# Patient Record
Sex: MALE | Race: WHITE | HISPANIC OR LATINO | URBAN - METROPOLITAN AREA
[De-identification: names, ages, dates, MRNs, and addresses within clinical notes are randomized per-mention and may not be internally consistent; named-entity substitution may affect disease eponyms.]

---

## 2021-06-27 ENCOUNTER — EMERGENCY (EMERGENCY)
Facility: HOSPITAL | Age: 71
LOS: 1 days | Discharge: HOME | End: 2021-06-27
Attending: EMERGENCY MEDICINE
Payer: MEDICARE

## 2021-06-27 VITALS
OXYGEN SATURATION: 96 % | TEMPERATURE: 99 F | WEIGHT: 169.54 LBS | DIASTOLIC BLOOD PRESSURE: 78 MMHG | SYSTOLIC BLOOD PRESSURE: 133 MMHG | RESPIRATION RATE: 16 BRPM | HEART RATE: 78 BPM | HEIGHT: 64 IN

## 2021-06-27 VITALS
HEART RATE: 72 BPM | TEMPERATURE: 99 F | DIASTOLIC BLOOD PRESSURE: 86 MMHG | OXYGEN SATURATION: 96 % | SYSTOLIC BLOOD PRESSURE: 149 MMHG | RESPIRATION RATE: 16 BRPM

## 2021-06-27 LAB
ALBUMIN SERPL ELPH-MCNC: 4 G/DL — SIGNIFICANT CHANGE UP (ref 3.3–5.2)
ALP SERPL-CCNC: 79 U/L — SIGNIFICANT CHANGE UP (ref 40–120)
ALT FLD-CCNC: 21 U/L — SIGNIFICANT CHANGE UP
ANION GAP SERPL CALC-SCNC: 9 MMOL/L — SIGNIFICANT CHANGE UP (ref 5–17)
APTT BLD: 30.7 SEC — SIGNIFICANT CHANGE UP (ref 27.5–35.5)
AST SERPL-CCNC: 23 U/L — SIGNIFICANT CHANGE UP
BASE EXCESS BLDV CALC-SCNC: 4.2 MMOL/L — HIGH (ref -2–3)
BASOPHILS # BLD AUTO: 0.05 K/UL — SIGNIFICANT CHANGE UP (ref 0–0.2)
BASOPHILS NFR BLD AUTO: 0.6 % — SIGNIFICANT CHANGE UP (ref 0–2)
BILIRUB SERPL-MCNC: 0.5 MG/DL — SIGNIFICANT CHANGE UP (ref 0.4–2)
BLD GP AB SCN SERPL QL: SIGNIFICANT CHANGE UP
BUN SERPL-MCNC: 10.3 MG/DL — SIGNIFICANT CHANGE UP (ref 8–20)
CALCIUM SERPL-MCNC: 9.1 MG/DL — SIGNIFICANT CHANGE UP (ref 8.6–10.2)
CHLORIDE SERPL-SCNC: 103 MMOL/L — SIGNIFICANT CHANGE UP (ref 98–107)
CO2 SERPL-SCNC: 28 MMOL/L — SIGNIFICANT CHANGE UP (ref 22–29)
CREAT SERPL-MCNC: 0.82 MG/DL — SIGNIFICANT CHANGE UP (ref 0.5–1.3)
EOSINOPHIL # BLD AUTO: 0.23 K/UL — SIGNIFICANT CHANGE UP (ref 0–0.5)
EOSINOPHIL NFR BLD AUTO: 2.9 % — SIGNIFICANT CHANGE UP (ref 0–6)
GLUCOSE SERPL-MCNC: 158 MG/DL — HIGH (ref 70–99)
HCO3 BLDV-SCNC: 30 MMOL/L — HIGH (ref 22–29)
HCT VFR BLD CALC: 43.8 % — SIGNIFICANT CHANGE UP (ref 39–50)
HGB BLD-MCNC: 14.5 G/DL — SIGNIFICANT CHANGE UP (ref 13–17)
IMM GRANULOCYTES NFR BLD AUTO: 0.4 % — SIGNIFICANT CHANGE UP (ref 0–1.5)
INR BLD: 1.24 RATIO — HIGH (ref 0.88–1.16)
LYMPHOCYTES # BLD AUTO: 2.25 K/UL — SIGNIFICANT CHANGE UP (ref 1–3.3)
LYMPHOCYTES # BLD AUTO: 28.8 % — SIGNIFICANT CHANGE UP (ref 13–44)
MCHC RBC-ENTMCNC: 29.7 PG — SIGNIFICANT CHANGE UP (ref 27–34)
MCHC RBC-ENTMCNC: 33.1 GM/DL — SIGNIFICANT CHANGE UP (ref 32–36)
MCV RBC AUTO: 89.8 FL — SIGNIFICANT CHANGE UP (ref 80–100)
MONOCYTES # BLD AUTO: 0.72 K/UL — SIGNIFICANT CHANGE UP (ref 0–0.9)
MONOCYTES NFR BLD AUTO: 9.2 % — SIGNIFICANT CHANGE UP (ref 2–14)
NEUTROPHILS # BLD AUTO: 4.53 K/UL — SIGNIFICANT CHANGE UP (ref 1.8–7.4)
NEUTROPHILS NFR BLD AUTO: 58.1 % — SIGNIFICANT CHANGE UP (ref 43–77)
PCO2 BLDV: 54 MMHG — SIGNIFICANT CHANGE UP (ref 42–55)
PH BLDV: 7.35 — SIGNIFICANT CHANGE UP (ref 7.32–7.43)
PLATELET # BLD AUTO: 307 K/UL — SIGNIFICANT CHANGE UP (ref 150–400)
PO2 BLDV: 54 MMHG — HIGH (ref 25–45)
POTASSIUM SERPL-MCNC: 3.8 MMOL/L — SIGNIFICANT CHANGE UP (ref 3.5–5.3)
POTASSIUM SERPL-SCNC: 3.8 MMOL/L — SIGNIFICANT CHANGE UP (ref 3.5–5.3)
PROT SERPL-MCNC: 7.2 G/DL — SIGNIFICANT CHANGE UP (ref 6.6–8.7)
PROTHROM AB SERPL-ACNC: 14.2 SEC — HIGH (ref 10.6–13.6)
RBC # BLD: 4.88 M/UL — SIGNIFICANT CHANGE UP (ref 4.2–5.8)
RBC # FLD: 12.5 % — SIGNIFICANT CHANGE UP (ref 10.3–14.5)
SAO2 % BLDV: 86.3 % — SIGNIFICANT CHANGE UP
SODIUM SERPL-SCNC: 140 MMOL/L — SIGNIFICANT CHANGE UP (ref 135–145)
TROPONIN T SERPL-MCNC: <0.01 NG/ML — SIGNIFICANT CHANGE UP (ref 0–0.06)
WBC # BLD: 7.81 K/UL — SIGNIFICANT CHANGE UP (ref 3.8–10.5)
WBC # FLD AUTO: 7.81 K/UL — SIGNIFICANT CHANGE UP (ref 3.8–10.5)

## 2021-06-27 PROCEDURE — 99220: CPT

## 2021-06-27 PROCEDURE — 93005 ELECTROCARDIOGRAM TRACING: CPT

## 2021-06-27 PROCEDURE — 86900 BLOOD TYPING SEROLOGIC ABO: CPT

## 2021-06-27 PROCEDURE — 82803 BLOOD GASES ANY COMBINATION: CPT

## 2021-06-27 PROCEDURE — 70551 MRI BRAIN STEM W/O DYE: CPT

## 2021-06-27 PROCEDURE — 0042T: CPT

## 2021-06-27 PROCEDURE — 70496 CT ANGIOGRAPHY HEAD: CPT

## 2021-06-27 PROCEDURE — 85610 PROTHROMBIN TIME: CPT

## 2021-06-27 PROCEDURE — 99291 CRITICAL CARE FIRST HOUR: CPT | Mod: 25

## 2021-06-27 PROCEDURE — 93010 ELECTROCARDIOGRAM REPORT: CPT

## 2021-06-27 PROCEDURE — 82962 GLUCOSE BLOOD TEST: CPT

## 2021-06-27 PROCEDURE — 85025 COMPLETE CBC W/AUTO DIFF WBC: CPT

## 2021-06-27 PROCEDURE — 85730 THROMBOPLASTIN TIME PARTIAL: CPT

## 2021-06-27 PROCEDURE — 70498 CT ANGIOGRAPHY NECK: CPT | Mod: 26,MA

## 2021-06-27 PROCEDURE — 70450 CT HEAD/BRAIN W/O DYE: CPT | Mod: 26,59,MA

## 2021-06-27 PROCEDURE — 86901 BLOOD TYPING SEROLOGIC RH(D): CPT

## 2021-06-27 PROCEDURE — 70450 CT HEAD/BRAIN W/O DYE: CPT

## 2021-06-27 PROCEDURE — 70551 MRI BRAIN STEM W/O DYE: CPT | Mod: 26,MA

## 2021-06-27 PROCEDURE — 86850 RBC ANTIBODY SCREEN: CPT

## 2021-06-27 PROCEDURE — 36415 COLL VENOUS BLD VENIPUNCTURE: CPT

## 2021-06-27 PROCEDURE — 71045 X-RAY EXAM CHEST 1 VIEW: CPT | Mod: 26

## 2021-06-27 PROCEDURE — G0378: CPT

## 2021-06-27 PROCEDURE — 71045 X-RAY EXAM CHEST 1 VIEW: CPT

## 2021-06-27 PROCEDURE — 70496 CT ANGIOGRAPHY HEAD: CPT | Mod: 26,MA

## 2021-06-27 PROCEDURE — 80053 COMPREHEN METABOLIC PANEL: CPT

## 2021-06-27 PROCEDURE — 84484 ASSAY OF TROPONIN QUANT: CPT

## 2021-06-27 PROCEDURE — 70498 CT ANGIOGRAPHY NECK: CPT

## 2021-06-27 RX ORDER — ATORVASTATIN CALCIUM 80 MG/1
20 TABLET, FILM COATED ORAL AT BEDTIME
Refills: 0 | Status: DISCONTINUED | OUTPATIENT
Start: 2021-06-27 | End: 2021-07-02

## 2021-06-27 NOTE — ED ADULT NURSE NOTE - OBJECTIVE STATEMENT
pt A&OX4, states that around 10am pt was outside gardening and a neighbors was talking to him and he was unable to respond pt then went inside and had difficulty talking with son episode lasted a couple minutes and then resolved, resp even and unlabored no distress noted, abd soft NTND, pt able to ambulate and move all extems without difficulty

## 2021-06-27 NOTE — ED CDU PROVIDER DISPOSITION NOTE - CARE PROVIDER_API CALL
Aldair Kelly; PhD)  Neurology; Vascular Neurology  [address] [place] [address]  Phone: [phone]  Fax: [phone]  Follow Up Time:

## 2021-06-27 NOTE — ED ADULT NURSE REASSESSMENT NOTE - GLASGOW COMA SCALE: EYE OPENING
Use steroid cream twice a day for 1 week and call if no improvement   PAST MEDICAL HISTORY:  CAD (coronary artery disease) s/p stents 2017 at [facility]?    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

## 2021-06-27 NOTE — ED PROVIDER NOTE - ATTENDING CONTRIBUTION TO CARE
71yoM; with pmh signif for DM, HLD; now p/w expressive aphasia.  patient LKW 10am. states he was out in the garden and someone said hello on the street, but patient was unable to respond although he knew the words he wanted to state. patient states he went into the home and again experienced expressive aphasia when attempting to speak to his son. states he had 3 similar episodes.  states he felt heaviness of the tongue at that time. denies headache. denies numbness/tingling. denies any other focal weakness.  Gen: Alert, NAD  Head: NC, AT, PERRL, EOMI, normal lids/conjunctiva  Neck: +supple, no tenderness/meningismus/JVD, +Trachea midline  Pulm: Bilateral BS, normal resp effort, no wheeze/stridor/retractions  CV: RRR, no M/R/G, 2+dist pulses  Abd: soft, NT/ND, +BS, no hepatosplenomegaly  Mskel: ROM intact x4 extremities.  no edema/erythema/cyanosis  Skin: no rash, warm, dry  Neuro: AAOx3, no sensory/motor deficits, CN 2-12 intact, NIHSS = 0  A/P:  71yoM p/w expressive aphasia, LKW = 10am  -code stroke activated, ct head and angio negative for acute findings, will place in obs for mri

## 2021-06-27 NOTE — ED ADULT TRIAGE NOTE - CHIEF COMPLAINT QUOTE
daughter reports pt is having intermittent trouble finding his words with the inability to speak at all and reporting that he feels like he is going to pass out when it happens. first episode happened at 10am then resolved. last known well 1:30pm before starting again.

## 2021-06-27 NOTE — ED CDU PROVIDER INITIAL DAY NOTE - MEDICAL DECISION MAKING DETAILS
72 y/o M with expressive aphasia that resolved PTA and has not recurred, workup negative so far, MRI pending

## 2021-06-27 NOTE — ED CDU PROVIDER INITIAL DAY NOTE - NS ED ROS FT
General: no fever or chills  Eyes: no redness, discharge,  no visual disturbances  ENT: no nasal congestion, sore throat  Cardiac: No chest pain, palpitations, peripheral edema  Respiratory: No cough, CHRISTIAN, SOB or wheeze  GI: No abdominal pain, N/V/D  : no dysuria, hematuria  Musculoskeletal: no joint pain, no back or neck pain  Neuro: No headache or dizziness  Skin: no itch or rash

## 2021-06-27 NOTE — ED CDU PROVIDER DISPOSITION NOTE - CLINICAL COURSE
pt is a 72 y/o male with a pmhx of DM, HLD presenting to the ed for expressive aphasia that resolved PTA, and has not recurred when in the ed, pt placed in observation for MR head - negative, pt to follow up with neurology, aspirin daily pt explained dc instructions

## 2021-06-27 NOTE — ED ADULT NURSE REASSESSMENT NOTE - NS ED NURSE REASSESS COMMENT FT1
Assumed care of patient from previous RN.  Patient back from MRI, cardiac monitoring continued.  Patients daughter present at bedside.  Patient offers no complaints at this time.  Patient awaiting for MRI results at this time.  VSS.  Safety maintained.
Report given to IRENE Cantor. Pt POC explained and verbalized understanding. Pt placed on obs awaiting MRI.
Assumed care of patient @ 1600.  Pt A&Ox4. Denies any c/o pain or discomfort at this time. Cardiac monitor in place, normal sinus rhythm. Patient in understanding of plan of care. Patient with no further questions for the RN. Resting in comfort. Call bell within reach and encouraged to use when assistance needed. Will continue to monitor.

## 2021-06-27 NOTE — ED CDU PROVIDER INITIAL DAY NOTE - OBJECTIVE STATEMENT
Pt is a 70 y/o M w/PMHx DM, HLD presents c/o expressive aphasia.  Pt states that he was outside gardening and a passer by said hello to him and he was unable to respond.  He went inside and was also having difficulty talking to his son.  The episode started around 10 am and lasted several minutes before resolving.  He states he felt his tongue was heavy, and was unable to speak at all.  Pt states he has not had these symptoms before, and feels that everything has returned to normal for him.  Pt denies headache, fever, chest pain, cob, abdominal pain.  NO PSH  Smoking: never  CT scan, CTA's and perfusion negative for acute pathology

## 2021-06-27 NOTE — ED CDU PROVIDER DISPOSITION NOTE - PATIENT PORTAL LINK FT
You can access the FollowMyHealth Patient Portal offered by [facility] by registering at the following website: [email or web address]. By joining [employer]’s FollowMyHealth portal, you will also be able to view your health information using other applications (apps) compatible with our system.

## 2021-06-27 NOTE — ED CDU PROVIDER INITIAL DAY NOTE - PHYSICAL EXAMINATION
Constitutional: Awake and alert, in NAD  Eyes: clear bilaterally  Cardiac: S1S2, RR, no murmur  Resp: CTA/BL, no use of accessory muscles  GI: +BS x 4, soft, NTTP  MSK:  no bony deformity, no limited ROM  Neuro: A&Ox3, CN II-XI GI, THOMAS x4, 5/5 motors throughout, = sensation, no pronator drift  Skin: warm and dry

## 2021-06-27 NOTE — ED PROVIDER NOTE - OBJECTIVE STATEMENT
Pt is a 72 y/o M w/PMHx DM, HLD presents c/o expressive aphasia.  Pt states that he was outside gardening and a passer by said hello to him and he was unable to respond.  He went inside and was also having difficulty talking to his son.  The episode started around 10 am and lasted several minutes before resolving.  He states he felt his tongue was heavy, and was unable to speak at all.  Pt states he has not had these symptoms before, and feels that everything has returned to normal for him.  Pt denies headache, fever, chest pain, cob, abdominal pain.

## 2021-06-27 NOTE — ED PROVIDER NOTE - PHYSICAL EXAMINATION
General: well appearing, interactive, well nourished, NAD  HEENT: pupils equal and reactive, normal external ears bilaterally   Cardiac: RRR, no MRG appreciated  Resp: lungs clear to auscultation bilaterally, symmetric chest wall rise  Abd: soft, nontender, nondistended,   : no CVA tenderness  Neuro: Moving all extremities, no focal neurological deficits, normal gait  Skin:  normal color for race

## 2021-06-27 NOTE — ED CDU PROVIDER INITIAL DAY NOTE - ATTENDING CONTRIBUTION TO CARE
I, Estela Ponce MD have personally performed a face to face diagnostic evaluation on this patient.  I have reviewed the ACP note and agree with the history, exam, and plan of care, except as noted.     Exam:  Head: atraumatic, normacephalic  Face: atraumatic, no crepitus no orbiral/maxillary/mandibular ttp  throat: uvula midline no exudates  eyes: perrla eomi  heart: rrr s1s2  lungs: ctab  abd: soft, nt nd +bs no rebound/guarding no cva ttp  skin: warm  LE: no swelling, no calf ttp  back: no midline cervical/thoracic/lumbar ttp  neuro: aao x 3 nih 0 cn iii-xii intact normal finger to nose strenth  5/5 in upper and lower    in obs for tia/cva work up; pending mri no symptoms at this time

## 2021-06-27 NOTE — ED ADULT NURSE REASSESSMENT NOTE - COMFORT CARE
No
meal provided/plan of care explained/wait time explained/warm blanket provided
Attending Attestation (For Attendings USE Only)...

## 2021-07-08 ENCOUNTER — INPATIENT (INPATIENT)
Facility: HOSPITAL | Age: 71
LOS: 5 days | Discharge: HOME | DRG: 872 | End: 2021-07-14
Attending: HOSPITALIST | Admitting: HOSPITALIST
Payer: MEDICARE

## 2021-07-08 VITALS
WEIGHT: 167.99 LBS | TEMPERATURE: 100 F | OXYGEN SATURATION: 96 % | RESPIRATION RATE: 20 BRPM | SYSTOLIC BLOOD PRESSURE: 105 MMHG | DIASTOLIC BLOOD PRESSURE: 70 MMHG | HEIGHT: 64 IN | HEART RATE: 112 BPM

## 2021-07-08 DIAGNOSIS — A41.9 SEPSIS, UNSPECIFIED ORGANISM: ICD-10-CM

## 2021-07-08 LAB
ANION GAP SERPL CALC-SCNC: 13 MMOL/L — SIGNIFICANT CHANGE UP (ref 5–17)
APPEARANCE UR: CLEAR — SIGNIFICANT CHANGE UP
BACTERIA # UR AUTO: NEGATIVE — SIGNIFICANT CHANGE UP
BASOPHILS # BLD AUTO: 0.03 K/UL — SIGNIFICANT CHANGE UP (ref 0–0.2)
BASOPHILS NFR BLD AUTO: 0.2 % — SIGNIFICANT CHANGE UP (ref 0–2)
BILIRUB UR-MCNC: NEGATIVE — SIGNIFICANT CHANGE UP
BUN SERPL-MCNC: 15.1 MG/DL — SIGNIFICANT CHANGE UP (ref 8–20)
CALCIUM SERPL-MCNC: 9.7 MG/DL — SIGNIFICANT CHANGE UP (ref 8.6–10.2)
CHLORIDE SERPL-SCNC: 95 MMOL/L — LOW (ref 98–107)
CO2 SERPL-SCNC: 28 MMOL/L — SIGNIFICANT CHANGE UP (ref 22–29)
COLOR SPEC: YELLOW — SIGNIFICANT CHANGE UP
CREAT SERPL-MCNC: 0.79 MG/DL — SIGNIFICANT CHANGE UP (ref 0.5–1.3)
DIFF PNL FLD: ABNORMAL
EOSINOPHIL # BLD AUTO: 0.04 K/UL — SIGNIFICANT CHANGE UP (ref 0–0.5)
EOSINOPHIL NFR BLD AUTO: 0.3 % — SIGNIFICANT CHANGE UP (ref 0–6)
EPI CELLS # UR: NEGATIVE — SIGNIFICANT CHANGE UP
GLUCOSE BLDC GLUCOMTR-MCNC: 121 MG/DL — HIGH (ref 70–99)
GLUCOSE SERPL-MCNC: 236 MG/DL — HIGH (ref 70–99)
GLUCOSE UR QL: 1000 MG/DL
HCT VFR BLD CALC: 44.1 % — SIGNIFICANT CHANGE UP (ref 39–50)
HGB BLD-MCNC: 14.9 G/DL — SIGNIFICANT CHANGE UP (ref 13–17)
IMM GRANULOCYTES NFR BLD AUTO: 0.6 % — SIGNIFICANT CHANGE UP (ref 0–1.5)
KETONES UR-MCNC: NEGATIVE — SIGNIFICANT CHANGE UP
LACTATE BLDV-MCNC: 1.2 MMOL/L — SIGNIFICANT CHANGE UP (ref 0.5–2)
LACTATE BLDV-MCNC: 2.5 MMOL/L — HIGH (ref 0.5–2)
LEUKOCYTE ESTERASE UR-ACNC: ABNORMAL
LYMPHOCYTES # BLD AUTO: 1.27 K/UL — SIGNIFICANT CHANGE UP (ref 1–3.3)
LYMPHOCYTES # BLD AUTO: 9.4 % — LOW (ref 13–44)
MCHC RBC-ENTMCNC: 29.4 PG — SIGNIFICANT CHANGE UP (ref 27–34)
MCHC RBC-ENTMCNC: 33.8 GM/DL — SIGNIFICANT CHANGE UP (ref 32–36)
MCV RBC AUTO: 87.2 FL — SIGNIFICANT CHANGE UP (ref 80–100)
MONOCYTES # BLD AUTO: 1.08 K/UL — HIGH (ref 0–0.9)
MONOCYTES NFR BLD AUTO: 8 % — SIGNIFICANT CHANGE UP (ref 2–14)
NEUTROPHILS # BLD AUTO: 11.01 K/UL — HIGH (ref 1.8–7.4)
NEUTROPHILS NFR BLD AUTO: 81.5 % — HIGH (ref 43–77)
NITRITE UR-MCNC: NEGATIVE — SIGNIFICANT CHANGE UP
PH UR: 8 — SIGNIFICANT CHANGE UP (ref 5–8)
PLATELET # BLD AUTO: 181 K/UL — SIGNIFICANT CHANGE UP (ref 150–400)
POTASSIUM SERPL-MCNC: 3.7 MMOL/L — SIGNIFICANT CHANGE UP (ref 3.5–5.3)
POTASSIUM SERPL-SCNC: 3.7 MMOL/L — SIGNIFICANT CHANGE UP (ref 3.5–5.3)
PROCALCITONIN SERPL-MCNC: 0.25 NG/ML — HIGH (ref 0.02–0.1)
PROT UR-MCNC: NEGATIVE MG/DL — SIGNIFICANT CHANGE UP
RAPID RVP RESULT: SIGNIFICANT CHANGE UP
RBC # BLD: 5.06 M/UL — SIGNIFICANT CHANGE UP (ref 4.2–5.8)
RBC # FLD: 12.9 % — SIGNIFICANT CHANGE UP (ref 10.3–14.5)
RBC CASTS # UR COMP ASSIST: SIGNIFICANT CHANGE UP /HPF (ref 0–4)
SARS-COV-2 RNA SPEC QL NAA+PROBE: SIGNIFICANT CHANGE UP
SODIUM SERPL-SCNC: 136 MMOL/L — SIGNIFICANT CHANGE UP (ref 135–145)
SP GR SPEC: 1.01 — SIGNIFICANT CHANGE UP (ref 1.01–1.02)
UROBILINOGEN FLD QL: NEGATIVE MG/DL — SIGNIFICANT CHANGE UP
WBC # BLD: 13.51 K/UL — HIGH (ref 3.8–10.5)
WBC # FLD AUTO: 13.51 K/UL — HIGH (ref 3.8–10.5)
WBC UR QL: SIGNIFICANT CHANGE UP

## 2021-07-08 PROCEDURE — 99223 1ST HOSP IP/OBS HIGH 75: CPT

## 2021-07-08 PROCEDURE — 71250 CT THORAX DX C-: CPT | Mod: 26

## 2021-07-08 PROCEDURE — 93010 ELECTROCARDIOGRAM REPORT: CPT

## 2021-07-08 PROCEDURE — 99285 EMERGENCY DEPT VISIT HI MDM: CPT

## 2021-07-08 RX ORDER — INSULIN LISPRO 100/ML
VIAL (ML) SUBCUTANEOUS AT BEDTIME
Refills: 0 | Status: DISCONTINUED | OUTPATIENT
Start: 2021-07-08 | End: 2021-07-14

## 2021-07-08 RX ORDER — ASPIRIN/CALCIUM CARB/MAGNESIUM 324 MG
1 TABLET ORAL
Qty: 0 | Refills: 0 | DISCHARGE

## 2021-07-08 RX ORDER — GLUCAGON INJECTION, SOLUTION 0.5 MG/.1ML
1 INJECTION, SOLUTION SUBCUTANEOUS ONCE
Refills: 0 | Status: DISCONTINUED | OUTPATIENT
Start: 2021-07-08 | End: 2021-07-14

## 2021-07-08 RX ORDER — DEXTROSE 50 % IN WATER 50 %
25 SYRINGE (ML) INTRAVENOUS ONCE
Refills: 0 | Status: DISCONTINUED | OUTPATIENT
Start: 2021-07-08 | End: 2021-07-14

## 2021-07-08 RX ORDER — SODIUM CHLORIDE 9 MG/ML
1000 INJECTION INTRAMUSCULAR; INTRAVENOUS; SUBCUTANEOUS ONCE
Refills: 0 | Status: COMPLETED | OUTPATIENT
Start: 2021-07-08 | End: 2021-07-08

## 2021-07-08 RX ORDER — ASPIRIN/CALCIUM CARB/MAGNESIUM 324 MG
81 TABLET ORAL DAILY
Refills: 0 | Status: DISCONTINUED | OUTPATIENT
Start: 2021-07-08 | End: 2021-07-14

## 2021-07-08 RX ORDER — DEXTROSE 50 % IN WATER 50 %
12.5 SYRINGE (ML) INTRAVENOUS ONCE
Refills: 0 | Status: DISCONTINUED | OUTPATIENT
Start: 2021-07-08 | End: 2021-07-14

## 2021-07-08 RX ORDER — ATORVASTATIN CALCIUM 80 MG/1
20 TABLET, FILM COATED ORAL AT BEDTIME
Refills: 0 | Status: DISCONTINUED | OUTPATIENT
Start: 2021-07-08 | End: 2021-07-14

## 2021-07-08 RX ORDER — DEXTROSE 50 % IN WATER 50 %
15 SYRINGE (ML) INTRAVENOUS ONCE
Refills: 0 | Status: DISCONTINUED | OUTPATIENT
Start: 2021-07-08 | End: 2021-07-14

## 2021-07-08 RX ORDER — SODIUM CHLORIDE 9 MG/ML
1000 INJECTION, SOLUTION INTRAVENOUS
Refills: 0 | Status: DISCONTINUED | OUTPATIENT
Start: 2021-07-08 | End: 2021-07-14

## 2021-07-08 RX ORDER — ENOXAPARIN SODIUM 100 MG/ML
40 INJECTION SUBCUTANEOUS DAILY
Refills: 0 | Status: DISCONTINUED | OUTPATIENT
Start: 2021-07-08 | End: 2021-07-14

## 2021-07-08 RX ORDER — ACETAMINOPHEN 500 MG
975 TABLET ORAL ONCE
Refills: 0 | Status: COMPLETED | OUTPATIENT
Start: 2021-07-08 | End: 2021-07-08

## 2021-07-08 RX ORDER — SODIUM CHLORIDE 9 MG/ML
1000 INJECTION, SOLUTION INTRAVENOUS
Refills: 0 | Status: DISCONTINUED | OUTPATIENT
Start: 2021-07-08 | End: 2021-07-12

## 2021-07-08 RX ORDER — PIPERACILLIN AND TAZOBACTAM 4; .5 G/20ML; G/20ML
3.38 INJECTION, POWDER, LYOPHILIZED, FOR SOLUTION INTRAVENOUS ONCE
Refills: 0 | Status: COMPLETED | OUTPATIENT
Start: 2021-07-08 | End: 2021-07-08

## 2021-07-08 RX ORDER — ATORVASTATIN CALCIUM 80 MG/1
1 TABLET, FILM COATED ORAL
Qty: 0 | Refills: 0 | DISCHARGE

## 2021-07-08 RX ORDER — PIPERACILLIN AND TAZOBACTAM 4; .5 G/20ML; G/20ML
3.38 INJECTION, POWDER, LYOPHILIZED, FOR SOLUTION INTRAVENOUS EVERY 8 HOURS
Refills: 0 | Status: DISCONTINUED | OUTPATIENT
Start: 2021-07-08 | End: 2021-07-10

## 2021-07-08 RX ORDER — INSULIN LISPRO 100/ML
VIAL (ML) SUBCUTANEOUS
Refills: 0 | Status: DISCONTINUED | OUTPATIENT
Start: 2021-07-08 | End: 2021-07-14

## 2021-07-08 RX ADMIN — ENOXAPARIN SODIUM 40 MILLIGRAM(S): 100 INJECTION SUBCUTANEOUS at 17:10

## 2021-07-08 RX ADMIN — Medication 975 MILLIGRAM(S): at 16:51

## 2021-07-08 RX ADMIN — PIPERACILLIN AND TAZOBACTAM 200 GRAM(S): 4; .5 INJECTION, POWDER, LYOPHILIZED, FOR SOLUTION INTRAVENOUS at 16:06

## 2021-07-08 RX ADMIN — SODIUM CHLORIDE 1000 MILLILITER(S): 9 INJECTION INTRAMUSCULAR; INTRAVENOUS; SUBCUTANEOUS at 16:40

## 2021-07-08 RX ADMIN — PIPERACILLIN AND TAZOBACTAM 25 GRAM(S): 4; .5 INJECTION, POWDER, LYOPHILIZED, FOR SOLUTION INTRAVENOUS at 21:58

## 2021-07-08 RX ADMIN — SODIUM CHLORIDE 75 MILLILITER(S): 9 INJECTION, SOLUTION INTRAVENOUS at 17:10

## 2021-07-08 RX ADMIN — SODIUM CHLORIDE 1000 MILLILITER(S): 9 INJECTION INTRAMUSCULAR; INTRAVENOUS; SUBCUTANEOUS at 14:58

## 2021-07-08 RX ADMIN — SODIUM CHLORIDE 1000 MILLILITER(S): 9 INJECTION INTRAMUSCULAR; INTRAVENOUS; SUBCUTANEOUS at 15:58

## 2021-07-08 RX ADMIN — PIPERACILLIN AND TAZOBACTAM 3.38 GRAM(S): 4; .5 INJECTION, POWDER, LYOPHILIZED, FOR SOLUTION INTRAVENOUS at 16:36

## 2021-07-08 RX ADMIN — Medication 975 MILLIGRAM(S): at 15:10

## 2021-07-08 NOTE — ED PROVIDER NOTE - CLINICAL SUMMARY MEDICAL DECISION MAKING FREE TEXT BOX
Patient presenting with persistent dysuria for 2 weeks. Patient evaluated for stroke with negative workup 2 weeks ago. Switched to Cefuroxime 1 week ago but with continued dysuria. Borderline febrile here.     Lab evaluation showing leukocytosis and lactic acidosis. With persistent symptoms for dysuria will move to admit.

## 2021-07-08 NOTE — H&P ADULT - NSICDXPASTMEDICALHX_GEN_ALL_CORE_FT
PAST MEDICAL HISTORY:  AMI (Acute Myocardial Infarction) 2001    Transient ischemic attack (TIA)

## 2021-07-08 NOTE — H&P ADULT - NSHPREVIEWOFSYSTEMS_GEN_ALL_CORE
Review of Systems:  CONSTITUTIONAL: +weakness, fevers and chills   EYES/ENT: No visual changes;  No vertigo or throat pain   NECK: No pain or stiffness  RESPIRATORY: No cough, wheezing, hemoptysis; No shortness of breath,   CARDIOVASCULAR: No chest pain or palpitations  GASTROINTESTINAL: No abdominal or epigastric pain. No nausea, vomiting, or hematemesis; No diarrhea or constipation.   GENITOURINARY: +dysuria  NEUROLOGICAL: No numbness or weakness  SKIN: No itching, burning, rashes, or lesions

## 2021-07-08 NOTE — ED PROVIDER NOTE - NS ED ROS FT
General: Denies fever, chills  HEENT: Denies visual changes, sore throat  Neck: Denies neck pain, neck stiffness  Resp: Denies coughing, SOB  Cardiovascular: Denies CP, palpitations, LE edema  GI: Denies abdominal pain, nausea, vomiting  : Endorses dysuria  MSK: Denies back pain  Neuro: Denies HA, dizziness, numbness  Skin: Denies rashes

## 2021-07-08 NOTE — ED PROVIDER NOTE - PHYSICAL EXAMINATION
General: Well appearing male in no acute distress  HEENT: Normocephalic, atraumatic. Moist mucous membranes.   Eyes: No scleral icterus. No conjunctival pallor. EOMI. CHAPARRITA.  Neck: Soft and supple. Full ROM without pain. No midline tenderness  Cardiac: Regular rate and regular rhythm. No murmurs  Resp: Lungs CTAB. No wheezes, rales or rhonchi.  Abd: Soft, non-tender, non-distended. No guarding or rebound.   Back: No CVA tenderness.    Skin: No rashes, abrasions, or lacerations.

## 2021-07-08 NOTE — ED ADULT TRIAGE NOTE - CHIEF COMPLAINT QUOTE
Patient BIBA to ED today from home with c/o fevers, never took a home temperature.  Patient being treated over the past 4 days for UTI.

## 2021-07-08 NOTE — ED ADULT NURSE NOTE - OBJECTIVE STATEMENT
Assumed care at 1318 pt in no apparent distress, co dizziness, weakness, fevers, burning on urination for about 2 weeks, pt denies any falls, blood in urine, N.V, Assumed care at 1318 pt in no apparent distress, co dizziness, weakness, fevers, burning on urination for about 2 weeks, pt denies any falls, blood in urine, N.V, pt recently diagnosed with UTI has been getting treatment for UTI.

## 2021-07-08 NOTE — H&P ADULT - ASSESSMENT
71 year old male with pmh of tia, hld, previous mi (2001) coming to hospital with complaints of fever, dysuria >1 week. per patient was at hospital 6/27 for stroke workup given confused (later resolved) and weak however nothing found on mri. states however dysuria worsening and now coming to hospital. states no cp abdominal pain. states outpatient was given cefuroxime 500bid which did not help. on arrival to ed found to be febrile, tachycardiac and with elevated white blood cell count    #sepsis- present on admission   - 2/2 uti vs diabetes (glycosuria) other etiology  - check ct a/p and chest   - check procal   - monitor cultures  - zosyn for now   - ID consult pending   - check tsh a1c and lipids would suspect patient with uncontrolled diabetes given glycosuria    #glycosuria  - check a1c   - given random glucose >200 will start iss  - sliding scale  - monitor fingersticks  - diabetic diet   - fi a1c >10 may need endo consult and diabetic teaching    #HLD  - statin     #hx of tia   - aspirin     #dvt prophylaxis  - lovenox SC  71 year old male with pmh of tia, hld, previous mi (2001) coming to hospital with complaints of fever, dysuria >1 week. per patient was at hospital 6/27 for stroke workup given confused (later resolved) and weak however nothing found on mri. states however dysuria worsening and now coming to hospital. states no cp abdominal pain. states outpatient was given cefuroxime 500bid which did not help. on arrival to ed found to be febrile, tachycardiac and with elevated white blood cell count    #sepsis- present on admission   - 2/2 uti vs diabetes (glycosuria) other etiology  - check ct a/p and chest   - check procal   - monitor cultures  - zosyn for now   - ID consult pending   - check tsh a1c and lipids would suspect patient with uncontrolled diabetes given glycosuria    #glycosuria  - check a1c   - given random glucose >200 will start iss  - sliding scale  - monitor fingersticks  - diabetic diet   - fi a1c >10 may need endo consult and diabetic teaching    #HLD  - statin     #hx of tia   - aspirin     #dvt prophylaxis  - lovenox SC   IMPROVE VTE Individual Risk Assessment  RISK                                                                Points  [  ] Previous VTE                                                  3  [  ] Thrombophilia                                               2  [  ] Lower limb paralysis                                      2        (unable to hold up >15 seconds)    [  ] Current Cancer                                              2         (within 6 months)  [  ] Immobilization > 24 hrs                                1  [  ] ICU/CCU stay > 24 hours                              1  [x  ] Age > 60                                                      1    IMPROVE VTE Score __1_____    IMPROVE Score 0-1: Low Risk, No VTE prophylaxis required for most patients, encourage ambulation.   IMPROVE Score 2-3: At risk, pharmacologic VTE prophylaxis is indicated for most patients (in the absence of a contraindication)  IMPROVE Score > or = 4: High Risk, pharmacologic VTE prophylaxis is indicated for most patients (in the absence of a contraindication)    case reviewed with patient family bedside and daughter on phone. all questions answered 71 year old male with pmh of tia, hld, previous mi (2001) coming to hospital with complaints of fever, dysuria >1 week. per patient was at hospital 6/27 for stroke workup given confused (later resolved) and weak however nothing found on mri. states however dysuria worsening and now coming to hospital. states no cp abdominal pain. states outpatient was given cefuroxime 500bid which did not help. on arrival to ed found to be febrile, tachycardiac and with elevated white blood cell count    #sepsis- present on admission   - 2/2 uti vs diabetes (glycosuria) other etiology  - check ct a/p and chest   - check procal   - monitor cultures  - zosyn for now   - ID consult pending   - check tsh a1c and lipids would suspect patient with uncontrolled diabetes given glycosuria  - w/ lactic acidosis on admit- monitor     #glycosuria  - check a1c   - given random glucose >200 will start iss  - sliding scale  - monitor fingersticks  - diabetic diet   - fi a1c >10 may need endo consult and diabetic teaching    #HLD  - statin     #hx of tia   - aspirin     #dvt prophylaxis  - lovenox SC   IMPROVE VTE Individual Risk Assessment  RISK                                                                Points  [  ] Previous VTE                                                  3  [  ] Thrombophilia                                               2  [  ] Lower limb paralysis                                      2        (unable to hold up >15 seconds)    [  ] Current Cancer                                              2         (within 6 months)  [  ] Immobilization > 24 hrs                                1  [  ] ICU/CCU stay > 24 hours                              1  [x  ] Age > 60                                                      1    IMPROVE VTE Score __1_____    IMPROVE Score 0-1: Low Risk, No VTE prophylaxis required for most patients, encourage ambulation.   IMPROVE Score 2-3: At risk, pharmacologic VTE prophylaxis is indicated for most patients (in the absence of a contraindication)  IMPROVE Score > or = 4: High Risk, pharmacologic VTE prophylaxis is indicated for most patients (in the absence of a contraindication)    case reviewed with patient family bedside and daughter on phone. all questions answered

## 2021-07-08 NOTE — ED PROVIDER NOTE - CARE PLAN
Principal Discharge DX:	Sepsis, due to unspecified organism, unspecified whether acute organ dysfunction present  Secondary Diagnosis:	Dysuria

## 2021-07-08 NOTE — ED PROVIDER NOTE - ATTENDING CONTRIBUTION TO CARE
I personally saw the patient with the resident, and completed the key components of the history and physical exam. I then discussed the management plan with the resident.    72 y/o M with PMH AMI, HLD presents for dysuria despite being on 2 different ABx, most recently Vantin x 1 week with fevers, chills, fatigue. His wife is concerned that he is having difficulty with word finding that was similar to his initial presentation on 6/27 for which he had a full stroke work up including MRI.     AP - well appearing, hot to touch, tachycardic, lungs cta b/l, abd soft, NT/ND, no CVAT.    Patient febrile, tachycardic, elevated WBC, meeting sepsis criteria, likely source urine, pending cultures, failed Vantin outpatient - will start zosyn, fluids, admit.

## 2021-07-08 NOTE — ED PROVIDER NOTE - OBJECTIVE STATEMENT
Pt is a 72yo M presenting with dysuria. He is accompanied by daughter and wife who provide history. They state that he was seen approx 2 weeks ago for stroke like symptoms. He had a full evaluation with CT imaging and MRI which was unremarkable for acute stroke. Patient reports that he has been having dysuria since he presented 2 weeks ago. He reports having followed with a urologist and was on an antibiotic that was then switched to Cefuroxime about 1 week ago. He reports persistent dysuria with some fevers yesterday and today. He denies any chest pain, nausea, vomiting, flank pain, hematuria, pyuria, confusion. Family notes some concern with abnormal mouth movements.

## 2021-07-08 NOTE — H&P ADULT - NSHPPHYSICALEXAM_GEN_ALL_CORE
Vital Signs Last 24 Hrs  T(F): 100.4 (08 Jul 2021 16:43), Max: 102.3 (08 Jul 2021 14:51)  HR: 89 (08 Jul 2021 16:43) (89 - 112)  BP: 99/60 (08 Jul 2021 16:43) (99/60 - 116/72)  RR: 18 (08 Jul 2021 16:43) (18 - 20)  SpO2: 95% (08 Jul 2021 16:43) (95% - 96%)    Physical Exam:  Constitutional: NAD, awake and alert, well-developed  Neck: Soft and supple, No LAD, No JVD  Respiratory: cta b/l no wheezing no rhonchi  Cardiovascular: +s1/s2 no edema b/l le  Gastrointestinal: soft nt nd bs+  Vascular: 2+ peripheral pulses  Neurological: A/O x 3  Musculoskeletal: 4/5 strength b/l upper and lower extremities  : Contraindicated  Breasts: Contraindicated  Rectal: Contraindicated

## 2021-07-08 NOTE — H&P ADULT - HISTORY OF PRESENT ILLNESS
71 year old male with pmh of tia, hld, previous mi (2001) coming to hospital with complaints of fever, dysuria >1 week. per patient was at hospital 6/27 for stroke workup given confused (later resolved) and weak however nothing found on mri. states however dysuria worsening and now coming to hospital. states no cp abdominal pain. states outpatient was given cefuroxime 500bid which did not help. on arrival to ed found to be febrile, tachycardiac and with elevated white blood cell count

## 2021-07-09 DIAGNOSIS — Z02.9 ENCOUNTER FOR ADMINISTRATIVE EXAMINATIONS, UNSPECIFIED: ICD-10-CM

## 2021-07-09 DIAGNOSIS — R81 GLYCOSURIA: ICD-10-CM

## 2021-07-09 DIAGNOSIS — A41.9 SEPSIS, UNSPECIFIED ORGANISM: ICD-10-CM

## 2021-07-09 LAB
A1C WITH ESTIMATED AVERAGE GLUCOSE RESULT: 6.4 % — HIGH (ref 4–5.6)
ANION GAP SERPL CALC-SCNC: 10 MMOL/L — SIGNIFICANT CHANGE UP (ref 5–17)
BASOPHILS # BLD AUTO: 0.05 K/UL — SIGNIFICANT CHANGE UP (ref 0–0.2)
BASOPHILS NFR BLD AUTO: 0.4 % — SIGNIFICANT CHANGE UP (ref 0–2)
BUN SERPL-MCNC: 10.4 MG/DL — SIGNIFICANT CHANGE UP (ref 8–20)
CALCIUM SERPL-MCNC: 8.7 MG/DL — SIGNIFICANT CHANGE UP (ref 8.6–10.2)
CHLORIDE SERPL-SCNC: 102 MMOL/L — SIGNIFICANT CHANGE UP (ref 98–107)
CHOLEST SERPL-MCNC: 118 MG/DL — SIGNIFICANT CHANGE UP
CO2 SERPL-SCNC: 26 MMOL/L — SIGNIFICANT CHANGE UP (ref 22–29)
COVID-19 SPIKE DOMAIN AB INTERP: POSITIVE
COVID-19 SPIKE DOMAIN ANTIBODY RESULT: >250 U/ML — HIGH
CREAT SERPL-MCNC: 0.8 MG/DL — SIGNIFICANT CHANGE UP (ref 0.5–1.3)
E COLI DNA BLD POS QL NAA+NON-PROBE: SIGNIFICANT CHANGE UP
EOSINOPHIL # BLD AUTO: 0.06 K/UL — SIGNIFICANT CHANGE UP (ref 0–0.5)
EOSINOPHIL NFR BLD AUTO: 0.5 % — SIGNIFICANT CHANGE UP (ref 0–6)
ESTIMATED AVERAGE GLUCOSE: 137 MG/DL — HIGH (ref 68–114)
GLUCOSE BLDC GLUCOMTR-MCNC: 136 MG/DL — HIGH (ref 70–99)
GLUCOSE BLDC GLUCOMTR-MCNC: 154 MG/DL — HIGH (ref 70–99)
GLUCOSE BLDC GLUCOMTR-MCNC: 164 MG/DL — HIGH (ref 70–99)
GLUCOSE BLDC GLUCOMTR-MCNC: 194 MG/DL — HIGH (ref 70–99)
GLUCOSE SERPL-MCNC: 132 MG/DL — HIGH (ref 70–99)
GRAM STN FLD: SIGNIFICANT CHANGE UP
HCT VFR BLD CALC: 38.5 % — LOW (ref 39–50)
HDLC SERPL-MCNC: 38 MG/DL — LOW
HGB BLD-MCNC: 13 G/DL — SIGNIFICANT CHANGE UP (ref 13–17)
IMM GRANULOCYTES NFR BLD AUTO: 0.4 % — SIGNIFICANT CHANGE UP (ref 0–1.5)
LIPID PNL WITH DIRECT LDL SERPL: 61 MG/DL — SIGNIFICANT CHANGE UP
LYMPHOCYTES # BLD AUTO: 1.35 K/UL — SIGNIFICANT CHANGE UP (ref 1–3.3)
LYMPHOCYTES # BLD AUTO: 11.7 % — LOW (ref 13–44)
MAGNESIUM SERPL-MCNC: 1.4 MG/DL — LOW (ref 1.8–2.6)
MCHC RBC-ENTMCNC: 29.5 PG — SIGNIFICANT CHANGE UP (ref 27–34)
MCHC RBC-ENTMCNC: 33.8 GM/DL — SIGNIFICANT CHANGE UP (ref 32–36)
MCV RBC AUTO: 87.5 FL — SIGNIFICANT CHANGE UP (ref 80–100)
METHOD TYPE: SIGNIFICANT CHANGE UP
MONOCYTES # BLD AUTO: 1.05 K/UL — HIGH (ref 0–0.9)
MONOCYTES NFR BLD AUTO: 9.1 % — SIGNIFICANT CHANGE UP (ref 2–14)
NEUTROPHILS # BLD AUTO: 8.98 K/UL — HIGH (ref 1.8–7.4)
NEUTROPHILS NFR BLD AUTO: 77.9 % — HIGH (ref 43–77)
NON HDL CHOLESTEROL: 80 MG/DL — SIGNIFICANT CHANGE UP
ORGANISM # SPEC MICROSCOPIC CNT: SIGNIFICANT CHANGE UP
PHOSPHATE SERPL-MCNC: 2.4 MG/DL — SIGNIFICANT CHANGE UP (ref 2.4–4.7)
PLATELET # BLD AUTO: 155 K/UL — SIGNIFICANT CHANGE UP (ref 150–400)
POTASSIUM SERPL-MCNC: 4 MMOL/L — SIGNIFICANT CHANGE UP (ref 3.5–5.3)
POTASSIUM SERPL-SCNC: 4 MMOL/L — SIGNIFICANT CHANGE UP (ref 3.5–5.3)
RBC # BLD: 4.4 M/UL — SIGNIFICANT CHANGE UP (ref 4.2–5.8)
RBC # FLD: 13 % — SIGNIFICANT CHANGE UP (ref 10.3–14.5)
SARS-COV-2 IGG+IGM SERPL QL IA: >250 U/ML — HIGH
SARS-COV-2 IGG+IGM SERPL QL IA: POSITIVE
SODIUM SERPL-SCNC: 138 MMOL/L — SIGNIFICANT CHANGE UP (ref 135–145)
SPECIMEN SOURCE: SIGNIFICANT CHANGE UP
TRIGL SERPL-MCNC: 97 MG/DL — SIGNIFICANT CHANGE UP
TSH SERPL-MCNC: 0.64 UIU/ML — SIGNIFICANT CHANGE UP (ref 0.27–4.2)
WBC # BLD: 11.54 K/UL — HIGH (ref 3.8–10.5)
WBC # FLD AUTO: 11.54 K/UL — HIGH (ref 3.8–10.5)

## 2021-07-09 PROCEDURE — 76705 ECHO EXAM OF ABDOMEN: CPT | Mod: 26

## 2021-07-09 PROCEDURE — 99233 SBSQ HOSP IP/OBS HIGH 50: CPT

## 2021-07-09 PROCEDURE — 99223 1ST HOSP IP/OBS HIGH 75: CPT

## 2021-07-09 RX ORDER — ACETAMINOPHEN 500 MG
650 TABLET ORAL EVERY 6 HOURS
Refills: 0 | Status: DISCONTINUED | OUTPATIENT
Start: 2021-07-09 | End: 2021-07-14

## 2021-07-09 RX ORDER — MAGNESIUM SULFATE 500 MG/ML
1 VIAL (ML) INJECTION ONCE
Refills: 0 | Status: COMPLETED | OUTPATIENT
Start: 2021-07-09 | End: 2021-07-09

## 2021-07-09 RX ADMIN — ATORVASTATIN CALCIUM 20 MILLIGRAM(S): 80 TABLET, FILM COATED ORAL at 22:14

## 2021-07-09 RX ADMIN — ENOXAPARIN SODIUM 40 MILLIGRAM(S): 100 INJECTION SUBCUTANEOUS at 11:39

## 2021-07-09 RX ADMIN — SODIUM CHLORIDE 75 MILLILITER(S): 9 INJECTION, SOLUTION INTRAVENOUS at 22:14

## 2021-07-09 RX ADMIN — PIPERACILLIN AND TAZOBACTAM 25 GRAM(S): 4; .5 INJECTION, POWDER, LYOPHILIZED, FOR SOLUTION INTRAVENOUS at 05:09

## 2021-07-09 RX ADMIN — Medication 81 MILLIGRAM(S): at 11:39

## 2021-07-09 RX ADMIN — Medication 1: at 16:29

## 2021-07-09 RX ADMIN — Medication 1: at 11:39

## 2021-07-09 RX ADMIN — Medication 650 MILLIGRAM(S): at 08:25

## 2021-07-09 RX ADMIN — Medication 100 GRAM(S): at 08:55

## 2021-07-09 RX ADMIN — PIPERACILLIN AND TAZOBACTAM 25 GRAM(S): 4; .5 INJECTION, POWDER, LYOPHILIZED, FOR SOLUTION INTRAVENOUS at 16:29

## 2021-07-09 RX ADMIN — SODIUM CHLORIDE 75 MILLILITER(S): 9 INJECTION, SOLUTION INTRAVENOUS at 05:09

## 2021-07-09 RX ADMIN — Medication 650 MILLIGRAM(S): at 08:26

## 2021-07-09 RX ADMIN — PIPERACILLIN AND TAZOBACTAM 25 GRAM(S): 4; .5 INJECTION, POWDER, LYOPHILIZED, FOR SOLUTION INTRAVENOUS at 22:13

## 2021-07-09 NOTE — CONSULT NOTE ADULT - SUBJECTIVE AND OBJECTIVE BOX
[facility]  INFECTIOUS DISEASES AND INTERNAL MEDICINE at [place]  =======================================================  Chip Priest MD  Diplomates American Board of Internal Medicine and Infectious Diseases  Tel: [phone]      Fax: [phone]  =======================================================      [id number]  DOMINIC ALAMO    CC: Patient is a 71y old  Male who presents with a chief complaint of Dysuria (09 Jul 2021 12:13)      71y  Male       Past Medical & Surgical Hx:  PAST MEDICAL & SURGICAL HISTORY:  AMI (Acute Myocardial Infarction)  2001    Transient ischemic attack (TIA)    No Past Surgical History            Social Hx:    FAMILY HISTORY:  FH: CAD (coronary artery disease)        Allergies    iv dye (Unknown)  No Known Drug Allergies  SHRIMP (Unknown)    Intolerances             REVIEW OF SYSTEMS:  CONSTITUTIONAL:  No Fever or chills  HEENT:  No diplopia or blurred vision.  No earache, sore throat or runny nose.  CARDIOVASCULAR:  No pressure, squeezing, strangling, tightness, heaviness or aching about the chest, neck, axilla or epigastrium.  RESPIRATORY:  No cough, shortness of breath  GASTROINTESTINAL:  No nausea, vomiting or diarrhea.  GENITOURINARY:  No dysuria, frequency or urgency. No Blood in urine  MUSCULOSKELETAL:  no joint aches, no muscle pain  SKIN:  No change in skin, hair or nails.  NEUROLOGIC:  No Headaches, seizures or weakness.  PSYCHIATRIC:  No disorder of thought or mood.  ENDOCRINE:  No heat or cold intolerance  HEMATOLOGICAL:  No easy bruising or bleeding.       Physical Exam:    GEN: NAD, pleasant  HEENT: normocephalic and atraumatic. EOMI. PERRL.  Anicteric  NECK: Supple.   LUNGS: Clear to auscultation.  HEART: Regular rate and rhythm without murmur.  ABDOMEN: Soft, nontender, and nondistended.  Positive bowel sounds.    : No CVA tenderness  EXTREMITIES: Without any edema.  MSK: No joint swelling  NEUROLOGIC: Cranial nerves II through XII are grossly intact. No Focal Deficits  PSYCHIATRIC: Appropriate affect .  SKIN: No Rash        Vitals:    T(F): 100.3 (09 Jul 2021 10:34), Max: 101 (09 Jul 2021 07:37)  HR: 84 (09 Jul 2021 10:34)  BP: 101/64 (09 Jul 2021 10:34)  RR: 19 (09 Jul 2021 10:34)  SpO2: 95% (09 Jul 2021 10:34) (92% - 95%)  temp max in last 48H T(F): , Max: 102.3 (07-08-21 @ 14:51)    Current Antibiotics:  piperacillin/tazobactam IVPB.. 3.375 Gram(s) IV Intermittent every 8 hours    Other medications:  aspirin  chewable 81 milliGRAM(s) Oral daily  atorvastatin 20 milliGRAM(s) Oral at bedtime  dextrose 40% Gel 15 Gram(s) Oral once  dextrose 5%. 1000 milliLiter(s) IV Continuous <Continuous>  dextrose 5%. 1000 milliLiter(s) IV Continuous <Continuous>  dextrose 50% Injectable 25 Gram(s) IV Push once  dextrose 50% Injectable 12.5 Gram(s) IV Push once  dextrose 50% Injectable 25 Gram(s) IV Push once  enoxaparin Injectable 40 milliGRAM(s) SubCutaneous daily  glucagon  Injectable 1 milliGRAM(s) IntraMuscular once  insulin lispro (ADMELOG) corrective regimen sliding scale   SubCutaneous three times a day before meals  insulin lispro (ADMELOG) corrective regimen sliding scale   SubCutaneous at bedtime  lactated ringers. 1000 milliLiter(s) IV Continuous <Continuous>                            13.0   11.54 )-----------( 155      ( 09 Jul 2021 06:26 )             38.5     07-09    138  |  102  |  10.4  ----------------------------<  132<H>  4.0   |  26.0  |  0.80    Ca    8.7      09 Jul 2021 06:26  Phos  2.4     07-09  Mg     1.4     07-09      RECENT CULTURES:  07-08 @ 16:51          NotDete  07-08 @ 15:18 .Blood Blood Blood Culture PCR      Growth in anaerobic bottle: Gram Negative Rods  ***Blood Panel PCR results on this specimen are available  approximately 3 hours after the Gram stain result.***  Gram stain, PCR, and/or culture results may not always  correspond due to difference in methodologies.  ************************************************************  This PCR assay was performed using [employer].  The following targets are tested for: Enterococcus,  vancomycin resistant enterococci, Listeria monocytogenes,  coagulase negative staphylococci, S. aureus,  methicillin resistant S. aureus, Streptococcus agalactiae  (Group B), S. pneumoniae, S. pyogenes (Group A),  Acinetobacter baumannii, Enterobacter cloacae, E. coli,  Klebsiella oxytoca, K. pneumoniae, Proteus sp.,  Serratia marcescens, Haemophilus influenzae,  Neisseria meningitidis, Pseudomonas aeruginosa, Candida  albicans, C. glabrata, C krusei, C parapsilosis,  C. tropicalis and the KPC resistance gene.  "Due to technical problems, Pseudomonas aeruginosa  will Not be reported as part of the BCID panel  until further notice".  Gram Stain and BCID performed by:  [facility]  [address] [place] [id number]  .  TYPE: (C=Critical, N=Notification, A=Abnormal) C  TESTS:  _   DATE/TIME CALLED: _ 07/09/2021 09:12:19  CALLED TO: Tim Chacon RN    READ BACK (2 Patient Identifiers)(Y/N): _ Y  READ BACK VALUES (Y/N): _ Y  CALLED BY: Tim Otero          WBC Count: 11.54 K/uL (07-09-21 @ 06:26)  WBC Count: 13.51 K/uL (07-08-21 @ 14:15)    Creatinine, Serum: 0.80 mg/dL (07-09-21 @ 06:26)  Creatinine, Serum: 0.79 mg/dL (07-08-21 @ 14:15)          Procalcitonin, Serum: 0.25 ng/mL (07-08-21 @ 20:47)     SARS-CoV-2: NotDetec (07-08-21 @ 16:51)  Rapid RVP Result: NotDetec (07-08-21 @ 16:51)     [facility]  INFECTIOUS DISEASES AND INTERNAL MEDICINE at [place]  =======================================================  Chip Priest MD  Diplomates American Board of Internal Medicine and Infectious Diseases  Tel: [phone]      Fax: [phone]  =======================================================      N-[id number]  DOMINIC ALAMO    CC: Patient is a 71y old  Male who presents with a chief complaint of Dysuria (09 Jul 2021 12:13)      71 year old male with pmh of tia, hld, previous mi (2001) coming to hospital with complaints of fever, dysuria >1 week. per patient was at hospital 6/27 for stroke workup given confused (later resolved) and weak however nothing found on mri. states however dysuria worsening and now coming to hospital. states no cp abdominal pain. states outpatient was given cefuroxime 500bid which did not help. on arrival to ed found to be febrile, tachycardiac and with elevated white blood cell count      Past Medical & Surgical Hx:  PAST MEDICAL & SURGICAL HISTORY:  AMI (Acute Myocardial Infarction)  2001    Transient ischemic attack (TIA)    No Past Surgical History            Social Hx:non smoker    FAMILY HISTORY:  FH: CAD (coronary artery disease)        Allergies    iv dye (Unknown)  No Known Drug Allergies  SHRIMP (Unknown)    Intolerances             REVIEW OF SYSTEMS:  CONSTITUTIONAL:  No Fever or chills  HEENT:  No diplopia or blurred vision.  No earache, sore throat or runny nose.  CARDIOVASCULAR:  No pressure, squeezing, strangling, tightness, heaviness or aching about the chest, neck, axilla or epigastrium.  RESPIRATORY:  No cough, shortness of breath  GASTROINTESTINAL:  No nausea, vomiting or diarrhea.  GENITOURINARY:  No dysuria, frequency or urgency. No Blood in urine  MUSCULOSKELETAL:  no joint aches, no muscle pain  SKIN:  No change in skin, hair or nails.  NEUROLOGIC:  No Headaches, seizures or weakness.  PSYCHIATRIC:  No disorder of thought or mood.  ENDOCRINE:  No heat or cold intolerance  HEMATOLOGICAL:  No easy bruising or bleeding.       Physical Exam:    GEN: NAD, pleasant  HEENT: normocephalic and atraumatic. EOMI. PERRL.  Anicteric  NECK: Supple.   LUNGS: Clear to auscultation.  HEART: Regular rate and rhythm without murmur.  ABDOMEN: Soft, nontender, and nondistended.  Positive bowel sounds.    : No CVA tenderness  EXTREMITIES: Without any edema.  MSK: No joint swelling  NEUROLOGIC: Cranial nerves II through XII are grossly intact. No Focal Deficits  PSYCHIATRIC: Appropriate affect .  SKIN: No Rash        Vitals:    T(F): 100.3 (09 Jul 2021 10:34), Max: 101 (09 Jul 2021 07:37)  HR: 84 (09 Jul 2021 10:34)  BP: 101/64 (09 Jul 2021 10:34)  RR: 19 (09 Jul 2021 10:34)  SpO2: 95% (09 Jul 2021 10:34) (92% - 95%)  temp max in last 48H T(F): , Max: 102.3 (07-08-21 @ 14:51)    Current Antibiotics:  piperacillin/tazobactam IVPB.. 3.375 Gram(s) IV Intermittent every 8 hours    Other medications:  aspirin  chewable 81 milliGRAM(s) Oral daily  atorvastatin 20 milliGRAM(s) Oral at bedtime  dextrose 40% Gel 15 Gram(s) Oral once  dextrose 5%. 1000 milliLiter(s) IV Continuous <Continuous>  dextrose 5%. 1000 milliLiter(s) IV Continuous <Continuous>  dextrose 50% Injectable 25 Gram(s) IV Push once  dextrose 50% Injectable 12.5 Gram(s) IV Push once  dextrose 50% Injectable 25 Gram(s) IV Push once  enoxaparin Injectable 40 milliGRAM(s) SubCutaneous daily  glucagon  Injectable 1 milliGRAM(s) IntraMuscular once  insulin lispro (ADMELOG) corrective regimen sliding scale   SubCutaneous three times a day before meals  insulin lispro (ADMELOG) corrective regimen sliding scale   SubCutaneous at bedtime  lactated ringers. 1000 milliLiter(s) IV Continuous <Continuous>                            13.0   11.54 )-----------( 155      ( 09 Jul 2021 06:26 )             38.5     07-09    138  |  102  |  10.4  ----------------------------<  132<H>  4.0   |  26.0  |  0.80    Ca    8.7      09 Jul 2021 06:26  Phos  2.4     07-09  Mg     1.4     07-09      RECENT CULTURES:  07-08 @ 16:51          NotDetec  07-08 @ 15:18 .Blood Blood Blood Culture PCR      Growth in anaerobic bottle: Gram Negative Rods  ***Blood Panel PCR results on this specimen are available  approximately 3 hours after the Gram stain result.***  Gram stain, PCR, and/or culture results may not always  correspond due to difference in methodologies.  ************************************************************  This PCR assay was performed using [employer].  The following targets are tested for: Enterococcus,  vancomycin resistant enterococci, Listeria monocytogenes,  coagulase negative staphylococci, S. aureus,  methicillin resistant S. aureus, Streptococcus agalactiae  (Group B), S. pneumoniae, S. pyogenes (Group A),  Acinetobacter baumannii, Enterobacter cloacae, E. coli,  Klebsiella oxytoca, K. pneumoniae, Proteus sp.,  Serratia marcescens, Haemophilus influenzae,  Neisseria meningitidis, Pseudomonas aeruginosa, Candida  albicans, C. glabrata, C krusei, C parapsilosis,  C. tropicalis and the KPC resistance gene.  "Due to technical problems, Pseudomonas aeruginosa  will Not be reported as part of the BCID panel  until further notice".  Gram Stain and BCID performed by:  [facility]  [address] [place] [id number]  .  TYPE: (C=Critical, N=Notification, A=Abnormal) C  TESTS:  _ GS  DATE/TIME CALLED: _ 07/09/2021 09:12:19  CALLED TO: Tim Chacon RN    READ BACK (2 Patient Identifiers)(Y/N): _ Y  READ BACK VALUES (Y/N): _ Y  CALLED BY: Tim Otero          WBC Count: 11.54 K/uL (07-09-21 @ 06:26)  WBC Count: 13.51 K/uL (07-08-21 @ 14:15)    Creatinine, Serum: 0.80 mg/dL (07-09-21 @ 06:26)  Creatinine, Serum: 0.79 mg/dL (07-08-21 @ 14:15)          Procalcitonin, Serum: 0.25 ng/mL (07-08-21 @ 20:47)     SARS-CoV-2: NotDetec (07-08-21 @ 16:51)  Rapid RVP Result: NotDetec (07-08-21 @ 16:51)    < from: CT Chest No Cont (07.08.21 @ 21:17) >  FINDINGS: Evaluation of the thoracic organs/vasculature is limited without intravenous contrast. The patient's respiratory motion degrades images.    LUNGS AND AIRWAYS: Patent central airways. No focal consolidation. Subsegmental bibasilar atelectasis. Small opacities along the left major fissure as well as 0.2 cm nodule in the left upper lobe (3:49), noted on 6/27/2021. Additional scattered bilateral pulmonary nodules, for example, 0.5 x 0.3 cm in the right middle lobe (3:80) and 0.6 x 0.4 cm in the right lower lobe (3:96). Small opacities along the right major fissure, suggestive of intrafissural lymph nodes.  PLEURA: No pleural effusion or pneumothorax.  HEART: Normal size. No pericardial effusion.  VESSELS: Atherosclerosis. Normal caliber of the thoracic aorta.  MEDIASTINUM AND ALMA DELIA: Subcentimeter lymph nodes without lymphadenopathy.  CHEST WALL AND LOWER NECK: Unremarkable.  UPPER ABDOMEN: Somewhat curvilinear densities in the gallbladder lumen. Nonspecific calcification at the gastrohepatic region. Nonspecific bilateral perinephric stranding.  BONES: Degenerative changes of the spine.    IMPRESSION:    No focal consolidation. Subsegmental bibasilar atelectasis. Nonspecific bilateral pulmonary nodules as described. Differential diagnosis includes infection, inflammation and neoplasm. Recommend comparison to with prior study when it becomes available. Follow-up may be obtained following completion of treatment.    Densities in the gallbladder, which may represent stones/sludge. Follow-upright upper quadrant may be obtained as indicated.    Additional findings as described.    --- End of Report ---    < end of copied text >

## 2021-07-09 NOTE — PROGRESS NOTE ADULT - PROBLEM SELECTOR PLAN 3
- DVT ppx- Lovenoc 40 mg QD  - Diet- DASH  - Dispo- pending sensitivities and source control. Monitor fever curve.  - PT- home - DVT ppx- Lovenox 40 mg QD  - Diet- DASH  - Dispo- pending sensitivities and source control. Monitor fever curve.  - PT recs- home - DVT ppx- Lovenox 40 mg QD  - Diet- DASH  - Dispo- pending sensitivities and source control. Monitor fever curve.  - PT recs- home  - Updated patient's wife (Fátima)- - DVT ppx- Lovenox 40 mg QD  - Diet- DASH  - Dispo- pending sensitivities and source control. Monitor fever curve.  - PT recs- home  - Updated patient's daughter - DVT ppx- Lovenox 40 mg QD  - Diet- DASH  - Dispo- pending sensitivities and source control. Monitor fever curve.  - PT recs- home  - Updated patient's daughter, Linda (7/9)

## 2021-07-09 NOTE — PATIENT PROFILE ADULT - NSTRANSFERBELONGINGSDISPO_GEN_A_NUR
Ambulatory from department without difficulty. No distress noted. Pt instructed to follow up with family doctor or doctor referred by ED physician. Pt also given number to the Pt advocate. Pt reports no further needs or questions prior to discharge.      Kymberly Barba RN  05/18/19 [id number]
with patient

## 2021-07-09 NOTE — CONSULT NOTE ADULT - ASSESSMENT
71 year old male with pmh of tia, hld, previous mi (2001) coming to hospital with complaints of fever, dysuria >1 week. per patient was at hospital 6/27 for stroke workup given confused (later resolved) and weak however nothing found on mri. states however dysuria worsening and now coming to hospital. states no cp abdominal pain. states outpatient was given cefuroxime 500bid which did not help. on arrival to ed found to be febrile, tachycardiac and with elevated white blood cell count. Found to have E.coli bacteremia-unclear source GI vs     - Blood cultures + e/coli f/u sensitivities  - Repeat blood cultures x2  - Ct chest with densities in gallbladder USg pending   - suggest Ct a/p  - c/w zosyn  - Trend Fever  - Trend Leukocytosis      Will Follow

## 2021-07-09 NOTE — PROGRESS NOTE ADULT - SUBJECTIVE AND OBJECTIVE BOX
[facility] Division of Hospital Medicine    Chief Complaint:  Dysuria    SUBJECTIVE / OVERNIGHT EVENTS: No acute events overnight. Patient was febrile to 101F. He endorses not feeling good and feels "warm." Denies c/h/d/n/v, urinaru frequency/urgency.    Patient denies chest pain, SOB, abd pain, N/V, fever, chills, dysuria or any other complaints. All remainder ROS negative.     MEDICATIONS  (STANDING):  aspirin  chewable 81 milliGRAM(s) Oral daily  atorvastatin 20 milliGRAM(s) Oral at bedtime  dextrose 40% Gel 15 Gram(s) Oral once  dextrose 5%. 1000 milliLiter(s) (50 mL/Hr) IV Continuous <Continuous>  dextrose 5%. 1000 milliLiter(s) (100 mL/Hr) IV Continuous <Continuous>  dextrose 50% Injectable 25 Gram(s) IV Push once  dextrose 50% Injectable 12.5 Gram(s) IV Push once  dextrose 50% Injectable 25 Gram(s) IV Push once  enoxaparin Injectable 40 milliGRAM(s) SubCutaneous daily  glucagon  Injectable 1 milliGRAM(s) IntraMuscular once  insulin lispro (ADMELOG) corrective regimen sliding scale   SubCutaneous three times a day before meals  insulin lispro (ADMELOG) corrective regimen sliding scale   SubCutaneous at bedtime  lactated ringers. 1000 milliLiter(s) (75 mL/Hr) IV Continuous <Continuous>  piperacillin/tazobactam IVPB.. 3.375 Gram(s) IV Intermittent every 8 hours    MEDICATIONS  (PRN):  acetaminophen   Tablet .. 650 milliGRAM(s) Oral every 6 hours PRN Temp greater or equal to 38.5C (101.3F)        I&O's Summary      PHYSICAL EXAM:  Vital Signs Last 24 Hrs  T(C): 37.9 (2021 10:34), Max: 39.1 (2021 14:51)  T(F): 100.3 (2021 10:34), Max: 102.3 (2021 14:51)  HR: 84 (2021 10:34) (84 - 103)  BP: 101/64 (2021 10:34) (99/60 - 130/74)  BP(mean): --  RR: 19 (2021 10:34) (18 - 20)  SpO2: 95% (2021 10:34) (92% - 95%)        CONSTITUTIONAL: NAD, well-developed, well-groomed  ENMT: Moist oral mucosa, no pharyngeal injection or exudates; normal dentition  RESPIRATORY: Normal respiratory effort; lungs are clear to auscultation bilaterally  CARDIOVASCULAR: Regular rate and rhythm, normal S1 and S2, no murmur/rub/gallop; No lower extremity edema; Peripheral pulses are 2+ bilaterally  ABDOMEN: Nontender to palpation, normoactive bowel sounds, no rebound/guarding; No hepatosplenomegaly  MUSCLOSKELETAL:  Normal gait; no clubbing or cyanosis of digits; no joint swelling or tenderness to palpation  PSYCH: A+O to person, place, and time; affect appropriate  NEUROLOGY: CN 2-12 are intact and symmetric; no gross sensory deficits;   SKIN: No rashes; no palpable lesions    LABS:                        13.0   11.54 )-----------( 155      ( 2021 06:26 )             38.5     07-09    138  |  102  |  10.4  ----------------------------<  132<H>  4.0   |  26.0  |  0.80    Ca    8.7      2021 06:26  Phos  2.4     07-09  Mg     1.4     07-09            Urinalysis Basic - ( 2021 14:16 )    Color: Yellow / Appearance: Clear / S.015 / pH: x  Gluc: x / Ketone: Negative  / Bili: Negative / Urobili: Negative mg/dL   Blood: x / Protein: Negative mg/dL / Nitrite: Negative   Leuk Esterase: Small / RBC: 0-2 /HPF / WBC 3-5   Sq Epi: x / Non Sq Epi: Negative / Bacteria: Negative        Culture - Blood (collected 2021 15:18)  Source: .Blood Blood  Gram Stain (2021 09:14):    Growth in anaerobic bottle: Gram Negative Rods    ***Blood Panel PCR results on this specimen are available    approximately 3 hours after the Gram stain result.***    Gram stain, PCR, and/or culture results may not always    correspond due to difference in methodologies.    ************************************************************    This PCR assay was performed using [employer].    The following targets are tested for: Enterococcus,    vancomycin resistant enterococci, Listeria monocytogenes,    coagulase negative staphylococci, S. aureus,    methicillin resistant S. aureus, Streptococcus agalactiae    (Group B), S. pneumoniae, S. pyogenes (Group A),    Acinetobacter baumannii, Enterobacter cloacae, E. coli,    Klebsiella oxytoca, K. pneumoniae, Proteus sp.,    Serratia marcescens, Haemophilus influenzae,    Neisseria meningitidis, Pseudomonas aeruginosa, Candida    albicans, C. glabrata, C krusei, C parapsilosis,    C. tropicalis and the KPC resistance gene.    "Due to technical problems, Pseudomonas aeruginosa    will Not be reported as part of the BCID panel    until further notice".    Gram Stain and BCID performed by:    [facility]    [address] [place] [address]    .    TYPE: (C=Critical, N=Notification, A=Abnormal) C    TESTS:  _ GS    DATE/TIME CALLED: _ 2021 09:12:19    CALLED TO: Tim Chacon RN    READ BACK (2 Patient Identifiers)(Y/N): _ Y    READ BACK VALUES (Y/N): _ Y    CALLED BY: Tim Otero  Organism: Blood Culture PCR (2021 09:34)  Organism: Blood Culture PCR (2021 09:34)      CAPILLARY BLOOD GLUCOSE      POCT Blood Glucose.: 164 mg/dL (2021 11:36)  POCT Blood Glucose.: 136 mg/dL (2021 08:14)  POCT Blood Glucose.: 121 mg/dL (2021 21:31)        RADIOLOGY & ADDITIONAL TESTS:  Results Reviewed:   Imaging Personally Reviewed: Y  1)< from: CT Chest No Cont (21 @ 21:17) >   EXAM:  CT CHEST                          PROCEDURE DATE:  2021          INTERPRETATION:  CLINICAL INFORMATION: Fever, assess pneumonia.    PROCEDURE: CT of the chest was performed without intravenous contrast. Coronal and sagittal reconstruction images were obtained.    CONTRAST/COMPLICATIONS:  IV Contrast: NONE  Oral Contrast: NONE  Complications: None reported at time of study completion    COMPARISON: 2021. 2015 study not available despite attempts to retrieve.    FINDINGS: Evaluation of the thoracic organs/vasculature is limited without intravenous contrast. The patient's respiratory motion degrades images.    LUNGS AND AIRWAYS: Patent central airways. No focal consolidation. Subsegmental bibasilar atelectasis. Small opacities along the left major fissure as well as 0.2 cm nodule in the left upper lobe (3:49), noted on 2021. Additional scattered bilateral pulmonary nodules, for example, 0.5 x 0.3 cm in the right middle lobe (3:80) and 0.6 x 0.4 cm in the right lower lobe (3:96). Small opacities along the right major fissure, suggestive of intrafissural lymph nodes.  PLEURA: No pleural effusion or pneumothorax.  HEART: Normal size. No pericardial effusion.  VESSELS: Atherosclerosis. Normal caliber of the thoracic aorta.  MEDIASTINUM AND ALMA DELIA: Subcentimeter lymph nodes without lymphadenopathy.  CHEST WALL AND LOWER NECK: Unremarkable.  UPPER ABDOMEN: Somewhat curvilinear densities in the gallbladder lumen. Nonspecific calcification at the gastrohepatic region. Nonspecific bilateral perinephric stranding.  BONES: Degenerative changes of the spine.    IMPRESSION:    No focal consolidation. Subsegmental bibasilar atelectasis. Nonspecific bilateral pulmonary nodules as described. Differential diagnosis includes infection, inflammation and neoplasm. Recommend comparison to with prior study when it becomes available. Follow-up may be obtained following completion of treatment.    Densities in the gallbladder, which may represent stones/sludge. Follow-upright upper quadrant may be obtained as indicated.    Additional findings as described.    --- End of Report ---            DAI HERNANDEZ MD; Attending Radiologist  This document has been electronically signed. 2021 10:55PM    < end of copied text >    Electrocardiogram Personally Reviewed:                                           [facility] Division of Hospital Medicine    Chief Complaint: Sepsis    SUBJECTIVE / OVERNIGHT EVENTS: No acute events overnight. Patient was febrile to 101F. He endorses not feeling good and feels "warm." Denies c/h/d/n/v, urinaru frequency/urgency.    Patient denies chest pain, SOB, abd pain, N/V, fever, chills, dysuria or any other complaints. All remainder ROS negative.     MEDICATIONS  (STANDING):  aspirin  chewable 81 milliGRAM(s) Oral daily  atorvastatin 20 milliGRAM(s) Oral at bedtime  dextrose 40% Gel 15 Gram(s) Oral once  dextrose 5%. 1000 milliLiter(s) (50 mL/Hr) IV Continuous <Continuous>  dextrose 5%. 1000 milliLiter(s) (100 mL/Hr) IV Continuous <Continuous>  dextrose 50% Injectable 25 Gram(s) IV Push once  dextrose 50% Injectable 12.5 Gram(s) IV Push once  dextrose 50% Injectable 25 Gram(s) IV Push once  enoxaparin Injectable 40 milliGRAM(s) SubCutaneous daily  glucagon  Injectable 1 milliGRAM(s) IntraMuscular once  insulin lispro (ADMELOG) corrective regimen sliding scale   SubCutaneous three times a day before meals  insulin lispro (ADMELOG) corrective regimen sliding scale   SubCutaneous at bedtime  lactated ringers. 1000 milliLiter(s) (75 mL/Hr) IV Continuous <Continuous>  piperacillin/tazobactam IVPB.. 3.375 Gram(s) IV Intermittent every 8 hours    MEDICATIONS  (PRN):  acetaminophen   Tablet .. 650 milliGRAM(s) Oral every 6 hours PRN Temp greater or equal to 38.5C (101.3F)        I&O's Summary      PHYSICAL EXAM:  Vital Signs Last 24 Hrs  T(C): 37.9 (2021 10:34), Max: 39.1 (2021 14:51)  T(F): 100.3 (2021 10:34), Max: 102.3 (2021 14:51)  HR: 84 (2021 10:34) (84 - 103)  BP: 101/64 (2021 10:34) (99/60 - 130/74)  BP(mean): --  RR: 19 (2021 10:34) (18 - 20)  SpO2: 95% (2021 10:34) (92% - 95%)        CONSTITUTIONAL: NAD, well-developed, well-groomed  ENMT: Moist oral mucosa, no pharyngeal injection or exudates; normal dentition  RESPIRATORY: Normal respiratory effort; lungs are clear to auscultation bilaterally  CARDIOVASCULAR: Regular rate and rhythm, normal S1 and S2, no murmur/rub/gallop; No lower extremity edema; Peripheral pulses are 2+ bilaterally  ABDOMEN: Nontender to palpation, normoactive bowel sounds, no rebound/guarding; No hepatosplenomegaly  MUSCLOSKELETAL:  Normal gait; no clubbing or cyanosis of digits; no joint swelling or tenderness to palpation  PSYCH: A+O to person, place, and time; affect appropriate  NEUROLOGY: CN 2-12 are intact and symmetric; no gross sensory deficits;   SKIN: No rashes; no palpable lesions    LABS:                        13.0   11.54 )-----------( 155      ( 2021 06:26 )             38.5     07-09    138  |  102  |  10.4  ----------------------------<  132<H>  4.0   |  26.0  |  0.80    Ca    8.7      2021 06:26  Phos  2.4     07-09  Mg     1.4     07-09            Urinalysis Basic - ( 2021 14:16 )    Color: Yellow / Appearance: Clear / S.015 / pH: x  Gluc: x / Ketone: Negative  / Bili: Negative / Urobili: Negative mg/dL   Blood: x / Protein: Negative mg/dL / Nitrite: Negative   Leuk Esterase: Small / RBC: 0-2 /HPF / WBC 3-5   Sq Epi: x / Non Sq Epi: Negative / Bacteria: Negative        Culture - Blood (collected 2021 15:18)  Source: .Blood Blood  Gram Stain (2021 09:14):    Growth in anaerobic bottle: Gram Negative Rods    ***Blood Panel PCR results on this specimen are available    approximately 3 hours after the Gram stain result.***    Gram stain, PCR, and/or culture results may not always    correspond due to difference in methodologies.    ************************************************************    This PCR assay was performed using [employer].    The following targets are tested for: Enterococcus,    vancomycin resistant enterococci, Listeria monocytogenes,    coagulase negative staphylococci, S. aureus,    methicillin resistant S. aureus, Streptococcus agalactiae    (Group B), S. pneumoniae, S. pyogenes (Group A),    Acinetobacter baumannii, Enterobacter cloacae, E. coli,    Klebsiella oxytoca, K. pneumoniae, Proteus sp.,    Serratia marcescens, Haemophilus influenzae,    Neisseria meningitidis, Pseudomonas aeruginosa, Candida    albicans, C. glabrata, C krusei, C parapsilosis,    C. tropicalis and the KPC resistance gene.    "Due to technical problems, Pseudomonas aeruginosa    will Not be reported as part of the BCID panel    until further notice".    Gram Stain and BCID performed by:    [facility]    [address] [place] [address]    .    TYPE: (C=Critical, N=Notification, A=Abnormal) C    TESTS:  _ GS    DATE/TIME CALLED: _ 2021 09:12:19    CALLED TO: Tim Chacon RN    READ BACK (2 Patient Identifiers)(Y/N): _ Y    READ BACK VALUES (Y/N): _ Y    CALLED BY: Tim Otero  Organism: Blood Culture PCR (2021 09:34)  Organism: Blood Culture PCR (2021 09:34)      CAPILLARY BLOOD GLUCOSE      POCT Blood Glucose.: 164 mg/dL (2021 11:36)  POCT Blood Glucose.: 136 mg/dL (2021 08:14)  POCT Blood Glucose.: 121 mg/dL (2021 21:31)        RADIOLOGY & ADDITIONAL TESTS:  Results Reviewed:   Imaging Personally Reviewed: Y  1)< from: CT Chest No Cont (21 @ 21:17) >   EXAM:  CT CHEST                          PROCEDURE DATE:  2021          INTERPRETATION:  CLINICAL INFORMATION: Fever, assess pneumonia.    PROCEDURE: CT of the chest was performed without intravenous contrast. Coronal and sagittal reconstruction images were obtained.    CONTRAST/COMPLICATIONS:  IV Contrast: NONE  Oral Contrast: NONE  Complications: None reported at time of study completion    COMPARISON: 2021. 2015 study not available despite attempts to retrieve.    FINDINGS: Evaluation of the thoracic organs/vasculature is limited without intravenous contrast. The patient's respiratory motion degrades images.    LUNGS AND AIRWAYS: Patent central airways. No focal consolidation. Subsegmental bibasilar atelectasis. Small opacities along the left major fissure as well as 0.2 cm nodule in the left upper lobe (3:49), noted on 2021. Additional scattered bilateral pulmonary nodules, for example, 0.5 x 0.3 cm in the right middle lobe (3:80) and 0.6 x 0.4 cm in the right lower lobe (3:96). Small opacities along the right major fissure, suggestive of intrafissural lymph nodes.  PLEURA: No pleural effusion or pneumothorax.  HEART: Normal size. No pericardial effusion.  VESSELS: Atherosclerosis. Normal caliber of the thoracic aorta.  MEDIASTINUM AND ALMA DELIA: Subcentimeter lymph nodes without lymphadenopathy.  CHEST WALL AND LOWER NECK: Unremarkable.  UPPER ABDOMEN: Somewhat curvilinear densities in the gallbladder lumen. Nonspecific calcification at the gastrohepatic region. Nonspecific bilateral perinephric stranding.  BONES: Degenerative changes of the spine.    IMPRESSION:    No focal consolidation. Subsegmental bibasilar atelectasis. Nonspecific bilateral pulmonary nodules as described. Differential diagnosis includes infection, inflammation and neoplasm. Recommend comparison to with prior study when it becomes available. Follow-up may be obtained following completion of treatment.    Densities in the gallbladder, which may represent stones/sludge. Follow-upright upper quadrant may be obtained as indicated.    Additional findings as described.    --- End of Report ---            DAI HERNANDEZ MD; Attending Radiologist  This document has been electronically signed. 2021 10:55PM    < end of copied text >    Electrocardiogram Personally Reviewed:

## 2021-07-10 LAB
ALBUMIN SERPL ELPH-MCNC: 3.7 G/DL — SIGNIFICANT CHANGE UP (ref 3.3–5.2)
ALP SERPL-CCNC: 70 U/L — SIGNIFICANT CHANGE UP (ref 40–120)
ALT FLD-CCNC: 21 U/L — SIGNIFICANT CHANGE UP
ANION GAP SERPL CALC-SCNC: 10 MMOL/L — SIGNIFICANT CHANGE UP (ref 5–17)
AST SERPL-CCNC: 24 U/L — SIGNIFICANT CHANGE UP
BILIRUB SERPL-MCNC: 0.4 MG/DL — SIGNIFICANT CHANGE UP (ref 0.4–2)
BUN SERPL-MCNC: 9.6 MG/DL — SIGNIFICANT CHANGE UP (ref 8–20)
CALCIUM SERPL-MCNC: 8.6 MG/DL — SIGNIFICANT CHANGE UP (ref 8.6–10.2)
CHLORIDE SERPL-SCNC: 99 MMOL/L — SIGNIFICANT CHANGE UP (ref 98–107)
CO2 SERPL-SCNC: 29 MMOL/L — SIGNIFICANT CHANGE UP (ref 22–29)
CREAT SERPL-MCNC: 0.77 MG/DL — SIGNIFICANT CHANGE UP (ref 0.5–1.3)
GLUCOSE BLDC GLUCOMTR-MCNC: 116 MG/DL — HIGH (ref 70–99)
GLUCOSE BLDC GLUCOMTR-MCNC: 163 MG/DL — HIGH (ref 70–99)
GLUCOSE BLDC GLUCOMTR-MCNC: 164 MG/DL — HIGH (ref 70–99)
GLUCOSE BLDC GLUCOMTR-MCNC: 178 MG/DL — HIGH (ref 70–99)
GLUCOSE SERPL-MCNC: 121 MG/DL — HIGH (ref 70–99)
HCT VFR BLD CALC: 41.4 % — SIGNIFICANT CHANGE UP (ref 39–50)
HGB BLD-MCNC: 13.7 G/DL — SIGNIFICANT CHANGE UP (ref 13–17)
MAGNESIUM SERPL-MCNC: 1.8 MG/DL — SIGNIFICANT CHANGE UP (ref 1.6–2.6)
MCHC RBC-ENTMCNC: 29.3 PG — SIGNIFICANT CHANGE UP (ref 27–34)
MCHC RBC-ENTMCNC: 33.1 GM/DL — SIGNIFICANT CHANGE UP (ref 32–36)
MCV RBC AUTO: 88.7 FL — SIGNIFICANT CHANGE UP (ref 80–100)
PHOSPHATE SERPL-MCNC: 2.6 MG/DL — SIGNIFICANT CHANGE UP (ref 2.4–4.7)
PLATELET # BLD AUTO: 166 K/UL — SIGNIFICANT CHANGE UP (ref 150–400)
POTASSIUM SERPL-MCNC: 3.6 MMOL/L — SIGNIFICANT CHANGE UP (ref 3.5–5.3)
POTASSIUM SERPL-SCNC: 3.6 MMOL/L — SIGNIFICANT CHANGE UP (ref 3.5–5.3)
PROT SERPL-MCNC: 7.2 G/DL — SIGNIFICANT CHANGE UP (ref 6.6–8.7)
RBC # BLD: 4.67 M/UL — SIGNIFICANT CHANGE UP (ref 4.2–5.8)
RBC # FLD: 12.8 % — SIGNIFICANT CHANGE UP (ref 10.3–14.5)
SODIUM SERPL-SCNC: 138 MMOL/L — SIGNIFICANT CHANGE UP (ref 135–145)
WBC # BLD: 7.22 K/UL — SIGNIFICANT CHANGE UP (ref 3.8–10.5)
WBC # FLD AUTO: 7.22 K/UL — SIGNIFICANT CHANGE UP (ref 3.8–10.5)

## 2021-07-10 PROCEDURE — 74176 CT ABD & PELVIS W/O CONTRAST: CPT | Mod: 26

## 2021-07-10 PROCEDURE — 99233 SBSQ HOSP IP/OBS HIGH 50: CPT

## 2021-07-10 RX ORDER — MEROPENEM 1 G/30ML
1000 INJECTION INTRAVENOUS EVERY 8 HOURS
Refills: 0 | Status: DISCONTINUED | OUTPATIENT
Start: 2021-07-10 | End: 2021-07-14

## 2021-07-10 RX ADMIN — Medication 81 MILLIGRAM(S): at 11:44

## 2021-07-10 RX ADMIN — Medication 1: at 11:42

## 2021-07-10 RX ADMIN — SODIUM CHLORIDE 75 MILLILITER(S): 9 INJECTION, SOLUTION INTRAVENOUS at 11:40

## 2021-07-10 RX ADMIN — ATORVASTATIN CALCIUM 20 MILLIGRAM(S): 80 TABLET, FILM COATED ORAL at 21:54

## 2021-07-10 RX ADMIN — PIPERACILLIN AND TAZOBACTAM 25 GRAM(S): 4; .5 INJECTION, POWDER, LYOPHILIZED, FOR SOLUTION INTRAVENOUS at 06:28

## 2021-07-10 RX ADMIN — SODIUM CHLORIDE 75 MILLILITER(S): 9 INJECTION, SOLUTION INTRAVENOUS at 21:57

## 2021-07-10 RX ADMIN — ENOXAPARIN SODIUM 40 MILLIGRAM(S): 100 INJECTION SUBCUTANEOUS at 11:44

## 2021-07-10 RX ADMIN — Medication 1: at 17:09

## 2021-07-10 RX ADMIN — PIPERACILLIN AND TAZOBACTAM 25 GRAM(S): 4; .5 INJECTION, POWDER, LYOPHILIZED, FOR SOLUTION INTRAVENOUS at 13:24

## 2021-07-10 RX ADMIN — SODIUM CHLORIDE 75 MILLILITER(S): 9 INJECTION, SOLUTION INTRAVENOUS at 17:06

## 2021-07-10 RX ADMIN — MEROPENEM 100 MILLIGRAM(S): 1 INJECTION INTRAVENOUS at 21:54

## 2021-07-10 NOTE — PROGRESS NOTE ADULT - SUBJECTIVE AND OBJECTIVE BOX
INFECTIOUS DISEASES AND INTERNAL MEDICINE at [facility]  =======================================================  Chip Priest MD  Diplomates American Board of Internal Medicine and Infectious Diseases  Telephone [phone]  Fax            [phone]  =======================================================    DOMINIC ALAMO [id number]     Follow up: [facility] BACTEREMIA    Allergies:  iv dye (Unknown)  No Known Drug Allergies  SHRIMP (Unknown)      Medications:  acetaminophen   Tablet .. 650 milliGRAM(s) Oral every 6 hours PRN  aspirin  chewable 81 milliGRAM(s) Oral daily  atorvastatin 20 milliGRAM(s) Oral at bedtime  dextrose 40% Gel 15 Gram(s) Oral once  dextrose 5%. 1000 milliLiter(s) IV Continuous <Continuous>  dextrose 5%. 1000 milliLiter(s) IV Continuous <Continuous>  dextrose 50% Injectable 25 Gram(s) IV Push once  dextrose 50% Injectable 12.5 Gram(s) IV Push once  dextrose 50% Injectable 25 Gram(s) IV Push once  enoxaparin Injectable 40 milliGRAM(s) SubCutaneous daily  glucagon  Injectable 1 milliGRAM(s) IntraMuscular once  insulin lispro (ADMELOG) corrective regimen sliding scale   SubCutaneous three times a day before meals  insulin lispro (ADMELOG) corrective regimen sliding scale   SubCutaneous at bedtime  lactated ringers. 1000 milliLiter(s) IV Continuous <Continuous>  meropenem  IVPB 1000 milliGRAM(s) IV Intermittent every 8 hours    SOCIAL       FAMILY   FAMILY HISTORY:  FH: CAD (coronary artery disease)      REVIEW OF SYSTEMS:  CONSTITUTIONAL:  No Fever or chills  HEENT:   No diplopia or blurred vision.  No earache, sore throat or runny nose.  CARDIOVASCULAR:  No pressure, squeezing, strangling, tightness, heaviness or aching about the chest, neck, axilla or epigastrium.  RESPIRATORY:  No cough, shortness of breath, PND or orthopnea.  GASTROINTESTINAL:  No nausea, vomiting or diarrhea.  GENITOURINARY:   dysuria,  Blood in urine  MUSCULOSKELETAL:   moves all joints  SKIN:  No change in skin, hair or nails.  NEUROLOGIC:  No paresthesias, fasciculations, seizures or weakness.  PSYCHIATRIC:  No disorder of thought or mood.  ENDOCRINE:  No heat or cold intolerance, polyuria or polydipsia.  HEMATOLOGICAL:  No easy bruising or bleeding.            Physical Exam:  ICU Vital Signs Last 24 Hrs  T(C): 36.8 (10 Jul 2021 11:46), Max: 37.6 (09 Jul 2021 15:22)  T(F): 98.2 (10 Jul 2021 11:46), Max: 99.7 (09 Jul 2021 15:22)  HR: 73 (10 Jul 2021 11:46) (73 - 94)  BP: 114/71 (10 Jul 2021 11:46) (100/61 - 145/78)  BP(mean): --  ABP: --  ABP(mean): --  RR: 18 (10 Jul 2021 11:46) (18 - 19)  SpO2: 95% (10 Jul 2021 11:46) (91% - 95%)    GEN: NAD,   HEENT: normocephalic and atraumatic. EOMI. TROY.    NECK: Supple. No carotid bruits.  No lymphadenopathy or thyromegaly.  LUNGS: Clear to auscultation.  HEART: Regular rate and rhythm without murmur.  ABDOMEN: Soft, nontender, and nondistended.  Positive bowel sounds.    : No CVA tenderness  EXTREMITIES: Without any cyanosis, clubbing, rash, lesions or edema.  MSK: no joint swelling  NEUROLOGIC: Cranial nerves II through XII are grossly intact.  PSYCHIATRIC: Appropriate affect .  SKIN: No ulceration or induration present.        Labs:  Vitals:  ============  T(F): 98.2 (10 Jul 2021 11:46), Max: 99.7 (09 Jul 2021 15:22)  HR: 73 (10 Jul 2021 11:46)  BP: 114/71 (10 Jul 2021 11:46)  RR: 18 (10 Jul 2021 11:46)  SpO2: 95% (10 Jul 2021 11:46) (91% - 95%)  temp max in last 48H T(F): , Max: 102.3 (07-08-21 @ 14:51)    =======================================================  Current Antibiotics:  meropenem  IVPB 1000 milliGRAM(s) IV Intermittent every 8 hours    Other medications:  aspirin  chewable 81 milliGRAM(s) Oral daily  atorvastatin 20 milliGRAM(s) Oral at bedtime  dextrose 40% Gel 15 Gram(s) Oral once  dextrose 5%. 1000 milliLiter(s) IV Continuous <Continuous>  dextrose 5%. 1000 milliLiter(s) IV Continuous <Continuous>  dextrose 50% Injectable 25 Gram(s) IV Push once  dextrose 50% Injectable 12.5 Gram(s) IV Push once  dextrose 50% Injectable 25 Gram(s) IV Push once  enoxaparin Injectable 40 milliGRAM(s) SubCutaneous daily  glucagon  Injectable 1 milliGRAM(s) IntraMuscular once  insulin lispro (ADMELOG) corrective regimen sliding scale   SubCutaneous three times a day before meals  insulin lispro (ADMELOG) corrective regimen sliding scale   SubCutaneous at bedtime  lactated ringers. 1000 milliLiter(s) IV Continuous <Continuous>      =======================================================  Labs:                        13.7   7.22  )-----------( 166      ( 10 Jul 2021 08:30 )             41.4     07-10    138  |  99  |  9.6  ----------------------------<  121<H>  3.6   |  29.0  |  0.77    Ca    8.6      10 Jul 2021 08:30  Phos  2.6     07-10  Mg     1.8     07-10    TPro  7.2  /  Alb  3.7  /  TBili  0.4  /  DBili  x   /  AST  24  /  ALT  21  /  AlkPhos  70  07-10      Culture - Urine (collected 07-08-21 @ 22:22)  Source: .Urine Clean Catch (Midstream)    Culture - Blood (collected 07-08-21 @ 15:18)  Source: .Blood Blood  Gram Stain (07-09-21 @ 09:14):    Growth in anaerobic bottle: Gram Negative Rods    ***Blood Panel PCR results on this specimen are available    approximately 3 hours after the Gram stain result.***    Gram stain, PCR, and/or culture results may not always    correspond due to difference in methodologies.    ************************************************************    This PCR assay was performed using [employer].    The following targets are tested for: Enterococcus,    vancomycin resistant enterococci, Listeria monocytogenes,    coagulase negative staphylococci, S. aureus,    methicillin resistant S. aureus, Streptococcus agalactiae    (Group B), S. pneumoniae, S. pyogenes (Group A),    Acinetobacter baumannii, Enterobacter cloacae, E. coli,    Klebsiella oxytoca, K. pneumoniae, Proteus sp.,    Serratia marcescens, Haemophilus influenzae,    Neisseria meningitidis, Pseudomonas aeruginosa, Candida    albicans, C. glabrata, C krusei, C parapsilosis,    C. tropicalis and the KPC resistance gene.    "Due to technical problems, Pseudomonas aeruginosa    will Not be reported as part of the BCID panel    until further notice".    Gram Stain and BCID performed by:    [facility]    [address] [place] [address]    .    TYPE: (C=Critical, N=Notification, A=Abnormal) C    TESTS:  _ GS    DATE/TIME CALLED: _ 07/09/2021 09:12:19    CALLED TO: Tim Chacon RN    READ BACK (2 Patient Identifiers)(Y/N): _ Y    READ BACK VALUES (Y/N): _ Y    CALLED BY: Tim Otero  Organism: Blood Culture PCR (07-10-21 @ 12:56)  Organism: Blood Culture PCR (07-09-21 @ 09:34)    Sensitivities:      -  Escherichia coli: Detec      Method Type: PCR      Creatinine, Serum: 0.77 mg/dL (07-10-21 @ 08:30)  Creatinine, Serum: 0.80 mg/dL (07-09-21 @ 06:26)  Creatinine, Serum: 0.79 mg/dL (07-08-21 @ 14:15)    Procalcitonin, Serum: 0.25 ng/mL (07-08-21 @ 20:47)          WBC Count: 7.22 K/uL (07-10-21 @ 08:30)  WBC Count: 11.54 K/uL (07-09-21 @ 06:26)  WBC Count: 13.51 K/uL (07-08-21 @ 14:15)    SARS-CoV-2: NotDetec (07-08-21 @ 16:51)  Rapid RVP Result: NotDetec (07-08-21 @ 16:51)        Alkaline Phosphatase, Serum: 70 U/L (07-10-21 @ 08:30)  Alanine Aminotransferase (ALT/SGPT): 21 U/L (07-10-21 @ 08:30)  Aspartate Aminotransferase (AST/SGOT): 24 U/L (07-10-21 @ 08:30)  Bilirubin Total, Serum: 0.4 mg/dL (07-10-21 @ 08:30)

## 2021-07-10 NOTE — PROGRESS NOTE ADULT - SUBJECTIVE AND OBJECTIVE BOX
CC:   HPI:  71 year old male with pmh of tia, hld, previous mi (2001) coming to hospital with complaints of fever, dysuria >1 week. per patient was at hospital 6/27 for stroke workup given confused (later resolved) and weak however nothing found on mri. states however dysuria worsening and now coming to hospital. states no cp abdominal pain. states outpatient was given cefuroxime 500bid which did not help. on arrival to ed found to be febrile, tachycardiac and with elevated white blood cell count (08 Jul 2021 16:58)    REVIEW OF SYSTEMS:    Patient denied fever, chills, abdominal pain, nausea, vomiting, cough, shortness of breath, chest pain or palpitations    Vital Signs Last 24 Hrs  T(C): 36.8 (10 Jul 2021 11:46), Max: 37.6 (09 Jul 2021 15:22)  T(F): 98.2 (10 Jul 2021 11:46), Max: 99.7 (09 Jul 2021 15:22)  HR: 73 (10 Jul 2021 11:46) (73 - 94)  BP: 114/71 (10 Jul 2021 11:46) (100/61 - 145/78)  BP(mean): --  RR: 18 (10 Jul 2021 11:46) (18 - 19)  SpO2: 95% (10 Jul 2021 11:46) (91% - 95%)I&O's Summary    09 Jul 2021 07:01  -  10 Jul 2021 07:00  --------------------------------------------------------  IN: 0 mL / OUT: 1525 mL / NET: -1525 mL      PHYSICAL EXAM:  GENERAL: NAD, well-groomed  HEENT: PERRL, +EOMI, anicteric, no [place]  NECK: Supple, No JVD   CHEST/LUNG: CTA bilaterally; Normal effort  HEART: S1S2 Normal intensity, no murmurs, gallops or rubs noted  ABDOMEN: Soft, BS Normoactive, NT, ND, no HSM noted  EXTREMITIES:  2+ radial and DP pulses noted, no clubbing, cyanosis, or edema noted, FROM x 4  SKIN: No rashes or lesions noted  NEURO: A&Ox3, no focal deficits noted, CN II-XII intact  PSYCH: normal mood and affect; insight/judgement appropriate  LABS:                        13.7   7.22  )-----------( 166      ( 10 Jul 2021 08:30 )             41.4     07-10    138  |  99  |  9.6  ----------------------------<  121<H>  3.6   |  29.0  |  0.77    Ca    8.6      10 Jul 2021 08:30  Phos  2.6     07-10  Mg     1.8     07-10    TPro  7.2  /  Alb  3.7  /  TBili  0.4  /  DBili  x   /  AST  24  /  ALT  21  /  AlkPhos  70  07-10        RADIOLOGY & ADDITIONAL TESTS:    MEDICATIONS:  MEDICATIONS  (STANDING):  aspirin  chewable 81 milliGRAM(s) Oral daily  atorvastatin 20 milliGRAM(s) Oral at bedtime  dextrose 40% Gel 15 Gram(s) Oral once  dextrose 5%. 1000 milliLiter(s) (50 mL/Hr) IV Continuous <Continuous>  dextrose 5%. 1000 milliLiter(s) (100 mL/Hr) IV Continuous <Continuous>  dextrose 50% Injectable 25 Gram(s) IV Push once  dextrose 50% Injectable 12.5 Gram(s) IV Push once  dextrose 50% Injectable 25 Gram(s) IV Push once  enoxaparin Injectable 40 milliGRAM(s) SubCutaneous daily  glucagon  Injectable 1 milliGRAM(s) IntraMuscular once  insulin lispro (ADMELOG) corrective regimen sliding scale   SubCutaneous three times a day before meals  insulin lispro (ADMELOG) corrective regimen sliding scale   SubCutaneous at bedtime  lactated ringers. 1000 milliLiter(s) (75 mL/Hr) IV Continuous <Continuous>  meropenem  IVPB 1000 milliGRAM(s) IV Intermittent every 8 hours    MEDICATIONS  (PRN):  acetaminophen   Tablet .. 650 milliGRAM(s) Oral every 6 hours PRN Temp greater or equal to 38.5C (101.3F)   CC: UTI with sepsis, bacteremia.  Has lower urinary tracts symptoms of frequency and nocturia and following with urology.  F  HPI:  71 year old male with pmh of tia, hld, previous mi (2001) coming to hospital with complaints of fever, dysuria >1 week. per patient was at hospital 6/27 for stroke workup given confused (later resolved) and weak however nothing found on mri. states however dysuria worsening and now coming to hospital. states no cp abdominal pain. states outpatient was given cefuroxime 500bid which did not help. on arrival to ed found to be febrile, tachycardiac and with elevated white blood cell count (08 Jul 2021 16:58)    REVIEW OF SYSTEMS:    Patient denied fever, chills, abdominal pain, nausea, vomiting, cough, shortness of breath, chest pain or palpitations    Vital Signs Last 24 Hrs  T(C): 36.8 (10 Jul 2021 11:46), Max: 37.6 (09 Jul 2021 15:22)  T(F): 98.2 (10 Jul 2021 11:46), Max: 99.7 (09 Jul 2021 15:22)  HR: 73 (10 Jul 2021 11:46) (73 - 94)  BP: 114/71 (10 Jul 2021 11:46) (100/61 - 145/78)  BP(mean): --  RR: 18 (10 Jul 2021 11:46) (18 - 19)  SpO2: 95% (10 Jul 2021 11:46) (91% - 95%)I&O's Summary    09 Jul 2021 07:01  -  10 Jul 2021 07:00  --------------------------------------------------------  IN: 0 mL / OUT: 1525 mL / NET: -1525 mL      PHYSICAL EXAM:  GENERAL: NAD, well-groomed  HEENT: PERRL, +EOMI, anicteric, no [place]  NECK: Supple, No JVD   CHEST/LUNG: CTA bilaterally; Normal effort  HEART: S1S2 Normal intensity, no murmurs, gallops or rubs noted  ABDOMEN: Soft, BS Normoactive, NT, ND, no HSM noted  EXTREMITIES:  2+ radial and DP pulses noted, no clubbing, cyanosis, or edema noted, FROM x 4  SKIN: No rashes or lesions noted  NEURO: A&Ox3, no focal deficits noted, CN II-XII intact  PSYCH: normal mood and affect; insight/judgement appropriate  LABS:                        13.7   7.22  )-----------( 166      ( 10 Jul 2021 08:30 )             41.4     07-10    138  |  99  |  9.6  ----------------------------<  121<H>  3.6   |  29.0  |  0.77    Ca    8.6      10 Jul 2021 08:30  Phos  2.6     07-10  Mg     1.8     07-10    TPro  7.2  /  Alb  3.7  /  TBili  0.4  /  DBili  x   /  AST  24  /  ALT  21  /  AlkPhos  70  07-10        RADIOLOGY & ADDITIONAL TESTS:    MEDICATIONS:  MEDICATIONS  (STANDING):  aspirin  chewable 81 milliGRAM(s) Oral daily  atorvastatin 20 milliGRAM(s) Oral at bedtime  dextrose 40% Gel 15 Gram(s) Oral once  dextrose 5%. 1000 milliLiter(s) (50 mL/Hr) IV Continuous <Continuous>  dextrose 5%. 1000 milliLiter(s) (100 mL/Hr) IV Continuous <Continuous>  dextrose 50% Injectable 25 Gram(s) IV Push once  dextrose 50% Injectable 12.5 Gram(s) IV Push once  dextrose 50% Injectable 25 Gram(s) IV Push once  enoxaparin Injectable 40 milliGRAM(s) SubCutaneous daily  glucagon  Injectable 1 milliGRAM(s) IntraMuscular once  insulin lispro (ADMELOG) corrective regimen sliding scale   SubCutaneous three times a day before meals  insulin lispro (ADMELOG) corrective regimen sliding scale   SubCutaneous at bedtime  lactated ringers. 1000 milliLiter(s) (75 mL/Hr) IV Continuous <Continuous>  meropenem  IVPB 1000 milliGRAM(s) IV Intermittent every 8 hours    MEDICATIONS  (PRN):  acetaminophen   Tablet .. 650 milliGRAM(s) Oral every 6 hours PRN Temp greater or equal to 38.5C (101.3F)

## 2021-07-11 LAB
-  AMIKACIN: SIGNIFICANT CHANGE UP
-  AMIKACIN: SIGNIFICANT CHANGE UP
-  AMOXICILLIN/CLAVULANIC ACID: SIGNIFICANT CHANGE UP
-  AMPICILLIN/SULBACTAM: SIGNIFICANT CHANGE UP
-  AMPICILLIN/SULBACTAM: SIGNIFICANT CHANGE UP
-  AMPICILLIN: SIGNIFICANT CHANGE UP
-  AMPICILLIN: SIGNIFICANT CHANGE UP
-  AZTREONAM: SIGNIFICANT CHANGE UP
-  AZTREONAM: SIGNIFICANT CHANGE UP
-  CEFAZOLIN: SIGNIFICANT CHANGE UP
-  CEFAZOLIN: SIGNIFICANT CHANGE UP
-  CEFEPIME: SIGNIFICANT CHANGE UP
-  CEFEPIME: SIGNIFICANT CHANGE UP
-  CEFOXITIN: SIGNIFICANT CHANGE UP
-  CEFOXITIN: SIGNIFICANT CHANGE UP
-  CEFTRIAXONE: SIGNIFICANT CHANGE UP
-  CEFTRIAXONE: SIGNIFICANT CHANGE UP
-  CIPROFLOXACIN: SIGNIFICANT CHANGE UP
-  CIPROFLOXACIN: SIGNIFICANT CHANGE UP
-  ERTAPENEM: SIGNIFICANT CHANGE UP
-  ERTAPENEM: SIGNIFICANT CHANGE UP
-  GENTAMICIN: SIGNIFICANT CHANGE UP
-  GENTAMICIN: SIGNIFICANT CHANGE UP
-  IMIPENEM: SIGNIFICANT CHANGE UP
-  IMIPENEM: SIGNIFICANT CHANGE UP
-  LEVOFLOXACIN: SIGNIFICANT CHANGE UP
-  LEVOFLOXACIN: SIGNIFICANT CHANGE UP
-  MEROPENEM: SIGNIFICANT CHANGE UP
-  MEROPENEM: SIGNIFICANT CHANGE UP
-  NITROFURANTOIN: SIGNIFICANT CHANGE UP
-  PIPERACILLIN/TAZOBACTAM: SIGNIFICANT CHANGE UP
-  PIPERACILLIN/TAZOBACTAM: SIGNIFICANT CHANGE UP
-  TIGECYCLINE: SIGNIFICANT CHANGE UP
-  TOBRAMYCIN: SIGNIFICANT CHANGE UP
-  TOBRAMYCIN: SIGNIFICANT CHANGE UP
-  TRIMETHOPRIM/SULFAMETHOXAZOLE: SIGNIFICANT CHANGE UP
-  TRIMETHOPRIM/SULFAMETHOXAZOLE: SIGNIFICANT CHANGE UP
CULTURE RESULTS: SIGNIFICANT CHANGE UP
CULTURE RESULTS: SIGNIFICANT CHANGE UP
GLUCOSE BLDC GLUCOMTR-MCNC: 129 MG/DL — HIGH (ref 70–99)
GLUCOSE BLDC GLUCOMTR-MCNC: 137 MG/DL — HIGH (ref 70–99)
GLUCOSE BLDC GLUCOMTR-MCNC: 147 MG/DL — HIGH (ref 70–99)
GLUCOSE BLDC GLUCOMTR-MCNC: 193 MG/DL — HIGH (ref 70–99)
METHOD TYPE: SIGNIFICANT CHANGE UP
METHOD TYPE: SIGNIFICANT CHANGE UP
ORGANISM # SPEC MICROSCOPIC CNT: SIGNIFICANT CHANGE UP
SPECIMEN SOURCE: SIGNIFICANT CHANGE UP
SPECIMEN SOURCE: SIGNIFICANT CHANGE UP

## 2021-07-11 PROCEDURE — 99233 SBSQ HOSP IP/OBS HIGH 50: CPT

## 2021-07-11 RX ADMIN — ENOXAPARIN SODIUM 40 MILLIGRAM(S): 100 INJECTION SUBCUTANEOUS at 11:29

## 2021-07-11 RX ADMIN — MEROPENEM 100 MILLIGRAM(S): 1 INJECTION INTRAVENOUS at 06:08

## 2021-07-11 RX ADMIN — ATORVASTATIN CALCIUM 20 MILLIGRAM(S): 80 TABLET, FILM COATED ORAL at 23:03

## 2021-07-11 RX ADMIN — SODIUM CHLORIDE 75 MILLILITER(S): 9 INJECTION, SOLUTION INTRAVENOUS at 11:26

## 2021-07-11 RX ADMIN — SODIUM CHLORIDE 75 MILLILITER(S): 9 INJECTION, SOLUTION INTRAVENOUS at 06:08

## 2021-07-11 RX ADMIN — MEROPENEM 100 MILLIGRAM(S): 1 INJECTION INTRAVENOUS at 13:56

## 2021-07-11 RX ADMIN — Medication 1: at 16:42

## 2021-07-11 RX ADMIN — SODIUM CHLORIDE 75 MILLILITER(S): 9 INJECTION, SOLUTION INTRAVENOUS at 16:42

## 2021-07-11 RX ADMIN — Medication 81 MILLIGRAM(S): at 11:29

## 2021-07-11 RX ADMIN — MEROPENEM 100 MILLIGRAM(S): 1 INJECTION INTRAVENOUS at 23:02

## 2021-07-11 NOTE — PROGRESS NOTE ADULT - SUBJECTIVE AND OBJECTIVE BOX
CC: UTI Pyelonephritis.  Blood culture, urine cx ESBL E Coli  HPI:  71 year old male with pmh of tia, hld, previous mi (2001) coming to hospital with complaints of fever, dysuria >1 week. per patient was at hospital 6/27 for stroke workup given confused (later resolved) and weak however nothing found on mri. states however dysuria worsening and now coming to hospital. states no cp abdominal pain. states outpatient was given cefuroxime 500bid which did not help. on arrival to ed found to be febrile, tachycardiac and with elevated white blood cell count (08 Jul 2021 16:58)    REVIEW OF SYSTEMS:    Patient denied fever, chills, abdominal pain, nausea, vomiting, cough, shortness of breath, chest pain or palpitations    Vital Signs Last 24 Hrs  T(C): 37.1 (11 Jul 2021 08:20), Max: 37.1 (11 Jul 2021 08:20)  T(F): 98.7 (11 Jul 2021 08:20), Max: 98.7 (11 Jul 2021 08:20)  HR: 69 (11 Jul 2021 08:20) (69 - 79)  BP: 113/78 (11 Jul 2021 08:20) (111/60 - 119/74)  BP(mean): --  RR: 18 (11 Jul 2021 08:20) (18 - 18)  SpO2: 95% (11 Jul 2021 08:20) (94% - 96%)I&O's Summary    10 Jul 2021 07:01  -  11 Jul 2021 07:00  --------------------------------------------------------  IN: 0 mL / OUT: 650 mL / NET: -650 mL      PHYSICAL EXAM:  GENERAL: NAD, well-groomed  HEENT: PERRL, +EOMI, anicteric, no [place]  NECK: Supple, No JVD   CHEST/LUNG: CTA bilaterally; Normal effort  HEART: S1S2 Normal intensity, no murmurs, gallops or rubs noted  ABDOMEN: Soft, BS Normoactive, NT, ND, no HSM noted  EXTREMITIES:  2+ radial and DP pulses noted, no clubbing, cyanosis, or edema noted, FROM x 4  SKIN: No rashes or lesions noted  NEURO: A&Ox3, no focal deficits noted, CN II-XII intact  PSYCH: normal mood and affect; insight/judgement appropriate  LABS:                        13.7   7.22  )-----------( 166      ( 10 Jul 2021 08:30 )             41.4     07-10    138  |  99  |  9.6  ----------------------------<  121<H>  3.6   |  29.0  |  0.77    Ca    8.6      10 Jul 2021 08:30  Phos  2.6     07-10  Mg     1.8     07-10    TPro  7.2  /  Alb  3.7  /  TBili  0.4  /  DBili  x   /  AST  24  /  ALT  21  /  AlkPhos  70  07-10        RADIOLOGY & ADDITIONAL TESTS:    MEDICATIONS:  MEDICATIONS  (STANDING):  aspirin  chewable 81 milliGRAM(s) Oral daily  atorvastatin 20 milliGRAM(s) Oral at bedtime  dextrose 40% Gel 15 Gram(s) Oral once  dextrose 5%. 1000 milliLiter(s) (50 mL/Hr) IV Continuous <Continuous>  dextrose 5%. 1000 milliLiter(s) (100 mL/Hr) IV Continuous <Continuous>  dextrose 50% Injectable 25 Gram(s) IV Push once  dextrose 50% Injectable 12.5 Gram(s) IV Push once  dextrose 50% Injectable 25 Gram(s) IV Push once  enoxaparin Injectable 40 milliGRAM(s) SubCutaneous daily  glucagon  Injectable 1 milliGRAM(s) IntraMuscular once  insulin lispro (ADMELOG) corrective regimen sliding scale   SubCutaneous three times a day before meals  insulin lispro (ADMELOG) corrective regimen sliding scale   SubCutaneous at bedtime  lactated ringers. 1000 milliLiter(s) (75 mL/Hr) IV Continuous <Continuous>  meropenem  IVPB 1000 milliGRAM(s) IV Intermittent every 8 hours    MEDICATIONS  (PRN):  acetaminophen   Tablet .. 650 milliGRAM(s) Oral every 6 hours PRN Temp greater or equal to 38.5C (101.3F)

## 2021-07-12 ENCOUNTER — TRANSCRIPTION ENCOUNTER (OUTPATIENT)
Age: 71
End: 2021-07-12

## 2021-07-12 LAB
ALBUMIN SERPL ELPH-MCNC: 3.7 G/DL — SIGNIFICANT CHANGE UP (ref 3.3–5.2)
ALP SERPL-CCNC: 74 U/L — SIGNIFICANT CHANGE UP (ref 40–120)
ALT FLD-CCNC: 41 U/L — HIGH
ANION GAP SERPL CALC-SCNC: 13 MMOL/L — SIGNIFICANT CHANGE UP (ref 5–17)
AST SERPL-CCNC: 42 U/L — HIGH
BILIRUB SERPL-MCNC: 0.3 MG/DL — LOW (ref 0.4–2)
BUN SERPL-MCNC: 11 MG/DL — SIGNIFICANT CHANGE UP (ref 8–20)
CALCIUM SERPL-MCNC: 9.3 MG/DL — SIGNIFICANT CHANGE UP (ref 8.6–10.2)
CHLORIDE SERPL-SCNC: 100 MMOL/L — SIGNIFICANT CHANGE UP (ref 98–107)
CO2 SERPL-SCNC: 23 MMOL/L — SIGNIFICANT CHANGE UP (ref 22–29)
CREAT SERPL-MCNC: 0.59 MG/DL — SIGNIFICANT CHANGE UP (ref 0.5–1.3)
GLUCOSE BLDC GLUCOMTR-MCNC: 118 MG/DL — HIGH (ref 70–99)
GLUCOSE BLDC GLUCOMTR-MCNC: 128 MG/DL — HIGH (ref 70–99)
GLUCOSE BLDC GLUCOMTR-MCNC: 142 MG/DL — HIGH (ref 70–99)
GLUCOSE BLDC GLUCOMTR-MCNC: 311 MG/DL — HIGH (ref 70–99)
GLUCOSE SERPL-MCNC: 126 MG/DL — HIGH (ref 70–99)
HCT VFR BLD CALC: 43.9 % — SIGNIFICANT CHANGE UP (ref 39–50)
HGB BLD-MCNC: 14.7 G/DL — SIGNIFICANT CHANGE UP (ref 13–17)
MAGNESIUM SERPL-MCNC: 1.6 MG/DL — SIGNIFICANT CHANGE UP (ref 1.6–2.6)
MCHC RBC-ENTMCNC: 29.1 PG — SIGNIFICANT CHANGE UP (ref 27–34)
MCHC RBC-ENTMCNC: 33.5 GM/DL — SIGNIFICANT CHANGE UP (ref 32–36)
MCV RBC AUTO: 86.8 FL — SIGNIFICANT CHANGE UP (ref 80–100)
PLATELET # BLD AUTO: 188 K/UL — SIGNIFICANT CHANGE UP (ref 150–400)
POTASSIUM SERPL-MCNC: 4.1 MMOL/L — SIGNIFICANT CHANGE UP (ref 3.5–5.3)
POTASSIUM SERPL-SCNC: 4.1 MMOL/L — SIGNIFICANT CHANGE UP (ref 3.5–5.3)
PROT SERPL-MCNC: 7.3 G/DL — SIGNIFICANT CHANGE UP (ref 6.6–8.7)
RBC # BLD: 5.06 M/UL — SIGNIFICANT CHANGE UP (ref 4.2–5.8)
RBC # FLD: 12.8 % — SIGNIFICANT CHANGE UP (ref 10.3–14.5)
SODIUM SERPL-SCNC: 136 MMOL/L — SIGNIFICANT CHANGE UP (ref 135–145)
WBC # BLD: 5.91 K/UL — SIGNIFICANT CHANGE UP (ref 3.8–10.5)
WBC # FLD AUTO: 5.91 K/UL — SIGNIFICANT CHANGE UP (ref 3.8–10.5)

## 2021-07-12 PROCEDURE — 99232 SBSQ HOSP IP/OBS MODERATE 35: CPT

## 2021-07-12 RX ORDER — MAGNESIUM SULFATE 500 MG/ML
2 VIAL (ML) INJECTION ONCE
Refills: 0 | Status: COMPLETED | OUTPATIENT
Start: 2021-07-12 | End: 2021-07-12

## 2021-07-12 RX ADMIN — SODIUM CHLORIDE 75 MILLILITER(S): 9 INJECTION, SOLUTION INTRAVENOUS at 06:10

## 2021-07-12 RX ADMIN — ATORVASTATIN CALCIUM 20 MILLIGRAM(S): 80 TABLET, FILM COATED ORAL at 21:22

## 2021-07-12 RX ADMIN — MEROPENEM 100 MILLIGRAM(S): 1 INJECTION INTRAVENOUS at 15:26

## 2021-07-12 RX ADMIN — Medication 2: at 21:21

## 2021-07-12 RX ADMIN — MEROPENEM 100 MILLIGRAM(S): 1 INJECTION INTRAVENOUS at 06:10

## 2021-07-12 RX ADMIN — MEROPENEM 100 MILLIGRAM(S): 1 INJECTION INTRAVENOUS at 21:21

## 2021-07-12 RX ADMIN — ENOXAPARIN SODIUM 40 MILLIGRAM(S): 100 INJECTION SUBCUTANEOUS at 11:34

## 2021-07-12 RX ADMIN — Medication 50 GRAM(S): at 14:21

## 2021-07-12 RX ADMIN — Medication 81 MILLIGRAM(S): at 11:34

## 2021-07-12 NOTE — DISCHARGE NOTE PROVIDER - HOSPITAL COURSE
Vital Signs Last 24 Hrs  T(C): 36.8 (12 Jul 2021 04:50), Max: 37.1 (11 Jul 2021 08:20)  T(F): 98.2 (12 Jul 2021 04:50), Max: 98.7 (11 Jul 2021 08:20)  HR: 68 (12 Jul 2021 04:50) (68 - 77)  BP: 112/71 (12 Jul 2021 04:50) (95/61 - 113/78)  BP(mean): --  ABP: --  ABP(mean): --  RR: 18 (12 Jul 2021 04:50) (18 - 18)  SpO2: 95% (12 Jul 2021 04:50) (95% - 95%)    PHYSICAL EXAM:  GENERAL: NAD, lying in bed comfortably  HEAD:  Atraumatic, Normocephalic  EYES: EOMI, PERRL, conjunctiva and sclera clear  ENT: Moist mucous membranes  NECK: Supple, No JVD  CHEST/LUNG: Clear to auscultation bilaterally; No rales, rhonchi, wheezing, or rubs. Unlabored respirations  HEART: Regular rate and rhythm; No murmurs, rubs, or gallops  ABDOMEN: Bowel sounds present; Soft, Nontender, Nondistended   EXTREMITIES:  2+ Peripheral Pulses, brisk capillary refill. No clubbing, cyanosis, or edema  NERVOUS SYSTEM:  Alert & Oriented X3, speech clear. No facial droop, tongue protrusion midline. Answers questions appropriately. Full and equal 5/5 strength B/L upper and lower extremities. +reflexes B/L LE. Sensation intact. No deficits   MSK: FROM x 4 extremities   SKIN: No rashes or lesions    Time spent on Discharge: 35 minutes 70yo male w/PMHx of TIA, HLD, previous MI (2001) presents w/complaints of fever, dysuria >1 week. Pt was recently admitted to hospital on 06/27 for stroke w/u 2/2 to confusion, MR head performed and was negative, confusion resolved. He was Tx'd w/Cefuroxime 500 BID for dysuria, however, Sx's continued to worsen. Pt was then admitted to [facility] on 07/08 for sepsis initially w/unclear etiology, however, CT stone hunt performed, no evidence of renal stones appreciated, but perinephric stranding noted and is concerning for pyelonephritis. UCx positive for E. coli ESBL and Bcx positive for same. ID consulted, Pt Tx'd for UTI complicated by pyelonephritis. Pt completed IV Abx and remains afebrile, is w/o leukocytosis. Pt also w/glucosuria, denies Hx of DM2 and is not on Metformins. Pt's HgA1C is 6.4, will need to F/U w/PCP OP. Pt seen and examined this a.m. by Hospitalist and PA. No acute overnight events to report. Pt currently has no complaints at this time. Denies SOB, cough, CP, palpitations, HA, dizziness, NVD, changes in urinary or bowel habits, fevers, chills. All other remaining ROS negative. Pt is medically stable for D/C home.     Vital Signs Last 24 Hrs  T(C): 36.8 (12 Jul 2021 04:50), Max: 37.1 (11 Jul 2021 08:20)  T(F): 98.2 (12 Jul 2021 04:50), Max: 98.7 (11 Jul 2021 08:20)  HR: 68 (12 Jul 2021 04:50) (68 - 77)  BP: 112/71 (12 Jul 2021 04:50) (95/61 - 113/78)  BP(mean): --  ABP: --  ABP(mean): --  RR: 18 (12 Jul 2021 04:50) (18 - 18)  SpO2: 95% (12 Jul 2021 04:50) (95% - 95%)    PHYSICAL EXAM:  GENERAL: NAD, lying in bed comfortably  HEAD:  Atraumatic, Normocephalic  EYES: EOMI, PERRL, conjunctiva and sclera clear  ENT: Moist mucous membranes  NECK: Supple, No JVD  CHEST/LUNG: Clear to auscultation bilaterally; No rales, rhonchi, wheezing, or rubs. Unlabored respirations  HEART: Regular rate and rhythm; No murmurs, rubs, or gallops  ABDOMEN: Bowel sounds present; Soft, Nontender, Nondistended   EXTREMITIES:  2+ Peripheral Pulses, brisk capillary refill. No clubbing, cyanosis, or edema  NERVOUS SYSTEM:  Alert & Oriented X3, speech clear. No facial droop, tongue protrusion midline. Answers questions appropriately. Full and equal 5/5 strength B/L upper and lower extremities. +reflexes B/L LE. Sensation intact. No deficits   MSK: FROM x 4 extremities   SKIN: No rashes or lesions    Time spent on Discharge: 35 minutes 70yo male w/PMHx of TIA, HLD, previous MI (2001) presents w/complaints of fever, dysuria >1 week. Pt was recently admitted to hospital on 06/27 for stroke w/u 2/2 to confusion, MR head performed and was negative, confusion resolved. He was Tx'd w/Cefuroxime 500 BID for dysuria, however, Sx's continued to worsen. Pt was then admitted to [facility] on 07/08 for sepsis initially w/unclear etiology, however, CT stone hunt performed, no evidence of renal stones appreciated, but perinephric stranding noted and is concerning for pyelonephritis. UCx positive for E. coli ESBL and Bcx positive for same. ID consulted, Pt Tx'd for UTI complicated by pyelonephritis. Pt completed IV Abx and remains afebrile, is w/o leukocytosis. Pt also w/glucosuria, denies Hx of DM2 and is not on Metformins. Pt's HgA1C is 6.4, will need to F/U w/PCP OP. Pt seen and examined this a.m. by Hospitalist and PA. No acute overnight events to report. Pt currently has no complaints at this time. Denies SOB, cough, CP, palpitations, HA, dizziness, NVD, changes in urinary or bowel habits, fevers, chills. All other remaining ROS negative. Pt is medically stable for D/C home.     Vital Signs Last 24 Hrs  T(C): 36.8 (12 Jul 2021 04:50), Max: 37.1 (11 Jul 2021 08:20)  T(F): 98.2 (12 Jul 2021 04:50), Max: 98.7 (11 Jul 2021 08:20)  HR: 68 (12 Jul 2021 04:50) (68 - 77)  BP: 112/71 (12 Jul 2021 04:50) (95/61 - 113/78)  BP(mean): --  ABP: --  ABP(mean): --  RR: 18 (12 Jul 2021 04:50) (18 - 18)  SpO2: 95% (12 Jul 2021 04:50) (95% - 95%)    PHYSICAL EXAM:  GENERAL: NAD, lying in bed comfortably  HEAD:  Atraumatic, Normocephalic  EENT: conjunctiva and sclera clear. MMM, trachea midline, nares patent  CHEST/LUNG: CTABL; No rales, rhonchi, wheezing, or rubs. Unlabored respirations  HEART: RRR; No murmurs, rubs, or gallops  ABDOMEN: _BS present in all 4 quadrants, soft, NTTP, ND  EXTREMITIES:  2+ Peripheral Pulses, brisk capillary refill. No clubbing, cyanosis, or edema  NERVOUS SYSTEM:  A&OX3, speech clear. No facial droop, tongue protrusion midline. Answers questions appropriately. Full and equal 5/5 strength B/L upper and lower extremities. +reflexes B/L LE. Sensation intact. No deficits   MSK: FROM x 4 extremities   SKIN: No rashes or lesions    Time spent on Discharge: 35 minutes 72yo male w/PMHx of TIA, HLD, previous MI (2001) presents w/complaints of fever, dysuria >1 week. Pt was recently admitted to hospital on 06/27 for stroke w/u 2/2 to confusion, MR head performed and was negative, confusion resolved. He was Tx'd w/Cefuroxime 500 BID for dysuria, however, Sx's continued to worsen. Pt was then admitted to [facility] on 07/08 for sepsis initially w/unclear etiology, however, CT stone hunt performed, no evidence of renal stones appreciated, but perinephric stranding noted and is concerning for pyelonephritis. UCx positive for E. coli ESBL and Bcx positive for same. ID consulted, Pt Tx'd for UTI complicated by pyelonephritis. Pt completed IV Abx merem    repeat blood cultures not sent until 7/12 - and if negative will be discharged with midline and iv invanz until 7/25    PHYSICAL EXAM:  GENERAL: NAD, lying in bed comfortably  HEAD:  Atraumatic, Normocephalic  EENT: conjunctiva and sclera clear. MMM, trachea midline, nares patent  CHEST/LUNG: CTABL; No rales, rhonchi, wheezing, or rubs. Unlabored respirations  HEART: RRR; No murmurs, rubs, or gallops  ABDOMEN: _BS present in all 4 quadrants, soft, NTTP, ND  EXTREMITIES:  2+ Peripheral Pulses, brisk capillary refill. No clubbing, cyanosis, or edema  NERVOUS SYSTEM:  A&OX3, speech clear. No facial droop, tongue protrusion midline. Answers questions appropriately. Full and equal 5/5 strength B/L upper and lower extremities. +reflexes B/L LE. Sensation intact. No deficits   MSK: FROM x 4 extremities   SKIN: No rashes or lesions    Time spent on Discharge: 35 minutes

## 2021-07-12 NOTE — DISCHARGE NOTE NURSING/CASE MANAGEMENT/SOCIAL WORK - PATIENT PORTAL LINK FT
You can access the FollowMyHealth Patient Portal offered by [facility] by registering at the following website: [email or web address]. By joining [employer]’s FollowMyHealth portal, you will also be able to view your health information using other applications (apps) compatible with our system.

## 2021-07-12 NOTE — PROGRESS NOTE ADULT - SUBJECTIVE AND OBJECTIVE BOX
DOMINIC ALAMO    [id number]    71y      Male      INTERVAL HPI/OVERNIGHT EVENTS: patient being seen for esbl bacteremia. patient seen at bedside and is upset.     patient is upset that we ludy "multiple test tubes and cultures".       REVIEW OF SYSTEMS:    CONSTITUTIONAL: No fever, weight loss, or fatigue  RESPIRATORY: No cough, wheezing, hemoptysis; No shortness of breath  CARDIOVASCULAR: No chest pain, palpitations  GASTROINTESTINAL: No abdominal or epigastric pain. No nausea, vomiting  NEUROLOGICAL: No headaches, memory loss, loss of strength.  MISCELLANEOUS:      Vital Signs Last 24 Hrs  T(C): 36.8 (12 Jul 2021 04:50), Max: 36.8 (12 Jul 2021 04:50)  T(F): 98.2 (12 Jul 2021 04:50), Max: 98.2 (12 Jul 2021 04:50)  HR: 68 (12 Jul 2021 04:50) (68 - 77)  BP: 112/71 (12 Jul 2021 04:50) (95/61 - 112/71)  BP(mean): --  RR: 18 (12 Jul 2021 04:50) (18 - 18)  SpO2: 95% (12 Jul 2021 04:50) (95% - 95%)    PHYSICAL EXAM:    GENERAL: agitated  HEENT: PERRL, +EOMI  NECK: soft, Supple, No JVD,   CHEST/LUNG: Clear to auscultation bilaterally; No wheezing  HEART: S1S2+, Regular rate and rhythm; No murmurs, rubs, or gallops  ABDOMEN: Soft, Nontender, Nondistended; Bowel sounds present  EXTREMITIES:  2+ Peripheral Pulses, No clubbing, cyanosis, or edema  SKIN: No rashes or lesions  NEURO: AAOX3, no focal deficits,      LABS:                        14.7   5.91  )-----------( 188      ( 12 Jul 2021 09:55 )             43.9     07-12    136  |  100  |  11.0  ----------------------------<  126<H>  4.1   |  23.0  |  0.59    Ca    9.3      12 Jul 2021 09:55  Mg     1.6     07-12    TPro  7.3  /  Alb  3.7  /  TBili  0.3<L>  /  DBili  x   /  AST  42<H>  /  ALT  41<H>  /  AlkPhos  74  07-12      MEDICATIONS  (STANDING):  aspirin  chewable 81 milliGRAM(s) Oral daily  atorvastatin 20 milliGRAM(s) Oral at bedtime  dextrose 40% Gel 15 Gram(s) Oral once  dextrose 5%. 1000 milliLiter(s) (50 mL/Hr) IV Continuous <Continuous>  dextrose 5%. 1000 milliLiter(s) (100 mL/Hr) IV Continuous <Continuous>  dextrose 50% Injectable 25 Gram(s) IV Push once  dextrose 50% Injectable 12.5 Gram(s) IV Push once  dextrose 50% Injectable 25 Gram(s) IV Push once  enoxaparin Injectable 40 milliGRAM(s) SubCutaneous daily  glucagon  Injectable 1 milliGRAM(s) IntraMuscular once  insulin lispro (ADMELOG) corrective regimen sliding scale   SubCutaneous three times a day before meals  insulin lispro (ADMELOG) corrective regimen sliding scale   SubCutaneous at bedtime  lactated ringers. 1000 milliLiter(s) (75 mL/Hr) IV Continuous <Continuous>  magnesium sulfate  IVPB 2 Gram(s) IV Intermittent once  meropenem  IVPB 1000 milliGRAM(s) IV Intermittent every 8 hours    MEDICATIONS  (PRN):  acetaminophen   Tablet .. 650 milliGRAM(s) Oral every 6 hours PRN Temp greater or equal to 38.5C (101.3F)      RADIOLOGY & ADDITIONAL TESTS:    blood cultures sent today

## 2021-07-12 NOTE — CHART NOTE - NSCHARTNOTEFT_GEN_A_CORE
spoke to Daughter Linda [phone]  explained the course of action and is agreeable to have her father stay in the hospital

## 2021-07-12 NOTE — DISCHARGE NOTE PROVIDER - NSDCMRMEDTOKEN_GEN_ALL_CORE_FT
aspirin 81 mg oral tablet, chewable: 1 tab(s) orally once a day  atorvastatin 20 mg oral tablet: 1 tab(s) orally once a day  cefuroxime 500 mg oral tablet: 1 tab(s) orally every 12 hours   aspirin 81 mg oral tablet, chewable: 1 tab(s) orally once a day  atorvastatin 20 mg oral tablet: 1 tab(s) orally once a day  INVanz 1 g injection: 1 gram(s) intravenously every 24 hours

## 2021-07-12 NOTE — DISCHARGE NOTE PROVIDER - CARE PROVIDER_API CALL
F/U w/PCP OP,   Phone: (   )    -  Fax: (   )    -  Follow Up Time: 1 week   María Elena Priest)  Internal Medicine  [address] [place] [id number]  Phone: [phone]  Fax: [phone]  Follow Up Time:

## 2021-07-12 NOTE — PROGRESS NOTE ADULT - SUBJECTIVE AND OBJECTIVE BOX
[facility]  INFECTIOUS DISEASES AND INTERNAL MEDICINE at [place]  =======================================================  Chip Priest MD  Diplomates American Board of Internal Medicine and Infectious Diseases  Tel: [phone]      Fax: [phone]  =======================================================    DOMINIC ALAMO [id number]    Follow up: bacteremia  pt feels well   no urinary symptoms, abdominal pain, flank pain  no fever        Allergies:  iv dye (Unknown)  No Known Drug Allergies  SHRIMP (Unknown)           REVIEW OF SYSTEMS:  CONSTITUTIONAL:  No Fever or chills  HEENT:   No diplopia or blurred vision.  No earache, sore throat or runny nose.  CARDIOVASCULAR:  No pressure, squeezing, strangling, tightness, heaviness or aching about the chest, neck, axilla or epigastrium.  RESPIRATORY:  No cough, shortness of breath  GASTROINTESTINAL:  No nausea, vomiting or diarrhea.  GENITOURINARY:  No dysuria, frequency or urgency. No Blood in urine  MUSCULOSKELETAL:  no joint aches, no muscle pain  SKIN:  No change in skin, hair or nails.  NEUROLOGIC:  No Headaches, seizures or weakness.  PSYCHIATRIC:  No disorder of thought or mood.  ENDOCRINE:  No heat or cold intolerance  HEMATOLOGICAL:  No easy bruising or bleeding.       Physical Exam:  GEN: NAD, pleasant  HEENT: normocephalic and atraumatic. EOMI. PERRL.  Anicteric   NECK: Supple.   LUNGS: Clear to auscultation.  HEART: Regular rate and rhythm without murmur.  ABDOMEN: Soft, nontender, and nondistended.  Positive bowel sounds.    : No CVA tenderness  EXTREMITIES: Without any edema.  MSK: no joint swelling  NEUROLOGIC: Cranial nerves II through XII are grossly intact. No focal deficits  PSYCHIATRIC: Appropriate affect .  SKIN: No Rash      Vitals:    T(F): 98.8 (12 Jul 2021 16:41), Max: 98.8 (12 Jul 2021 16:41)  HR: 80 (12 Jul 2021 16:41)  BP: 106/68 (12 Jul 2021 16:41)  RR: 18 (12 Jul 2021 16:41)  SpO2: 93% (12 Jul 2021 16:41) (93% - 95%)  temp max in last 48H T(F): , Max: 98.8 (07-12-21 @ 16:41)    Current Antibiotics:  meropenem  IVPB 1000 milliGRAM(s) IV Intermittent every 8 hours    Other medications:  aspirin  chewable 81 milliGRAM(s) Oral daily  atorvastatin 20 milliGRAM(s) Oral at bedtime  dextrose 40% Gel 15 Gram(s) Oral once  dextrose 5%. 1000 milliLiter(s) IV Continuous <Continuous>  dextrose 5%. 1000 milliLiter(s) IV Continuous <Continuous>  dextrose 50% Injectable 25 Gram(s) IV Push once  dextrose 50% Injectable 12.5 Gram(s) IV Push once  dextrose 50% Injectable 25 Gram(s) IV Push once  enoxaparin Injectable 40 milliGRAM(s) SubCutaneous daily  glucagon  Injectable 1 milliGRAM(s) IntraMuscular once  insulin lispro (ADMELOG) corrective regimen sliding scale   SubCutaneous three times a day before meals  insulin lispro (ADMELOG) corrective regimen sliding scale   SubCutaneous at bedtime  lactated ringers. 1000 milliLiter(s) IV Continuous <Continuous>                            14.7   5.91  )-----------( 188      ( 12 Jul 2021 09:55 )             43.9     07-12    136  |  100  |  11.0  ----------------------------<  126<H>  4.1   |  23.0  |  0.59    Ca    9.3      12 Jul 2021 09:55  Mg     1.6     07-12    TPro  7.3  /  Alb  3.7  /  TBili  0.3<L>  /  DBili  x   /  AST  42<H>  /  ALT  41<H>  /  AlkPhos  74  07-12    RECENT CULTURES:  07-08 @ 22:22 .Urine Clean Catch (Midstream) Escherichia coli ESBL    10,000 - 49,000 CFU/mL Escherichia coli ESBL  <10,000 CFU/ml Normal Urogenital yair present        07-08 @ 16:51          NotDetec  07-08 @ 15:18 .Blood Blood Blood Culture PCR  Escherichia coli ESBL    Growth in anaerobic bottle: Escherichia coli ESBL    Growth in anaerobic bottle: Gram Negative Rods  ***Blood Panel PCR results on this specimen are available  approximately 3 hours after the Gram stain result.***  Gram stain, PCR, and/or culture results may not always  correspond due to difference in methodologies.  ************************************************************  This PCR assay was performed using [employer].  The following targets are tested for: Enterococcus,  vancomycin resistant enterococci, Listeria monocytogenes,  coagulase negative staphylococci, S. aureus,  methicillin resistant S. aureus, Streptococcus agalactiae  (Group B), S. pneumoniae, S. pyogenes (Group A),  Acinetobacter baumannii, Enterobacter cloacae, E. coli,  Klebsiella oxytoca, K. pneumoniae, Proteus sp.,  Serratia marcescens, Haemophilus influenzae,  Neisseria meningitidis, Pseudomonas aeruginosa, Candida  albicans, C. glabrata, C krusei, C parapsilosis,  C. tropicalis and the KPC resistance gene.  "Due to technical problems, Pseudomonas aeruginosa  will Not be reported as part of the BCID panel  until further notice".  Gram Stain and BCID performed by:  [facility]  [address] [place] [address]  .  TYPE: (C=Critical, N=Notification, A=Abnormal) C  TESTS:  _ GS  DATE/TIME CALLED: _ 07/09/2021 09:12:19  CALLED TO: Tim Chacon RN    READ BACK (2 Patient Identifiers)(Y/N): _ Y  READ BACK VALUES (Y/N): _ Y  CALLED BY: Tim Otero      07-08 @ 15:17 .Blood Blood     No growth at 48 hours            WBC Count: 5.91 K/uL (07-12-21 @ 09:55)  WBC Count: 7.22 K/uL (07-10-21 @ 08:30)  WBC Count: 11.54 K/uL (07-09-21 @ 06:26)  WBC Count: 13.51 K/uL (07-08-21 @ 14:15)    Creatinine, Serum: 0.59 mg/dL (07-12-21 @ 09:55)  Creatinine, Serum: 0.77 mg/dL (07-10-21 @ 08:30)  Creatinine, Serum: 0.80 mg/dL (07-09-21 @ 06:26)  Creatinine, Serum: 0.79 mg/dL (07-08-21 @ 14:15)          Procalcitonin, Serum: 0.25 ng/mL (07-08-21 @ 20:47)     SARS-CoV-2: NotDetec (07-08-21 @ 16:51)  Rapid RVP Result: NotDetec (07-08-21 @ 16:51)    < from: CT Abdomen and Pelvis No Cont (07.10.21 @ 20:23) >    IMPRESSION:    Nonspecific mild bilateral perinephric stranding without hydroureteronephrosis or radiopaque urinary tract stone. Diffuse bladder thickening with mild surrounding stranding. Recommend clinical correlation to assess urinary tract infection.    Additional findings as described.    < end of copied text >    < from: US Abdomen Limited (07.09.21 @ 17:47) >  IMPRESSION:    Gallstones without secondary signs of acute cholecystitis.    10 mm common bile duct dilatation.    Fatty liver    --- End of Report ---    < end of copied text >

## 2021-07-12 NOTE — DISCHARGE NOTE PROVIDER - NSDCCPCAREPLAN_GEN_ALL_CORE_FT
PRINCIPAL DISCHARGE DIAGNOSIS  Diagnosis: Sepsis, due to unspecified organism, unspecified whether acute organ dysfunction present  Assessment and Plan of Treatment:       SECONDARY DISCHARGE DIAGNOSES  Diagnosis: Dysuria  Assessment and Plan of Treatment:      PRINCIPAL DISCHARGE DIAGNOSIS  Diagnosis: Sepsis, due to unspecified organism, unspecified whether acute organ dysfunction present  Assessment and Plan of Treatment: Resolved  Competed IV Abx  Remains afebrile, no leukocytosis      SECONDARY DISCHARGE DIAGNOSES  Diagnosis: Dysuria  Assessment and Plan of Treatment: 2/2 UTI/Pyelonephritis, E.coli ESBL  Completed IV Abx      Diagnosis: Glucosuria  Assessment and Plan of Treatment: F/U PCP OP    Diagnosis: Pyelonephritis  Assessment and Plan of Treatment: Resolved  Completed course of IV Abx

## 2021-07-12 NOTE — DISCHARGE NOTE NURSING/CASE MANAGEMENT/SOCIAL WORK - NSDCVIVACCINE_GEN_ALL_CORE_FT
influenza, injectable, quadrivalent, preservative free; 20-Feb-2015 12:19; Sania Theodore (RN); [employer]; [id number]; IntraMuscular; Deltoid Right.; 0.5 milliLiter(s); VIS (VIS Published: 19-Aug-2014, VIS Presented: 20-Feb-2015);

## 2021-07-12 NOTE — DISCHARGE NOTE PROVIDER - PROVIDER TOKENS
FREE:[LAST:[F/U w/PCP OP],PHONE:[(   )    -],FAX:[(   )    -],FOLLOWUP:[1 week]] PROVIDER:[TOKEN:[[id number]]]

## 2021-07-13 LAB
CULTURE RESULTS: SIGNIFICANT CHANGE UP
GLUCOSE BLDC GLUCOMTR-MCNC: 115 MG/DL — HIGH (ref 70–99)
GLUCOSE BLDC GLUCOMTR-MCNC: 128 MG/DL — HIGH (ref 70–99)
GLUCOSE BLDC GLUCOMTR-MCNC: 129 MG/DL — HIGH (ref 70–99)
GLUCOSE BLDC GLUCOMTR-MCNC: 153 MG/DL — HIGH (ref 70–99)
SPECIMEN SOURCE: SIGNIFICANT CHANGE UP

## 2021-07-13 PROCEDURE — 99233 SBSQ HOSP IP/OBS HIGH 50: CPT

## 2021-07-13 PROCEDURE — 99232 SBSQ HOSP IP/OBS MODERATE 35: CPT

## 2021-07-13 RX ADMIN — MEROPENEM 100 MILLIGRAM(S): 1 INJECTION INTRAVENOUS at 21:34

## 2021-07-13 RX ADMIN — MEROPENEM 100 MILLIGRAM(S): 1 INJECTION INTRAVENOUS at 14:33

## 2021-07-13 RX ADMIN — Medication 81 MILLIGRAM(S): at 11:11

## 2021-07-13 RX ADMIN — ATORVASTATIN CALCIUM 20 MILLIGRAM(S): 80 TABLET, FILM COATED ORAL at 21:34

## 2021-07-13 RX ADMIN — ENOXAPARIN SODIUM 40 MILLIGRAM(S): 100 INJECTION SUBCUTANEOUS at 11:11

## 2021-07-13 RX ADMIN — MEROPENEM 100 MILLIGRAM(S): 1 INJECTION INTRAVENOUS at 05:30

## 2021-07-13 NOTE — PROGRESS NOTE ADULT - SUBJECTIVE AND OBJECTIVE BOX
DOMINIC ALAMO    [id number]    71y      Male    INTERVAL HPI/OVERNIGHT EVENTS: patient beign seen for esbl bacteremia. patient seen at bedside and is in better spirits and states feeling well.     REVIEW OF SYSTEMS:    CONSTITUTIONAL: No fever, weight loss, or fatigue  RESPIRATORY: No cough, wheezing, hemoptysis; No shortness of breath  CARDIOVASCULAR: No chest pain, palpitations  GASTROINTESTINAL: No abdominal or epigastric pain. No nausea, vomiting  NEUROLOGICAL: No headaches, memory loss, loss of strength.  MISCELLANEOUS:      Vital Signs Last 24 Hrs  T(C): 36.6 (13 Jul 2021 09:10), Max: 37.1 (12 Jul 2021 16:41)  T(F): 97.9 (13 Jul 2021 09:10), Max: 98.8 (12 Jul 2021 16:41)  HR: 77 (13 Jul 2021 09:10) (69 - 80)  BP: 101/65 (13 Jul 2021 09:10) (101/65 - 113/72)  BP(mean): --  RR: 18 (13 Jul 2021 09:10) (18 - 18)  SpO2: 96% (13 Jul 2021 09:10) (93% - 96%)    PHYSICAL EXAM:    GENERAL: nad, pleasant   HEENT: PERRL, +EOMI  NECK: soft, Supple, No JVD,   CHEST/LUNG: Clear to auscultation bilaterally; No wheezing  HEART: S1S2+, Regular rate and rhythm; No murmurs, rubs, or gallops  ABDOMEN: Soft, Nontender, Nondistended; Bowel sounds present  EXTREMITIES:  2+ Peripheral Pulses, No clubbing, cyanosis, or edema  SKIN: No rashes or lesions  NEURO: AAOX3, no focal deficits,        LABS:                        14.7   5.91  )-----------( 188      ( 12 Jul 2021 09:55 )             43.9     07-12    136  |  100  |  11.0  ----------------------------<  126<H>  4.1   |  23.0  |  0.59    Ca    9.3      12 Jul 2021 09:55  Mg     1.6     07-12    TPro  7.3  /  Alb  3.7  /  TBili  0.3<L>  /  DBili  x   /  AST  42<H>  /  ALT  41<H>  /  AlkPhos  74  07-12            MEDICATIONS  (STANDING):  aspirin  chewable 81 milliGRAM(s) Oral daily  atorvastatin 20 milliGRAM(s) Oral at bedtime  dextrose 40% Gel 15 Gram(s) Oral once  dextrose 5%. 1000 milliLiter(s) (50 mL/Hr) IV Continuous <Continuous>  dextrose 5%. 1000 milliLiter(s) (100 mL/Hr) IV Continuous <Continuous>  dextrose 50% Injectable 25 Gram(s) IV Push once  dextrose 50% Injectable 12.5 Gram(s) IV Push once  dextrose 50% Injectable 25 Gram(s) IV Push once  enoxaparin Injectable 40 milliGRAM(s) SubCutaneous daily  glucagon  Injectable 1 milliGRAM(s) IntraMuscular once  insulin lispro (ADMELOG) corrective regimen sliding scale   SubCutaneous three times a day before meals  insulin lispro (ADMELOG) corrective regimen sliding scale   SubCutaneous at bedtime  meropenem  IVPB 1000 milliGRAM(s) IV Intermittent every 8 hours    MEDICATIONS  (PRN):  acetaminophen   Tablet .. 650 milliGRAM(s) Oral every 6 hours PRN Temp greater or equal to 38.5C (101.3F)      RADIOLOGY & ADDITIONAL TESTS:

## 2021-07-13 NOTE — PROGRESS NOTE ADULT - SUBJECTIVE AND OBJECTIVE BOX
[facility]  INFECTIOUS DISEASES AND INTERNAL MEDICINE at [place]  =======================================================  Chip Priest MD  Diplomates American Board of Internal Medicine and Infectious Diseases  Tel: [phone]      Fax: [phone]  =======================================================    DOMINIC ALAMO [id number]    Follow up: bacteremia  feels better today  discussed midline and iv abx      Allergies:  iv dye (Unknown)  No Known Drug Allergies  SHRIMP (Unknown)           REVIEW OF SYSTEMS:  CONSTITUTIONAL:  No Fever or chills  HEENT:   No diplopia or blurred vision.  No earache, sore throat or runny nose.  CARDIOVASCULAR:  No pressure, squeezing, strangling, tightness, heaviness or aching about the chest, neck, axilla or epigastrium.  RESPIRATORY:  No cough, shortness of breath  GASTROINTESTINAL:  No nausea, vomiting or diarrhea.  GENITOURINARY:  No dysuria, frequency or urgency. No Blood in urine  MUSCULOSKELETAL:  no joint aches, no muscle pain  SKIN:  No change in skin, hair or nails.  NEUROLOGIC:  No Headaches, seizures or weakness.  PSYCHIATRIC:  No disorder of thought or mood.  ENDOCRINE:  No heat or cold intolerance  HEMATOLOGICAL:  No easy bruising or bleeding.       Physical Exam:  GEN: NAD, pleasant  HEENT: normocephalic and atraumatic. EOMI. PERRL.  Anicteric   NECK: Supple.   LUNGS: Clear to auscultation.  HEART: Regular rate and rhythm without murmur.  ABDOMEN: Soft, nontender, and nondistended.  Positive bowel sounds.    : No CVA tenderness  EXTREMITIES: Without any edema.  MSK: no joint swelling  NEUROLOGIC: Cranial nerves II through XII are grossly intact. No focal deficits  PSYCHIATRIC: Appropriate affect .  SKIN: No Rash      Vitals:    T(F): 97.8 (13 Jul 2021 16:48), Max: 98.1 (12 Jul 2021 22:15)  HR: 79 (13 Jul 2021 16:48)  BP: 106/68 (13 Jul 2021 16:48)  RR: 18 (13 Jul 2021 16:48)  SpO2: 96% (13 Jul 2021 16:48) (95% - 96%)  temp max in last 48H T(F): , Max: 98.8 (07-12-21 @ 16:41)    Current Antibiotics:  meropenem  IVPB 1000 milliGRAM(s) IV Intermittent every 8 hours    Other medications:  aspirin  chewable 81 milliGRAM(s) Oral daily  atorvastatin 20 milliGRAM(s) Oral at bedtime  dextrose 40% Gel 15 Gram(s) Oral once  dextrose 5%. 1000 milliLiter(s) IV Continuous <Continuous>  dextrose 5%. 1000 milliLiter(s) IV Continuous <Continuous>  dextrose 50% Injectable 25 Gram(s) IV Push once  dextrose 50% Injectable 12.5 Gram(s) IV Push once  dextrose 50% Injectable 25 Gram(s) IV Push once  enoxaparin Injectable 40 milliGRAM(s) SubCutaneous daily  glucagon  Injectable 1 milliGRAM(s) IntraMuscular once  insulin lispro (ADMELOG) corrective regimen sliding scale   SubCutaneous three times a day before meals  insulin lispro (ADMELOG) corrective regimen sliding scale   SubCutaneous at bedtime                            14.7   5.91  )-----------( 188      ( 12 Jul 2021 09:55 )             43.9     07-12    136  |  100  |  11.0  ----------------------------<  126<H>  4.1   |  23.0  |  0.59    Ca    9.3      12 Jul 2021 09:55  Mg     1.6     07-12    TPro  7.3  /  Alb  3.7  /  TBili  0.3<L>  /  DBili  x   /  AST  42<H>  /  ALT  41<H>  /  AlkPhos  74  07-12    RECENT CULTURES:  07-08 @ 22:22 .Urine Clean Catch (Midstream) Escherichia coli ESBL    10,000 - 49,000 CFU/mL Escherichia coli ESBL  <10,000 CFU/ml Normal Urogenital yair present        07-08 @ 16:51          NotDetec  07-08 @ 15:18 .Blood Blood Blood Culture PCR  Escherichia coli ESBL    Growth in anaerobic bottle: Escherichia coli ESBL    Growth in anaerobic bottle: Gram Negative Rods  ***Blood Panel PCR results on this specimen are available  approximately 3 hours after the Gram stain result.***  Gram stain, PCR, and/or culture results may not always  correspond due to difference in methodologies.  ************************************************************  This PCR assay was performed using [employer].  The following targets are tested for: Enterococcus,  vancomycin resistant enterococci, Listeria monocytogenes,  coagulase negative staphylococci, S. aureus,  methicillin resistant S. aureus, Streptococcus agalactiae  (Group B), S. pneumoniae, S. pyogenes (Group A),  Acinetobacter baumannii, Enterobacter cloacae, E. coli,  Klebsiella oxytoca, K. pneumoniae, Proteus sp.,  Serratia marcescens, Haemophilus influenzae,  Neisseria meningitidis, Pseudomonas aeruginosa, Candida  albicans, C. glabrata, C krusei, C parapsilosis,  C. tropicalis and the KPC resistance gene.  "Due to technical problems, Pseudomonas aeruginosa  will Not be reported as part of the BCID panel  until further notice".  Gram Stain and BCID performed by:  [facility]  [address] [place] [id number]  .  TYPE: (C=Critical, N=Notification, A=Abnormal) C  TESTS:  _   DATE/TIME CALLED: _ 07/09/2021 09:12:19  CALLED TO: Tim Chacon RN    READ BACK (2 Patient Identifiers)(Y/N): _ Y  READ BACK VALUES (Y/N): _ Y  CALLED BY: Tim Otero      07-08 @ 15:17 .Blood Blood     No growth at 5 days.            WBC Count: 5.91 K/uL (07-12-21 @ 09:55)  WBC Count: 7.22 K/uL (07-10-21 @ 08:30)  WBC Count: 11.54 K/uL (07-09-21 @ 06:26)    Creatinine, Serum: 0.59 mg/dL (07-12-21 @ 09:55)  Creatinine, Serum: 0.77 mg/dL (07-10-21 @ 08:30)  Creatinine, Serum: 0.80 mg/dL (07-09-21 @ 06:26)          Procalcitonin, Serum: 0.25 ng/mL (07-08-21 @ 20:47)     SARS-CoV-2: NotDetec (07-08-21 @ 16:51)  Rapid RVP Result: NotDetec (07-08-21 @ 16:51)

## 2021-07-14 VITALS
HEART RATE: 78 BPM | RESPIRATION RATE: 18 BRPM | SYSTOLIC BLOOD PRESSURE: 120 MMHG | TEMPERATURE: 98 F | DIASTOLIC BLOOD PRESSURE: 77 MMHG | OXYGEN SATURATION: 97 %

## 2021-07-14 LAB
GLUCOSE BLDC GLUCOMTR-MCNC: 125 MG/DL — HIGH (ref 70–99)
GLUCOSE BLDC GLUCOMTR-MCNC: 135 MG/DL — HIGH (ref 70–99)

## 2021-07-14 PROCEDURE — 87150 DNA/RNA AMPLIFIED PROBE: CPT

## 2021-07-14 PROCEDURE — 0225U NFCT DS DNA&RNA 21 SARSCOV2: CPT

## 2021-07-14 PROCEDURE — 36415 COLL VENOUS BLD VENIPUNCTURE: CPT

## 2021-07-14 PROCEDURE — 99285 EMERGENCY DEPT VISIT HI MDM: CPT

## 2021-07-14 PROCEDURE — 85025 COMPLETE CBC W/AUTO DIFF WBC: CPT

## 2021-07-14 PROCEDURE — 74176 CT ABD & PELVIS W/O CONTRAST: CPT

## 2021-07-14 PROCEDURE — 82962 GLUCOSE BLOOD TEST: CPT

## 2021-07-14 PROCEDURE — 36556 INSERT NON-TUNNEL CV CATH: CPT

## 2021-07-14 PROCEDURE — 87186 SC STD MICRODIL/AGAR DIL: CPT

## 2021-07-14 PROCEDURE — 97163 PT EVAL HIGH COMPLEX 45 MIN: CPT

## 2021-07-14 PROCEDURE — 87086 URINE CULTURE/COLONY COUNT: CPT

## 2021-07-14 PROCEDURE — 80061 LIPID PANEL: CPT

## 2021-07-14 PROCEDURE — 85027 COMPLETE CBC AUTOMATED: CPT

## 2021-07-14 PROCEDURE — 76942 ECHO GUIDE FOR BIOPSY: CPT | Mod: 26,59

## 2021-07-14 PROCEDURE — 83036 HEMOGLOBIN GLYCOSYLATED A1C: CPT

## 2021-07-14 PROCEDURE — 83735 ASSAY OF MAGNESIUM: CPT

## 2021-07-14 PROCEDURE — 81001 URINALYSIS AUTO W/SCOPE: CPT

## 2021-07-14 PROCEDURE — 87040 BLOOD CULTURE FOR BACTERIA: CPT

## 2021-07-14 PROCEDURE — 99239 HOSP IP/OBS DSCHRG MGMT >30: CPT

## 2021-07-14 PROCEDURE — 84100 ASSAY OF PHOSPHORUS: CPT

## 2021-07-14 PROCEDURE — 87077 CULTURE AEROBIC IDENTIFY: CPT

## 2021-07-14 PROCEDURE — 84443 ASSAY THYROID STIM HORMONE: CPT

## 2021-07-14 PROCEDURE — 76705 ECHO EXAM OF ABDOMEN: CPT

## 2021-07-14 PROCEDURE — 71250 CT THORAX DX C-: CPT

## 2021-07-14 PROCEDURE — 84145 PROCALCITONIN (PCT): CPT

## 2021-07-14 PROCEDURE — 99232 SBSQ HOSP IP/OBS MODERATE 35: CPT

## 2021-07-14 PROCEDURE — 93005 ELECTROCARDIOGRAM TRACING: CPT

## 2021-07-14 PROCEDURE — 86769 SARS-COV-2 COVID-19 ANTIBODY: CPT

## 2021-07-14 PROCEDURE — 80048 BASIC METABOLIC PNL TOTAL CA: CPT

## 2021-07-14 PROCEDURE — 83605 ASSAY OF LACTIC ACID: CPT

## 2021-07-14 PROCEDURE — 76937 US GUIDE VASCULAR ACCESS: CPT | Mod: 26,59

## 2021-07-14 PROCEDURE — 80053 COMPREHEN METABOLIC PANEL: CPT

## 2021-07-14 RX ORDER — CEFUROXIME AXETIL 250 MG
1 TABLET ORAL
Qty: 0 | Refills: 0 | DISCHARGE

## 2021-07-14 RX ORDER — ERTAPENEM SODIUM 1 G/1
1000 INJECTION, POWDER, LYOPHILIZED, FOR SOLUTION INTRAMUSCULAR; INTRAVENOUS ONCE
Refills: 0 | Status: COMPLETED | OUTPATIENT
Start: 2021-07-14 | End: 2021-07-14

## 2021-07-14 RX ORDER — ERTAPENEM SODIUM 1 G/1
1 INJECTION, POWDER, LYOPHILIZED, FOR SOLUTION INTRAMUSCULAR; INTRAVENOUS ONCE
Refills: 0 | Status: DISCONTINUED | OUTPATIENT
Start: 2021-07-14 | End: 2021-07-14

## 2021-07-14 RX ORDER — ERTAPENEM SODIUM 1 G/1
1 INJECTION, POWDER, LYOPHILIZED, FOR SOLUTION INTRAMUSCULAR; INTRAVENOUS
Qty: 11 | Refills: 0
Start: 2021-07-14 | End: 2021-07-24

## 2021-07-14 RX ADMIN — MEROPENEM 100 MILLIGRAM(S): 1 INJECTION INTRAVENOUS at 05:16

## 2021-07-14 RX ADMIN — Medication 81 MILLIGRAM(S): at 13:48

## 2021-07-14 RX ADMIN — ENOXAPARIN SODIUM 40 MILLIGRAM(S): 100 INJECTION SUBCUTANEOUS at 13:48

## 2021-07-14 RX ADMIN — ERTAPENEM SODIUM 120 MILLIGRAM(S): 1 INJECTION, POWDER, LYOPHILIZED, FOR SOLUTION INTRAMUSCULAR; INTRAVENOUS at 13:47

## 2021-07-14 NOTE — PROGRESS NOTE ADULT - ASSESSMENT
71 year old male with pmh of tia, hld, previous mi (2001) coming to hospital with complaints of fever, dysuria >1 week. Per patient, he was at hospital 6/27 for stroke workup given confused (later resolved) and weak however nothing found on mri. He was getting treated with cefuroxime 500bid for dysuria, but his symptoms continued to worsen. On arrival to he was found to be febrile, tachycardiac and with elevated white blood cell count. Currently admitted for sepsis 2/2 unclear source and on zosyn.      Problem/Plan - 1:  ·  Problem: Sepsis.  Plan: - Sepsis secondary to ESBL UTI pyelonephritis    Sepsis syndrome resolved   CT stone hunt showing no renal stone, perinephric stranding concerning for pyelonephritis    - ID started Merrem      Problem/Plan - 2:  ·  Problem: Glucosuria.  Plan: - Patient denies hx of T2DM and not on metformin. HgA1c- 6.4.  - c/w ISS and monitor FS.      DVT ppx- Lovenox 40 mg QD  - PT recs- home likely Monday 7/12          
71 year old male with pmh of tia, hld, previous mi (2001) coming to hospital with complaints of fever, dysuria >1 week. Per patient, he was at hospital 6/27 for stroke workup given confused (later resolved) and weak however nothing found on mri. He was getting treated with cefuroxime 500bid for dysuria, but his symptoms continued to worsen. Patient is now being treated for esbl uti and bacteremia    #esbl uti and bacteremia - ID following  - stat blood cultures sent 7/12  - cbharti duncan    #tia - asa and lipitor    #hld - statin     dispo - spoke to daughter ludin   [phone]  will not draw any further blood unless patient spikes a fever  spoke to rn and cm  dispo - likely dc tomorrow with mid or picc line with [place] 
71 year old male with pmh of tia, hld, previous mi (2001) coming to hospital with complaints of fever, dysuria >1 week. Per patient, he was at hospital 6/27 for stroke workup given confused (later resolved) and weak however nothing found on mri. He was getting treated with cefuroxime 500bid for dysuria, but his symptoms continued to worsen. Patient is now being treated for esbl uti and bacteremia    #esbl uti and bacteremia - ID following  - unclear why blood cultures not repeated  - ordered stat  - c.w iv merem    #tia - asa and lipitor    #hld - statin     dispo - spoke to daughter ludin   [phone]  will not draw any further blood unless needed to redraw cultures
71 year old male with pmh of tia, hld, previous mi (2001) coming to hospital with complaints of fever, dysuria >1 week. per patient was at hospital 6/27 for stroke workup given confused (later resolved) and weak however nothing found on mri. states however dysuria worsening and now coming to hospital. states no cp abdominal pain. PT REPORTS GOING   TO CLINIC IN [facility]  FOR  SX   AND THEN SAW UROLOGIST IN [facility] outpatient was given cefuroxime 500bid which did not help. on arrival to ed found to be febrile, tachycardiac and with elevated white blood cell count. Found to have E.coli bacteremia-unclear source GI vs     - Blood cultures + e/coli f/u sensitivities  - Repeat blood cultures x2  - Ct chest with densities in gallbladder USg  GALL STONES  NO CHOLECYSTITIDES NO SX OF RUQ PAIN    -  URINE CX GM NEG RODS  POSSIBLE  SORCE AS PT REPORTS HEMATURIA  WILL CHANGE TO MERREM AS CONCERN FOR POSSBLE RESISTANT ECOLI   SUGEGST IMAGING TO LOOK FOR STONES   WILL FOLLOW UP WITH FURTHER RECOMMENDATIONS  
71 year old male with pmh of tia, hld, previous mi (2001) coming to hospital with complaints of fever, dysuria >1 week. per patient was at hospital 6/27 for stroke workup given confused (later resolved) and weak however nothing found on mri. states however dysuria worsening and now coming to hospital. states no cp abdominal pain. states outpatient was given cefuroxime 500bid which did not help. on arrival to ed found to be febrile, tachycardiac and with elevated white blood cell count. Found to have E.coli bacteremia, Ct a/p suggestive of UTI    - Blood cultures + e/coli ESBL  - Repeat blood cultures x2 sent 7/12- negative at 48h   - plan for midline and IV ertapenem 1 g IV daily with weekly CBC CMP through 7/25/21  - CT chest with densities in gallbladder USg without signs of cholecystitis  - Trend Fever  - Trend Leukocytosis        ID outpatient f/u 3-4 weeks    d/w CM, [facility]  
71 year old male with pmh of tia, hld, previous mi (2001) coming to hospital with complaints of fever, dysuria >1 week. per patient was at hospital 6/27 for stroke workup given confused (later resolved) and weak however nothing found on mri. states however dysuria worsening and now coming to hospital. states no cp abdominal pain. states outpatient was given cefuroxime 500bid which did not help. on arrival to ed found to be febrile, tachycardiac and with elevated white blood cell count. Found to have E.coli bacteremia, Ct a/p suggestive of UTI    - Blood cultures + e/coli ESBL  - Repeat blood cultures x2 sent 7/12-if negative at 48h will plan for midline and IV ertapenem 1 g IV daily with weekly CBC CMP through 7/25/21  - CT chest with densities in gallbladder USg without signs of cholecystitis   - c/w meropenem while in hospital  - Trend Fever  - Trend Leukocytosis  warm compresses to LUE      ID outpatient f/u 3-4 weeks    Will Follow  
71 year old male with pmh of tia, hld, previous mi (2001) coming to hospital with complaints of fever, dysuria >1 week. Per patient, he was at hospital 6/27 for stroke workup given confused (later resolved) and weak however nothing found on mri. He was getting treated with cefuroxime 500bid for dysuria, but his symptoms continued to worsen. On arrival to he was found to be febrile, tachycardiac and with elevated white blood cell count. Currently admitted for sepsis 2/2 unclear source and on zosyn.      Problem/Plan - 1:  ·  Problem: Sepsis.  Plan: - P/w fever, tachycardia, leukocytosis resolved   - Blood cx (7/8)- E. coli;   - CT A/P and chest- no focal consolidation and densities in gallbladder concerning for stones/sludge  Will request stone hunt ct to rule out kidney stone   - ID started Merrem      Problem/Plan - 2:  ·  Problem: Glucosuria.  Plan: - Patient denies hx of T2DM and not on metformin. HgA1c- 6.4.  - c/w ISS and monitor FS.      DVT ppx- Lovenox 40 mg QD  - PT recs- home likely Monday 7/12  Discussed with son today at the bedside .    
71 year old male with pmh of tia, hld, previous mi (2001) coming to hospital with complaints of fever, dysuria >1 week. per patient was at hospital 6/27 for stroke workup given confused (later resolved) and weak however nothing found on mri. states however dysuria worsening and now coming to hospital. states no cp abdominal pain. states outpatient was given cefuroxime 500bid which did not help. on arrival to ed found to be febrile, tachycardiac and with elevated white blood cell count. Found to have E.coli bacteremia, Ct a/p suggestive of UTI    - Blood cultures + e/coli ESBL  - Repeat blood cultures x2 sent 7/12  - Ct chest with densities in gallbladder USg without signs of cholecystitis   - c/w meropenem  - Trend Fever  - Trend Leukocytosis  warm compresses to LUE      Will Follow  
71 year old male with pmh of tia, hld, previous mi (2001) coming to hospital with complaints of fever, dysuria >1 week. Per patient, he was at hospital 6/27 for stroke workup given confused (later resolved) and weak however nothing found on mri. He was getting treated with cefuroxime 500bid for dysuria, but his symptoms continued to worsen. On arrival to he was found to be febrile, tachycardiac and with elevated white blood cell count. Currently admitted for sepsis 2/2 unclear source and on zosyn.

## 2021-07-14 NOTE — PROGRESS NOTE ADULT - SUBJECTIVE AND OBJECTIVE BOX
[facility]  INFECTIOUS DISEASES AND INTERNAL MEDICINE at [place]  =======================================================  Chip Priest MD  Diplomates American Board of Internal Medicine and Infectious Diseases  Tel: [phone]      Fax: [phone]  =======================================================    DOMINIC ALAMO [id number]    Follow up: bacteremia 2/2 uti  pt feels well   midline was placed      Allergies:  iv dye (Unknown)  No Known Drug Allergies  SHRIMP (Unknown)           REVIEW OF SYSTEMS:  CONSTITUTIONAL:  No Fever or chills  HEENT:   No diplopia or blurred vision.  No earache, sore throat or runny nose.  CARDIOVASCULAR:  No pressure, squeezing, strangling, tightness, heaviness or aching about the chest, neck, axilla or epigastrium.  RESPIRATORY:  No cough, shortness of breath  GASTROINTESTINAL:  No nausea, vomiting or diarrhea.  GENITOURINARY:  No dysuria, frequency or urgency. No Blood in urine  MUSCULOSKELETAL:  no joint aches, no muscle pain  SKIN:  No change in skin, hair or nails.  NEUROLOGIC:  No Headaches, seizures or weakness.  PSYCHIATRIC:  No disorder of thought or mood.  ENDOCRINE:  No heat or cold intolerance  HEMATOLOGICAL:  No easy bruising or bleeding.       Physical Exam:  GEN: NAD, pleasant  HEENT: normocephalic and atraumatic. EOMI. PERRL.  Anicteric   NECK: Supple.   LUNGS: Clear to auscultation.  HEART: Regular rate and rhythm without murmur.  ABDOMEN: Soft, nontender, and nondistended.  Positive bowel sounds.    : No CVA tenderness  EXTREMITIES: Without any edema.  MSK: no joint swelling  NEUROLOGIC: Cranial nerves II through XII are grossly intact. No focal deficits  PSYCHIATRIC: Appropriate affect .  SKIN: No Rash      Vitals:    T(F): 98.3 (14 Jul 2021 10:37), Max: 98.3 (13 Jul 2021 21:31)  HR: 82 (14 Jul 2021 10:37)  BP: 119/72 (14 Jul 2021 10:37)  RR: 18 (14 Jul 2021 04:27)  SpO2: 92% (14 Jul 2021 10:37) (92% - 96%)  temp max in last 48H T(F): , Max: 98.8 (07-12-21 @ 16:41)    Current Antibiotics:    Other medications:  aspirin  chewable 81 milliGRAM(s) Oral daily  atorvastatin 20 milliGRAM(s) Oral at bedtime  dextrose 40% Gel 15 Gram(s) Oral once  dextrose 5%. 1000 milliLiter(s) IV Continuous <Continuous>  dextrose 5%. 1000 milliLiter(s) IV Continuous <Continuous>  dextrose 50% Injectable 25 Gram(s) IV Push once  dextrose 50% Injectable 12.5 Gram(s) IV Push once  dextrose 50% Injectable 25 Gram(s) IV Push once  enoxaparin Injectable 40 milliGRAM(s) SubCutaneous daily  glucagon  Injectable 1 milliGRAM(s) IntraMuscular once  insulin lispro (ADMELOG) corrective regimen sliding scale   SubCutaneous at bedtime  insulin lispro (ADMELOG) corrective regimen sliding scale   SubCutaneous three times a day before meals              RECENT CULTURES:  07-12 @ 09:55 .Blood Blood     No growth at 48 hours        07-08 @ 22:22 .Urine Clean Catch (Midstream) Escherichia coli ESBL    10,000 - 49,000 CFU/mL Escherichia coli ESBL  <10,000 CFU/ml Normal Urogenital yair present        07-08 @ 16:51          NotDetec  07-08 @ 15:18 .Blood Blood Blood Culture PCR  Escherichia coli ESBL    Growth in anaerobic bottle: Escherichia coli ESBL    Growth in anaerobic bottle: Gram Negative Rods  ***Blood Panel PCR results on this specimen are available  approximately 3 hours after the Gram stain result.***  Gram stain, PCR, and/or culture results may not always  correspond due to difference in methodologies.  ************************************************************  This PCR assay was performed using [employer].  The following targets are tested for: Enterococcus,  vancomycin resistant enterococci, Listeria monocytogenes,  coagulase negative staphylococci, S. aureus,  methicillin resistant S. aureus, Streptococcus agalactiae  (Group B), S. pneumoniae, S. pyogenes (Group A),  Acinetobacter baumannii, Enterobacter cloacae, E. coli,  Klebsiella oxytoca, K. pneumoniae, Proteus sp.,  Serratia marcescens, Haemophilus influenzae,  Neisseria meningitidis, Pseudomonas aeruginosa, Candida  albicans, C. glabrata, C krusei, C parapsilosis,  C. tropicalis and the KPC resistance gene.  "Due to technical problems, Pseudomonas aeruginosa  will Not be reported as part of the BCID panel  until further notice".  Gram Stain and BCID performed by:  [facility]  [address] [place] [address]  .  TYPE: (C=Critical, N=Notification, A=Abnormal) C  TESTS:  _ GS  DATE/TIME CALLED: _ 07/09/2021 09:12:19  CALLED TO: Tim Chacon RN    READ BACK (2 Patient Identifiers)(Y/N): _ Y  READ BACK VALUES (Y/N): _ Y  CALLED BY: Tim Otero      07-08 @ 15:17 .Blood Blood     No growth at 5 days.            WBC Count: 5.91 K/uL (07-12-21 @ 09:55)  WBC Count: 7.22 K/uL (07-10-21 @ 08:30)    Creatinine, Serum: 0.59 mg/dL (07-12-21 @ 09:55)  Creatinine, Serum: 0.77 mg/dL (07-10-21 @ 08:30)          Procalcitonin, Serum: 0.25 ng/mL (07-08-21 @ 20:47)     SARS-CoV-2: NotDetec (07-08-21 @ 16:51)  Rapid RVP Result: NotDetec (07-08-21 @ 16:51)

## 2021-07-14 NOTE — PROGRESS NOTE ADULT - PROVIDER SPECIALTY LIST ADULT
Infectious Disease
Internal Medicine
Internal Medicine
Hospitalist
Hospitalist
Infectious Disease
Hospitalist

## 2021-07-17 LAB
CULTURE RESULTS: SIGNIFICANT CHANGE UP
CULTURE RESULTS: SIGNIFICANT CHANGE UP
SPECIMEN SOURCE: SIGNIFICANT CHANGE UP
SPECIMEN SOURCE: SIGNIFICANT CHANGE UP

## 2022-10-14 NOTE — PROGRESS NOTE ADULT - PROBLEM SELECTOR PLAN 1
Received pharmacy request for refill of clonazepam.   Last appt 10/21/21  Upcoming appt, 10/20/22.   Please advise.   Monet also called to state that she does not have enough meds to get to appt.     clonazePAM (KlonoPIN) 1 MG tablet 30 tablet 5 4/25/2022     Sig: TAKE 1 TABLET BY MOUTH EVERY DAY IN THE EVENING    Sent to pharmacy as: clonazePAM 1 MG Oral Tablet (KlonoPIN)    Class: Eprescribe        
- P/w fever, tachycardia, leukocytosis,  - CT A/P and chest- no focal consolidation and densities in gallbladder concerning for stones/sludge  - C/w IV zosyn  - Blood cx (7/8)- E. coli; pending sensitivities  - F/u UCx  - F/u US abd RUQ to r/o cholelithiasis  - appreciate ID recs
JERRY Tate

## 2023-06-06 ENCOUNTER — EMERGENCY (EMERGENCY)
Facility: HOSPITAL | Age: 73
LOS: 1 days | Discharge: HOME | End: 2023-06-06
Attending: STUDENT IN AN ORGANIZED HEALTH CARE EDUCATION/TRAINING PROGRAM
Payer: COMMERCIAL

## 2023-06-06 VITALS
SYSTOLIC BLOOD PRESSURE: 129 MMHG | HEART RATE: 74 BPM | RESPIRATION RATE: 16 BRPM | DIASTOLIC BLOOD PRESSURE: 93 MMHG | OXYGEN SATURATION: 95 % | TEMPERATURE: 99 F

## 2023-06-06 PROCEDURE — 99053 MED SERV 10PM-8AM 24 HR FAC: CPT

## 2023-06-06 PROCEDURE — 99284 EMERGENCY DEPT VISIT MOD MDM: CPT
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
## 2023-06-07 PROBLEM — G45.9 TRANSIENT CEREBRAL ISCHEMIC ATTACK, UNSPECIFIED: Chronic | Status: ACTIVE | Noted: 2021-07-08

## 2023-06-07 PROCEDURE — 99283 EMERGENCY DEPT VISIT LOW MDM: CPT

## 2023-06-07 PROCEDURE — T1013: CPT

## 2023-06-07 RX ORDER — IBUPROFEN 200 MG
1 TABLET ORAL
Qty: 20 | Refills: 0
Start: 2023-06-07 | End: 2023-06-11

## 2023-06-07 RX ORDER — METHOCARBAMOL 500 MG/1
2 TABLET, FILM COATED ORAL
Qty: 30 | Refills: 0
Start: 2023-06-07 | End: 2023-06-11

## 2023-06-07 NOTE — ED PROVIDER NOTE - PATIENT PORTAL LINK FT
You can access the FollowMyHealth Patient Portal offered by [facility] by registering at the following website: [email or web address]. By joining [employer]’s FollowMyHealth portal, you will also be able to view your health information using other applications (apps) compatible with our system.
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
## 2023-06-07 NOTE — ED PROVIDER NOTE - NSFOLLOWUPINSTRUCTIONS_ED_ALL_ED_FT
- Prescription sent to pharmacy.  - Ibuprofen 600mg every 6 hours as needed for pain.  - Acetaminophen 650mg every 6 hours as needed for pain.   - Please bring all documentation from your ED visit to any related future follow up appointment.  - Please call to schedule follow up appointment with your primary care physician within 24-48 hours.  - Please seek immediate medical attention for any new/worsening, signs/symptoms, or concerns.    Feel better!    - Receta enviada a farmacia.  - Ibuprofeno 600mg cada 6 horas según necesidad para el dolor.  - Acetaminofén 650 mg cada 6 horas según sea necesario para el dolor.   - Traiga toda la documentación de cutler visita al servicio de urgencias a cualquier gadiel de seguimiento futura relacionada.  - Llame para programar shanelle gadiel de seguimiento con cutler médico de atención primaria dentro de las 24 a 48 horas.  - Busque atención médica inmediata ante cualquier nuevo / empeoramiento, signos / síntomas o inquietudes.    ¡Sentirse mejor!       Accidente automovilístico    LO QUE NECESITA SABER:    Los accidentes automovilísticos pueden causar lesiones ocasionadas por el impacto o por ruben sido movido de un lado al otro dentro del raymond. Podría tener un hematoma en el abdomen, pecho o lee ann debido al cinturón de seguridad. También puede que tenga dolor en cutler rafael, lee ann o espalda. Podría sentir dolor en las rodillas, caderas o muslos si cutler cuerpo golpea el tablero o el volante. El dolor muscular tiende a empeorar de 1 a 2 días después del accidente.    INSTRUCCIONES SOBRE EL PRANAY HOSPITALARIA:    Llame al número de emergencias local (911 en los [place]) si:  •Usted tiene un nuevo dolor de pecho o éste empeora, o tiene falta de aliento.          Llame a cutler médico si:  •Usted tiene un dolor nuevo o peor en el abdomen.      •Usted tiene náuseas y vómitos que no mejoran.      •Usted tiene un [place]enton dolor de vlad.      •Usted tiene debilidad, hormigueo o adormecimiento en lona brazos o piernas.      •Usted tiene un dolor nuevo o peor que le dificulta el movimiento.      •Usted tiene dolor que aparece de 2 a 3 días después del accidente.      •Usted tiene preguntas o inquietudes acerca de cutler condición o cuidado.      Medicamentos:  •Analgésicos:Usted podría recibir medicamento para quitarle o reducir el dolor. No espere a que el dolor sea muy intenso para roberto el medicamento.      •Los ROBER,elsi el ibuprofeno, ayudan a disminuir la inflamación, el dolor y la fiebre. Jenae medicamento está disponible con o sin shanelle receta médica. Los ROBER pueden causar sangrado estomacal o problemas renales en ciertas personas. Si usted esta tomando un anticoágulante,  siempre pregunte si los AINEs son seguros para usted. Siempre dajuan la etiqueta de jenae medicamento y siga las instrucciones. No administre jenae medicamento a niños menores de 6 meses de juanis sin antes obtener la autorización de cutler médico.      •[place] lona medicamentos elsi se le haya indicado.Consulte con cutler médico si usted donny que cutler medicamento no le está ayudando o si presenta efectos secundarios. Infórmele si es alérgico a algún medicamento. Mantenga shanelle lista actualizada de los medicamentos, las vitaminas y los productos herbales que sorin. Incluya los siguientes datos de los medicamentos: cantidad, frecuencia y motivo de administración. Traiga con usted la lista o los envases de las píldoras a lona citas de seguimiento. Lleve la lista de los medicamentos con usted en emelyn de shanelle emergencia.      Cuidados personales:  •Use hielo y calor.El hielo ayuda a disminuir la inflamación y el dolor. El hielo también puede contribuir a evitar el daño de los tejidos. Use shanelle compresa de hielo o ponga hielo triturado en shanelle bolsa de plástico. Cúbrala con shanelle toalla y aplíquela al área adolorida por 15 a 20 minutos cada hora o elsi se le indique. Después de 2 días, use shanelle compresa caliente en el área lesionada. Aplique calor elsi se lo recomiende el médico.      •Estire lona músculos cuidadosamente.Kristin ejercicios suaves para estirar lona músculos después de ruben sufrido un accidente automovilístico. Consulte con cutler médico sobre cuáles ejercicios hacer.      Consejos de seguridad:Lo siguiente puede ayudar a prevenir otro accidente automovilístico o a reducir el riesgo de lesiones:   •Use siempre cutler cinturón de seguridad.El uso de cutler cinturón de seguridad ayudará a reducir las lesiones sufridas por accidentes automovilísticos. El cinturón de seguridad debe tener shanelle alexandra que atraviese cutler pecho y otra que atraviese cutler regazo.      •Siempre coloque a cutler hijo en un asiento de seguridad para niños.Use un asiento de seguridad hecho para cutler edad, altura y peso. Elija un asiento de seguridad que tenga un arnés y un broche. Coloque el asiento de seguridad en la plaza del medio del asiento trasero del automóvil. El asiento de seguridad no debería moverse en ninguna dirección más de 1 pulgada después de ajustarlo. Siga siempre las instrucciones proporcionadas para cutler asiento de seguridad para ayudarle a colocarlo. Las instrucciones también le indicarán cómo sujetar a cutler melissa en el asiento correctamente. Pregúntele a cutler médico sobre más información acerca de los asientos de seguridad para niños.   Asiento de seguridad para niños en automóviles           •Disminuya la velocidad.Maneje cutler raymond al límite de velocidad para reducir cutler riesgo de accidentes automovilísticos.      •No maneje si se siente cansado.Usted reacciona más lentamente cuando está cansado. El tiempo de reacción lento aumentará el riesgo de un accidente automovilístico.      •No hable por teléfono ni envíe mensajes de texto mientras maneje.Usted no reaccionará lo suficientemente rápido en shanelle emergencia si se distrae con mensajes de texto o conversaciones.      •No consuma drogas ni alcohol antes de manejar.Es probable que se sienta más cansado o tome riesgos que usualmente no tomaría. No maneje después de roberto medicamentos que le dan sueño. Use un conductor designado o kristin arreglos para que lo lleven a cutler casa.      •Ayude a cutler hijo adolescente a convertirse en un conductor seguro.Sea un buen modelo al manejar. Hable con cutler hijo adolescente sobre las maneras de reducir el riesgo de un accidente automovilístico. Estas incluyen no conducir cuando está cansado y no tener distracciones, elsi un teléfono. Recuérdele a cutler hijo adolescente que siempre debe ir al límite de velocidad y usar el cinturón de seguridad.      Acuda a lona consultas de control con cutler médico según le indicaron.Anote lona preguntas para que se acuerde de hacerlas anibal lona visitas.       Dolor musculoesquelético    Musculoskeletal Pain      "Dolor musculoesquelético" hace referencia a los eden y las molestias en los huesos, las articulaciones, los músculos y los tejidos que los rodean. Jenae dolor puede ocurrir en cualquier parte del cuerpo. Puede durar un breve período (sheila) o prolongarse mucho tiempo (crónico).    Es posible que se realicen un examen físico, análisis de laboratorio y estudios de diagnóstico por imágenes para encontrar la causa del dolor musculoesquelético.      Siga estas instrucciones en cutler casa:    Estilo de juanis   •Trate de controlar o reducir los niveles de estrés. El estrés aumenta la tensión muscular y puede empeorar el dolor musculoesquelético. Es importante reconocer cuando está ansioso o estresado y aprender distintas formas de controlar jenae estado. [place] puede incluir:  •Yoga o meditación.      •Terapia cognitiva o conductual.      •Acupuntura o terapia de masajes.        •Podrá seguir con todas las actividades, a menos que estas le generen más dolor. Cuando el dolor disminuya, retome las actividades habituales de a poco. Aumente gradualmente la intensidad y la duración de las actividades o del ejercicio que realice.        Control del dolor, la rigidez y la hinchazón                   •El tratamiento puede incluir medicamentos para el dolor y la inflamación que se ivan por boca o que se aplican sobre la piel. Use los medicamentos de venta neena y los recetados solamente elsi se lo haya indicado el médico.      •Si el dolor es intenso, el reposo en cama puede ser beneficioso. Acuéstese o siéntese en cualquier posición que sea cómoda, ike salga de la cama y camine al menos cada dos horas.    •Si se lo indican, aplique calor en la robert afectada con la frecuencia que le haya indicado el médico. Use la kiki de calor que el médico le recomiende, elsi shanelle compresa de calor húmedo o shanelle almohadilla térmica.  •Coloque shanelle toalla entre la piel y la kiki de calor.      •Aplique calor anibal 20 a 30 minutos.      •Retire la kiki de calor si la piel se pone de color hogan brillante. [place] es especialmente importante si no puede sentir dolor, calor o frío. Puede correr un riesgo mayor de sufrir quemaduras.      •Si se lo indican, aplique hielo sobre la robert dolorida. Para hacer esto:  •Ponga el hielo en shanelle bolsa plástica.      •Coloque shanelle toalla entre la piel y la bolsa.      •Aplique el hielo anibal 20 minutos, 2 o 3 veces por día.      •Retire el hielo si la piel se pone de color hogan brillante. [place] es muy importante. Si no puede sentir dolor, calor o frío, tiene un mayor riesgo de que se dañe la robert.        Indicaciones generales     •El médico puede recomendarle que consulte a un fisioterapeuta. Esta persona puede ayudarlo a elaborar un programa de ejercicios seguro.      •Si se lo indica el médico, kristin ejercicios de fisioterapia para mejorar el movimiento y la fuerza de la robert afectada.      •Cumpla con todas las visitas de seguimiento. [place] es importante. [place] incluye las visitas al fisioterapeuta.        Comuníquese con un médico si:    •El dolor empeora.      •Los medicamentos no alivian el dolor.      •No puede usar la parte del cuerpo que le duele, elsi un brazo, shanelle pierna o el lee ann.      •Tiene dificultad para dormir.      •Tiene dificultad para realizar las actividades cotidianas.        Solicite ayuda de inmediato si:    •Tiene shanelle nueva lesión o el dolor empeora o es diferente.      •Tiene adormecimiento u hormigueo en la robert dolorida.        Resumen    •"Dolor musculoesquelético" hace referencia a los eden y las molestias en los huesos, las articulaciones, los músculos y los tejidos que los rodean.      •Jenae dolor puede ocurrir en cualquier parte del cuerpo.      •El médico puede recomendarle que consulte a un fisioterapeuta. Esta persona puede ayudarlo a elaborar un programa de ejercicios seguro. Kristin ejercicios elsi se lo haya indicado el fisioterapeuta.      •Disminuya los niveles de estrés. El estrés puede empeorar el dolor musculoesquelético. Entre los métodos para disminuir el estrés se pueden mencionar la meditación, el yoga, la terapia cognitiva o conductual, la acupuntura y la terapia de masajes.      Esta información no tiene elsi fin reemplazar el consejo del médico. Asegúrese de hacerle al médico cualquier pregunta que tenga.
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
## 2023-06-07 NOTE — ED PROVIDER NOTE - NS ED ATTENDING STATEMENT MOD
This was a shared visit with the ROWAN. I reviewed and verified the documentation and independently performed the documented:

## 2023-12-26 ENCOUNTER — OFFICE (OUTPATIENT)
Dept: URBAN - METROPOLITAN AREA CLINIC 6 | Facility: CLINIC | Age: 73
Setting detail: OPHTHALMOLOGY
End: 2023-12-26
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
## 2023-12-26 PROCEDURE — NO SHOW FE NO SHOW FEE: Performed by: OPHTHALMOLOGY

## 2024-04-29 NOTE — ED ADULT NURSE REASSESSMENT NOTE - BEFAST EYES
[FreeTextEntry1] : TARA COLEMAN is a 89 year y.o. 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 She is here with her home attendant.  She is here for follow-up of palpitations.  Holter only recorded one brief episode of PSVT which is significantly improved from prior Holter recording. However, she had 2 pauses (longest 2.9 sec). She has been on diltiazem for arrhythmia suppression.  Prior Holter recorded PSVT and NSVT runs.  She has declined having ischemia evaluation.  She reports dizzy spells. She previously did not tolerate toprol 12.5 mg  Echo was technically difficult. unable to visualize well the endocardial wall. No significant valve disease. Grade IDD.    No recurrent syncope.  She has no cardiac complaints at this time.  She states she has been anxious.   Her functional capacity has deteriorated. She is on wheelchair. Her legs are weak and needs assistance.    Denies CP, SOB, palpitations, orthopnea, edema Patient denies changes in her exercise tolerance during the past 6 months. She has limited functional capacity. She walks in her apartment with a walker and goes out mostly on a wheelchair.  She reports having poor balance since 2019.  She reports on and off headaches. She has not seen neuro  Sleeps with 3 pillows due to GERD.    Of note,  In Oct 2023, she had an ED visit for "syncope".  She reports that she was in shower with her home attendant assisting her as usual. When she was ready to exit the shower she was unable to the lift her right leg to get out of the tub. She tried a couple of times and her home attendant helped her get out and sit on the toilet. She subsequently became unresponsive and weak. She leaned her against the sink. Home attendant thought patient had fainted. The patient states she was able to hear the home attendant speaking to her but she was unable to respond. The patient denies palpitations, CP, SOB, dizziness. The home attendant states episode lasted about 10 min. She called EMS and by the time they arrived she was back to her usual state of health. Her BP was normal. She was taken to the ED (MSQ). CT head without acute findings. Trops were ok. She was not admitted. She was discharged with outpatient follow-up. TIA?   She denies history of MI, DM, smoking.  Denies family history of premature ASCVD and /or SCD  No hospitalizations in the past 3 years.   DATA LABS (1/30/24) TSH 2.93, K 4.7, CRE 1.10, LFT's wnl, eGFR 48, , , HDL 54, , NON-, A1C 5.4, HCT 40.1, HGB 13.6 (12/27/23): Trop Neg; Hgb 14.6; Hct 44.1; Cre 0.9; K 4.1; LFTs wnl; eGFR 61;  (11/22/23) HGB 14.4, HCT 42.3, K 4.7, CRE 0.91, AST 18, ALT 8, , eGFR 61, , , HDL 56, , NON-, TSH 2.85. (10/18/23): Cre 0.7; K 4.1; eGFR 73; LFTs wnl; Hgb 13.9; Hct 40.2; Plt 279;  (10/10/23): INR 1.1; HS trop 5 x2 (nl); eGFR 54; ALT 59; AST 41; Cre 1.0; K 3.9; Hgb 14.2; Hct 41.8;   ECG (12/27/23): NSR at 69 bpm w 1st degree AVB (GCb=316 ms), LVH PRWP and no STT abn  (10/30/23): NSR at 89 bpm w nl axis, first degree AVB (Alecia= 226 ms), LVH, PVCs, PRWP and nonspecific ST abn.   ECHO (11/9/23): Technically difficult study. The LV endocardium is not well visualized; cannot r/o RWMA. Grade IDD w normal filling pressures. Mildly dilated LA. AV sclerosis w/o stenosis. No significant valve disease   CXR (12/27/23): The cardiac silhouette is wnl. No definite focal consolidation. Elevation of the right hemidiaphragm.   Holter x 14d (4/25/24): Min HR 45 bpm, Avg HR 64 bpm, Max 120 bpm. PACs (5.4%), rare PVCs (<1%). PSVT x 1 (6 beats long). No VT. Pause x 2 (longest 2.9s).  Holter x6d (11/30/23): min 49 bpm, Avg 69 bpm, Max 132 bpm. NSR with brief PSVT x 13 (longest 7 beats) and brief NSVT x 3 (longest 8 beats). rare PVCs <1%. No pauses.   CT head (10/10/23): No CT evidence of acute ICH or cerebral edema. Chronic infarct within the inferior right frontal lobe and microvascular ischemic changes are noted.
No
No

## 2024-06-24 NOTE — ED PROVIDER NOTE - DOMESTIC TRAVEL HIGH RISK QUESTION
From: Hussein Moses Migel Sebastienron  To: Colleen Weiler  Sent: 2024 6:25 PM CDT  Subject: PCP Referral for Physical Therapy    Dr. Weiler,     I'm doing PT for my neck (it's going well) at [facility] in [place], [place] and they informed me today I need a 'PCP Referral' in order for [employer] to pay for my PT. They gave me a list of info needed in the referral and I've listed that below. I'm guessing you do this fairly regularly and know exactly what needs to happen, but they were pretty specific about me asking for this list. I've filled in what I can in case it is helpful:     Patient Name: Hussein Davila  : 1978  Member ID:   Insurance Carrier: [facility] [place]  Facility NPI: ?  Clinician NPI: ?  TIN: ?  Referring MD: I believe this is you  ICD-10: ?  Initial Eval & Treatment: ?   Frequency Duration: 3x per week  Total Visits Requested: I am currently scheduled with them through 24 so that would be 13 more appts.   Start date for Referral: My next appt is tomorrow 24  CPT Codes: ?   Date of Next Appt: 24 and the next after that is 24  Requested By: ?       Thanks for your help with this, please let me know if you need anything from me.     Thank you,     Alphonse  
Hello Agent message sent to patient.    Last office visit 4-29-24.    Future Appointments   Date Time Provider Department Center   8/6/2024  2:00 PM Tano Curran PA-C [facility] NEURS[facility]   8/20/2024  8:00 AM KULDEEP, PROCEDURE [id number] None       
Referral placed  
No

## 2025-05-18 ENCOUNTER — INPATIENT (INPATIENT)
Facility: HOSPITAL | Age: 75
LOS: 17 days | Discharge: HOME | DRG: 870 | End: 2025-06-05
Attending: STUDENT IN AN ORGANIZED HEALTH CARE EDUCATION/TRAINING PROGRAM | Admitting: INTERNAL MEDICINE
Payer: MEDICARE

## 2025-05-18 VITALS
TEMPERATURE: 98 F | SYSTOLIC BLOOD PRESSURE: 98 MMHG | HEART RATE: 96 BPM | DIASTOLIC BLOOD PRESSURE: 71 MMHG | OXYGEN SATURATION: 82 % | RESPIRATION RATE: 26 BRPM

## 2025-05-18 DIAGNOSIS — J96.01 ACUTE RESPIRATORY FAILURE WITH HYPOXIA: ICD-10-CM

## 2025-05-18 LAB
ALBUMIN SERPL ELPH-MCNC: 3.1 G/DL — LOW (ref 3.3–5.2)
ALBUMIN SERPL ELPH-MCNC: 3.3 G/DL — SIGNIFICANT CHANGE UP (ref 3.3–5.2)
ALP SERPL-CCNC: 199 U/L — HIGH (ref 40–120)
ALP SERPL-CCNC: 200 U/L — HIGH (ref 40–120)
ALT FLD-CCNC: 233 U/L — HIGH
ALT FLD-CCNC: 238 U/L — HIGH
ANION GAP SERPL CALC-SCNC: 35 MMOL/L — HIGH (ref 5–17)
ANION GAP SERPL CALC-SCNC: 38 MMOL/L — HIGH (ref 5–17)
APPEARANCE UR: ABNORMAL
APTT BLD: 36.5 SEC — SIGNIFICANT CHANGE UP (ref 26.1–36.8)
AST SERPL-CCNC: 246 U/L — HIGH
AST SERPL-CCNC: 254 U/L — HIGH
BACTERIA # UR AUTO: ABNORMAL /HPF
BASOPHILS # BLD AUTO: 0.04 K/UL — SIGNIFICANT CHANGE UP (ref 0–0.2)
BASOPHILS NFR BLD AUTO: 0.2 % — SIGNIFICANT CHANGE UP (ref 0–2)
BILIRUB SERPL-MCNC: 4.5 MG/DL — HIGH (ref 0.4–2)
BILIRUB SERPL-MCNC: 4.7 MG/DL — HIGH (ref 0.4–2)
BILIRUB UR-MCNC: ABNORMAL
BUN SERPL-MCNC: 39.1 MG/DL — HIGH (ref 8–20)
BUN SERPL-MCNC: 40.4 MG/DL — HIGH (ref 8–20)
CALCIUM SERPL-MCNC: 8.7 MG/DL — SIGNIFICANT CHANGE UP (ref 8.4–10.5)
CALCIUM SERPL-MCNC: 9 MG/DL — SIGNIFICANT CHANGE UP (ref 8.4–10.5)
CAST: 6 /LPF — HIGH (ref 0–4)
CHLORIDE SERPL-SCNC: 90 MMOL/L — LOW (ref 96–108)
CHLORIDE SERPL-SCNC: 90 MMOL/L — LOW (ref 96–108)
CO2 SERPL-SCNC: 12 MMOL/L — LOW (ref 22–29)
CO2 SERPL-SCNC: 12 MMOL/L — LOW (ref 22–29)
COLOR SPEC: SIGNIFICANT CHANGE UP
CREAT SERPL-MCNC: 2.77 MG/DL — HIGH (ref 0.5–1.3)
CREAT SERPL-MCNC: 3.21 MG/DL — HIGH (ref 0.5–1.3)
DIFF PNL FLD: ABNORMAL
EGFR: 19 ML/MIN/1.73M2 — LOW
EGFR: 19 ML/MIN/1.73M2 — LOW
EGFR: 23 ML/MIN/1.73M2 — LOW
EGFR: 23 ML/MIN/1.73M2 — LOW
EOSINOPHIL # BLD AUTO: 0.01 K/UL — SIGNIFICANT CHANGE UP (ref 0–0.5)
EOSINOPHIL NFR BLD AUTO: 0 % — SIGNIFICANT CHANGE UP (ref 0–6)
FINE GRAN CASTS #/AREA URNS AUTO: PRESENT
FLUAV AG NPH QL: SIGNIFICANT CHANGE UP
FLUBV AG NPH QL: SIGNIFICANT CHANGE UP
GAS PNL BLDA: SIGNIFICANT CHANGE UP
GAS PNL BLDV: SIGNIFICANT CHANGE UP
GAS PNL BLDV: SIGNIFICANT CHANGE UP
GLUCOSE SERPL-MCNC: 115 MG/DL — HIGH (ref 70–99)
GLUCOSE SERPL-MCNC: 127 MG/DL — HIGH (ref 70–99)
GLUCOSE UR QL: 250 MG/DL
HCT VFR BLD CALC: 47 % — SIGNIFICANT CHANGE UP (ref 39–50)
HCT VFR BLD CALC: 54.9 % — HIGH (ref 39–50)
HGB BLD-MCNC: 15.5 G/DL — SIGNIFICANT CHANGE UP (ref 13–17)
HGB BLD-MCNC: 17.5 G/DL — HIGH (ref 13–17)
IMM GRANULOCYTES # BLD AUTO: 0.99 K/UL — HIGH (ref 0–0.07)
IMM GRANULOCYTES NFR BLD AUTO: 4.5 % — HIGH (ref 0–0.9)
INR BLD: 2.88 RATIO — HIGH (ref 0.85–1.16)
KETONES UR QL: NEGATIVE MG/DL — SIGNIFICANT CHANGE UP
LEUKOCYTE ESTERASE UR-ACNC: ABNORMAL
LYMPHOCYTES # BLD AUTO: 1.19 K/UL — SIGNIFICANT CHANGE UP (ref 1–3.3)
LYMPHOCYTES NFR BLD AUTO: 5.4 % — LOW (ref 13–44)
MAGNESIUM SERPL-MCNC: 1.6 MG/DL — SIGNIFICANT CHANGE UP (ref 1.6–2.6)
MCHC RBC-ENTMCNC: 29.2 PG — SIGNIFICANT CHANGE UP (ref 27–34)
MCHC RBC-ENTMCNC: 29.3 PG — SIGNIFICANT CHANGE UP (ref 27–34)
MCHC RBC-ENTMCNC: 31.9 G/DL — LOW (ref 32–36)
MCHC RBC-ENTMCNC: 33 G/DL — SIGNIFICANT CHANGE UP (ref 32–36)
MCV RBC AUTO: 88.7 FL — SIGNIFICANT CHANGE UP (ref 80–100)
MCV RBC AUTO: 92 FL — SIGNIFICANT CHANGE UP (ref 80–100)
MONOCYTES # BLD AUTO: 0.6 K/UL — SIGNIFICANT CHANGE UP (ref 0–0.9)
MONOCYTES NFR BLD AUTO: 2.7 % — SIGNIFICANT CHANGE UP (ref 2–14)
NEUTROPHILS # BLD AUTO: 19.3 K/UL — HIGH (ref 1.8–7.4)
NEUTROPHILS NFR BLD AUTO: 87.2 % — HIGH (ref 43–77)
NITRITE UR-MCNC: POSITIVE
NRBC # BLD AUTO: 0.05 K/UL — HIGH (ref 0–0)
NRBC # BLD AUTO: 0.05 K/UL — HIGH (ref 0–0)
NRBC # FLD: 0.05 K/UL — HIGH (ref 0–0)
NRBC # FLD: 0.05 K/UL — HIGH (ref 0–0)
NRBC BLD AUTO-RTO: 0 /100 WBCS — SIGNIFICANT CHANGE UP (ref 0–0)
NRBC BLD AUTO-RTO: 0 /100 WBCS — SIGNIFICANT CHANGE UP (ref 0–0)
NT-PROBNP SERPL-SCNC: HIGH PG/ML (ref 0–300)
PH UR: 6 — SIGNIFICANT CHANGE UP (ref 5–8)
PLATELET # BLD AUTO: 117 K/UL — LOW (ref 150–400)
PLATELET # BLD AUTO: 96 K/UL — LOW (ref 150–400)
PMV BLD: 11.6 FL — SIGNIFICANT CHANGE UP (ref 7–13)
PMV BLD: 12.1 FL — SIGNIFICANT CHANGE UP (ref 7–13)
POTASSIUM SERPL-MCNC: 3.6 MMOL/L — SIGNIFICANT CHANGE UP (ref 3.5–5.3)
POTASSIUM SERPL-MCNC: 3.7 MMOL/L — SIGNIFICANT CHANGE UP (ref 3.5–5.3)
POTASSIUM SERPL-SCNC: 3.6 MMOL/L — SIGNIFICANT CHANGE UP (ref 3.5–5.3)
POTASSIUM SERPL-SCNC: 3.7 MMOL/L — SIGNIFICANT CHANGE UP (ref 3.5–5.3)
PROT SERPL-MCNC: 5.8 G/DL — LOW (ref 6.6–8.7)
PROT SERPL-MCNC: 6.1 G/DL — LOW (ref 6.6–8.7)
PROT UR-MCNC: 300 MG/DL
PROTHROM AB SERPL-ACNC: 32.2 SEC — HIGH (ref 9.9–13.4)
RBC # BLD: 5.3 M/UL — SIGNIFICANT CHANGE UP (ref 4.2–5.8)
RBC # BLD: 5.97 M/UL — HIGH (ref 4.2–5.8)
RBC # FLD: 13.9 % — SIGNIFICANT CHANGE UP (ref 10.3–14.5)
RBC # FLD: 13.9 % — SIGNIFICANT CHANGE UP (ref 10.3–14.5)
RBC CASTS # UR COMP ASSIST: 35 /HPF — HIGH (ref 0–4)
RSV RNA NPH QL NAA+NON-PROBE: SIGNIFICANT CHANGE UP
SARS-COV-2 RNA SPEC QL NAA+PROBE: SIGNIFICANT CHANGE UP
SODIUM SERPL-SCNC: 137 MMOL/L — SIGNIFICANT CHANGE UP (ref 135–145)
SODIUM SERPL-SCNC: 139 MMOL/L — SIGNIFICANT CHANGE UP (ref 135–145)
SOURCE RESPIRATORY: SIGNIFICANT CHANGE UP
SP GR SPEC: 1.02 — SIGNIFICANT CHANGE UP (ref 1–1.03)
SQUAMOUS # UR AUTO: 10 /HPF — HIGH (ref 0–5)
TROPONIN T, HIGH SENSITIVITY RESULT: 35 NG/L — SIGNIFICANT CHANGE UP (ref 0–51)
UROBILINOGEN FLD QL: 1 MG/DL — SIGNIFICANT CHANGE UP (ref 0.2–1)
WBC # BLD: 22.13 K/UL — HIGH (ref 3.8–10.5)
WBC # BLD: 25.93 K/UL — HIGH (ref 3.8–10.5)
WBC # FLD AUTO: 22.13 K/UL — HIGH (ref 3.8–10.5)
WBC # FLD AUTO: 25.93 K/UL — HIGH (ref 3.8–10.5)
WBC UR QL: 43 /HPF — HIGH (ref 0–5)

## 2025-05-18 PROCEDURE — 71045 X-RAY EXAM CHEST 1 VIEW: CPT | Mod: 26

## 2025-05-18 PROCEDURE — 99291 CRITICAL CARE FIRST HOUR: CPT | Mod: 25

## 2025-05-18 PROCEDURE — 74018 RADEX ABDOMEN 1 VIEW: CPT | Mod: 26

## 2025-05-18 PROCEDURE — 93010 ELECTROCARDIOGRAM REPORT: CPT

## 2025-05-18 PROCEDURE — 36620 INSERTION CATHETER ARTERY: CPT

## 2025-05-18 PROCEDURE — 99292 CRITICAL CARE ADDL 30 MIN: CPT | Mod: 25

## 2025-05-18 PROCEDURE — 71045 X-RAY EXAM CHEST 1 VIEW: CPT | Mod: 26,77

## 2025-05-18 PROCEDURE — 31500 INSERT EMERGENCY AIRWAY: CPT

## 2025-05-18 PROCEDURE — 43753 TX GASTRO INTUB W/ASP: CPT | Mod: GC

## 2025-05-18 PROCEDURE — 70450 CT HEAD/BRAIN W/O DYE: CPT | Mod: 26

## 2025-05-18 PROCEDURE — 36556 INSERT NON-TUNNEL CV CATH: CPT

## 2025-05-18 RX ORDER — SODIUM BICARBONATE 1 MEQ/ML
50 SYRINGE (ML) INTRAVENOUS ONCE
Refills: 0 | Status: COMPLETED | OUTPATIENT
Start: 2025-05-18 | End: 2025-05-18

## 2025-05-18 RX ORDER — NOREPINEPHRINE BITARTRATE 8 MG
0.05 SOLUTION INTRAVENOUS
Qty: 8 | Refills: 0 | Status: DISCONTINUED | OUTPATIENT
Start: 2025-05-18 | End: 2025-05-19

## 2025-05-18 RX ORDER — ONDANSETRON HCL/PF 4 MG/2 ML
4 VIAL (ML) INJECTION ONCE
Refills: 0 | Status: COMPLETED | OUTPATIENT
Start: 2025-05-18 | End: 2025-05-18

## 2025-05-18 RX ORDER — ACETAMINOPHEN 500 MG/5ML
1000 LIQUID (ML) ORAL ONCE
Refills: 0 | Status: COMPLETED | OUTPATIENT
Start: 2025-05-18 | End: 2025-05-18

## 2025-05-18 RX ORDER — SODIUM BICARBONATE 1 MEQ/ML
5040 SYRINGE (ML) INTRAVENOUS ONCE
Refills: 0 | Status: DISCONTINUED | OUTPATIENT
Start: 2025-05-18 | End: 2025-05-18

## 2025-05-18 RX ORDER — SODIUM BICARBONATE 1 MEQ/ML
0.14 SYRINGE (ML) INTRAVENOUS
Qty: 75 | Refills: 0 | Status: DISCONTINUED | OUTPATIENT
Start: 2025-05-18 | End: 2025-05-18

## 2025-05-18 RX ORDER — PIPERACILLIN-TAZO-DEXTROSE,ISO 3.375G/5
3.38 IV SOLUTION, PIGGYBACK PREMIX FROZEN(ML) INTRAVENOUS ONCE
Refills: 0 | Status: COMPLETED | OUTPATIENT
Start: 2025-05-18 | End: 2025-05-18

## 2025-05-18 RX ORDER — FENTANYL CITRATE-0.9 % NACL/PF 100MCG/2ML
25 SYRINGE (ML) INTRAVENOUS ONCE
Refills: 0 | Status: DISCONTINUED | OUTPATIENT
Start: 2025-05-18 | End: 2025-05-18

## 2025-05-18 RX ORDER — PHENYLEPHRINE HCL IN 0.9% NACL 0.5 MG/5ML
0.3 SYRINGE (ML) INTRAVENOUS
Qty: 40 | Refills: 0 | Status: DISCONTINUED | OUTPATIENT
Start: 2025-05-18 | End: 2025-05-19

## 2025-05-18 RX ORDER — CALCIUM GLUCONATE 20 MG/ML
1 INJECTION, SOLUTION INTRAVENOUS ONCE
Refills: 0 | Status: COMPLETED | OUTPATIENT
Start: 2025-05-18 | End: 2025-05-18

## 2025-05-18 RX ORDER — SODIUM CHLORIDE 9 G/1000ML
1000 INJECTION, SOLUTION INTRAVENOUS ONCE
Refills: 0 | Status: COMPLETED | OUTPATIENT
Start: 2025-05-18 | End: 2025-05-19

## 2025-05-18 RX ORDER — VANCOMYCIN HCL IN 5 % DEXTROSE 1.5G/250ML
1000 PLASTIC BAG, INJECTION (ML) INTRAVENOUS ONCE
Refills: 0 | Status: COMPLETED | OUTPATIENT
Start: 2025-05-18 | End: 2025-05-18

## 2025-05-18 RX ORDER — METRONIDAZOLE 250 MG
500 TABLET ORAL ONCE
Refills: 0 | Status: DISCONTINUED | OUTPATIENT
Start: 2025-05-18 | End: 2025-05-19

## 2025-05-18 RX ORDER — PROPOFOL 10 MG/ML
10 INJECTION, EMULSION INTRAVENOUS
Qty: 1000 | Refills: 0 | Status: DISCONTINUED | OUTPATIENT
Start: 2025-05-18 | End: 2025-05-19

## 2025-05-18 RX ORDER — SODIUM BICARBONATE 1 MEQ/ML
0.28 SYRINGE (ML) INTRAVENOUS
Qty: 150 | Refills: 0 | Status: DISCONTINUED | OUTPATIENT
Start: 2025-05-18 | End: 2025-05-20

## 2025-05-18 RX ORDER — PIPERACILLIN-TAZO-DEXTROSE,ISO 3.375G/5
3.38 IV SOLUTION, PIGGYBACK PREMIX FROZEN(ML) INTRAVENOUS EVERY 12 HOURS
Refills: 0 | Status: DISCONTINUED | OUTPATIENT
Start: 2025-05-18 | End: 2025-05-19

## 2025-05-18 RX ADMIN — Medication 8.93 MICROGRAM(S)/KG/MIN: at 23:15

## 2025-05-18 RX ADMIN — Medication 50 MILLIEQUIVALENT(S): at 22:21

## 2025-05-18 RX ADMIN — Medication 75 MEQ/KG/HR: at 21:58

## 2025-05-18 RX ADMIN — NOREPINEPHRINE BITARTRATE 7.44 MICROGRAM(S)/KG/MIN: 8 SOLUTION at 23:16

## 2025-05-18 RX ADMIN — Medication 1000 MILLIGRAM(S): at 20:35

## 2025-05-18 RX ADMIN — Medication 1000 MILLILITER(S): at 20:12

## 2025-05-18 RX ADMIN — PROPOFOL 4.76 MICROGRAM(S)/KG/MIN: 10 INJECTION, EMULSION INTRAVENOUS at 23:15

## 2025-05-18 RX ADMIN — Medication 4 MILLIGRAM(S): at 20:00

## 2025-05-18 RX ADMIN — Medication 400 MILLIGRAM(S): at 20:20

## 2025-05-18 RX ADMIN — NOREPINEPHRINE BITARTRATE 7.44 MICROGRAM(S)/KG/MIN: 8 SOLUTION at 21:13

## 2025-05-18 RX ADMIN — Medication 150 MEQ/KG/HR: at 23:14

## 2025-05-18 NOTE — ED ADULT NURSE NOTE - OBJECTIVE STATEMENT
Assumed care of patient in critical 2. Pt awake alert unable to provide full history due to present respiratory distress. Presents to ed with c/o of abd pain since last night  n/v/d. Denies chest pain, sob, fever, chills, gu symptoms. Pt educated on plan of care, pt able to successfully teach back plan of care to RN, RN will continue to reeducate pt during hospital stay.

## 2025-05-18 NOTE — ED ADULT TRIAGE NOTE - CHIEF COMPLAINT QUOTE
BIBEMS as per EMS and family pt have N/V/D since last night. Pt appears lethargic, hypoxic, abd distended, mottled skin. Dr. Che called to bedside

## 2025-05-18 NOTE — ED PROVIDER NOTE - ATTENDING CONTRIBUTION TO CARE
Pt is a 74 yo male with pmh of CAD s/p MI, TIA presenting with abdominal pain. Pt was BIBEMS as per EMS and family, pt has been having N/V/D since last night. Pt appears lethargic, hypoxic, tachypneic, with abd tenderness and distension and mottled skin. Pt is AO4. Pt denies chest pain.    suspect abdominal pathology sepsis.     vs shows mild tachy normotensive, tachypneic, variable pulse ox on nonrebreather with bad pleth. placed on bipap. placed on levophed with bp dropping.  requrinf more pressors and intubation . central line placed icu bediside pt intubated. Pending ct eval for abdominal pathology lactic 16. Family at bediside aware of critical condition.

## 2025-05-18 NOTE — ED PROVIDER NOTE - PROGRESS NOTE DETAILS
Given the significant and immediate threats to this patient based on initial presentation, the benefits of emergency contrast-enhanced CT imaging without obtaining GFR/creatinine serum level results greatly outweigh the potential risk of harm due to contrast-induced nephropathy. pt placed on levophed, bicarb and intubated. admitted to MICU.

## 2025-05-18 NOTE — H&P ADULT - NSHPPHYSICALEXAM_GEN_ALL_CORE
Prior to intubation, patient lethargic and tachypneic, on bipap, arouses to verbal stimuli and follows commands  mottled skin, delayed capillary refill  hypotensive despite escalating doses of pressors  sinus tachycardia to 150s-160s on monitor  Abdomen distended with hypoactive bowel sounds, +guarding, diffuse tenderness to palpation     Bedside US showed hypokinetic LV,  bilateral lung sliding, large fluid filled stomach

## 2025-05-18 NOTE — ED ADULT NURSE NOTE - CAS TRG GEN SKIN COLOR
Patient Normal for race Supportive social network of family or friends/Engaged in work or school/Positive therapeutic relationships

## 2025-05-18 NOTE — H&P ADULT - HISTORY OF PRESENT ILLNESS
74 yo male with hx of HTN, HLD, CAD, TIA, remote pericarditis in 2015, presented to the ED with complaints of abdominal pain, nausea, vomiting for since the day prior.  Upon arrival to the ED patient was lethargic and tachypneic, with abdominal distention and mottled skin.    He was noted to be hypotensive despite fluid resuscitation and placed on escalating doses of pressors.    Started on Bipap initially for tachypnea and metabolic acidosis, eventually intubated in the ED.  Labs revealed severe metabolic acidosis. OLEKSANDR, transaminitis

## 2025-05-18 NOTE — ED PROVIDER NOTE - CLINICAL SUMMARY MEDICAL DECISION MAKING FREE TEXT BOX
Pt is a 74 yo male with pmh of CAD s/p MI, TIA presenting with abdominal pain. Pt was BIBEMS as per EMS and family, pt has been having N/V/D since last night. Pt appears lethargic, hypoxic, tachypneic, with abd tenderness and distension and mottled skin. Pt is AO4. Pt denies chest pain.       vs shows mild tachy normotensive, tachypneic, variable pulse ox on nonrebreather with bad pleth. placed on bipap. placed on levophed with bp dropping.     sepsis workup, vanc, zosyn, acs workup. cta chest, head, abdomen pelvis.

## 2025-05-18 NOTE — ED PROVIDER NOTE - OBJECTIVE STATEMENT
Pt is a 76 yo male with pmh of CAD s/p MI, TIA presenting with abdominal pain. Pt was BIBEMS as per EMS and family, pt has been having N/V/D since last night. Pt appears lethargic, hypoxic, tachypneic, with abd tenderness and distension and mottled skin. Pt is AO4. Pt denies chest pain.

## 2025-05-18 NOTE — ED PROVIDER NOTE - WR ORDER STATUS 2
Patient called back, relayed messages. Patient is stating the last provider who prescribed him the tamsulosin was a urologist doctor he does not remember the doctor name. Patient is also still taking the medication and is down to one pill.     Patient understands that his provider has left and he will need to establish care with a different provider but he has been busy and haven't got time to search for a new provider.     Patient is wondering if he can get refills as he is down to 1 day. Please advise and call patient.    Performed Resulted

## 2025-05-18 NOTE — ED ADULT NURSE NOTE - NS ED NURSE RECORD ANOTHER VITAL SIGN
Indication: Dyspnea

 

Technique: One view of the chest

 

Comparison: 10/2/2018

 

Findings: Left-sided pleural effusion is again demonstrated. Interim development of

atelectasis in the right perihilar region. The remainder the lungs and pleural spaces

are clear. There is a tracheostomy.

 

Impression: Unchanged left pleural effusion

 

New finding of right perihilar atelectasis Yes

## 2025-05-18 NOTE — ED ADULT NURSE REASSESSMENT NOTE - NS ED NURSE REASSESS COMMENT FT1
Delay in antibiotic administration due to lack of peripheral IVs, MICU team, ER attending and 2 ER residents at bedside for central line procedure and A-line

## 2025-05-18 NOTE — ED PROVIDER NOTE - PHYSICAL EXAMINATION
General: NAD, well appearing  HEENT: Normocephalic, atraumatic  Neck: No apparent stiffness or JVD  Pulm: Chest wall symmetric and nontender, lungs clear to ascultation   Cardiac: Regular rate and regular rhythm  Abdomen: generalized tenderness and distension  Skin: Skin is cool, mottled, diaphoretic.  Neuro: No motor or sensory deficits above reported baseline  MSK: No deformity or tenderness above reported baseline

## 2025-05-18 NOTE — H&P ADULT - CRITICAL CARE ATTENDING COMMENT
Critical care time spent titrating IV sedatives, vasoactive medications, managing life threatening acid-base disturbances, adjusting mechanical ventilator settings, interpreting blood gases and chest imaging.

## 2025-05-18 NOTE — H&P ADULT - ASSESSMENT
Impression  76 yo male with HTN, HLD, CAD s/p prior MI, remote pericarditis, now presents with acute respiratory failure, shock, metabolic acidosis, OLEKSANDR, metabolic encephalopathy, likely due to acute abdomen/ intraabdominal process.     Awaiting CT abd/pelvis, chest, head.    Plan  - aggressive fluid resuscitation, abx, pressors  - sodium bicarbonate drip for metabolic acidosis and renal failure  - pending CT abdomen/pelvis to decide if patient will need urgent OR tonight.  - echo   Impression  76 yo male with HTN, HLD, CAD s/p prior MI, remote pericarditis, now presents with acute respiratory failure, shock, metabolic acidosis, OLEKSANDR, metabolic encephalopathy, septic shock, likely due to acute abdomen/ intraabdominal process.     Awaiting CT abd/pelvis, chest, head.    Plan  - aggressive fluid resuscitation, abx, pressors  - sodium bicarbonate drip for metabolic acidosis and renal failure  - pending CT abdomen/pelvis to decide if patient will need urgent OR tonight.  - echocardiogram  - may need CRRT  Prognosis guarded, family updated at bedside with

## 2025-05-19 ENCOUNTER — RESULT REVIEW (OUTPATIENT)
Age: 75
End: 2025-05-19

## 2025-05-19 LAB
-  CTX-M RESISTANCE MARKER: SIGNIFICANT CHANGE UP
-  ESBL: SIGNIFICANT CHANGE UP
-  STREPTOCOCCUS SP. (NOT GRP A, B OR S PNEUMONIAE): SIGNIFICANT CHANGE UP
A1C WITH ESTIMATED AVERAGE GLUCOSE RESULT: 7.4 % — HIGH (ref 4–5.6)
ALBUMIN SERPL ELPH-MCNC: 2.7 G/DL — LOW (ref 3.3–5.2)
ALBUMIN SERPL ELPH-MCNC: 2.7 G/DL — LOW (ref 3.3–5.2)
ALBUMIN SERPL ELPH-MCNC: 2.8 G/DL — LOW (ref 3.3–5.2)
ALBUMIN SERPL ELPH-MCNC: 2.9 G/DL — LOW (ref 3.3–5.2)
ALBUMIN SERPL ELPH-MCNC: 3 G/DL — LOW (ref 3.3–5.2)
ALBUMIN SERPL ELPH-MCNC: 3 G/DL — LOW (ref 3.3–5.2)
ALP SERPL-CCNC: 158 U/L — HIGH (ref 40–120)
ALP SERPL-CCNC: 181 U/L — HIGH (ref 40–120)
ALP SERPL-CCNC: 196 U/L — HIGH (ref 40–120)
ALP SERPL-CCNC: 203 U/L — HIGH (ref 40–120)
ALP SERPL-CCNC: 209 U/L — HIGH (ref 40–120)
ALP SERPL-CCNC: 214 U/L — HIGH (ref 40–120)
ALT FLD-CCNC: 154 U/L — HIGH
ALT FLD-CCNC: 186 U/L — HIGH
ALT FLD-CCNC: 193 U/L — HIGH
ALT FLD-CCNC: 195 U/L — HIGH
ALT FLD-CCNC: 201 U/L — HIGH
ALT FLD-CCNC: 223 U/L — HIGH
AMMONIA BLD-MCNC: 69 UMOL/L — HIGH (ref 11–55)
AMMONIA BLD-MCNC: 72 UMOL/L — HIGH (ref 11–55)
AMMONIA BLD-MCNC: 80 UMOL/L — HIGH (ref 11–55)
ANION GAP SERPL CALC-SCNC: 30 MMOL/L — HIGH (ref 5–17)
ANION GAP SERPL CALC-SCNC: 31 MMOL/L — HIGH (ref 5–17)
ANION GAP SERPL CALC-SCNC: 35 MMOL/L — HIGH (ref 5–17)
ANION GAP SERPL CALC-SCNC: 35 MMOL/L — HIGH (ref 5–17)
ANION GAP SERPL CALC-SCNC: 38 MMOL/L — HIGH (ref 5–17)
ANION GAP SERPL CALC-SCNC: 38 MMOL/L — HIGH (ref 5–17)
ANISOCYTOSIS BLD QL: SLIGHT — SIGNIFICANT CHANGE UP
ANISOCYTOSIS BLD QL: SLIGHT — SIGNIFICANT CHANGE UP
APTT BLD: 34.9 SEC — SIGNIFICANT CHANGE UP (ref 26.1–36.8)
APTT BLD: 35.8 SEC — SIGNIFICANT CHANGE UP (ref 26.1–36.8)
AST SERPL-CCNC: 205 U/L — HIGH
AST SERPL-CCNC: 222 U/L — HIGH
AST SERPL-CCNC: 226 U/L — HIGH
AST SERPL-CCNC: 238 U/L — HIGH
AST SERPL-CCNC: 246 U/L — HIGH
AST SERPL-CCNC: 252 U/L — HIGH
BASOPHILS # BLD AUTO: 0 K/UL — SIGNIFICANT CHANGE UP (ref 0–0.2)
BASOPHILS # BLD AUTO: 0.03 K/UL — SIGNIFICANT CHANGE UP (ref 0–0.2)
BASOPHILS # BLD AUTO: 0.11 K/UL — SIGNIFICANT CHANGE UP (ref 0–0.2)
BASOPHILS # BLD MANUAL: 0 K/UL — SIGNIFICANT CHANGE UP (ref 0–0.2)
BASOPHILS # BLD MANUAL: 0.06 K/UL — SIGNIFICANT CHANGE UP (ref 0–0.2)
BASOPHILS NFR BLD AUTO: 0 % — SIGNIFICANT CHANGE UP (ref 0–2)
BASOPHILS NFR BLD AUTO: 0.1 % — SIGNIFICANT CHANGE UP (ref 0–2)
BASOPHILS NFR BLD AUTO: 0.7 % — SIGNIFICANT CHANGE UP (ref 0–2)
BASOPHILS NFR BLD MANUAL: 0 % — SIGNIFICANT CHANGE UP (ref 0–2)
BASOPHILS NFR BLD MANUAL: 0.4 % — SIGNIFICANT CHANGE UP (ref 0–2)
BILIRUB SERPL-MCNC: 4.2 MG/DL — HIGH (ref 0.4–2)
BILIRUB SERPL-MCNC: 4.3 MG/DL — HIGH (ref 0.4–2)
BILIRUB SERPL-MCNC: 4.5 MG/DL — HIGH (ref 0.4–2)
BILIRUB SERPL-MCNC: 4.6 MG/DL — HIGH (ref 0.4–2)
BLD GP AB SCN SERPL QL: SIGNIFICANT CHANGE UP
BUN SERPL-MCNC: 35.6 MG/DL — HIGH (ref 8–20)
BUN SERPL-MCNC: 36 MG/DL — HIGH (ref 8–20)
BUN SERPL-MCNC: 36 MG/DL — HIGH (ref 8–20)
BUN SERPL-MCNC: 36.6 MG/DL — HIGH (ref 8–20)
BUN SERPL-MCNC: 37.7 MG/DL — HIGH (ref 8–20)
BUN SERPL-MCNC: 40.2 MG/DL — HIGH (ref 8–20)
BURR CELLS BLD QL SMEAR: ABNORMAL
BURR CELLS BLD QL SMEAR: ABNORMAL
BURR CELLS BLD QL SMEAR: SLIGHT — SIGNIFICANT CHANGE UP
CALCIUM SERPL-MCNC: 7.2 MG/DL — LOW (ref 8.4–10.5)
CALCIUM SERPL-MCNC: 7.3 MG/DL — LOW (ref 8.4–10.5)
CALCIUM SERPL-MCNC: 7.4 MG/DL — LOW (ref 8.4–10.5)
CALCIUM SERPL-MCNC: 7.7 MG/DL — LOW (ref 8.4–10.5)
CALCIUM SERPL-MCNC: 8.5 MG/DL — SIGNIFICANT CHANGE UP (ref 8.4–10.5)
CALCIUM SERPL-MCNC: 8.6 MG/DL — SIGNIFICANT CHANGE UP (ref 8.4–10.5)
CHLORIDE SERPL-SCNC: 88 MMOL/L — LOW (ref 96–108)
CHLORIDE SERPL-SCNC: 88 MMOL/L — LOW (ref 96–108)
CHLORIDE SERPL-SCNC: 89 MMOL/L — LOW (ref 96–108)
CHLORIDE SERPL-SCNC: 90 MMOL/L — LOW (ref 96–108)
CHLORIDE SERPL-SCNC: 91 MMOL/L — LOW (ref 96–108)
CHLORIDE SERPL-SCNC: 91 MMOL/L — LOW (ref 96–108)
CITROBAC SP DNA BLD POS QL NAA+PROBE: SIGNIFICANT CHANGE UP
CK MB CFR SERPL CALC: 80.2 NG/ML — HIGH (ref 0–6.7)
CK SERPL-CCNC: 1793 U/L — HIGH (ref 30–200)
CO2 SERPL-SCNC: 10 MMOL/L — CRITICAL LOW (ref 22–29)
CO2 SERPL-SCNC: 11 MMOL/L — LOW (ref 22–29)
CO2 SERPL-SCNC: 13 MMOL/L — LOW (ref 22–29)
CO2 SERPL-SCNC: 14 MMOL/L — LOW (ref 22–29)
CO2 SERPL-SCNC: 17 MMOL/L — LOW (ref 22–29)
CO2 SERPL-SCNC: 20 MMOL/L — LOW (ref 22–29)
CREAT SERPL-MCNC: 1.59 MG/DL — HIGH (ref 0.5–1.3)
CREAT SERPL-MCNC: 1.7 MG/DL — HIGH (ref 0.5–1.3)
CREAT SERPL-MCNC: 1.78 MG/DL — HIGH (ref 0.5–1.3)
CREAT SERPL-MCNC: 1.89 MG/DL — HIGH (ref 0.5–1.3)
CREAT SERPL-MCNC: 2.29 MG/DL — HIGH (ref 0.5–1.3)
CREAT SERPL-MCNC: 2.6 MG/DL — HIGH (ref 0.5–1.3)
DACRYOCYTES BLD QL SMEAR: SLIGHT — SIGNIFICANT CHANGE UP
E COLI DNA BLD POS QL NAA+NON-PROBE: SIGNIFICANT CHANGE UP
EGFR: 25 ML/MIN/1.73M2 — LOW
EGFR: 25 ML/MIN/1.73M2 — LOW
EGFR: 29 ML/MIN/1.73M2 — LOW
EGFR: 29 ML/MIN/1.73M2 — LOW
EGFR: 37 ML/MIN/1.73M2 — LOW
EGFR: 37 ML/MIN/1.73M2 — LOW
EGFR: 39 ML/MIN/1.73M2 — LOW
EGFR: 39 ML/MIN/1.73M2 — LOW
EGFR: 42 ML/MIN/1.73M2 — LOW
EGFR: 42 ML/MIN/1.73M2 — LOW
EGFR: 45 ML/MIN/1.73M2 — LOW
EGFR: 45 ML/MIN/1.73M2 — LOW
EOSINOPHIL # BLD AUTO: 0 K/UL — SIGNIFICANT CHANGE UP (ref 0–0.5)
EOSINOPHIL # BLD AUTO: 0.01 K/UL — SIGNIFICANT CHANGE UP (ref 0–0.5)
EOSINOPHIL # BLD AUTO: 0.02 K/UL — SIGNIFICANT CHANGE UP (ref 0–0.5)
EOSINOPHIL # BLD MANUAL: 0 K/UL — SIGNIFICANT CHANGE UP (ref 0–0.5)
EOSINOPHIL # BLD MANUAL: 0.36 K/UL — SIGNIFICANT CHANGE UP (ref 0–0.5)
EOSINOPHIL NFR BLD AUTO: 0 % — SIGNIFICANT CHANGE UP (ref 0–6)
EOSINOPHIL NFR BLD AUTO: 0.1 % — SIGNIFICANT CHANGE UP (ref 0–6)
EOSINOPHIL NFR BLD AUTO: 0.2 % — SIGNIFICANT CHANGE UP (ref 0–6)
EOSINOPHIL NFR BLD MANUAL: 0 % — SIGNIFICANT CHANGE UP (ref 0–6)
EOSINOPHIL NFR BLD MANUAL: 2.3 % — SIGNIFICANT CHANGE UP (ref 0–6)
ESTIMATED AVERAGE GLUCOSE: 166 MG/DL — HIGH (ref 68–114)
GAS PNL BLDA: SIGNIFICANT CHANGE UP
GAS PNL BLDV: SIGNIFICANT CHANGE UP
GGT SERPL-CCNC: 130 U/L — HIGH (ref 9–50)
GIANT PLATELETS BLD QL SMEAR: PRESENT
GLUCOSE BLDC GLUCOMTR-MCNC: 216 MG/DL — HIGH (ref 70–99)
GLUCOSE SERPL-MCNC: 155 MG/DL — HIGH (ref 70–99)
GLUCOSE SERPL-MCNC: 187 MG/DL — HIGH (ref 70–99)
GLUCOSE SERPL-MCNC: 226 MG/DL — HIGH (ref 70–99)
GLUCOSE SERPL-MCNC: 248 MG/DL — HIGH (ref 70–99)
GLUCOSE SERPL-MCNC: 281 MG/DL — HIGH (ref 70–99)
GLUCOSE SERPL-MCNC: 93 MG/DL — SIGNIFICANT CHANGE UP (ref 70–99)
GRAM STN FLD: ABNORMAL
HCT VFR BLD CALC: 38.6 % — LOW (ref 39–50)
HCT VFR BLD CALC: 45.5 % — SIGNIFICANT CHANGE UP (ref 39–50)
HGB BLD-MCNC: 13.4 G/DL — SIGNIFICANT CHANGE UP (ref 13–17)
HGB BLD-MCNC: 15 G/DL — SIGNIFICANT CHANGE UP (ref 13–17)
IMM GRANULOCYTES # BLD AUTO: 0.09 K/UL — HIGH (ref 0–0.07)
IMM GRANULOCYTES # BLD AUTO: 0.38 K/UL — HIGH (ref 0–0.07)
IMM GRANULOCYTES # BLD AUTO: 1.24 K/UL — HIGH (ref 0–0.07)
IMM GRANULOCYTES NFR BLD AUTO: 0.7 % — SIGNIFICANT CHANGE UP (ref 0–0.9)
IMM GRANULOCYTES NFR BLD AUTO: 2.4 % — HIGH (ref 0–0.9)
IMM GRANULOCYTES NFR BLD AUTO: 4.8 % — HIGH (ref 0–0.9)
IMMATURE PLATELET FRACTION #: 2.8 K/UL — LOW (ref 3.9–12.5)
IMMATURE PLATELET FRACTION #: 7.1 K/UL — SIGNIFICANT CHANGE UP (ref 3.9–12.5)
IMMATURE PLATELET FRACTION %: 10.6 % — HIGH (ref 1.6–7.1)
IMMATURE PLATELET FRACTION %: 9.7 % — HIGH (ref 1.6–7.1)
INR BLD: 2.4 RATIO — HIGH (ref 0.85–1.16)
INR BLD: 2.73 RATIO — HIGH (ref 0.85–1.16)
LACTATE SERPL-SCNC: 18.1 MMOL/L — CRITICAL HIGH (ref 0.5–2)
LDH SERPL L TO P-CCNC: 421 U/L — HIGH (ref 98–192)
LIDOCAIN IGE QN: 2007 U/L — HIGH (ref 22–51)
LIDOCAIN IGE QN: >3000 U/L — HIGH (ref 22–51)
LYMPHOCYTES # BLD AUTO: 0.44 K/UL — LOW (ref 1–3.3)
LYMPHOCYTES # BLD AUTO: 0.75 K/UL — LOW (ref 1–3.3)
LYMPHOCYTES # BLD AUTO: 0.76 K/UL — LOW (ref 1–3.3)
LYMPHOCYTES # BLD MANUAL: 0.32 K/UL — LOW (ref 1–3.3)
LYMPHOCYTES # BLD MANUAL: 0.62 K/UL — LOW (ref 1–3.3)
LYMPHOCYTES # BLD MANUAL: 1.01 K/UL — SIGNIFICANT CHANGE UP (ref 1–3.3)
LYMPHOCYTES # BLD MANUAL: 1.17 K/UL — SIGNIFICANT CHANGE UP (ref 1–3.3)
LYMPHOCYTES NFR BLD AUTO: 2.9 % — LOW (ref 13–44)
LYMPHOCYTES NFR BLD AUTO: 3.4 % — LOW (ref 13–44)
LYMPHOCYTES NFR BLD AUTO: 4.9 % — LOW (ref 13–44)
LYMPHOCYTES NFR BLD MANUAL: 2.4 % — LOW (ref 13–44)
LYMPHOCYTES NFR BLD MANUAL: 2.5 % — LOW (ref 13–44)
LYMPHOCYTES NFR BLD MANUAL: 5.3 % — LOW (ref 13–44)
LYMPHOCYTES NFR BLD MANUAL: 6.5 % — LOW (ref 13–44)
MAGNESIUM SERPL-MCNC: 1.6 MG/DL — SIGNIFICANT CHANGE UP (ref 1.6–2.6)
MAGNESIUM SERPL-MCNC: 2.1 MG/DL — SIGNIFICANT CHANGE UP (ref 1.6–2.6)
MAGNESIUM SERPL-MCNC: 2.1 MG/DL — SIGNIFICANT CHANGE UP (ref 1.6–2.6)
MAGNESIUM SERPL-MCNC: 2.2 MG/DL — SIGNIFICANT CHANGE UP (ref 1.6–2.6)
MAGNESIUM SERPL-MCNC: 2.2 MG/DL — SIGNIFICANT CHANGE UP (ref 1.6–2.6)
MAGNESIUM SERPL-MCNC: 2.7 MG/DL — HIGH (ref 1.6–2.6)
MANUAL METAMYELOCYTE #: 0.43 K/UL — HIGH (ref 0–0)
MANUAL METAMYELOCYTE #: 1.45 K/UL — HIGH (ref 0–0)
MANUAL METAMYELOCYTE #: 1.54 K/UL — HIGH (ref 0–0)
MANUAL METAMYELOCYTE #: 2.04 K/UL — HIGH (ref 0–0)
MANUAL MYELOCYTE #: 1.43 K/UL — HIGH (ref 0–0)
MANUAL MYELOCYTE #: 1.53 K/UL — HIGH (ref 0–0)
MANUAL MYELOCYTE #: 2.49 K/UL — HIGH (ref 0–0)
MANUAL NEUTROPHIL BANDS #: 0.96 K/UL — HIGH (ref 0–0.84)
MANUAL NEUTROPHIL BANDS #: 4.22 K/UL — HIGH (ref 0–0.84)
MANUAL NEUTROPHIL BANDS #: 6.59 K/UL — HIGH (ref 0–0.84)
MANUAL NEUTROPHIL BANDS #: 7.26 K/UL — HIGH (ref 0–0.84)
MANUAL NRBC #: 0 K/UL — SIGNIFICANT CHANGE UP (ref 0–0)
MANUAL NRBC #: 0.26 K/UL — HIGH (ref 0–0)
MANUAL PROMYELOCYTE #: 0.21 K/UL — HIGH (ref 0–0)
MANUAL SMEAR VERIFICATION: SIGNIFICANT CHANGE UP
MCHC RBC-ENTMCNC: 29.4 PG — SIGNIFICANT CHANGE UP (ref 27–34)
MCHC RBC-ENTMCNC: 29.5 PG — SIGNIFICANT CHANGE UP (ref 27–34)
MCHC RBC-ENTMCNC: 33 G/DL — SIGNIFICANT CHANGE UP (ref 32–36)
MCHC RBC-ENTMCNC: 34.7 G/DL — SIGNIFICANT CHANGE UP (ref 32–36)
MCV RBC AUTO: 85 FL — SIGNIFICANT CHANGE UP (ref 80–100)
MCV RBC AUTO: 89 FL — SIGNIFICANT CHANGE UP (ref 80–100)
METAMYELOCYTES # FLD: 3.3 % — HIGH (ref 0–0)
METAMYELOCYTES # FLD: 5.6 % — HIGH (ref 0–0)
METAMYELOCYTES # FLD: 9.2 % — HIGH (ref 0–0)
METAMYELOCYTES # FLD: 9.9 % — HIGH (ref 0–0)
METAMYELOCYTES NFR BLD: 3.3 % — HIGH (ref 0–0)
METAMYELOCYTES NFR BLD: 5.6 % — HIGH (ref 0–0)
METAMYELOCYTES NFR BLD: 9.2 % — HIGH (ref 0–0)
METAMYELOCYTES NFR BLD: 9.9 % — HIGH (ref 0–0)
METHOD TYPE: SIGNIFICANT CHANGE UP
MONOCYTES # BLD AUTO: 0.28 K/UL — SIGNIFICANT CHANGE UP (ref 0–0.9)
MONOCYTES # BLD AUTO: 0.36 K/UL — SIGNIFICANT CHANGE UP (ref 0–0.9)
MONOCYTES # BLD AUTO: 0.59 K/UL — SIGNIFICANT CHANGE UP (ref 0–0.9)
MONOCYTES # BLD MANUAL: 0.51 K/UL — SIGNIFICANT CHANGE UP (ref 0–0.9)
MONOCYTES # BLD MANUAL: 1.07 K/UL — HIGH (ref 0–0.9)
MONOCYTES # BLD MANUAL: 1.5 K/UL — HIGH (ref 0–0.9)
MONOCYTES # BLD MANUAL: 2.49 K/UL — HIGH (ref 0–0.9)
MONOCYTES NFR BLD AUTO: 1.8 % — LOW (ref 2–14)
MONOCYTES NFR BLD AUTO: 2.3 % — SIGNIFICANT CHANGE UP (ref 2–14)
MONOCYTES NFR BLD AUTO: 2.8 % — SIGNIFICANT CHANGE UP (ref 2–14)
MONOCYTES NFR BLD MANUAL: 11.6 % — SIGNIFICANT CHANGE UP (ref 2–14)
MONOCYTES NFR BLD MANUAL: 2.3 % — SIGNIFICANT CHANGE UP (ref 2–14)
MONOCYTES NFR BLD MANUAL: 6.9 % — SIGNIFICANT CHANGE UP (ref 2–14)
MONOCYTES NFR BLD MANUAL: 9.6 % — SIGNIFICANT CHANGE UP (ref 2–14)
MRSA PCR RESULT.: DETECTED
MYELOCYTES NFR BLD: 6.9 % — HIGH (ref 0–0)
MYELOCYTES NFR BLD: 9.2 % — HIGH (ref 0–0)
MYELOCYTES NFR BLD: 9.6 % — HIGH (ref 0–0)
NEUTROPHILS # BLD AUTO: 12.06 K/UL — HIGH (ref 1.8–7.4)
NEUTROPHILS # BLD AUTO: 14.02 K/UL — HIGH (ref 1.8–7.4)
NEUTROPHILS # BLD AUTO: 23.32 K/UL — HIGH (ref 1.8–7.4)
NEUTROPHILS # BLD MANUAL: 10.29 K/UL — HIGH (ref 1.8–7.4)
NEUTROPHILS # BLD MANUAL: 11.41 K/UL — HIGH (ref 1.8–7.4)
NEUTROPHILS # BLD MANUAL: 5.87 K/UL — SIGNIFICANT CHANGE UP (ref 1.8–7.4)
NEUTROPHILS # BLD MANUAL: 9.75 K/UL — HIGH (ref 1.8–7.4)
NEUTROPHILS NFR BLD AUTO: 89.9 % — HIGH (ref 43–77)
NEUTROPHILS NFR BLD AUTO: 90.1 % — HIGH (ref 43–77)
NEUTROPHILS NFR BLD AUTO: 92.9 % — HIGH (ref 43–77)
NEUTROPHILS NFR BLD MANUAL: 37.7 % — LOW (ref 43–77)
NEUTROPHILS NFR BLD MANUAL: 44 % — SIGNIFICANT CHANGE UP (ref 43–77)
NEUTROPHILS NFR BLD MANUAL: 46.5 % — SIGNIFICANT CHANGE UP (ref 43–77)
NEUTROPHILS NFR BLD MANUAL: 75.2 % — SIGNIFICANT CHANGE UP (ref 43–77)
NEUTS BAND # BLD: 27.1 % — HIGH (ref 0–8)
NEUTS BAND # BLD: 28 % — HIGH (ref 0–8)
NEUTS BAND # BLD: 29.8 % — HIGH (ref 0–8)
NEUTS BAND # BLD: 7.4 % — SIGNIFICANT CHANGE UP (ref 0–8)
NEUTS BAND NFR BLD: 27.1 % — HIGH (ref 0–8)
NEUTS BAND NFR BLD: 28 % — HIGH (ref 0–8)
NEUTS BAND NFR BLD: 29.8 % — HIGH (ref 0–8)
NEUTS BAND NFR BLD: 7.4 % — SIGNIFICANT CHANGE UP (ref 0–8)
NRBC # BLD AUTO: 0 K/UL — SIGNIFICANT CHANGE UP (ref 0–0)
NRBC # BLD AUTO: 0.03 K/UL — HIGH (ref 0–0)
NRBC # BLD: 0 /100 WBCS — SIGNIFICANT CHANGE UP (ref 0–0)
NRBC # BLD: 1 /100 WBCS — HIGH (ref 0–0)
NRBC # FLD: 0 K/UL — SIGNIFICANT CHANGE UP (ref 0–0)
NRBC # FLD: 0.03 K/UL — HIGH (ref 0–0)
NRBC BLD AUTO-RTO: 0 /100 WBCS — SIGNIFICANT CHANGE UP (ref 0–0)
NRBC BLD AUTO-RTO: 0 /100 WBCS — SIGNIFICANT CHANGE UP (ref 0–0)
NRBC BLD-RTO: 0 /100 WBCS — SIGNIFICANT CHANGE UP (ref 0–0)
NRBC BLD-RTO: 1 /100 WBCS — HIGH (ref 0–0)
OVALOCYTES BLD QL SMEAR: SLIGHT — SIGNIFICANT CHANGE UP
PHOSPHATE SERPL-MCNC: 3.9 MG/DL — SIGNIFICANT CHANGE UP (ref 2.4–4.7)
PHOSPHATE SERPL-MCNC: 4.8 MG/DL — HIGH (ref 2.4–4.7)
PHOSPHATE SERPL-MCNC: 5.8 MG/DL — HIGH (ref 2.4–4.7)
PHOSPHATE SERPL-MCNC: 6.3 MG/DL — HIGH (ref 2.4–4.7)
PHOSPHATE SERPL-MCNC: 7.8 MG/DL — HIGH (ref 2.4–4.7)
PHOSPHATE SERPL-MCNC: 8.3 MG/DL — HIGH (ref 2.4–4.7)
PLAT MORPH BLD: NORMAL — SIGNIFICANT CHANGE UP
PLATELET # BLD AUTO: 26 K/UL — LOW (ref 150–400)
PLATELET # BLD AUTO: 73 K/UL — LOW (ref 150–400)
PMV BLD: 12 FL — SIGNIFICANT CHANGE UP (ref 7–13)
PMV BLD: 13.3 FL — HIGH (ref 7–13)
POIKILOCYTOSIS BLD QL AUTO: ABNORMAL
POIKILOCYTOSIS BLD QL AUTO: ABNORMAL
POIKILOCYTOSIS BLD QL AUTO: SLIGHT — SIGNIFICANT CHANGE UP
POLYCHROMASIA BLD QL SMEAR: SLIGHT — SIGNIFICANT CHANGE UP
POTASSIUM SERPL-MCNC: 3.5 MMOL/L — SIGNIFICANT CHANGE UP (ref 3.5–5.3)
POTASSIUM SERPL-MCNC: 3.6 MMOL/L — SIGNIFICANT CHANGE UP (ref 3.5–5.3)
POTASSIUM SERPL-MCNC: 3.7 MMOL/L — SIGNIFICANT CHANGE UP (ref 3.5–5.3)
POTASSIUM SERPL-MCNC: 3.9 MMOL/L — SIGNIFICANT CHANGE UP (ref 3.5–5.3)
POTASSIUM SERPL-MCNC: 4 MMOL/L — SIGNIFICANT CHANGE UP (ref 3.5–5.3)
POTASSIUM SERPL-MCNC: 4.1 MMOL/L — SIGNIFICANT CHANGE UP (ref 3.5–5.3)
POTASSIUM SERPL-SCNC: 3.5 MMOL/L — SIGNIFICANT CHANGE UP (ref 3.5–5.3)
POTASSIUM SERPL-SCNC: 3.6 MMOL/L — SIGNIFICANT CHANGE UP (ref 3.5–5.3)
POTASSIUM SERPL-SCNC: 3.7 MMOL/L — SIGNIFICANT CHANGE UP (ref 3.5–5.3)
POTASSIUM SERPL-SCNC: 3.9 MMOL/L — SIGNIFICANT CHANGE UP (ref 3.5–5.3)
POTASSIUM SERPL-SCNC: 4 MMOL/L — SIGNIFICANT CHANGE UP (ref 3.5–5.3)
POTASSIUM SERPL-SCNC: 4.1 MMOL/L — SIGNIFICANT CHANGE UP (ref 3.5–5.3)
PROCALCITONIN SERPL-MCNC: >100 NG/ML — HIGH (ref 0.02–0.1)
PROCALCITONIN SERPL-MCNC: >100 NG/ML — HIGH (ref 0.02–0.1)
PROMYELOCYTES # FLD: 0.8 % — HIGH (ref 0–0)
PROMYELOCYTES NFR BLD: 0.8 % — HIGH (ref 0–0)
PROT SERPL-MCNC: 5.1 G/DL — LOW (ref 6.6–8.7)
PROT SERPL-MCNC: 5.2 G/DL — LOW (ref 6.6–8.7)
PROT SERPL-MCNC: 5.2 G/DL — LOW (ref 6.6–8.7)
PROT SERPL-MCNC: 5.3 G/DL — LOW (ref 6.6–8.7)
PROT SERPL-MCNC: 5.4 G/DL — LOW (ref 6.6–8.7)
PROT SERPL-MCNC: 5.6 G/DL — LOW (ref 6.6–8.7)
PROTHROM AB SERPL-ACNC: 27.6 SEC — HIGH (ref 9.9–13.4)
PROTHROM AB SERPL-ACNC: 31.4 SEC — HIGH (ref 9.9–13.4)
RBC # BLD: 4.54 M/UL — SIGNIFICANT CHANGE UP (ref 4.2–5.8)
RBC # BLD: 5.11 M/UL — SIGNIFICANT CHANGE UP (ref 4.2–5.8)
RBC # FLD: 14 % — SIGNIFICANT CHANGE UP (ref 10.3–14.5)
RBC # FLD: 14.1 % — SIGNIFICANT CHANGE UP (ref 10.3–14.5)
RBC BLD AUTO: ABNORMAL
S AUREUS DNA NOSE QL NAA+PROBE: DETECTED
SMUDGE CELLS # BLD: PRESENT
SODIUM SERPL-SCNC: 136 MMOL/L — SIGNIFICANT CHANGE UP (ref 135–145)
SODIUM SERPL-SCNC: 137 MMOL/L — SIGNIFICANT CHANGE UP (ref 135–145)
SODIUM SERPL-SCNC: 138 MMOL/L — SIGNIFICANT CHANGE UP (ref 135–145)
SODIUM SERPL-SCNC: 139 MMOL/L — SIGNIFICANT CHANGE UP (ref 135–145)
SODIUM SERPL-SCNC: 139 MMOL/L — SIGNIFICANT CHANGE UP (ref 135–145)
SODIUM SERPL-SCNC: 140 MMOL/L — SIGNIFICANT CHANGE UP (ref 135–145)
SPECIMEN SOURCE: SIGNIFICANT CHANGE UP
TRIGL SERPL-MCNC: 567 MG/DL — HIGH
TROPONIN T, HIGH SENSITIVITY RESULT: 54 NG/L — HIGH (ref 0–51)
WBC # BLD: 12.97 K/UL — HIGH (ref 3.8–10.5)
WBC # BLD: 15.56 K/UL — HIGH (ref 3.8–10.5)
WBC # FLD AUTO: 12.97 K/UL — HIGH (ref 3.8–10.5)
WBC # FLD AUTO: 15.56 K/UL — HIGH (ref 3.8–10.5)

## 2025-05-19 PROCEDURE — 99232 SBSQ HOSP IP/OBS MODERATE 35: CPT | Mod: GC

## 2025-05-19 PROCEDURE — 47490 INCISION OF GALLBLADDER: CPT

## 2025-05-19 PROCEDURE — 93306 TTE W/DOPPLER COMPLETE: CPT | Mod: 26

## 2025-05-19 PROCEDURE — 99223 1ST HOSP IP/OBS HIGH 75: CPT

## 2025-05-19 PROCEDURE — 99222 1ST HOSP IP/OBS MODERATE 55: CPT

## 2025-05-19 PROCEDURE — 92960 CARDIOVERSION ELECTRIC EXT: CPT

## 2025-05-19 PROCEDURE — 74177 CT ABD & PELVIS W/CONTRAST: CPT | Mod: 26

## 2025-05-19 PROCEDURE — 76705 ECHO EXAM OF ABDOMEN: CPT | Mod: 26

## 2025-05-19 PROCEDURE — 71275 CT ANGIOGRAPHY CHEST: CPT | Mod: 26

## 2025-05-19 PROCEDURE — 99497 ADVNCD CARE PLAN 30 MIN: CPT | Mod: 25

## 2025-05-19 PROCEDURE — 71045 X-RAY EXAM CHEST 1 VIEW: CPT | Mod: 26

## 2025-05-19 PROCEDURE — 99291 CRITICAL CARE FIRST HOUR: CPT | Mod: GC

## 2025-05-19 RX ORDER — SODIUM CHLORIDE 9 G/1000ML
1000 INJECTION, SOLUTION INTRAVENOUS
Refills: 0 | Status: COMPLETED | OUTPATIENT
Start: 2025-05-19 | End: 2025-05-19

## 2025-05-19 RX ORDER — MEROPENEM 1 G/30ML
500 INJECTION INTRAVENOUS EVERY 12 HOURS
Refills: 0 | Status: DISCONTINUED | OUTPATIENT
Start: 2025-05-19 | End: 2025-05-19

## 2025-05-19 RX ORDER — PHENYLEPHRINE HCL IN 0.9% NACL 0.5 MG/5ML
5 SYRINGE (ML) INTRAVENOUS
Qty: 160 | Refills: 0 | Status: DISCONTINUED | OUTPATIENT
Start: 2025-05-19 | End: 2025-05-20

## 2025-05-19 RX ORDER — MAGNESIUM SULFATE 500 MG/ML
2 SYRINGE (ML) INJECTION ONCE
Refills: 0 | Status: COMPLETED | OUTPATIENT
Start: 2025-05-19 | End: 2025-05-19

## 2025-05-19 RX ORDER — BUMETANIDE 1 MG/1
2 TABLET ORAL ONCE
Refills: 0 | Status: COMPLETED | OUTPATIENT
Start: 2025-05-19 | End: 2025-05-19

## 2025-05-19 RX ORDER — LACTULOSE 10 G/15ML
10 SOLUTION ORAL THREE TIMES A DAY
Refills: 0 | Status: DISCONTINUED | OUTPATIENT
Start: 2025-05-19 | End: 2025-05-23

## 2025-05-19 RX ORDER — MUPIROCIN CALCIUM 20 MG/G
1 CREAM TOPICAL
Refills: 0 | Status: COMPLETED | OUTPATIENT
Start: 2025-05-19 | End: 2025-05-21

## 2025-05-19 RX ORDER — ALBUMIN (HUMAN) 12.5 G/50ML
50 INJECTION, SOLUTION INTRAVENOUS EVERY 6 HOURS
Refills: 0 | Status: COMPLETED | OUTPATIENT
Start: 2025-05-19 | End: 2025-05-20

## 2025-05-19 RX ORDER — DEXMEDETOMIDINE HYDROCHLORIDE IN SODIUM CHLORIDE 4 UG/ML
0.2 INJECTION INTRAVENOUS
Qty: 400 | Refills: 0 | Status: DISCONTINUED | OUTPATIENT
Start: 2025-05-19 | End: 2025-05-20

## 2025-05-19 RX ORDER — FENTANYL CITRATE-0.9 % NACL/PF 100MCG/2ML
0.5 SYRINGE (ML) INTRAVENOUS
Qty: 2500 | Refills: 0 | Status: DISCONTINUED | OUTPATIENT
Start: 2025-05-19 | End: 2025-05-20

## 2025-05-19 RX ORDER — ROSUVASTATIN CALCIUM 20 MG/1
1 TABLET, FILM COATED ORAL
Refills: 0 | DISCHARGE

## 2025-05-19 RX ORDER — SODIUM CHLORIDE 9 G/1000ML
1000 INJECTION, SOLUTION INTRAVENOUS
Refills: 0 | Status: DISCONTINUED | OUTPATIENT
Start: 2025-05-19 | End: 2025-05-21

## 2025-05-19 RX ORDER — NOREPINEPHRINE BITARTRATE 8 MG
0.4 SOLUTION INTRAVENOUS
Qty: 32 | Refills: 0 | Status: DISCONTINUED | OUTPATIENT
Start: 2025-05-19 | End: 2025-05-20

## 2025-05-19 RX ORDER — CALCIUM GLUCONATE 20 MG/ML
2 INJECTION, SOLUTION INTRAVENOUS ONCE
Refills: 0 | Status: COMPLETED | OUTPATIENT
Start: 2025-05-19 | End: 2025-05-19

## 2025-05-19 RX ORDER — ACETAMINOPHEN 500 MG/5ML
1000 LIQUID (ML) ORAL ONCE
Refills: 0 | Status: COMPLETED | OUTPATIENT
Start: 2025-05-19 | End: 2025-05-19

## 2025-05-19 RX ORDER — EMPAGLIFLOZIN AND METFORMIN HYDROCHLORIDE 12.5; 1 MG/1; MG/1
1 TABLET ORAL
Refills: 0 | DISCHARGE

## 2025-05-19 RX ORDER — AMIODARONE HYDROCHLORIDE 50 MG/ML
150 INJECTION, SOLUTION INTRAVENOUS ONCE
Refills: 0 | Status: COMPLETED | OUTPATIENT
Start: 2025-05-19 | End: 2025-05-19

## 2025-05-19 RX ORDER — MEROPENEM 1 G/30ML
1000 INJECTION INTRAVENOUS EVERY 8 HOURS
Refills: 0 | Status: DISCONTINUED | OUTPATIENT
Start: 2025-05-19 | End: 2025-05-21

## 2025-05-19 RX ORDER — ANGIOTENSIN II 2.5 MG/ML
40 INJECTION INTRAVENOUS
Qty: 2.5 | Refills: 0 | Status: DISCONTINUED | OUTPATIENT
Start: 2025-05-19 | End: 2025-05-22

## 2025-05-19 RX ORDER — HYDROCORTISONE 20 MG
50 TABLET ORAL EVERY 6 HOURS
Refills: 0 | Status: DISCONTINUED | OUTPATIENT
Start: 2025-05-19 | End: 2025-05-24

## 2025-05-19 RX ORDER — SODIUM BICARBONATE 1 MEQ/ML
50 SYRINGE (ML) INTRAVENOUS ONCE
Refills: 0 | Status: COMPLETED | OUTPATIENT
Start: 2025-05-19 | End: 2025-05-19

## 2025-05-19 RX ORDER — ASPIRIN 325 MG
81 TABLET ORAL DAILY
Refills: 0 | Status: DISCONTINUED | OUTPATIENT
Start: 2025-05-19 | End: 2025-05-20

## 2025-05-19 RX ORDER — SODIUM CHLORIDE 9 G/1000ML
1000 INJECTION, SOLUTION INTRAVENOUS
Refills: 0 | Status: DISCONTINUED | OUTPATIENT
Start: 2025-05-19 | End: 2025-05-19

## 2025-05-19 RX ORDER — MIDAZOLAM IN 0.9 % SOD.CHLORID 1 MG/ML
1 PLASTIC BAG, INJECTION (ML) INTRAVENOUS ONCE
Refills: 0 | Status: DISCONTINUED | OUTPATIENT
Start: 2025-05-19 | End: 2025-05-19

## 2025-05-19 RX ORDER — MIDAZOLAM IN 0.9 % SOD.CHLORID 1 MG/ML
0.05 PLASTIC BAG, INJECTION (ML) INTRAVENOUS
Qty: 100 | Refills: 0 | Status: DISCONTINUED | OUTPATIENT
Start: 2025-05-19 | End: 2025-05-20

## 2025-05-19 RX ORDER — INSULIN LISPRO 100 U/ML
INJECTION, SOLUTION INTRAVENOUS; SUBCUTANEOUS EVERY 6 HOURS
Refills: 0 | Status: DISCONTINUED | OUTPATIENT
Start: 2025-05-19 | End: 2025-05-24

## 2025-05-19 RX ORDER — METHYLENE BLUE 5 MG/ML
78 INJECTION INTRAVENOUS ONCE
Refills: 0 | Status: COMPLETED | OUTPATIENT
Start: 2025-05-19 | End: 2025-05-19

## 2025-05-19 RX ORDER — VASOPRESSIN 20 [USP'U]/ML
0.04 INJECTION INTRAVENOUS
Qty: 40 | Refills: 0 | Status: DISCONTINUED | OUTPATIENT
Start: 2025-05-19 | End: 2025-05-25

## 2025-05-19 RX ORDER — DOXAZOSIN MESYLATE 8 MG/1
2 TABLET ORAL AT BEDTIME
Refills: 0 | Status: DISCONTINUED | OUTPATIENT
Start: 2025-05-19 | End: 2025-05-20

## 2025-05-19 RX ADMIN — PROPOFOL 4.76 MICROGRAM(S)/KG/MIN: 10 INJECTION, EMULSION INTRAVENOUS at 01:02

## 2025-05-19 RX ADMIN — Medication 8.93 MICROGRAM(S)/KG/MIN: at 02:23

## 2025-05-19 RX ADMIN — ALBUMIN (HUMAN) 50 MILLILITER(S): 12.5 INJECTION, SOLUTION INTRAVENOUS at 11:55

## 2025-05-19 RX ADMIN — SODIUM CHLORIDE 1000 MILLILITER(S): 9 INJECTION, SOLUTION INTRAVENOUS at 06:08

## 2025-05-19 RX ADMIN — MEROPENEM 500 MILLIGRAM(S): 1 INJECTION INTRAVENOUS at 01:30

## 2025-05-19 RX ADMIN — ANGIOTENSIN II 18.7 NANOGRAM(S)/KG/MIN: 2.5 INJECTION INTRAVENOUS at 10:50

## 2025-05-19 RX ADMIN — MEROPENEM 1000 MILLIGRAM(S): 1 INJECTION INTRAVENOUS at 17:31

## 2025-05-19 RX ADMIN — INSULIN LISPRO 4: 100 INJECTION, SOLUTION INTRAVENOUS; SUBCUTANEOUS at 17:30

## 2025-05-19 RX ADMIN — SODIUM CHLORIDE 2000 MILLILITER(S): 9 INJECTION, SOLUTION INTRAVENOUS at 01:01

## 2025-05-19 RX ADMIN — Medication 73.2 MICROGRAM(S)/KG/MIN: at 08:18

## 2025-05-19 RX ADMIN — Medication 1000 MILLIGRAM(S): at 09:07

## 2025-05-19 RX ADMIN — Medication 8.93 MICROGRAM(S)/KG/MIN: at 00:56

## 2025-05-19 RX ADMIN — PROPOFOL 4.76 MICROGRAM(S)/KG/MIN: 10 INJECTION, EMULSION INTRAVENOUS at 04:14

## 2025-05-19 RX ADMIN — Medication 50 MILLIGRAM(S): at 11:54

## 2025-05-19 RX ADMIN — Medication 50 MILLIEQUIVALENT(S): at 01:08

## 2025-05-19 RX ADMIN — Medication 102 MILLIGRAM(S): at 16:49

## 2025-05-19 RX ADMIN — NOREPINEPHRINE BITARTRATE 7.44 MICROGRAM(S)/KG/MIN: 8 SOLUTION at 03:23

## 2025-05-19 RX ADMIN — ALBUMIN (HUMAN) 50 MILLILITER(S): 12.5 INJECTION, SOLUTION INTRAVENOUS at 17:31

## 2025-05-19 RX ADMIN — Medication 100 MILLIEQUIVALENT(S): at 01:30

## 2025-05-19 RX ADMIN — Medication 400 MILLIGRAM(S): at 06:07

## 2025-05-19 RX ADMIN — Medication 15 MILLILITER(S): at 05:35

## 2025-05-19 RX ADMIN — AMIODARONE HYDROCHLORIDE 618 MILLIGRAM(S): 50 INJECTION, SOLUTION INTRAVENOUS at 01:03

## 2025-05-19 RX ADMIN — Medication 1 MILLIGRAM(S): at 01:07

## 2025-05-19 RX ADMIN — Medication 250 MILLIGRAM(S): at 02:19

## 2025-05-19 RX ADMIN — METHYLENE BLUE 57.8 MILLIGRAM(S): 5 INJECTION INTRAVENOUS at 01:26

## 2025-05-19 RX ADMIN — SODIUM CHLORIDE 1000 MILLILITER(S): 9 INJECTION, SOLUTION INTRAVENOUS at 01:02

## 2025-05-19 RX ADMIN — Medication 200 GRAM(S): at 01:00

## 2025-05-19 RX ADMIN — Medication 1 APPLICATION(S): at 05:43

## 2025-05-19 RX ADMIN — Medication 400 MILLIGRAM(S): at 08:43

## 2025-05-19 RX ADMIN — CALCIUM GLUCONATE 100 GRAM(S): 20 INJECTION, SOLUTION INTRAVENOUS at 00:55

## 2025-05-19 RX ADMIN — ALBUMIN (HUMAN) 50 MILLILITER(S): 12.5 INJECTION, SOLUTION INTRAVENOUS at 05:22

## 2025-05-19 RX ADMIN — Medication 25 GRAM(S): at 02:33

## 2025-05-19 RX ADMIN — Medication 50 MILLIGRAM(S): at 03:18

## 2025-05-19 RX ADMIN — NOREPINEPHRINE BITARTRATE 29.3 MICROGRAM(S)/KG/MIN: 8 SOLUTION at 08:20

## 2025-05-19 RX ADMIN — Medication 8.93 MICROGRAM(S)/KG/MIN: at 03:59

## 2025-05-19 RX ADMIN — Medication 150 MEQ/KG/HR: at 05:22

## 2025-05-19 RX ADMIN — NOREPINEPHRINE BITARTRATE 7.44 MICROGRAM(S)/KG/MIN: 8 SOLUTION at 00:56

## 2025-05-19 RX ADMIN — Medication 50 MILLIGRAM(S): at 05:34

## 2025-05-19 RX ADMIN — Medication 3.91 MG/KG/HR: at 06:30

## 2025-05-19 RX ADMIN — ALBUMIN (HUMAN) 50 MILLILITER(S): 12.5 INJECTION, SOLUTION INTRAVENOUS at 23:59

## 2025-05-19 RX ADMIN — Medication 1000 MILLIGRAM(S): at 06:28

## 2025-05-19 RX ADMIN — Medication 100 MILLIEQUIVALENT(S): at 03:23

## 2025-05-19 RX ADMIN — LACTULOSE 10 GRAM(S): 10 SOLUTION ORAL at 17:30

## 2025-05-19 RX ADMIN — Medication 50 MILLIGRAM(S): at 17:31

## 2025-05-19 RX ADMIN — SODIUM CHLORIDE 1000 MILLILITER(S): 9 INJECTION, SOLUTION INTRAVENOUS at 04:37

## 2025-05-19 RX ADMIN — Medication 40 MILLIGRAM(S): at 17:31

## 2025-05-19 RX ADMIN — Medication 8.93 MICROGRAM(S)/KG/MIN: at 06:07

## 2025-05-19 RX ADMIN — Medication 40 MILLIGRAM(S): at 05:35

## 2025-05-19 RX ADMIN — SODIUM CHLORIDE 1000 MILLILITER(S): 9 INJECTION, SOLUTION INTRAVENOUS at 05:35

## 2025-05-19 RX ADMIN — CALCIUM GLUCONATE 200 GRAM(S): 20 INJECTION, SOLUTION INTRAVENOUS at 01:31

## 2025-05-19 RX ADMIN — Medication 3.91 MICROGRAM(S)/KG/HR: at 01:42

## 2025-05-19 RX ADMIN — VASOPRESSIN 6 UNIT(S)/MIN: 20 INJECTION INTRAVENOUS at 01:02

## 2025-05-19 RX ADMIN — Medication 15 MILLILITER(S): at 17:28

## 2025-05-19 NOTE — CONSULT NOTE ADULT - TIME BILLING
Time spent with review of chart documents, labs, imaging. Direct patient assessment,  formulation of care plan, documentation. Discussion with  Interdisciplinary  team  Dr. CELESTINA Moser,  Dr. JUSTIN Amor ( TY1)  with an additional  ACP  of  20    minutes   This time is exclusive of the encounter

## 2025-05-19 NOTE — CONSULT NOTE ADULT - SUBJECTIVE AND OBJECTIVE BOX
incomplete note    Plan:  - Recommend urgent IR evaluation for PCT and TPC drainage to address both cholecystitis and choledocholithiasis/cholangitis  - Patient is too unstable for any surgical intervention at this time    Above discussed with MICU team. Full consult note to follow.      GENERAL SURGERY CONSULT     HPI: 75y Male presenting to ED with   Patient denies fevers/chills, denies lightheadedness/dizziness, denies SOB/chest pain, denies nausea/vomiting, denies constipation/diarrhea.  ***    ROS: 10-system review is otherwise negative except HPI above.      PAST MEDICAL & SURGICAL HISTORY:  AMI (Acute Myocardial Infarction)  2001      Transient ischemic attack (TIA)      No Past Surgical History        FAMILY HISTORY:  FH: CAD (coronary artery disease)      Family history not pertinent as reviewed with the patient.    SOCIAL HISTORY:  Denies any toxic habits***    ALLERGIES: NKA No Known Drug Allergies  SHRIMP (Unknown)  iv dye (Unknown)      HOME MEDICATIONS: ***  Home Medications:  aspirin 81 mg oral tablet, chewable: 1 tab(s) orally once a day (19 May 2025 12:33)  empagliflozin-metformin 5 mg-1000 mg oral tablet: 1 tab(s) orally once a day (19 May 2025 12:31)  Flomax 0.4 mg oral capsule: 1 cap(s) orally once a day (at bedtime) (19 May 2025 12:33)  nitrofurantoin macrocrystals 50 mg oral capsule: 1 cap(s) orally once a day (19 May 2025 12:33)  rosuvastatin 20 mg oral tablet: 1 tab(s) orally once a day (at bedtime) (19 May 2025 12:33)      --------------------------------------------------------------------------------------------  VITALS:  T(C): 38.6 (05-19-25 @ 13:00), Max: 38.6 (05-19-25 @ 12:30)  HR: 123 (05-19-25 @ 13:00) (96 - 165)  BP: 117/76 (05-19-25 @ 13:00) (88/50 - 122/78)  RR: 32 (05-19-25 @ 13:00) (16 - 35)  SpO2: 98% (05-19-25 @ 13:00) (82% - 100%)      PHYSICAL EXAM:   General: NAD, lying in bed comfortably  Neuro: A+Ox3  HEENT: extraocular eye movements grossly intact, MMM  Cardio: RRR  Resp: Non labored breathing on RA  GI/Abd: Soft, NT/ND, no rebound/guarding, no masses palpated  Vascular: All 4 extremities warm and well perfused  Musculoskeletal: All 4 extremities moving spontaneously, no limitations, no spinal tenderness  --------------------------------------------------------------------------------------------    LABS                 15.0   15.56  )----------(  73        ( 19 May 2025 04:10 )               45.5      137    |  89     |  36.0   ----------------------------<  187        ( 19 May 2025 08:35 )  4.1     |  10.0   |  1.89     Ca    7.7        ( 19 May 2025 08:35 )  Phos  6.3       ( 19 May 2025 08:35 )  Mg     2.2       ( 19 May 2025 08:35 )    TPro  5.1    /  Alb  2.7    /  TBili  4.5    /  DBili  x      /  AST  226    /  ALT  195    /  AlkPhos  181    ( 19 May 2025 08:35 )    LIVER FUNCTIONS - ( 19 May 2025 08:35 )  Alb: 2.7 g/dL / Pro: 5.1 g/dL / ALK PHOS: 181 U/L / ALT: 195 U/L / AST: 226 U/L / GGT: x           PT/INR -  31.4 sec / 2.73 ratio   ( 19 May 2025 04:10 )       PTT -  35.8 sec   ( 19 May 2025 04:10 )  CAPILLARY BLOOD GLUCOSE    CARDIAC MARKERS ( 19 May 2025 04:10 )  x     / x     / x     / x     / 80.2 ng/mL      Urinalysis Basic - ( 19 May 2025 08:35 )    Color: x / Appearance: x / SG: x / pH: x  Gluc: 187 mg/dL / Ketone: x  / Bili: x / Urobili: x   Blood: x / Protein: x / Nitrite: x   Leuk Esterase: x / RBC: x / WBC x   Sq Epi: x / Non Sq Epi: x / Bacteria: x      ABG - ( 19 May 2025 11:46 )  pH: 7.240 /  pCO2: 33    /  pO2: 80    / HCO3: 14    / Base Excess: -13.3 /  SaO2: 96.4              23:10 - VBG - pH: 7.140 | pCO2: 40    | pO2: 143   | Lactate: 15.90  20:05 - VBG - pH: <6.942 | pCO2: 42    | pO2: 96    | Lactate: >16.50    --------------------------------------------------------------------------------------------  IMAGING  ***    ASSESSMENT: Patient is a 75y male with ***    PLAN:    - No indication for acute surgical intervention  - Recommend  - Dispo as per   - Care as per primary team  - Pain control  - OOB/ambulate  - Strict I/Os  - DVT ppx:    Patient and plan discussed with attending, Dr. Kathrine Man MD GENERAL SURGERY CONSULT     Admission HPI:   "74 yo male with hx of HTN, HLD, CAD, TIA, remote pericarditis in 2015, presented to the ED with complaints of abdominal pain, nausea, vomiting for since the day prior.  Upon arrival to the ED patient was lethargic and tachypneic, with abdominal distention and mottled skin.    He was noted to be hypotensive despite fluid resuscitation and placed on escalating doses of pressors.    Started on Bipap initially for tachypnea and metabolic acidosis, eventually intubated in the ED.  Labs revealed severe metabolic acidosis. OLEKSANDR, transaminitis"    RUQ US done today demonstrated acute cholecystitis for which General Surgery was called.    Family at bedside provided collateral information. Patient began complaining of abdominal pain 2 days prior to presentation, with associated nausea, vomiting and diarrhea. Quick decline over 2 days leading to condition in which he presented to ED. Grandson at bedside states patient did not complain of RUQ/epigastric pain after meals, to his knowledge.    ROS: unable to obtain 2/2 patient condition.    PAST MEDICAL & SURGICAL HISTORY:  AMI (Acute Myocardial Infarction)  2001      Transient ischemic attack (TIA)      No Past Surgical History      ALLERGIES: NKA No Known Drug Allergies  SHRIMP (Unknown)  iv dye (Unknown)      MEDICATIONS  (STANDING):  albumin human 25% IVPB 50 milliLiter(s) IV Intermittent every 6 hours  angiotensin II Infusion 40 NANOGram(s)/kG/Min (18.7 mL/Hr) IV Continuous <Continuous>  aspirin  chewable 81 milliGRAM(s) Oral daily  chlorhexidine 0.12% Liquid 15 milliLiter(s) Oral Mucosa every 12 hours  chlorhexidine 2% Cloths 1 Application(s) Topical <User Schedule>  doxazosin 2 milliGRAM(s) Oral at bedtime  fentaNYL   Infusion. 0.5 MICROgram(s)/kG/Hr (3.91 mL/Hr) IV Continuous <Continuous>  hydrocortisone sodium succinate Injectable 50 milliGRAM(s) IV Push every 6 hours  insulin lispro (ADMELOG) corrective regimen sliding scale.   SubCutaneous every 6 hours  meropenem Injectable 500 milliGRAM(s) IV Push every 12 hours  midazolam Infusion 0.05 mG/kG/Hr (3.91 mL/Hr) IV Continuous <Continuous>  multiple electrolytes Injection Type 1 1000 milliLiter(s) (1000 mL/Hr) IV Continuous <Continuous>  norepinephrine Infusion 0.4 MICROgram(s)/kG/Min (29.3 mL/Hr) IV Continuous <Continuous>  pantoprazole  Injectable 40 milliGRAM(s) IV Push two times a day  phenylephrine    Infusion 5 MICROgram(s)/kG/Min (73.2 mL/Hr) IV Continuous <Continuous>  sodium bicarbonate  Infusion 0.283 mEq/kG/Hr (150 mL/Hr) IV Continuous <Continuous>  vasopressin Infusion 0.04 Unit(s)/Min (6 mL/Hr) IV Continuous <Continuous>    MEDICATIONS  (PRN):      --------------------------------------------------------------------------------------------  VITALS:  T(C): 38.6 (05-19-25 @ 13:00), Max: 38.6 (05-19-25 @ 12:30)  HR: 123 (05-19-25 @ 13:00) (96 - 165)  BP: 117/76 (05-19-25 @ 13:00) (88/50 - 122/78)  RR: 32 (05-19-25 @ 13:00) (16 - 35)  SpO2: 98% (05-19-25 @ 13:00) (82% - 100%)      PHYSICAL EXAM:   General: critically ill appearing  Neuro: sedated  Cardio: RRR, on triple pressors  Resp: intubated, mechanically ventilated, synchronous w/vent  GI/Abd: Soft, distended  --------------------------------------------------------------------------------------------    LABS                 15.0   15.56  )----------(  73        ( 19 May 2025 04:10 )               45.5      137    |  89     |  36.0   ----------------------------<  187        ( 19 May 2025 08:35 )  4.1     |  10.0   |  1.89     Ca    7.7        ( 19 May 2025 08:35 )  Phos  6.3       ( 19 May 2025 08:35 )  Mg     2.2       ( 19 May 2025 08:35 )    TPro  5.1    /  Alb  2.7    /  TBili  4.5    /  DBili  x      /  AST  226    /  ALT  195    /  AlkPhos  181    ( 19 May 2025 08:35 )    LIVER FUNCTIONS - ( 19 May 2025 08:35 )  Alb: 2.7 g/dL / Pro: 5.1 g/dL / ALK PHOS: 181 U/L / ALT: 195 U/L / AST: 226 U/L / GGT: x           PT/INR -  31.4 sec / 2.73 ratio   ( 19 May 2025 04:10 )  PTT -  35.8 sec   ( 19 May 2025 04:10 )    ABG - ( 19 May 2025 11:46 )  pH: 7.240 /  pCO2: 33    /  pO2: 80    / HCO3: 14    / Base Excess: -13.3 /  SaO2: 96.4        23:10 - VBG - pH: 7.140 | pCO2: 40    | pO2: 143   | Lactate: 15.90  20:05 - VBG - pH: <6.942 | pCO2: 42    | pO2: 96    | Lactate: >16.50    --------------------------------------------------------------------------------------------  IMAGING  EXAM: US ABDOMEN RT UPR QUADRANT ORDERED BY: DANIA MEDEROS    PROCEDURE DATE: 05/19/2025    INTERPRETATION: CLINICAL INFORMATION: 75-year-old male with shock, sepsis, evaluate for cholecystitis, choledocholithiasis    COMPARISON: Abdominal pelvic CT 5/18/2025, right upper quadrant ultrasound 7/9/2021    TECHNIQUE: Portable sonography of the right upper quadrant.    FINDINGS:  The study is technically limited by the portable technique and the patient is on a ventilator.  Liver: Mildly enlarged measuring 18 cm in length. Increased echogenicity of the liver parenchyma suggesting hepatic steatosis. No focal abnormality is identified. The main portal vein is patent with hepatopedal flow.  Bile ducts: The common bile duct is mildly dilated measuring up to 8.5 mm. No stone is seen in the visualized segment. However, the distal segment of the common bile duct could not be visualized as it is obscured by bowel gas.  Gallbladder: The gallbladder is distended and contains stones. There is an echogenic focus in the region of the gallbladder neck which could represent an aggregate of sludge or nonshadowing stone. Mobility could not be assessed. There is gallbladder wall thickening and edema measuring up to 9 mm. The patient could not be assessed for a sonographic [place] sign. There is pericholecystic fluid; however, there is also trace perihepatic ascites.  Pancreas: Partially obscured by overlying bowel and not well evaluated on this exam.  Right kidney: 12.4 cm. No hydronephrosis.  Ascites: Trace perihepatic ascites  IVC/aorta: Poorly visualized    IMPRESSION:    1. The gallbladder is distended and contains stones. There is an echogenic nonshadowing focus in the region of the gallbladder neck which could represent an aggregate of sludge or nonshadowing stone. The gallbladder wall is thickened and edematous. The patient could not be evaluated for a sonographic [place] sign. There is trace pericholecystic fluid; however, there is also perihepatic ascites. The constellation of findings is concerning for possible acute cholecystitis. Clinical and laboratory correlation is advised. Surgical consultation is suggested for further evaluation and management.  2. The common bile duct is mildly dilated measuring up to 8.5 mm. The distal segment of the duct cannot be visualized on this examination and a stone or other abnormality within the distal duct could not be excluded. Clinical and laboratory correlation is again recommended.    Further evaluation of these findings with abdominal MRI/MRCP and/or HIDA scan could be performed if clinically possible. Surgical consultation is again suggested.    Findings were discussed with Dr. Moser 5/19/2025 8:53 AM by Dr. Porras with read back confirmation.    --- End of Report ---    ASSESSMENT: Patient is a 75y male with cholangitis, admitted to MICU on triple pressors, intubated, with multi-organ dysfunction. RUQ imaging shows acute cholecystitis, Tbili of 4.5 + CBD 8.5mm suggestive of choledocho.    PLAN:    - Recommend urgent IR evaluation for PCT and TPC drainage to address both cholecystitis and choledocholithiasis/cholangitis  - Patient is too unstable for any surgical intervention at this time    Above discussed with MICU team.    Patient and plan discussed with attending, Dr. Matthew Man MD

## 2025-05-19 NOTE — CONSULT NOTE ADULT - NS ATTEND AMEND GEN_ALL_CORE FT
Patient seen and examined in the presence of family.  75-year-old man with history of hypertension, coronary artery disease, dyslipidemia TIA had presented with a picture of septic shock, biliary obstruction and has been on escalating doses of vasopressors.  Patient has been intubated and has been on pressor support.  He has multiorgan failure at this time.  Has ultrasound abdomen with distended gallbladder and stones.  Also LFTs are elevated with also evidence of pancreatitis.  At this time most likely this is a clinical picture.  Cholangitis with acute pancreatitis and shock.  Continue supportive care.  The patient is too unstable to have any kind of procedures performed at this time.  Discussed at length with the patient's son at the bedside and the medical ICU team.  Emotional support was provided

## 2025-05-19 NOTE — CONSULT NOTE ADULT - ATTENDING COMMENTS
Patient evaluated by me in MICU.   Septic shock secondary to cholangitis.   Recommend emergent IR evaluation for cholecystotomy tube and possible PTC for biliary decompression.   ACS to follow.

## 2025-05-19 NOTE — PROCEDURE NOTE - LOCAL ANESTHESIA
CHIEF COMPLAINT: Matthew is a 13 year old male who comes in today with Mother for evaluation of ADHD medication.    Nurses notes (i.e., Visit Notes) reviewed.    HISTORY OF PRESENT ILLNESS: Matthew is a 13 year old male who was being treated for AHD with methylphenidate from [id number]. Approximately 1 year ago, the medication was discontinued. However, this adademic school year was difficult for him, and grades suffered due to his inability to keep sufficient focus to perform on school work and tests.    He would like to return to medication at this time. Parents are supportive.    (Reported by Patient.)    SOCIAL HISTORY   Matthew attends school. There is COVID in the community at the present time. He is not exposed to passive smoke. He has no significant upcoming travel, tests, or events.    Current Outpatient Medications   Medication   • Melatonin 1 MG Cap   • lisdexamfetamine (VYVANSE) 20 MG capsule   • methylpheniDATE (RITALIN) 10 MG tablet   • methylphenidate (METADATE CD) 20 MG ER (CD) capsule     No current facility-administered medications for this visit.       PHYSICAL EXAM:  VITAL SIGNS AND/OR GROWTH:         Visit Vitals  Pulse 73   Wt 47.3 kg (104 lb 3.2 oz)   SpO2 98%     GENERAL APPEARANCE: Alert and in no distress  LUNGS: Clear to auscultation with good aeration throughout, no wheezing rhonchi or rales  HEART: Normal, grossly regular rate and rhythm, no murmur noted and capillary refill less than two seconds  ABDOMEN: Normoactive bowel sounds, soft without masses, tenderness, guarding or hepatosplenomegaly  EXTREMITIES: Warm and well perfused with a brisk capillary refill time of < 2 seconds.    ASSESSMENT: Attention deficit hyperactivity disorder (ADHD), combined type  (primary encounter diagnosis)  Plan: lisdexamfetamine (VYVANSE) 20 MG capsule  Call or return to clinic as needed if these symptoms worsen, fail to improve as anticipated, or if new symptoms develop.  
Matthew is a 13 year old child presenting for assessment of   Chief Complaint   Patient presents with   • Office Visit     discuss medication        Pharmacy of choice noted (under Meds & Orders): Yes  Medications and allergies verified: Yes  Denies known Latex allergy or symptoms of Latex sensitivity.  IMMUNIZATION REACTION: no  Patient would like communication of their results via:        Cell Phone:   Telephone Information:   Mobile [phone]     Okay to leave a message containing results? Yes  CONCERNS: yes, would like to discuss medication (metadate)        Health Maintenance Due   Topic Date Due   • COVID-19 Vaccine (3 - Booster for Pfizer series) 11/10/2021   • HPV Vaccine (2 - Male 2-dose series) 12/29/2021   • Depression Screening  06/29/2022   • Annual Physical (ages 3-18)  06/29/2022       Patient is due for topics as listed above but is not proceeding with Immunization(s) COVID-19 and HPV at this time.           
1% Lidocaine

## 2025-05-19 NOTE — CONSULT NOTE ADULT - SUBJECTIVE AND OBJECTIVE BOX
HPI:  76 yo male with hx of HTN, HLD, CAD, TIA, remote pericarditis in 2015, presented to the ED with complaints of abdominal pain, nausea, vomiting for since the day prior.  Upon arrival to the ED patient was lethargic and tachypneic, with abdominal distention and mottled skin.    He was noted to be hypotensive despite fluid resuscitation and placed on escalating doses of pressors.    Started on Bipap initially for tachypnea and metabolic acidosis, eventually intubated in the ED.  Labs revealed severe metabolic acidosis. OLEKSANDR, transaminitis      (18 May 2025 23:45)      PERTINENT PMH REVIEWED: Yes No    PAST MEDICAL & SURGICAL HISTORY:  AMI (Acute Myocardial Infarction)  2001      Transient ischemic attack (TIA)      No Past Surgical History          SOCIAL HISTORY:                      Substance history:                    Admitted from:  home  [facility]                     Gnosticist/spirituality:                    Cultural concerns:    Baseline ADLs (prior to admission):  Independent/ Dependent                        Surrogate/HCP/Guardian:     FAMILY HISTORY:  FH: CAD (coronary artery disease)        Allergies    No Known Drug Allergies  SHRIMP (Unknown)  iv dye (Unknown)    Intolerances        http://npcrc.org/files/news/[email or web address]_[email or web address]    Present Symptoms:   Dyspnea:  No Yes  Nausea/Vomiting:  No  Yes  Anxiety/ Agitation    No  Yes  Unable to assess  Fatigue: Yes No Unable to assess   Loss of appetite: Yes  No   Fair   unable to assess NPO  Constipation:  Not reported   Yes  Pain:  No  No signs            Location            Character            Duration            Factors            Severity            Effect    Pain AD Score:  [email or web address] (press ctrl + left click to view)    Review of Systems: Reviewed    All other ROS negative  Unable  Limited  to obtain due to poor mentation   historian    MEDICATIONS  (STANDING):  albumin human 25% IVPB 50 milliLiter(s) IV Intermittent every 6 hours  angiotensin II Infusion 40 NANOGram(s)/kG/Min (18.7 mL/Hr) IV Continuous <Continuous>  aspirin  chewable 81 milliGRAM(s) Oral daily  chlorhexidine 0.12% Liquid 15 milliLiter(s) Oral Mucosa every 12 hours  chlorhexidine 2% Cloths 1 Application(s) Topical <User Schedule>  doxazosin 2 milliGRAM(s) Oral at bedtime  fentaNYL   Infusion. 0.5 MICROgram(s)/kG/Hr (3.91 mL/Hr) IV Continuous <Continuous>  hydrocortisone sodium succinate Injectable 50 milliGRAM(s) IV Push every 6 hours  insulin lispro (ADMELOG) corrective regimen sliding scale.   SubCutaneous every 6 hours  meropenem Injectable 500 milliGRAM(s) IV Push every 12 hours  midazolam Infusion 0.05 mG/kG/Hr (3.91 mL/Hr) IV Continuous <Continuous>  multiple electrolytes Injection Type 1 1000 milliLiter(s) (1000 mL/Hr) IV Continuous <Continuous>  norepinephrine Infusion 0.4 MICROgram(s)/kG/Min (29.3 mL/Hr) IV Continuous <Continuous>  pantoprazole  Injectable 40 milliGRAM(s) IV Push two times a day  phenylephrine    Infusion 5 MICROgram(s)/kG/Min (73.2 mL/Hr) IV Continuous <Continuous>  sodium bicarbonate  Infusion 0.283 mEq/kG/Hr (150 mL/Hr) IV Continuous <Continuous>  vasopressin Infusion 0.04 Unit(s)/Min (6 mL/Hr) IV Continuous <Continuous>    MEDICATIONS  (PRN):    PHYSICAL EXAM:  Vital Signs Last 24 Hrs  T(C): 37.9 (19 May 2025 15:00), Max: 38.7 (19 May 2025 13:15)  T(F): 100.2 (19 May 2025 15:00), Max: 101.7 (19 May 2025 13:15)  HR: 109 (19 May 2025 15:00) (96 - 165)  BP: 112/76 (19 May 2025 15:00) (88/50 - 122/78)  BP(mean): 87 (19 May 2025 15:00) (62 - 103)  RR: 30 (19 May 2025 15:00) (16 - 35)  SpO2: 100% (19 May 2025 15:00) (82% - 100%)    Parameters below as of 19 May 2025 12:00  Patient On (Oxygen Delivery Method): ventilator    O2 Concentration (%): 50  Karnofsky 10  %  General:  Elder man intubated  HEENT: NCAT      ( x )  ET tube     Lungs: comfortable  CV: RR  GI:   distended  MSK:   bedbound  Neuro: sedated  Skin: warm  Psych:  sedated    LABS:                        15.0   15.56 )-----------( 73       ( 19 May 2025 04:10 )             45.5     05-19    136  |  88[L]  |  35.6[H]  ----------------------------<  226[H]  4.0   |  13.0[L]  |  1.78[H]    Ca    7.4[L]      19 May 2025 12:50  Phos  5.8     05-19  Mg     2.2     05-19    TPro  5.4[L]  /  Alb  2.8[L]  /  TBili  4.3[H]  /  DBili  x   /  AST  252[H]  /  ALT  201[H]  /  AlkPhos  209[H]  05-19    PT/INR - ( 19 May 2025 04:10 )   PT: 31.4 sec;   INR: 2.73 ratio         PTT - ( 19 May 2025 04:10 )  PTT:35.8 sec  Urinalysis Basic - ( 19 May 2025 12:50 )    Color: x / Appearance: x / SG: x / pH: x  Gluc: 226 mg/dL / Ketone: x  / Bili: x / Urobili: x   Blood: x / Protein: x / Nitrite: x   Leuk Esterase: x / RBC: x / WBC x   Sq Epi: x / Non Sq Epi: x / Bacteria: x      I&O's Summary    18 May 2025 07:01  -  19 May 2025 07:00  --------------------------------------------------------  IN: 7016.7 mL / OUT: 2385 mL / NET: 4631.7 mL    19 May 2025 07:01  -  19 May 2025 15:34  --------------------------------------------------------  IN: 2238.3 mL / OUT: 1875 mL / NET: 363.3 mL        RADIOLOGY & ADDITIONAL STUDIES:  Imaging reviewed   < from: CT Abdomen and Pelvis w/ IV Cont (05.19.25 @ 00:03) >  ACC: [id number] EXAM:  CT ABDOMEN AND PELVIS IC   ORDERED BY: MALLIKA CALIXTO     ACC: [id number] EXAM:  CT ANGIO CHEST PULM [facility]   ORDERED BY: MALLIKA CALIXTO     PROCEDURE DATE:  05/19/2025          INTERPRETATION:  CLINICAL INFORMATION: abd tenderness ams sob    TECHNIQUE: CT imaging of the chest, abdomen, and pelvis was obtained with   IV contrast. 90 cc of Omnipaque 350 was administered and 10 cc discarded.   MIP images were also obtained.    COMPARISON: 7/8/2021 and 7/10/2021    FINDINGS:    CHEST:  LUNGS, PLEURA AND LARGE AIRWAYS: Endotracheal tube with its distal tip   above the level of the gayla. Airspace consolidations at the lung bases   may represent atelectasis and/or pneumonia.  VESSELS: No pulmonary embolus. Normal aortic caliber.Right-sided central   venous catheter with its distal tip in the SVC.  HEART: Heart size is normal. No pericardial effusion. Coronary artery   calcification.  MEDIASTINUM AND ALMA DELIA: No lymphadenopathy.  CHEST WALL AND LOWER NECK: Within normal limits.    ABDOMEN AND PELVIS:  LIVER: Within normal limits.  BILE DUCTS: Normal caliber.  GALLBLADDER: Cholelithiasis.  SPLEEN: Within normal limits.  PANCREAS: Within normal limits.  ADRENALS: Within normal limits.  KIDNEYS/URETERS: No hydronephrosis.    BLADDER: Petersen.  REPRODUCTIVE ORGANS: Enlarged prostate.    BOWEL: Enteric tube with its distal tip in the stomach. No bowel   obstruction. Normal appendix.  PERITONEUM: Trace ascites.  VESSELS:  Normal aortic caliber.  RETROPERITONEUM: No lymphadenopathy.  ABDOMINAL WALL: Small fat-containing umbilical and inguinal hernias.  BONES: Degenerative changes of the spine. Nonspecific small sclerotic   foci in the pelvic bones.    IMPRESSION:    No pulmonary embolus.    Airspace consolidations at the lung bases may represent atelectasis   and/or pneumonia.    No bowel obstruction.    Cholelithiasis.    Trace ascites.    --- End of Report ---            < end of copied text >        < from: US Abdomen Upper Quadrant Right (05.19.25 @ 07:19) >  IMPRESSION:    1. The gallbladder is distended and contains stones. There is an   echogenic nonshadowing focus in the region of the gallbladder neck which   could represent an aggregate of sludge or nonshadowing stone. The   gallbladder wall is thickened and edematous. The patient could not be   evaluated for a sonographic [place] sign. There is trace pericholecystic   fluid;however, there is also perihepatic ascites. The constellation of   findings is concerning for possible acute cholecystitis. Clinical and   laboratory correlation is advised. Surgical consultation is suggested for   further evaluation and management.  2. The common bile duct is mildly dilated measuring up to 8.5 mm. The   distal segment of the duct cannot be visualized on this examination and a   stone or other abnormality within the distal duct could not be excluded.   Clinical and laboratory correlation is again recommended.    Further evaluation of these findings with abdominal MRI/MRCP and/or HIDA   scan could be performed if clinically possible. Surgical consultation is   again suggested.      < end of copied text >    ADVANCE DIRECTIVES/TREATMENT PREFERENCES:  Currently Full code

## 2025-05-19 NOTE — PATIENT PROFILE ADULT - HAVE YOU RECENTLY LOST WEIGHT WITHOUT TRYING?
Implemented All Universal Safety Interventions:  [place] to call system. Call bell, personal items and telephone within reach. Instruct patient to call for assistance. Room bathroom lighting operational. Non-slip footwear when patient is off stretcher. Physically safe environment: no spills, clutter or unnecessary equipment. Stretcher in lowest position, wheels locked, appropriate side rails in place. Unsure (2)

## 2025-05-19 NOTE — PROGRESS NOTE ADULT - ASSESSMENT
Mr. Bateman is a 75-year-old male (PMH CAD s/p MI, TIA, HTN, HLD) admitted to MICU on 18-May for acute respiratory failure and shock, now intubated and heavily sedated. He is in profound mixed shock (septic vs. cardiogenic from suspected acute abdomen) requiring triple pressor support (Norepinephrine, Phenylephrine, Vasopressin) and significant volume resuscitation (+4.6L net positive/24h). Critical issues include severe metabolic acidosis (pH 7.12, HCO3 14), OLEKSANDR (Cr 2.29), coagulopathy (INR 2.73), thrombocytopenia (Plt 73), transaminitis (, ), hyperbilirubinemia (TBili 4.3), and fever (Tmax 38.0°C); SVT overnight was successfully cardioverted. CT A/P not showing focal infectious source, possibly biliary tree infx. Prognosis is extremely guarded; family aware, Full Code.    #Neuro:    -Altered mental status, intubated and sedated.  -Cont Fentanyl and Midazolam infusions; titrate to RASS -4 to -5 (deep sedation for vent synchrony/shock).  -Neuro checks q2-4h. Seizure/aspiration precautions. HOB >30 deg.    #CV:    -Profound mixed shock (septic vs. cardiogenic).  -Cont Norepinephrine 0.4 mcg/kg/min, Phenylephrine (0.3 mcg/kg/min and 5 mcg/kg/min infusions noted on MAR - clarify intended rate/consolidation), Vasopressin 0.04 U/min. Titrate to MAP >65 mmHg. (Last ABP 95/51, MAP 69).  -SVT s/p electrical cardioversion (200J) to sinus tachycardia ().  -Bedside US (ED): hypokinetic LV. Troponin 80.2 ng/mL. Obtain formal TTE.  -Telemetry. Arterial line. Central venous catheter.  -Monitor I/Os closely, target euvolemia once shock source controlled. Currently +4.6L/24h.  -Cont Albumin 25% 50mL IV q6h.    #Resp:    -Acute Respiratory Failure, intubated. Vent: AC/CMV, RR 30, , FiO2 50%, PEEP 6.  -ABG (04:20): 7.12/44/85/14 (SaO2 96.6%, P/F 170).  -Wean FiO2/PEEP as tolerated to SpO2 >92-94%.  -Pulmonary toilet, oral care with Chlorhexidine 0.12% liquid q12h.    #Renal/FEN:    -OLEKSANDR (Cr 2.29, BUN 37.7). Severe metabolic acidosis (HCO3 14, BE -15).  -Cont Sodium Bicarbonate infusion (150 mL/hr).  -Hyperphosphatemia (Phos 7.8). Hypocalcemia (Ca 8.5).  -Consult Nephrology for evaluation for RRT (CRRT/IHD) given severe acidosis and OLEKSANDR.  -Monitor BMP, Ca, Mg, Phos q6-12h. Replete electrolytes as needed.  -Strict I&Os. Daily weights.  -IVF: Plasmalyte currently at 1L/hr, adjust based on hemodynamic response and renal consult.    #GI/Abdomen:    -Suspected acute abdomen (distension, guarding, tenderness). NGT output 1000mL/24h.  -Imaging not showing focal source for sepsis, possible biliary tree infx  -NPO. NGT to LIS/LWS.  -Gen surg vs GI consult  -Cont Pantoprazole 40mg IV BID.  -Monitor LFTs, bilirubin, INR. Coagulopathy (INR 2.73).     #Heme:    -Anemia (Hgb 15.0). Leukocytosis (WBC 15.56). Thrombocytopenia (Plt 73).  -Monitor CBC daily. Transfuse PRBCs for Hgb <7-8 g/dL or if symptomatic.  -Transfuse platelets if <50 K/uL and bleeding/planned procedure, or <10-20 K/uL.    #ID:    -Suspected sepsis (?intra-abdominal source). Febrile (Tmax 38.0°C).  -Cont Meropenem 500mg IV q12h  -Blood cultures, f/u results  -Monitor temperature, WBC, procalcitonin.    #Endo:    -Hyperglycemia (Serum Gluc 155).  -Monitor POCT BG q2-4h. Start Insulin SSI PRN, consider insulin gtt if persistent hyperglycemia. Goal -180 mg/dL.  -Cont Hydrocortisone 50mg IV q6h (stress dose for shock).    #Prophylaxis:    VTE: SCDs. Hold chemical ppx with Plt 73 and INR 2.73.  Stress Ulcer: Pantoprazole.  Skin: CHG cloths. Turning/positioning.    Disp/GOC:    MICU for septic/cardiogenic shock, ARF, multi-organ support.  Prognosis extremely guarded.  Full Code per family discussion (wife Amalia is HCP).  Palliative Care consult placed.

## 2025-05-19 NOTE — CHART NOTE - NSCHARTNOTEFT_GEN_A_CORE
Pt received from the ER intubated on vent.  Pt placed on BV on previous vent settings - Vent settings changed upon arrival to ICU secondary to ABG results.  As per MICU NP & Dr. Donahue pt RR increased to 30 abd VT decreased to 420ml and O2 titrated down to 50%.  Team at bedside at this time.

## 2025-05-19 NOTE — PATIENT PROFILE ADULT - FALL HARM RISK - HARM RISK INTERVENTIONS

## 2025-05-19 NOTE — PROGRESS NOTE ADULT - ATTENDING COMMENTS
Patient seen and examined    Events: S/p cardioversion for unstable SVT  Unresponsive on sedatives;   Febrile   increasing pressor requirement on levo and rob gtt; will add Angiotensin gtt    ====================ASSESSMENT/PLAN==============   75-year-old male presented with nausea vomiting diarrhea and lethargy with difficulty breathing   medical history of hypertension dyslipidemia coronary artery disease TIA remote pericarditis  Admitted to medical ICU with  Distributive shock  Acute pancreatitis with cholelithiasis  SVT status post cardioversion  Severe metabolic acidosis with acute kidney injury  Acute respiratory acidosis  Acute kidney injury  Transaminitis  Acute metabolic encephalopathy  Rule out bacteremia/UTI  B/l LE atelectasis vs aspiration PNA  ====================== NEUROLOGY=====================  fentaNYL   Infusion. 0.5 MICROgram(s)/kG/Hr (3.91 mL/Hr) IV Continuous <Continuous>  midazolam Infusion 0.05 mG/kG/Hr (3.91 mL/Hr) IV Continuous <Continuous>  Holding sedatives for now   CT head neg for ac path   Every 4 neurochecks  Fall aspiration seizure precaution  ==================== RESPIRATORY======================  Mechanical Ventilation:  Mode: AC/ CMV (Assist Control/ Continuous Mandatory Ventilation)  RR (machine): 30  TV (machine): 500  FiO2: 50  PEEP: 6  ITime: 0.8  MAP: 10  PIP: 19   ventilator associated pneumonia prevention bundle in place including but not limited to head of the bed elevation to 30 degrees and more, daily oral care with 2% chlorhexidine, subglottic endotracheal suctioning of secretion, spontaneous awakening and breathing trial for daily assessment of mechanical ventilator liberation  PLS  ====================CARDIOVASCULAR==================  norepinephrine Infusion 0.4 MICROgram(s)/kG/Min (29.3 mL/Hr) IV Continuous <Continuous>  phenylephrine    Infusion 0.3 MICROgram(s)/kG/Min (8.93 mL/Hr) IV Continuous <Continuous>  phenylephrine    Infusion 5 MICROgram(s)/kG/Min (73.2 mL/Hr) IV Continuous <Continuous>  hydrocortisone sodium succinate Injectable 50 milliGRAM(s) IV Push every 6 hours  vasopressin Infusion 0.04 Unit(s)/Min (6 mL/Hr) IV Continuous <Continuous>  MAP goal > 65  ===================HEMATOLOGIC/ONC ===================  add heparin dvt ppx  SCDs  ===================== RENAL =========================  Continue monitoring urine output  Nephro consult   Avoid Nephrotoxic agents   Adjusting medications for renal  function   Replacing electrolytes as needed with Goal K> 4, PO> 3, Mg> 2  ==================== GASTROINTESTINAL===================  albumin human 25% IVPB 50 milliLiter(s) IV Intermittent every 6 hours  multiple electrolytes Injection Type 1 1000 milliLiter(s) (1000 mL/Hr) IV Continuous <Continuous>  pantoprazole  Injectable 40 milliGRAM(s) IV Push two times a day  sodium bicarbonate  Infusion 0.283 mEq/kG/Hr (150 mL/Hr) IV Continuous <Continuous>   diet: NPO for now   ACS and GI eval   US and ct abd noted noted   =======================    ENDOCRINE  =====================  BS goal 100-200; q 6 monitoring   ========================INFECTIOUS DISEASE================  meropenem Injectable 500 milliGRAM(s) IV Push every 12 hours empirically   Cultures: Pending Blood and urine culture; one more set of blood culture  ==========================================================  CODE STATUS: Full  Plan of care discussed with: icu team  Palliative care consult   Upon my evaluation, this patient had a high probability of imminent or life-threatening deterioration due to critical condition, which required my direct attention, intervention, and personal management. I have personally provided 70 minutes of critical care time exclusive of time spent teaching and/or time spent on separately billable procedures. Time includes review of laboratory data, radiology results, discussion with consultants, and monitoring for potential decompensation. Interventions were performed as documented above.

## 2025-05-19 NOTE — PROVIDER CONTACT NOTE (CRITICAL VALUE NOTIFICATION) - DATE AND TIME:
19-May-2025 12:54 [School] : school [Development and Mental Health] : development and mental health [Nutrition and Physical Activity] : nutrition and physical activity [Oral Health] : oral health [Safety] : safety [FreeTextEntry1] : SARAI is a 10 year old F here for well child visit.  She takes no medications. Physical exam today was normal. Admits to shortness of breath with mild exertion including going up one flight of stairs. Has had to leave sports practice bc of it. Never had asthma. Needs to see cardiology prior to sports clearance. No vaccines today, mother declines flu. Lipids and CBC ordered to lab.

## 2025-05-19 NOTE — CONSULT NOTE ADULT - SUBJECTIVE AND OBJECTIVE BOX
[facility] DIVISION OF KIDNEY DISEASES AND HYPERTENSION -- INITIAL CONSULT NOTE  --------------------------------------------------------------------------------  HPI:  75-year-old gentleman with a past medical history of hypertension, hyperlipidemia, CAD, TIA, pericarditis who presented to the hospital with abdominal pain nausea and vomiting.  The patient is intubated and sedated.  History obtained from medical records.  Patient is in 3 pressor shock.  He has severe acidosis.  His lactate is more than 16.  He is found to have septic shock from cholecystitis.  He is too sick for any surgical intervention at this time.  He is in acute kidney injury his creatinine was 2.7 on admission.  With the adequate fluid resuscitation and antibiotics as he is making urine currently around 100 to 200 cc/h.    PAST HISTORY  --------------------------------------------------------------------------------  PAST MEDICAL & SURGICAL HISTORY:  AMI (Acute Myocardial Infarction)  2001      Transient ischemic attack (TIA)      No Past Surgical History        FAMILY HISTORY:  FH: CAD (coronary artery disease)      PAST SOCIAL HISTORY:    ALLERGIES & MEDICATIONS  --------------------------------------------------------------------------------  Allergies    No Known Drug Allergies  SHRIMP (Unknown)  iv dye (Unknown)    Intolerances      Standing Inpatient Medications  albumin human 25% IVPB 50 milliLiter(s) IV Intermittent every 6 hours  angiotensin II Infusion 40 NANOGram(s)/kG/Min IV Continuous <Continuous>  aspirin  chewable 81 milliGRAM(s) Oral daily  chlorhexidine 0.12% Liquid 15 milliLiter(s) Oral Mucosa every 12 hours  chlorhexidine 2% Cloths 1 Application(s) Topical <User Schedule>  doxazosin 2 milliGRAM(s) Oral at bedtime  fentaNYL   Infusion. 0.5 MICROgram(s)/kG/Hr IV Continuous <Continuous>  hydrocortisone sodium succinate Injectable 50 milliGRAM(s) IV Push every 6 hours  insulin lispro (ADMELOG) corrective regimen sliding scale.   SubCutaneous every 6 hours  meropenem Injectable 500 milliGRAM(s) IV Push every 12 hours  midazolam Infusion 0.05 mG/kG/Hr IV Continuous <Continuous>  multiple electrolytes Injection Type 1 1000 milliLiter(s) IV Continuous <Continuous>  norepinephrine Infusion 0.4 MICROgram(s)/kG/Min IV Continuous <Continuous>  pantoprazole  Injectable 40 milliGRAM(s) IV Push two times a day  phenylephrine    Infusion 5 MICROgram(s)/kG/Min IV Continuous <Continuous>  sodium bicarbonate  Infusion 0.283 mEq/kG/Hr IV Continuous <Continuous>  vasopressin Infusion 0.04 Unit(s)/Min IV Continuous <Continuous>    PRN Inpatient Medications      REVIEW OF SYSTEMS  --------------------------------------------------------------------------------  unable to obtain as patient is intubated and sedated.    All other systems were reviewed and are negative, except as noted.    VITALS/PHYSICAL EXAM  --------------------------------------------------------------------------------  T(C): 38.5 (05-19-25 @ 14:00), Max: 38.7 (05-19-25 @ 13:15)  HR: 119 (05-19-25 @ 14:00) (96 - 165)  BP: 117/76 (05-19-25 @ 13:00) (88/50 - 122/78)  RR: 31 (05-19-25 @ 14:00) (16 - 35)  SpO2: 99% (05-19-25 @ 14:00) (82% - 100%)  Wt(kg): --  Height (cm): 170.2 (05-19-25 @ 00:28)  Weight (kg): 78.1 (05-19-25 @ 00:28)  BMI (kg/m2): 27 (05-19-25 @ 00:28)  BSA (m2): 1.9 (05-19-25 @ 00:28)      05-18-25 @ 07:01  -  05-19-25 @ 07:00  --------------------------------------------------------  IN: 7016.7 mL / OUT: 2385 mL / NET: 4631.7 mL    05-19-25 @ 07:01  -  05-19-25 @ 14:35  --------------------------------------------------------  IN: 2044.1 mL / OUT: 1650 mL / NET: 394.1 mL      Physical Exam:  intubated  HEENT: PERRL, supple neck, clear oropharynx  Pulm: CTA B/L  CV: RRR, S1S2  Abd: +BS, soft, nontender/nondistended  Extremities: no edema  Petersen+    LABS/STUDIES  --------------------------------------------------------------------------------              15.0   15.56 >-----------<  73       [05-19-25 @ 04:10]              45.5     136  |  88  |  35.6  ----------------------------<  226      [05-19-25 @ 12:50]  4.0   |  13.0  |  1.78        Ca     7.4     [05-19-25 @ 12:50]      Mg     2.2     [05-19-25 @ 12:50]      Phos  5.8     [05-19-25 @ 12:50]    TPro  5.4  /  Alb  2.8  /  TBili  4.3  /  DBili  x   /  AST  252  /  ALT  201  /  AlkPhos  209  [05-19-25 @ 12:50]    PT/INR: PT 31.4 , INR 2.73       [05-19-25 @ 04:10]  PTT: 35.8       [05-19-25 @ 04:10]          [05-19-25 @ 04:10]    Creatinine Trend:  SCr 1.78 [05-19 @ 12:50]  SCr 1.89 [05-19 @ 08:35]  SCr 2.29 [05-19 @ 04:10]  SCr 2.60 [05-19 @ 00:45]  SCr 2.77 [05-18 @ 23:10]    Urinalysis - [05-19-25 @ 12:50]      Color  / Appearance  / SG  / pH       Gluc 226 / Ketone   / Bili  / Urobili        Blood  / Protein  / Leuk Est  / Nitrite       RBC  / WBC  / Hyaline  / Gran  / Sq Epi  / Non Sq Epi  / Bacteria       Lipid: chol --, , HDL --, LDL --      [05-19-25 @ 04:10]

## 2025-05-19 NOTE — GOALS OF CARE CONVERSATION - ADVANCED CARE PLANNING - CONVERSATION DETAILS
Mr. Loki Bateman was admitted to the ICU in profound shock and multisystem organ failure.  I spoke with Fátima who was identified as the health care surrogate and we had an extensive conversation about ____ 's care and current condition with the assistance of ED  Peter.    Discussed overall goals of care including. During this discussion we reviewed the current diagnosis, treatment plan, and likely prognosis. An explantation of current medications as well as life saving interventions was provided. After this conversation decision was made by Fátima to continue full code status. Palliative care consult placed to assist in further goals of care discussions.    Above was reviewed with MICU attending physician Dr. Donahue .

## 2025-05-19 NOTE — PROGRESS NOTE ADULT - SUBJECTIVE AND OBJECTIVE BOX
Patient is a 75y old  Male who presents with a chief complaint of Acute respiratory failure, shock (18 May 2025 23:45)      BRIEF HOSPITAL COURSE:   74 yo male with hx of HTN, HLD, CAD, TIA, remote pericarditis in 2015, presented to the ED with complaints of abdominal pain, nausea, vomiting for since the day prior.  Upon arrival to the ED patient was lethargic and tachypneic, with abdominal distention and mottled skin.    He was noted to be hypotensive despite fluid resuscitation and placed on escalating doses of pressors.    Started on Bipap initially for tachypnea and metabolic acidosis, eventually intubated in the ED. Labs revealed severe metabolic acidosis. OLEKSANDR, transaminitis. Admitted to MICU for mixed shock, cardiogenic vs septic, requiring triple pressors.    Ovn:  Overnight, patient was in SVT, s/p cardioversion and resolution of tachyarrhythmia.      Interval HPI:  Patient seen and examined at bedside. Is ill appearing, sedated, not responsive. Unable to obtain ROS.    PAST MEDICAL & SURGICAL HISTORY:  AMI (Acute Myocardial Infarction)  2001      Transient ischemic attack (TIA)      No Past Surgical History        Review of Systems:  All other review of systems negative except as noted in HPI    MEDICATIONS  (STANDING):  acetaminophen   IVPB .. 1000 milliGRAM(s) IV Intermittent once  albumin human 25% IVPB 50 milliLiter(s) IV Intermittent every 6 hours  chlorhexidine 0.12% Liquid 15 milliLiter(s) Oral Mucosa every 12 hours  chlorhexidine 2% Cloths 1 Application(s) Topical <User Schedule>  fentaNYL   Infusion. 0.5 MICROgram(s)/kG/Hr (3.91 mL/Hr) IV Continuous <Continuous>  hydrocortisone sodium succinate Injectable 50 milliGRAM(s) IV Push every 6 hours  meropenem Injectable 500 milliGRAM(s) IV Push every 12 hours  midazolam Infusion 0.05 mG/kG/Hr (3.91 mL/Hr) IV Continuous <Continuous>  multiple electrolytes Injection Type 1 1000 milliLiter(s) (1000 mL/Hr) IV Continuous <Continuous>  norepinephrine Infusion 0.4 MICROgram(s)/kG/Min (29.3 mL/Hr) IV Continuous <Continuous>  pantoprazole  Injectable 40 milliGRAM(s) IV Push two times a day  phenylephrine    Infusion 0.3 MICROgram(s)/kG/Min (8.93 mL/Hr) IV Continuous <Continuous>  phenylephrine    Infusion 5 MICROgram(s)/kG/Min (73.2 mL/Hr) IV Continuous <Continuous>  sodium bicarbonate  Infusion 0.283 mEq/kG/Hr (150 mL/Hr) IV Continuous <Continuous>  vasopressin Infusion 0.04 Unit(s)/Min (6 mL/Hr) IV Continuous <Continuous>    MEDICATIONS  (PRN):      Mode: AC/ CMV (Assist Control/ Continuous Mandatory Ventilation)  RR (machine): 30  TV (machine): 420  FiO2: 50  PEEP: 6  ITime: 0.8  MAP: 12  PIP: 22    ICU Vital Signs Last 24 Hrs  T(C): 38 (19 May 2025 07:30), Max: 38 (19 May 2025 07:30)  T(F): 100.4 (19 May 2025 07:30), Max: 100.4 (19 May 2025 07:30)  HR: 106 (19 May 2025 07:30) (96 - 165)  BP: 109/75 (19 May 2025 07:00) (88/50 - 121/93)  BP(mean): 87 (19 May 2025 07:00) (62 - 103)  ABP: 95/51 (19 May 2025 07:30) (88/49 - 109/53)  ABP(mean): 69 (19 May 2025 07:30) (64 - 76)  RR: 30 (19 May 2025 07:30) (16 - 35)  SpO2: 97% (19 May 2025 07:30) (82% - 100%)    O2 Parameters below as of 19 May 2025 00:28  Patient On (Oxygen Delivery Method): ventilator    O2 Concentration (%): 100      ABG - ( 19 May 2025 04:20 )  pH, Arterial: 7.120 pH, Blood: x     /  pCO2: 44    /  pO2: 85    / HCO3: 14    / Base Excess: -15.0 /  SaO2: 96.6              I&O's Detail    18 May 2025 07:01  -  19 May 2025 07:00  --------------------------------------------------------  IN:    FentaNYL: 31.2 mL    IV PiggyBack: 100 mL    IV PiggyBack: 100 mL    IV PiggyBack: 200 mL    IV PiggyBack: 250 mL    IV PiggyBack: 200 mL    multiple electrolytes Injection Type 1 Bolus: 2000 mL    multiple electrolytes Injection Type 1.: 1000 mL    Norepinephrine: 593.9 mL    Phenylephrine: 1186 mL    Propofol: 107.6 mL    Sodium Bicarbonate: 1200 mL    Vasopressin: 48 mL  Total IN: 7016.7 mL    OUT:    Indwelling Catheter - Urethral (mL): 1385 mL    Nasogastric/Oral tube (mL): 1000 mL  Total OUT: 2385 mL    Total NET: 4631.7 mL          LABS:                        15.0   15.56 )-----------( 73       ( 19 May 2025 04:10 )             45.5     05-19    140  |  91[L]  |  37.7[H]  ----------------------------<  155[H]  3.9   |  14.0[L]  |  2.29[H]    Ca    8.5      19 May 2025 04:10  Phos  7.8     05-19  Mg     2.7     05-19    TPro  5.3[L]  /  Alb  2.9[L]  /  TBili  4.3[H]  /  DBili  x   /  AST  222[H]  /  ALT  193[H]  /  AlkPhos  203[H]  05-19      CARDIAC MARKERS ( 19 May 2025 04:10 )  x     / x     / x     / x     / 80.2 ng/mL      CAPILLARY BLOOD GLUCOSE      POCT Blood Glucose.: 133 mg/dL (18 May 2025 19:58)    PT/INR - ( 19 May 2025 04:10 )   PT: 31.4 sec;   INR: 2.73 ratio         PTT - ( 19 May 2025 04:10 )  PTT:35.8 sec  Urinalysis Basic - ( 19 May 2025 04:10 )    Color: x / Appearance: x / SG: x / pH: x  Gluc: 155 mg/dL / Ketone: x  / Bili: x / Urobili: x   Blood: x / Protein: x / Nitrite: x   Leuk Esterase: x / RBC: x / WBC x   Sq Epi: x / Non Sq Epi: x / Bacteria: x      CULTURES:      Physical Examination:    General: No acute distress.    HEENT: Pupils equal, reactive to light.  Symmetric.  PULM: Clear to auscultation bilaterally, no significant sputum production, no wheezing, crackles, or rhonchi  NECK: Supple, no lymphadenopathy, trachea midline  CVS: Regular rate and rhythm, no murmurs, rubs, or gallops  ABD: distended  EXT: Nontender, no clubbing, cyanosis, or edema  SKIN: Warm and well perfused, no rashes noted.  NEURO: sedated, not responsive    DEVICES:   P IV    RADIOLOGY:

## 2025-05-19 NOTE — CONSULT NOTE ADULT - CONVERSATION DETAILS
Informed family of patient's very guarded status- on pressors, intubated, may need HD. Will need to see how he does in the Next 24-48hours.  Answered questions regarding patient's condition. Informed of the  possibility of HD. If he does not improve, tried to prepare family may need to make EOL decisions.     Discussed CPR and its limited benefit given patient's current condition.  Recommended to make decision CPR vs DNR, while we await to see how he does.

## 2025-05-19 NOTE — CONSULT NOTE ADULT - SUBJECTIVE AND OBJECTIVE BOX
HISTORY OF PRESENT ILLNESS: 76 yo male with hx of HTN, HLD, CAD, TIA, remote pericarditis in 2015, presented to the ED with complaints of abdominal pain, nausea, vomiting for since the day prior.  Upon arrival to the ED patient was lethargic and tachypneic, with abdominal distention and mottled skin. He was noted to be hypotensive despite fluid resuscitation and placed on escalating doses of pressors. (May ). Patient is currently intubated, requiring vasopressors (Levo, vaso, Kamlesh). GI consulted after finding of elevated lipase level and liver enzymes. CT abd shows cholelithiasis, no evidence of cholecystitis normal liver, no evidence of pancreatitis. US abd performed this morning showed gall bladder stones with gall bladder wall thickening, pericholecystic fluid, trace perihepatic ascites. CBD measuring 8.5 mm.  No stone is seen in the visualized segment. However, the distal segment of the common bile duct could not be visualized as it is obscured by bowel gas. His liver enzymes this morning T bili 4.3, AST/ /193, . Elevated lact >16, ph 7.12, Lipase >3000 (down trending this morning). Triglyceride 567. Elevated procalcitonin >100.    ID    Review of Systems:  Unable to obtain, patient intubated    PAST MEDICAL/SURGICAL HISTORY:  AMI (Acute Myocardial Infarction)  2001    Transient ischemic attack (TIA)    No Past Surgical History      SOCIAL HISTORY:  - TOBACCO: Denies  - ALCOHOL: Denies  - ILLICIT DRUG USE: Denies    FAMILY HISTORY:  No known history of gastrointestinal or liver disease;  FH: CAD (coronary artery disease)        HOME MEDICATIONS:  aspirin 81 mg oral tablet, chewable: 1 tab(s) orally once a day (08 Jul 2021 16:43)  atorvastatin 20 mg oral tablet: 1 tab(s) orally once a day (08 Jul 2021 16:43)    INPATIENT MEDICATIONS:  MEDICATIONS  (STANDING):  albumin human 25% IVPB 50 milliLiter(s) IV Intermittent every 6 hours  chlorhexidine 0.12% Liquid 15 milliLiter(s) Oral Mucosa every 12 hours  chlorhexidine 2% Cloths 1 Application(s) Topical <User Schedule>  fentaNYL   Infusion. 0.5 MICROgram(s)/kG/Hr (3.91 mL/Hr) IV Continuous <Continuous>  hydrocortisone sodium succinate Injectable 50 milliGRAM(s) IV Push every 6 hours  meropenem Injectable 500 milliGRAM(s) IV Push every 12 hours  midazolam Infusion 0.05 mG/kG/Hr (3.91 mL/Hr) IV Continuous <Continuous>  multiple electrolytes Injection Type 1 1000 milliLiter(s) (1000 mL/Hr) IV Continuous <Continuous>  norepinephrine Infusion 0.4 MICROgram(s)/kG/Min (29.3 mL/Hr) IV Continuous <Continuous>  pantoprazole  Injectable 40 milliGRAM(s) IV Push two times a day  phenylephrine    Infusion 5 MICROgram(s)/kG/Min (73.2 mL/Hr) IV Continuous <Continuous>  sodium bicarbonate  Infusion 0.283 mEq/kG/Hr (150 mL/Hr) IV Continuous <Continuous>  vasopressin Infusion 0.04 Unit(s)/Min (6 mL/Hr) IV Continuous <Continuous>    MEDICATIONS  (PRN):    ALLERGIES:  No Known Drug Allergies  SHRIMP (Unknown)  iv dye (Unknown)    T(C): 38.2 (05-19-25 @ 09:00), Max: 38.3 (05-19-25 @ 08:00)  HR: 102 (05-19-25 @ 09:24) (96 - 165)  BP: 107/72 (05-19-25 @ 09:00) (88/50 - 121/93)  RR: 30 (05-19-25 @ 09:00) (16 - 35)  SpO2: 99% (05-19-25 @ 09:24) (82% - 100%)    05-18-25 @ 07:01  -  05-19-25 @ 07:00  --------------------------------------------------------  IN: 7016.7 mL / OUT: 2385 mL / NET: 4631.7 mL    05-19-25 @ 07:01  -  05-19-25 @ 09:56  --------------------------------------------------------  IN: 1086 mL / OUT: 750 mL / NET: 336 mL      Mode: AC/ CMV (Assist Control/ Continuous Mandatory Ventilation), RR (machine): 30, TV (machine): 500, FiO2: 50, PEEP: 6, ITime: 0.8, MAP: 10, PIP: 19    PHYSICAL EXAM:    Constitutional: Intubated, multiple vasopressors, Bicarb drip   Neuro: Intubated, sedated    CV: regular rate, regular rhythm, vasopressors   Pulm/chest: Intubated to full vent support   Abd/GI: distended +bowel sounds. OGT to suction, with bilious output   Ext: no edema  Skin:  no jaundice       LABS:             15.0   15.56 )-----------( 73       ( 05-19 @ 04:10 )             45.5                15.5   25.93 )-----------( 96       ( 05-18 @ 23:10 )             47.0                17.5   22.13 )-----------( 117      ( 05-18 @ 20:05 )             54.9       PT/INR - ( 19 May 2025 04:10 )   PT: 31.4 sec;   INR: 2.73 ratio         PTT - ( 19 May 2025 04:10 )  PTT:35.8 sec  05-19    137  |  89[L]  |  36.0[H]  ----------------------------<  187[H]  4.1   |  10.0[LL]  |  1.89[H]    Ca    7.7[L]      19 May 2025 08:35  Phos  6.3     05-19  Mg     2.2     05-19    TPro  5.1[L]  /  Alb  2.7[L]  /  TBili  4.5[H]  /  DBili  x   /  AST  226[H]  /  ALT  195[H]  /  AlkPhos  181[H]  05-19    LIVER FUNCTIONS - ( 19 May 2025 08:35 )  Alb: 2.7 g/dL / Pro: 5.1 g/dL / ALK PHOS: 181 U/L / ALT: 195 U/L / AST: 226 U/L / GGT: x             Lipase: 2007 U/L (05-19-25 @ 04:10)  Lipase: >3000 U/L (05-19-25 @ 00:45)      Urinalysis Basic - ( 19 May 2025 08:35 )    Color: x / Appearance: x / SG: x / pH: x  Gluc: 187 mg/dL / Ketone: x  / Bili: x / Urobili: x   Blood: x / Protein: x / Nitrite: x   Leuk Esterase: x / RBC: x / WBC x   Sq Epi: x / Non Sq Epi: x / Bacteria: x      Urinalysis with Rflx Culture (collected 18 May 2025 22:08)    < from: US Abdomen Upper Quadrant Right (05.19.25 @ 07:19) >  FINDINGS:  The study is technically limited by the portable technique and the   patient is on a ventilator.  Liver: Mildly enlarged measuring 18 cm in length. Increased echogenicity   of the liver parenchyma suggesting hepatic steatosis. No focal   abnormality is identified. The main portal vein is patent with   hepatopedal flow.  Bile ducts: The common bile duct is mildly dilated measuring up to 8.5   mm. No stone is seen in the visualized segment. However, the distal   segment of the common bile duct could not be visualized as it is obscured   by bowel gas.  Gallbladder: The gallbladder isdistended and contains stones. There is   an echogenic focus in the region of the gallbladder neck which could   represent an aggregate of sludge or nonshadowing stone. Mobility could   not be assessed. There is gallbladder wall thickening and edema measuring   up to 9 mm. The patient could not be assessed for a sonographic [place]   sign. There is pericholecystic fluid; however, there is also trace   perihepatic ascites.  Pancreas: Partially obscured by overlying bowel and not well evaluated on   this exam.  Right kidney: 12.4 cm. No hydronephrosis.  Ascites: Trace perihepatic  ascites  IVC/aorta: Poorly visualized    IMPRESSION:    1. The gallbladder is distended and contains stones. There is an   echogenic nonshadowing focus in the region of the gallbladder neck which   could represent an aggregate of sludge or nonshadowing stone. The   gallbladder wall is thickened and edematous. The patient could not be   evaluated for a sonographic [place] sign. There is trace pericholecystic   fluid;however, there is also perihepatic ascites. The constellation of   findings is concerning for possible acute cholecystitis. Clinical and   laboratory correlation is advised. Surgical consultation is suggested for   further evaluation and management.  2. The common bile duct is mildly dilated measuring up to 8.5 mm. The   distal segment of the duct cannot be visualized on this examination and a   stone or other abnormality within the distal duct could not be excluded.   Clinical and laboratory correlation is again recommended.    Further evaluation of these findings with abdominal MRI/MRCP and/or HIDA   scan could be performed if clinically possible. Surgical consultation is   again suggested.    Findings were discussed with Dr. Moser 5/19/20258:53 AM by Dr. Porras   with read back confirmation.    < end of copied text >    < from: CT Abdomen and Pelvis w/ IV Cont (05.19.25 @ 00:03) >  FINDINGS:    CHEST:  LUNGS, PLEURA AND LARGE AIRWAYS: Endotracheal tube with its distal tip   above the level of the gayla. Airspace consolidations at the lung bases   may represent atelectasis and/or pneumonia.  VESSELS: No pulmonary embolus. Normal aortic caliber.Right-sided central   venous catheter with its distal tip in the SVC.  HEART: Heart size is normal. No pericardial effusion. Coronary artery   calcification.  MEDIASTINUM AND ALMA DELIA: No lymphadenopathy.  CHEST WALL AND LOWER NECK: Within normal limits.    ABDOMEN AND PELVIS:  LIVER: Within normal limits.  BILE DUCTS: Normal caliber.  GALLBLADDER: Cholelithiasis.  SPLEEN: Within normal limits.  PANCREAS: Within normal limits.  ADRENALS: Within normal limits.  KIDNEYS/URETERS: No hydronephrosis.    BLADDER: Petersen.  REPRODUCTIVE ORGANS: Enlarged prostate.    BOWEL: Enteric tube with its distal tip in the stomach. No bowel   obstruction. Normal appendix.  PERITONEUM: Trace ascites.  VESSELS:  Normal aortic caliber.  RETROPERITONEUM: No lymphadenopathy.  ABDOMINAL WALL: Small fat-containing umbilical and inguinal hernias.  BONES: Degenerative changes of the spine. Nonspecific small sclerotic   foci in the pelvic bones.    IMPRESSION:    No pulmonary embolus.    Airspace consolidations at the lung bases may represent atelectasis   and/or pneumonia.    No bowel obstruction.    Cholelithiasis.    Trace ascites.    < end of copied text >

## 2025-05-20 ENCOUNTER — RESULT REVIEW (OUTPATIENT)
Age: 75
End: 2025-05-20

## 2025-05-20 LAB
ALBUMIN SERPL ELPH-MCNC: 2.8 G/DL — LOW (ref 3.3–5.2)
ALBUMIN SERPL ELPH-MCNC: 2.8 G/DL — LOW (ref 3.3–5.2)
ALBUMIN SERPL ELPH-MCNC: 3 G/DL — LOW (ref 3.3–5.2)
ALBUMIN SERPL ELPH-MCNC: 3.1 G/DL — LOW (ref 3.3–5.2)
ALBUMIN SERPL ELPH-MCNC: 3.3 G/DL — SIGNIFICANT CHANGE UP (ref 3.3–5.2)
ALP SERPL-CCNC: 143 U/L — HIGH (ref 40–120)
ALP SERPL-CCNC: 148 U/L — HIGH (ref 40–120)
ALP SERPL-CCNC: 154 U/L — HIGH (ref 40–120)
ALP SERPL-CCNC: 154 U/L — HIGH (ref 40–120)
ALP SERPL-CCNC: 162 U/L — HIGH (ref 40–120)
ALP SERPL-CCNC: 169 U/L — HIGH (ref 40–120)
ALP SERPL-CCNC: 172 U/L — HIGH (ref 40–120)
ALT FLD-CCNC: 130 U/L — HIGH
ALT FLD-CCNC: 135 U/L — HIGH
ALT FLD-CCNC: 136 U/L — HIGH
ALT FLD-CCNC: 137 U/L — HIGH
ALT FLD-CCNC: 138 U/L — HIGH
ALT FLD-CCNC: 141 U/L — HIGH
ALT FLD-CCNC: 149 U/L — HIGH
AMMONIA BLD-MCNC: 56 UMOL/L — HIGH (ref 11–55)
AMMONIA BLD-MCNC: 58 UMOL/L — HIGH (ref 11–55)
AMMONIA BLD-MCNC: 59 UMOL/L — HIGH (ref 11–55)
AMMONIA BLD-MCNC: 61 UMOL/L — HIGH (ref 11–55)
AMMONIA BLD-MCNC: 66 UMOL/L — HIGH (ref 11–55)
ANION GAP SERPL CALC-SCNC: 18 MMOL/L — HIGH (ref 5–17)
ANION GAP SERPL CALC-SCNC: 18 MMOL/L — HIGH (ref 5–17)
ANION GAP SERPL CALC-SCNC: 19 MMOL/L — HIGH (ref 5–17)
ANION GAP SERPL CALC-SCNC: 21 MMOL/L — HIGH (ref 5–17)
ANION GAP SERPL CALC-SCNC: 21 MMOL/L — HIGH (ref 5–17)
ANION GAP SERPL CALC-SCNC: 25 MMOL/L — HIGH (ref 5–17)
ANION GAP SERPL CALC-SCNC: 27 MMOL/L — HIGH (ref 5–17)
APTT BLD: 29 SEC — SIGNIFICANT CHANGE UP (ref 26.1–36.8)
AST SERPL-CCNC: 170 U/L — HIGH
AST SERPL-CCNC: 172 U/L — HIGH
AST SERPL-CCNC: 184 U/L — HIGH
AST SERPL-CCNC: 186 U/L — HIGH
AST SERPL-CCNC: 192 U/L — HIGH
AST SERPL-CCNC: 197 U/L — HIGH
AST SERPL-CCNC: 211 U/L — HIGH
BASOPHILS # BLD AUTO: 0.1 K/UL — SIGNIFICANT CHANGE UP (ref 0–0.2)
BASOPHILS NFR BLD AUTO: 0.9 % — SIGNIFICANT CHANGE UP (ref 0–2)
BILIRUB SERPL-MCNC: 3.3 MG/DL — HIGH (ref 0.4–2)
BILIRUB SERPL-MCNC: 3.7 MG/DL — HIGH (ref 0.4–2)
BILIRUB SERPL-MCNC: 3.8 MG/DL — HIGH (ref 0.4–2)
BILIRUB SERPL-MCNC: 3.9 MG/DL — HIGH (ref 0.4–2)
BILIRUB SERPL-MCNC: 4 MG/DL — HIGH (ref 0.4–2)
BILIRUB SERPL-MCNC: 4 MG/DL — HIGH (ref 0.4–2)
BILIRUB SERPL-MCNC: 4.4 MG/DL — HIGH (ref 0.4–2)
BUN SERPL-MCNC: 39 MG/DL — HIGH (ref 8–20)
BUN SERPL-MCNC: 39.9 MG/DL — HIGH (ref 8–20)
BUN SERPL-MCNC: 42.5 MG/DL — HIGH (ref 8–20)
BUN SERPL-MCNC: 45 MG/DL — HIGH (ref 8–20)
BUN SERPL-MCNC: 48.6 MG/DL — HIGH (ref 8–20)
BUN SERPL-MCNC: 51.3 MG/DL — HIGH (ref 8–20)
BUN SERPL-MCNC: 53.6 MG/DL — HIGH (ref 8–20)
CALCIUM SERPL-MCNC: 6.6 MG/DL — LOW (ref 8.4–10.5)
CALCIUM SERPL-MCNC: 6.8 MG/DL — LOW (ref 8.4–10.5)
CALCIUM SERPL-MCNC: 7.5 MG/DL — LOW (ref 8.4–10.5)
CALCIUM SERPL-MCNC: 7.7 MG/DL — LOW (ref 8.4–10.5)
CALCIUM SERPL-MCNC: 7.9 MG/DL — LOW (ref 8.4–10.5)
CHLORIDE SERPL-SCNC: 88 MMOL/L — LOW (ref 96–108)
CHLORIDE SERPL-SCNC: 90 MMOL/L — LOW (ref 96–108)
CHLORIDE SERPL-SCNC: 91 MMOL/L — LOW (ref 96–108)
CO2 SERPL-SCNC: 23 MMOL/L — SIGNIFICANT CHANGE UP (ref 22–29)
CO2 SERPL-SCNC: 23 MMOL/L — SIGNIFICANT CHANGE UP (ref 22–29)
CO2 SERPL-SCNC: 26 MMOL/L — SIGNIFICANT CHANGE UP (ref 22–29)
CO2 SERPL-SCNC: 27 MMOL/L — SIGNIFICANT CHANGE UP (ref 22–29)
CO2 SERPL-SCNC: 28 MMOL/L — SIGNIFICANT CHANGE UP (ref 22–29)
CO2 SERPL-SCNC: 28 MMOL/L — SIGNIFICANT CHANGE UP (ref 22–29)
CO2 SERPL-SCNC: 30 MMOL/L — HIGH (ref 22–29)
CREAT SERPL-MCNC: 1.61 MG/DL — HIGH (ref 0.5–1.3)
CREAT SERPL-MCNC: 1.65 MG/DL — HIGH (ref 0.5–1.3)
CREAT SERPL-MCNC: 1.72 MG/DL — HIGH (ref 0.5–1.3)
CREAT SERPL-MCNC: 1.74 MG/DL — HIGH (ref 0.5–1.3)
CREAT SERPL-MCNC: 1.77 MG/DL — HIGH (ref 0.5–1.3)
CREAT SERPL-MCNC: 1.8 MG/DL — HIGH (ref 0.5–1.3)
CREAT SERPL-MCNC: 1.93 MG/DL — HIGH (ref 0.5–1.3)
EGFR: 36 ML/MIN/1.73M2 — LOW
EGFR: 36 ML/MIN/1.73M2 — LOW
EGFR: 39 ML/MIN/1.73M2 — LOW
EGFR: 39 ML/MIN/1.73M2 — LOW
EGFR: 40 ML/MIN/1.73M2 — LOW
EGFR: 41 ML/MIN/1.73M2 — LOW
EGFR: 41 ML/MIN/1.73M2 — LOW
EGFR: 43 ML/MIN/1.73M2 — LOW
EGFR: 43 ML/MIN/1.73M2 — LOW
EGFR: 44 ML/MIN/1.73M2 — LOW
EGFR: 44 ML/MIN/1.73M2 — LOW
EOSINOPHIL # BLD AUTO: 0.01 K/UL — SIGNIFICANT CHANGE UP (ref 0–0.5)
EOSINOPHIL NFR BLD AUTO: 0.1 % — SIGNIFICANT CHANGE UP (ref 0–6)
FIBRINOGEN PPP-MCNC: 562 MG/DL — HIGH (ref 200–450)
GAS PNL BLDA: SIGNIFICANT CHANGE UP
GAS PNL BLDV: SIGNIFICANT CHANGE UP
GLUCOSE BLDC GLUCOMTR-MCNC: 180 MG/DL — HIGH (ref 70–99)
GLUCOSE BLDC GLUCOMTR-MCNC: 194 MG/DL — HIGH (ref 70–99)
GLUCOSE BLDC GLUCOMTR-MCNC: 200 MG/DL — HIGH (ref 70–99)
GLUCOSE BLDC GLUCOMTR-MCNC: 210 MG/DL — HIGH (ref 70–99)
GLUCOSE BLDC GLUCOMTR-MCNC: 216 MG/DL — HIGH (ref 70–99)
GLUCOSE BLDC GLUCOMTR-MCNC: 234 MG/DL — HIGH (ref 70–99)
GLUCOSE BLDC GLUCOMTR-MCNC: 256 MG/DL — HIGH (ref 70–99)
GLUCOSE SERPL-MCNC: 199 MG/DL — HIGH (ref 70–99)
GLUCOSE SERPL-MCNC: 214 MG/DL — HIGH (ref 70–99)
GLUCOSE SERPL-MCNC: 224 MG/DL — HIGH (ref 70–99)
GLUCOSE SERPL-MCNC: 232 MG/DL — HIGH (ref 70–99)
GLUCOSE SERPL-MCNC: 236 MG/DL — HIGH (ref 70–99)
GLUCOSE SERPL-MCNC: 238 MG/DL — HIGH (ref 70–99)
GLUCOSE SERPL-MCNC: 269 MG/DL — HIGH (ref 70–99)
GRAM STN FLD: ABNORMAL
HCT VFR BLD CALC: 35.6 % — LOW (ref 39–50)
HCT VFR BLD CALC: 36.5 % — LOW (ref 39–50)
HCT VFR BLD CALC: 37.3 % — LOW (ref 39–50)
HCT VFR BLD CALC: 37.9 % — LOW (ref 39–50)
HCT VFR BLD CALC: 39.3 % — SIGNIFICANT CHANGE UP (ref 39–50)
HEPARIN-PF4 AB RESULT: <0.6 U/ML — SIGNIFICANT CHANGE UP (ref 0–0.9)
HGB BLD-MCNC: 12.4 G/DL — LOW (ref 13–17)
HGB BLD-MCNC: 12.5 G/DL — LOW (ref 13–17)
HGB BLD-MCNC: 12.6 G/DL — LOW (ref 13–17)
HGB BLD-MCNC: 13.4 G/DL — SIGNIFICANT CHANGE UP (ref 13–17)
HGB BLD-MCNC: 13.7 G/DL — SIGNIFICANT CHANGE UP (ref 13–17)
IMM GRANULOCYTES # BLD AUTO: 0.06 K/UL — SIGNIFICANT CHANGE UP (ref 0–0.07)
IMM GRANULOCYTES NFR BLD AUTO: 0.6 % — SIGNIFICANT CHANGE UP (ref 0–0.9)
IMMATURE PLATELET FRACTION #: 2.2 K/UL — LOW (ref 3.9–12.5)
IMMATURE PLATELET FRACTION #: 2.5 K/UL — LOW (ref 3.9–12.5)
IMMATURE PLATELET FRACTION #: 3.1 K/UL — LOW (ref 3.9–12.5)
IMMATURE PLATELET FRACTION %: 13.4 % — HIGH (ref 1.6–7.1)
IMMATURE PLATELET FRACTION %: 13.6 % — HIGH (ref 1.6–7.1)
IMMATURE PLATELET FRACTION %: 13.9 % — HIGH (ref 1.6–7.1)
IMMATURE PLATELET FRACTION %: 17.3 % — HIGH (ref 1.6–7.1)
IMMATURE PLATELET FRACTION %: 9.5 % — HIGH (ref 1.6–7.1)
LYMPHOCYTES # BLD AUTO: 0.38 K/UL — LOW (ref 1–3.3)
LYMPHOCYTES NFR BLD AUTO: 3.6 % — LOW (ref 13–44)
MAGNESIUM SERPL-MCNC: 1.8 MG/DL — SIGNIFICANT CHANGE UP (ref 1.6–2.6)
MAGNESIUM SERPL-MCNC: 2 MG/DL — SIGNIFICANT CHANGE UP (ref 1.6–2.6)
MAGNESIUM SERPL-MCNC: 2 MG/DL — SIGNIFICANT CHANGE UP (ref 1.8–2.6)
MAGNESIUM SERPL-MCNC: 2.2 MG/DL — SIGNIFICANT CHANGE UP (ref 1.6–2.6)
MAGNESIUM SERPL-MCNC: 2.3 MG/DL — SIGNIFICANT CHANGE UP (ref 1.6–2.6)
MCHC RBC-ENTMCNC: 28.7 PG — SIGNIFICANT CHANGE UP (ref 27–34)
MCHC RBC-ENTMCNC: 28.9 PG — SIGNIFICANT CHANGE UP (ref 27–34)
MCHC RBC-ENTMCNC: 29 PG — SIGNIFICANT CHANGE UP (ref 27–34)
MCHC RBC-ENTMCNC: 29.1 PG — SIGNIFICANT CHANGE UP (ref 27–34)
MCHC RBC-ENTMCNC: 29.1 PG — SIGNIFICANT CHANGE UP (ref 27–34)
MCHC RBC-ENTMCNC: 33.8 G/DL — SIGNIFICANT CHANGE UP (ref 32–36)
MCHC RBC-ENTMCNC: 34.2 G/DL — SIGNIFICANT CHANGE UP (ref 32–36)
MCHC RBC-ENTMCNC: 34.8 G/DL — SIGNIFICANT CHANGE UP (ref 32–36)
MCHC RBC-ENTMCNC: 34.9 G/DL — SIGNIFICANT CHANGE UP (ref 32–36)
MCHC RBC-ENTMCNC: 35.4 G/DL — SIGNIFICANT CHANGE UP (ref 32–36)
MCV RBC AUTO: 82.4 FL — SIGNIFICANT CHANGE UP (ref 80–100)
MCV RBC AUTO: 82.9 FL — SIGNIFICANT CHANGE UP (ref 80–100)
MCV RBC AUTO: 83.2 FL — SIGNIFICANT CHANGE UP (ref 80–100)
MCV RBC AUTO: 83.9 FL — SIGNIFICANT CHANGE UP (ref 80–100)
MCV RBC AUTO: 86.1 FL — SIGNIFICANT CHANGE UP (ref 80–100)
MONOCYTES # BLD AUTO: 0.42 K/UL — SIGNIFICANT CHANGE UP (ref 0–0.9)
MONOCYTES NFR BLD AUTO: 3.9 % — SIGNIFICANT CHANGE UP (ref 2–14)
NEUTROPHILS # BLD AUTO: 9.68 K/UL — HIGH (ref 1.8–7.4)
NEUTROPHILS NFR BLD AUTO: 90.9 % — HIGH (ref 43–77)
NRBC # BLD AUTO: 0 K/UL — SIGNIFICANT CHANGE UP (ref 0–0)
NRBC # BLD AUTO: 0.02 K/UL — HIGH (ref 0–0)
NRBC # BLD AUTO: 0.02 K/UL — HIGH (ref 0–0)
NRBC # FLD: 0 K/UL — SIGNIFICANT CHANGE UP (ref 0–0)
NRBC # FLD: 0.02 K/UL — HIGH (ref 0–0)
NRBC # FLD: 0.02 K/UL — HIGH (ref 0–0)
NRBC BLD AUTO-RTO: 0 /100 WBCS — SIGNIFICANT CHANGE UP (ref 0–0)
PF4 HEPARIN CMPLX AB SER-ACNC: NEGATIVE — SIGNIFICANT CHANGE UP
PHOSPHATE SERPL-MCNC: 2.6 MG/DL — SIGNIFICANT CHANGE UP (ref 2.4–4.7)
PHOSPHATE SERPL-MCNC: 3 MG/DL — SIGNIFICANT CHANGE UP (ref 2.4–4.7)
PHOSPHATE SERPL-MCNC: 3.1 MG/DL — SIGNIFICANT CHANGE UP (ref 2.4–4.7)
PHOSPHATE SERPL-MCNC: 3.1 MG/DL — SIGNIFICANT CHANGE UP (ref 2.4–4.7)
PHOSPHATE SERPL-MCNC: 3.2 MG/DL — SIGNIFICANT CHANGE UP (ref 2.4–4.7)
PHOSPHATE SERPL-MCNC: 3.2 MG/DL — SIGNIFICANT CHANGE UP (ref 2.4–4.7)
PHOSPHATE SERPL-MCNC: 3.6 MG/DL — SIGNIFICANT CHANGE UP (ref 2.4–4.7)
PLATELET # BLD AUTO: 18 K/UL — CRITICAL LOW (ref 150–400)
PLATELET # BLD AUTO: 19 K/UL — CRITICAL LOW (ref 150–400)
PLATELET # BLD AUTO: 22 K/UL — LOW (ref 150–400)
PLATELET # BLD AUTO: 23 K/UL — LOW (ref 150–400)
PLATELET # BLD AUTO: 23 K/UL — LOW (ref 150–400)
PMV BLD: 11.6 FL — SIGNIFICANT CHANGE UP (ref 7–13)
PMV BLD: 13 FL — SIGNIFICANT CHANGE UP (ref 7–13)
PMV BLD: 13.1 FL — HIGH (ref 7–13)
PMV BLD: SIGNIFICANT CHANGE UP FL (ref 7–13)
PMV BLD: SIGNIFICANT CHANGE UP FL (ref 7–13)
POTASSIUM SERPL-MCNC: 3.3 MMOL/L — LOW (ref 3.5–5.3)
POTASSIUM SERPL-MCNC: 3.6 MMOL/L — SIGNIFICANT CHANGE UP (ref 3.5–5.3)
POTASSIUM SERPL-MCNC: 3.7 MMOL/L — SIGNIFICANT CHANGE UP (ref 3.5–5.3)
POTASSIUM SERPL-MCNC: 3.8 MMOL/L — SIGNIFICANT CHANGE UP (ref 3.5–5.3)
POTASSIUM SERPL-MCNC: 4 MMOL/L — SIGNIFICANT CHANGE UP (ref 3.5–5.3)
POTASSIUM SERPL-MCNC: 4.2 MMOL/L — SIGNIFICANT CHANGE UP (ref 3.5–5.3)
POTASSIUM SERPL-MCNC: 4.2 MMOL/L — SIGNIFICANT CHANGE UP (ref 3.5–5.3)
POTASSIUM SERPL-SCNC: 3.3 MMOL/L — LOW (ref 3.5–5.3)
POTASSIUM SERPL-SCNC: 3.6 MMOL/L — SIGNIFICANT CHANGE UP (ref 3.5–5.3)
POTASSIUM SERPL-SCNC: 3.7 MMOL/L — SIGNIFICANT CHANGE UP (ref 3.5–5.3)
POTASSIUM SERPL-SCNC: 3.8 MMOL/L — SIGNIFICANT CHANGE UP (ref 3.5–5.3)
POTASSIUM SERPL-SCNC: 4 MMOL/L — SIGNIFICANT CHANGE UP (ref 3.5–5.3)
POTASSIUM SERPL-SCNC: 4.2 MMOL/L — SIGNIFICANT CHANGE UP (ref 3.5–5.3)
POTASSIUM SERPL-SCNC: 4.2 MMOL/L — SIGNIFICANT CHANGE UP (ref 3.5–5.3)
PROT SERPL-MCNC: 4.8 G/DL — LOW (ref 6.6–8.7)
PROT SERPL-MCNC: 4.9 G/DL — LOW (ref 6.6–8.7)
PROT SERPL-MCNC: 5.3 G/DL — LOW (ref 6.6–8.7)
PROT SERPL-MCNC: 5.5 G/DL — LOW (ref 6.6–8.7)
PROT SERPL-MCNC: 5.6 G/DL — LOW (ref 6.6–8.7)
PROT SERPL-MCNC: 5.7 G/DL — LOW (ref 6.6–8.7)
PROT SERPL-MCNC: 5.7 G/DL — LOW (ref 6.6–8.7)
RBC # BLD: 4.28 M/UL — SIGNIFICANT CHANGE UP (ref 4.2–5.8)
RBC # BLD: 4.33 M/UL — SIGNIFICANT CHANGE UP (ref 4.2–5.8)
RBC # BLD: 4.35 M/UL — SIGNIFICANT CHANGE UP (ref 4.2–5.8)
RBC # BLD: 4.6 M/UL — SIGNIFICANT CHANGE UP (ref 4.2–5.8)
RBC # BLD: 4.74 M/UL — SIGNIFICANT CHANGE UP (ref 4.2–5.8)
RBC # FLD: 13.3 % — SIGNIFICANT CHANGE UP (ref 10.3–14.5)
RBC # FLD: 13.5 % — SIGNIFICANT CHANGE UP (ref 10.3–14.5)
RBC # FLD: 13.5 % — SIGNIFICANT CHANGE UP (ref 10.3–14.5)
RBC # FLD: 13.7 % — SIGNIFICANT CHANGE UP (ref 10.3–14.5)
RBC # FLD: 14.1 % — SIGNIFICANT CHANGE UP (ref 10.3–14.5)
SODIUM SERPL-SCNC: 135 MMOL/L — SIGNIFICANT CHANGE UP (ref 135–145)
SODIUM SERPL-SCNC: 137 MMOL/L — SIGNIFICANT CHANGE UP (ref 135–145)
SODIUM SERPL-SCNC: 138 MMOL/L — SIGNIFICANT CHANGE UP (ref 135–145)
SODIUM SERPL-SCNC: 139 MMOL/L — SIGNIFICANT CHANGE UP (ref 135–145)
SPECIMEN SOURCE: SIGNIFICANT CHANGE UP
SPECIMEN SOURCE: SIGNIFICANT CHANGE UP
TRIGL SERPL-MCNC: 492 MG/DL — HIGH
WBC # BLD: 10.65 K/UL — HIGH (ref 3.8–10.5)
WBC # BLD: 10.73 K/UL — HIGH (ref 3.8–10.5)
WBC # BLD: 10.82 K/UL — HIGH (ref 3.8–10.5)
WBC # BLD: 11.18 K/UL — HIGH (ref 3.8–10.5)
WBC # BLD: 13.53 K/UL — HIGH (ref 3.8–10.5)
WBC # FLD AUTO: 10.65 K/UL — HIGH (ref 3.8–10.5)
WBC # FLD AUTO: 10.73 K/UL — HIGH (ref 3.8–10.5)
WBC # FLD AUTO: 10.82 K/UL — HIGH (ref 3.8–10.5)
WBC # FLD AUTO: 11.18 K/UL — HIGH (ref 3.8–10.5)
WBC # FLD AUTO: 13.53 K/UL — HIGH (ref 3.8–10.5)

## 2025-05-20 PROCEDURE — 99291 CRITICAL CARE FIRST HOUR: CPT | Mod: GC

## 2025-05-20 PROCEDURE — 93325 DOPPLER ECHO COLOR FLOW MAPG: CPT | Mod: 26

## 2025-05-20 PROCEDURE — 99233 SBSQ HOSP IP/OBS HIGH 50: CPT

## 2025-05-20 PROCEDURE — 99232 SBSQ HOSP IP/OBS MODERATE 35: CPT

## 2025-05-20 PROCEDURE — 93321 DOPPLER ECHO F-UP/LMTD STD: CPT | Mod: 26

## 2025-05-20 PROCEDURE — 99231 SBSQ HOSP IP/OBS SF/LOW 25: CPT

## 2025-05-20 PROCEDURE — 93308 TTE F-UP OR LMTD: CPT | Mod: 26

## 2025-05-20 PROCEDURE — 93010 ELECTROCARDIOGRAM REPORT: CPT

## 2025-05-20 PROCEDURE — 71045 X-RAY EXAM CHEST 1 VIEW: CPT | Mod: 26

## 2025-05-20 RX ORDER — BUMETANIDE 1 MG/1
2 TABLET ORAL
Qty: 20 | Refills: 0 | Status: DISCONTINUED | OUTPATIENT
Start: 2025-05-20 | End: 2025-05-20

## 2025-05-20 RX ORDER — CALCIUM GLUCONATE 20 MG/ML
2 INJECTION, SOLUTION INTRAVENOUS ONCE
Refills: 0 | Status: COMPLETED | OUTPATIENT
Start: 2025-05-20 | End: 2025-05-20

## 2025-05-20 RX ORDER — FENTANYL CITRATE-0.9 % NACL/PF 100MCG/2ML
0.5 SYRINGE (ML) INTRAVENOUS
Qty: 2500 | Refills: 0 | Status: DISCONTINUED | OUTPATIENT
Start: 2025-05-20 | End: 2025-05-22

## 2025-05-20 RX ORDER — BUMETANIDE 1 MG/1
0.5 TABLET ORAL
Qty: 20 | Refills: 0 | Status: DISCONTINUED | OUTPATIENT
Start: 2025-05-20 | End: 2025-05-21

## 2025-05-20 RX ORDER — ACETAMINOPHEN 500 MG/5ML
1000 LIQUID (ML) ORAL ONCE
Refills: 0 | Status: COMPLETED | OUTPATIENT
Start: 2025-05-20 | End: 2025-05-20

## 2025-05-20 RX ORDER — DOBUTAMINE 250 MG/20ML
2.5 INJECTION INTRAVENOUS
Qty: 500 | Refills: 0 | Status: DISCONTINUED | OUTPATIENT
Start: 2025-05-20 | End: 2025-05-23

## 2025-05-20 RX ORDER — INSULIN GLARGINE-YFGN 100 [IU]/ML
5 INJECTION, SOLUTION SUBCUTANEOUS ONCE
Refills: 0 | Status: COMPLETED | OUTPATIENT
Start: 2025-05-20 | End: 2025-05-20

## 2025-05-20 RX ORDER — INSULIN GLARGINE-YFGN 100 [IU]/ML
5 INJECTION, SOLUTION SUBCUTANEOUS EVERY MORNING
Refills: 0 | Status: DISCONTINUED | OUTPATIENT
Start: 2025-05-21 | End: 2025-05-23

## 2025-05-20 RX ADMIN — BUMETANIDE 2.5 MG/HR: 1 TABLET ORAL at 11:11

## 2025-05-20 RX ADMIN — LACTULOSE 10 GRAM(S): 10 SOLUTION ORAL at 13:06

## 2025-05-20 RX ADMIN — ALBUMIN (HUMAN) 50 MILLILITER(S): 12.5 INJECTION, SOLUTION INTRAVENOUS at 17:23

## 2025-05-20 RX ADMIN — Medication 100 MILLIEQUIVALENT(S): at 00:03

## 2025-05-20 RX ADMIN — NOREPINEPHRINE BITARTRATE 29.3 MICROGRAM(S)/KG/MIN: 8 SOLUTION at 05:20

## 2025-05-20 RX ADMIN — Medication 1 APPLICATION(S): at 05:22

## 2025-05-20 RX ADMIN — Medication 40 MILLIGRAM(S): at 11:19

## 2025-05-20 RX ADMIN — INSULIN LISPRO 2: 100 INJECTION, SOLUTION INTRAVENOUS; SUBCUTANEOUS at 11:26

## 2025-05-20 RX ADMIN — INSULIN LISPRO 6: 100 INJECTION, SOLUTION INTRAVENOUS; SUBCUTANEOUS at 02:20

## 2025-05-20 RX ADMIN — LACTULOSE 10 GRAM(S): 10 SOLUTION ORAL at 00:00

## 2025-05-20 RX ADMIN — DOBUTAMINE 5.86 MICROGRAM(S)/KG/MIN: 250 INJECTION INTRAVENOUS at 11:14

## 2025-05-20 RX ADMIN — LACTULOSE 10 GRAM(S): 10 SOLUTION ORAL at 05:22

## 2025-05-20 RX ADMIN — Medication 50 MILLIGRAM(S): at 23:01

## 2025-05-20 RX ADMIN — MUPIROCIN CALCIUM 1 APPLICATION(S): 20 CREAM TOPICAL at 00:03

## 2025-05-20 RX ADMIN — Medication 50 MILLIGRAM(S): at 00:00

## 2025-05-20 RX ADMIN — MUPIROCIN CALCIUM 1 APPLICATION(S): 20 CREAM TOPICAL at 17:25

## 2025-05-20 RX ADMIN — Medication 100 MILLIEQUIVALENT(S): at 13:01

## 2025-05-20 RX ADMIN — Medication 40 MILLIGRAM(S): at 05:21

## 2025-05-20 RX ADMIN — Medication 50 MILLIGRAM(S): at 17:25

## 2025-05-20 RX ADMIN — CALCIUM GLUCONATE 200 GRAM(S): 20 INJECTION, SOLUTION INTRAVENOUS at 05:20

## 2025-05-20 RX ADMIN — MEROPENEM 1000 MILLIGRAM(S): 1 INJECTION INTRAVENOUS at 05:21

## 2025-05-20 RX ADMIN — INSULIN LISPRO 4: 100 INJECTION, SOLUTION INTRAVENOUS; SUBCUTANEOUS at 06:37

## 2025-05-20 RX ADMIN — Medication 400 MILLIGRAM(S): at 12:58

## 2025-05-20 RX ADMIN — BUMETANIDE 2 MILLIGRAM(S): 1 TABLET ORAL at 00:01

## 2025-05-20 RX ADMIN — MEROPENEM 1000 MILLIGRAM(S): 1 INJECTION INTRAVENOUS at 23:01

## 2025-05-20 RX ADMIN — INSULIN LISPRO 2: 100 INJECTION, SOLUTION INTRAVENOUS; SUBCUTANEOUS at 23:02

## 2025-05-20 RX ADMIN — MEROPENEM 1000 MILLIGRAM(S): 1 INJECTION INTRAVENOUS at 13:06

## 2025-05-20 RX ADMIN — Medication 50 MILLIGRAM(S): at 11:16

## 2025-05-20 RX ADMIN — ALBUMIN (HUMAN) 50 MILLILITER(S): 12.5 INJECTION, SOLUTION INTRAVENOUS at 11:15

## 2025-05-20 RX ADMIN — Medication 1000 MILLIGRAM(S): at 13:28

## 2025-05-20 RX ADMIN — Medication 100 MILLIEQUIVALENT(S): at 02:22

## 2025-05-20 RX ADMIN — INSULIN GLARGINE-YFGN 5 UNIT(S): 100 INJECTION, SOLUTION SUBCUTANEOUS at 13:00

## 2025-05-20 RX ADMIN — Medication 100 MILLIEQUIVALENT(S): at 11:12

## 2025-05-20 RX ADMIN — Medication 15 MILLILITER(S): at 05:22

## 2025-05-20 RX ADMIN — ALBUMIN (HUMAN) 50 MILLILITER(S): 12.5 INJECTION, SOLUTION INTRAVENOUS at 05:21

## 2025-05-20 RX ADMIN — INSULIN LISPRO 4: 100 INJECTION, SOLUTION INTRAVENOUS; SUBCUTANEOUS at 17:38

## 2025-05-20 RX ADMIN — VASOPRESSIN 6 UNIT(S)/MIN: 20 INJECTION INTRAVENOUS at 05:20

## 2025-05-20 RX ADMIN — ALBUMIN (HUMAN) 50 MILLILITER(S): 12.5 INJECTION, SOLUTION INTRAVENOUS at 23:02

## 2025-05-20 RX ADMIN — Medication 100 MILLIEQUIVALENT(S): at 14:44

## 2025-05-20 RX ADMIN — Medication 15 MILLILITER(S): at 17:25

## 2025-05-20 RX ADMIN — DOXAZOSIN MESYLATE 2 MILLIGRAM(S): 8 TABLET ORAL at 00:00

## 2025-05-20 RX ADMIN — Medication 50 MILLIGRAM(S): at 05:21

## 2025-05-20 RX ADMIN — DEXMEDETOMIDINE HYDROCHLORIDE IN SODIUM CHLORIDE 3.91 MICROGRAM(S)/KG/HR: 4 INJECTION INTRAVENOUS at 01:33

## 2025-05-20 RX ADMIN — CALCIUM GLUCONATE 200 GRAM(S): 20 INJECTION, SOLUTION INTRAVENOUS at 03:35

## 2025-05-20 RX ADMIN — MUPIROCIN CALCIUM 1 APPLICATION(S): 20 CREAM TOPICAL at 05:21

## 2025-05-20 NOTE — PROGRESS NOTE ADULT - ATTENDING COMMENTS
75-year-old male with past medical history of CAD s/p MI, TIA, HTN, and HLD admitted to the MICU on 5/18 for acute respiratory failure and shock. Now intubated and sedated. In profound mixed shock (septic and cardiogenic) with ongoing multiorgan dysfunction.    Interval Events:  5/19: IR-guided percutaneous cholecystostomy placed for acute cholecystitis/cholangitis with drainage of foul bile  Positive blood cultures for Gram-negative rods  Remains critically ill on multiple vasopressors r  Developed severe thrombocytopenia and coagulopathy  Remains febrile  DNR    #Septic shock secondary to cholangitis:  Continue broad-spectrum antibiotics (meropenem) and supportive care  Monitor source control from cholecystostomy output  Trend lactate, WBCs, and blood cultures  Not a surgical candidate  Care as per MICU and GI

## 2025-05-20 NOTE — PROGRESS NOTE ADULT - ASSESSMENT
Patient is a 75 year-old male admitted for Acute respiratory failure with hypoxia. Patient was found to be profoundly septic. Imaging  was concerning for acute cholecystitis and patient underwent an urgent cholecystostomy tube placement at bedside yesterday with IR. No further IR intervention needed at this time. please see below for management/discharge instructions.     cholecystostomy management:  - The tube will need to remain in place for 4-6 weeks to allow for a tract from the skin to the GB to form.  - Tube may be flush with 10cc NS daily  - Change dressing every 3 days or when soiled.   - Patient to follow with surgery as an outpatient to determine cholecystectomy candidacy  - No immediate IR follow up required at this time      Please call IR at extension [phone] with any questions, concerns, or issues regarding above.

## 2025-05-20 NOTE — DIETITIAN INITIAL EVALUATION ADULT - PERTINENT LABORATORY DATA
05-20    137  |  90[L]  |  45.0[H]  ----------------------------<  236[H]  3.3[L]   |  27.0  |  1.61[H]    Ca    7.7[L]      20 May 2025 08:57  Phos  3.1     05-20  Mg     2.2     05-20    TPro  5.7[L]  /  Alb  3.0[L]  /  TBili  4.4[H]  /  DBili  x   /  AST  186[H]  /  ALT  141[H]  /  AlkPhos  172[H]  05-20  POCT Blood Glucose.: 180 mg/dL (05-20-25 @ 12:59)  A1C with Estimated Average Glucose Result: 7.4 % (05-19-25 @ 04:10)

## 2025-05-20 NOTE — DIETITIAN INITIAL EVALUATION ADULT - ORAL INTAKE PTA/DIET HISTORY
Pt seen intubated and sedated in ICU this afternoon. Unable to obtain nutrition hx. No family at bedside. Pt admitted with abdominal pain, N/V x 1 day prior to admission. OGT suction with 400 ml output yesterday per I/Os. Last BM yesterday. Current weight 172 lbs. HIE weight hx in 2021 168 lbs. Insufficient parameters to identify malnutrition at this time, pt remains at risk. Has been NPO since 5/18.

## 2025-05-20 NOTE — PROGRESS NOTE ADULT - ATTENDING COMMENTS
75-year-old male with past medical history of hypertension dyslipidemia coronary artery disease TIA remote pericarditis presented with nausea vomiting diarrhea lethargy difficulty breathing admitted to medical ICU on May 18 with  Distributive shock, acute pancreatitis with cholelithiasis (also with mild hypertriglyceridemia as well as on SGLT2 at home), SVT s/p cardioversion, polymicrobial bacteremia with possible UTI as well as acute cholecystitis in conjunction with acute kidney injury and metabolic acidosis as well as transaminitis and acute metabolic encephalopathy with acute hypoxic hypercapnic respiratory failure and bilateral lower lobe atelectasis now course complicated with severe thrombocytopenia and stress cardiomyopathy     patient was started on angiotensin II infusion in last 24 hours with subsequent improvement in pressor requirement with patient weaned off of Kamlesh-Synephrine and decreasing dose of Levophed with maintenance of fixed dose vasopressin with stress dose Solu-Cortef with overnight patient beginning hypotensive, 1 L of IV crystalloid given and angiotensin discontinued with escalating dose of Levophed again this morning and worsening lactic acid with interval TTE demonstrating newly reduced global EF.  Continue with Flowtrack monitoring with mixed venous and ABG assessment every 6 hours for cardiac output/index/SVR and mixed venous saturation in conjunction with lactic acid CMP and renal function with urine output.  Starting the patient on dobutamine at 2.5  microgram per kilogram per min in conjunction with Bumex infusion at 0.5 mg/h and reintroducing angiotensin at 20 ng/kg/min  with plan to de-escalate both vasopressin and Levophed prior to de-escalating angiotensin while going forward.  Patient remains comatose, discontinued Precedex and placing the patient on continuous EEG now and CT head on admission negative for acute pathology.  Will continue with as needed fentanyl for now.  Weaned off of bicarbonate infusion.  No need for RRT.  Plan discussed with nephrology.  Antibiotics adjusted to meropenem s/p bedside IR guided percutaneous cholecystostomy tube placement with bilious culture positive as well as blood culture positive and urine culture positive pending identification and sensitivities.  Will consult infectious disease to address further management.  Holding off of heparin as a DVT prophylaxis as patient is thrombocytopenic and if less than 10,000 we will contemplate transfusing platelets.  S/p 2 FFP prior to percutaneous cholecystostomy and 1 dose of vitamin K.  Patient remains critically ill with guarded prognosis.  Goals of care addressed on May 19 with family he is DNR and trial of intubation with no plans for renal replacement therapy    CT Abdomen and Pelvis w/ IV Cont (05.19.25 @ 00:03) No pulmonary embolus. Airspace consolidations at the lung bases may represent atelectasis   and/or pneumonia. No bowel obstruction. Cholelithiasis. Trace ascites.   US Abdomen Upper Quadrant Right (05.19.25 @ 07:19) The gallbladder is distended and contains stones. There is an echogenic nonshadowing focus in the region of the gallbladder neck which could represent an aggregate of sludge or nonshadowing stone. The gallbladder wall is thickened and edematous. The patient could not be evaluated for a sonographic [place] sign. There is trace pericholecystic fluid;however, there is also perihepatic ascites. The constellation of findings is concerning for possible acute cholecystitis.  The common bile duct is mildly dilated measuring up to 8.5 mm. The distal segment of the duct cannot be visualized on this examination and a stone or other abnormality within the distal duct could not be excluded.     MEDICATIONS  (STANDING):  albumin human 25% IVPB 50 milliLiter(s) IV Intermittent every 6 hours  angiotensin II Infusion 40 NANOGram(s)/kG/Min (9.37 mL/Hr) IV Continuous <Continuous>  buMETAnide Infusion 0.5 mG/Hr (2.5 mL/Hr) IV Continuous <Continuous>  chlorhexidine 0.12% Liquid 15 milliLiter(s) Oral Mucosa every 12 hours  chlorhexidine 2% Cloths 1 Application(s) Topical <User Schedule>  DOBUTamine Infusion 2.5 MICROgram(s)/kG/Min (5.86 mL/Hr) IV Continuous <Continuous>  hydrocortisone sodium succinate Injectable 50 milliGRAM(s) IV Push every 6 hours  insulin lispro (ADMELOG) corrective regimen sliding scale.   SubCutaneous every 6 hours  lactulose Syrup 10 Gram(s) Oral three times a day  meropenem Injectable 1000 milliGRAM(s) IV Push every 8 hours  midazolam Infusion 0.05 mG/kG/Hr (3.91 mL/Hr) IV Continuous <Continuous>  multiple electrolytes Injection Type 1 1000 milliLiter(s) (1000 mL/Hr) IV Continuous <Continuous>  mupirocin 2% Nasal 1 Application(s) Both Nostrils two times a day  norepinephrine Infusion 0.4 MICROgram(s)/kG/Min (29.3 mL/Hr) IV Continuous <Continuous>  pantoprazole  Injectable 40 milliGRAM(s) IV Push daily  phenylephrine    Infusion 5 MICROgram(s)/kG/Min (73.2 mL/Hr) IV Continuous <Continuous>: Discontinued  potassium chloride  20 mEq/100 mL IVPB 20 milliEquivalent(s) IV Intermittent every 1 hour  rifAXIMin 550 milliGRAM(s) Oral two times a day  vasopressin Infusion 0.04 Unit(s)/Min (6 mL/Hr) IV Continuous <Continuous>       upon my evaluation, this patient had a high probability of imminent or life-threatening deterioration due to critical condition, which required my direct attention, intervention, and personal management. I have personally provided 70 minutes of critical care time exclusive of time spent teaching and/or time spent on separately billable procedures. Time includes review of laboratory data, radiology results, discussion with consultants, and monitoring for potential decompensation. Interventions were performed as documented above.

## 2025-05-20 NOTE — PROGRESS NOTE ADULT - SUBJECTIVE AND OBJECTIVE BOX
Patient is a 75y old  Male who presents with a chief complaint of Acute respiratory failure, shock (19 May 2025 14:35)      BRIEF HOSPITAL COURSE:     Interval HPI:    PAST MEDICAL & SURGICAL HISTORY:  AMI (Acute Myocardial Infarction)  2001      Transient ischemic attack (TIA)      No Past Surgical History        Review of Systems:  All other review of systems negative except as noted in HPI    MEDICATIONS  (STANDING):  albumin human 25% IVPB 50 milliLiter(s) IV Intermittent every 6 hours  angiotensin II Infusion 40 NANOGram(s)/kG/Min (18.7 mL/Hr) IV Continuous <Continuous>  aspirin  chewable 81 milliGRAM(s) Oral daily  chlorhexidine 0.12% Liquid 15 milliLiter(s) Oral Mucosa every 12 hours  chlorhexidine 2% Cloths 1 Application(s) Topical <User Schedule>  dexMEDEtomidine Infusion 0.2 MICROgram(s)/kG/Hr (3.91 mL/Hr) IV Continuous <Continuous>  fentaNYL   Infusion. 0.5 MICROgram(s)/kG/Hr (3.91 mL/Hr) IV Continuous <Continuous>  hydrocortisone sodium succinate Injectable 50 milliGRAM(s) IV Push every 6 hours  insulin lispro (ADMELOG) corrective regimen sliding scale.   SubCutaneous every 6 hours  lactulose Syrup 10 Gram(s) Oral three times a day  meropenem Injectable 1000 milliGRAM(s) IV Push every 12 hours  midazolam Infusion 0.05 mG/kG/Hr (3.91 mL/Hr) IV Continuous <Continuous>  multiple electrolytes Injection Type 1 1000 milliLiter(s) (1000 mL/Hr) IV Continuous <Continuous>  mupirocin 2% Nasal 1 Application(s) Both Nostrils two times a day  norepinephrine Infusion 0.4 MICROgram(s)/kG/Min (29.3 mL/Hr) IV Continuous <Continuous>  pantoprazole  Injectable 40 milliGRAM(s) IV Push two times a day  pantoprazole  Injectable 40 milliGRAM(s) IV Push daily  phenylephrine    Infusion 5 MICROgram(s)/kG/Min (73.2 mL/Hr) IV Continuous <Continuous>  rifAXIMin 550 milliGRAM(s) Oral two times a day  vasopressin Infusion 0.04 Unit(s)/Min (6 mL/Hr) IV Continuous <Continuous>    MEDICATIONS  (PRN):      Mode: AC/ CMV (Assist Control/ Continuous Mandatory Ventilation)  RR (machine): 20  TV (machine): 500  FiO2: 50  PEEP: 6  ITime: 0.8  MAP: 10  PIP: 20    ICU Vital Signs Last 24 Hrs  T(C): 36.9 (20 May 2025 07:00), Max: 38.8 (20 May 2025 03:15)  T(F): 98.4 (20 May 2025 07:00), Max: 101.8 (20 May 2025 03:15)  HR: 106 (20 May 2025 07:00) (78 - 123)  BP: 83/66 (20 May 2025 05:00) (83/66 - 124/82)  BP(mean): 72 (20 May 2025 05:00) (72 - 96)  ABP: 97/63 (20 May 2025 07:00) (74/50 - 141/70)  ABP(mean): 75 (20 May 2025 07:00) (31 - 98)  RR: 20 (20 May 2025 07:00) (9 - 36)  SpO2: 100% (20 May 2025 07:00) (54% - 100%)    O2 Parameters below as of 19 May 2025 16:00  Patient On (Oxygen Delivery Method): ventilator    O2 Concentration (%): 50      ABG - ( 20 May 2025 04:15 )  pH, Arterial: 7.520 pH, Blood: x     /  pCO2: 38    /  pO2: 145   / HCO3: 31    / Base Excess: 8.1   /  SaO2: 99.3              I&O's Detail    19 May 2025 07:01  -  20 May 2025 07:00  --------------------------------------------------------  IN:    Albumin 25%  -  50 mL: 150 mL    Angiotensin II: 332 mL    Dexmedetomidine: 27.3 mL    FentaNYL: 89.7 mL    IV PiggyBack: 100 mL    IV PiggyBack: 300 mL    Midazolam: 78 mL    Norepinephrine: 80.5 mL    Norepinephrine: 213.6 mL    Phenylephrine: 152 mL    Phenylephrine: 294.3 mL    Plasma: 564 mL    Sodium Bicarbonate: 2700 mL    Vasopressin: 108 mL  Total IN: 5189.4 mL    OUT:    Bulb (mL): 110 mL    Indwelling Catheter - Urethral (mL): 3050 mL    Nasogastric/Oral tube (mL): 400 mL  Total OUT: 3560 mL    Total NET: 1629.4 mL          LABS:                        12.4   10.65 )-----------( 23       ( 20 May 2025 02:54 )             35.6     05-20    138  |  91[L]  |  42.5[H]  ----------------------------<  214[H]  3.8   |  26.0  |  1.77[H]    Ca    7.7[L]      20 May 2025 04:15  Phos  3.0     05-20  Mg     2.0     05-20    TPro  4.9[L]  /  Alb  2.8[L]  /  TBili  3.9[H]  /  DBili  x   /  AST  197[H]  /  ALT  137[H]  /  AlkPhos  154[H]  05-20      CARDIAC MARKERS ( 19 May 2025 04:10 )  x     / x     / x     / x     / 80.2 ng/mL      CAPILLARY BLOOD GLUCOSE      POCT Blood Glucose.: 210 mg/dL (20 May 2025 06:17)    PT/INR - ( 19 May 2025 21:20 )   PT: 27.6 sec;   INR: 2.40 ratio         PTT - ( 19 May 2025 21:20 )  PTT:34.9 sec  Urinalysis Basic - ( 20 May 2025 04:15 )    Color: x / Appearance: x / SG: x / pH: x  Gluc: 214 mg/dL / Ketone: x  / Bili: x / Urobili: x   Blood: x / Protein: x / Nitrite: x   Leuk Esterase: x / RBC: x / WBC x   Sq Epi: x / Non Sq Epi: x / Bacteria: x      CULTURES:  Culture Results:   Growth in anaerobic bottle: Gram Negative Rods  Growth in aerobic bottle: Gram Negative Rods (05-19 @ 10:15)  Culture Results:   >100,000 CFU/ml Escherichia coli (05-18 @ 22:08)  Culture Results:   Growth in aerobic and anaerobic bottles: Gram Negative Rods and Gram  Positive Cocci in Pairs and Chains  Growth in anaerobic bottle: Gram Positive Rods  Direct identification is available within approximately 3-5  hours either by Blood Panel Multiplexed PCR or Direct  MALDI-TOF. Details: [email or web address] (05-18 @ 20:05)      Physical Examination:    General: No acute distress.    HEENT: Pupils equal, reactive to light.  Symmetric.  PULM: Clear to auscultation bilaterally, no significant sputum production, no wheezing, crackles, or rhonchi  NECK: Supple, no lymphadenopathy, trachea midline  CVS: Regular rate and rhythm, no murmurs, rubs, or gallops  ABD: distended  EXT: Nontender, no clubbing, cyanosis, or edema  SKIN: Warm and well perfused, no rashes noted.  NEURO: sedated, not responsive      DEVICES:   P IV    RADIOLOGY:   < from: US Abdomen Upper Quadrant Right (05.19.25 @ 07:19) >    ACC: [id number] EXAM:  US ABDOMEN RT UPR QUADRANT   ORDERED BY: DANIA MEDEROS     PROCEDURE DATE:  05/19/2025          INTERPRETATION:  CLINICAL INFORMATION: 75-year-old male with shock,   sepsis, evaluate for cholecystitis, choledocholithiasis    COMPARISON: Abdominal pelvic CT 5/18/2025, right upper quadrant   ultrasound  7/9/2021    TECHNIQUE: Portable sonography of the right upper quadrant.    FINDINGS:  The study is technically limited by the portable technique and the   patient is on a ventilator.  Liver: Mildly enlarged measuring 18 cm in length. Increased echogenicity   of the liver parenchyma suggesting hepatic steatosis. No focal   abnormality is identified. The main portal vein is patent with   hepatopedal flow.  Bile ducts: The common bile duct is mildly dilated measuring up to 8.5   mm. No stone is seen in the visualized segment. However, the distal   segment of the common bile duct could not be visualized as it is obscured   by bowel gas.  Gallbladder: The gallbladder isdistended and contains stones. There is   an echogenic focus in the region of the gallbladder neck which could   represent an aggregate of sludge or nonshadowing stone. Mobility could   not be assessed. There is gallbladder wall thickening and edema measuring   up to 9 mm. The patient could not be assessed for a sonographic [place]   sign. There is pericholecystic fluid; however, there is also trace   perihepatic ascites.  Pancreas: Partially obscured by overlying bowel and not well evaluated on   this exam.  Right kidney: 12.4 cm. No hydronephrosis.  Ascites: Trace perihepatic  ascites  IVC/aorta: Poorly visualized    IMPRESSION:    1. The gallbladder is distended and contains stones. There is an   echogenic nonshadowing focus in the region of the gallbladder neck which   could represent an aggregate of sludge or nonshadowing stone. The   gallbladder wall is thickened and edematous. The patient could not be   evaluated for a sonographic [place] sign. There is trace pericholecystic   fluid;however, there is also perihepatic ascites. The constellation of   findings is concerning for possible acute cholecystitis. Clinical and   laboratory correlation is advised. Surgical consultation is suggested for   further evaluation and management.  2. The common bile duct is mildly dilated measuring up to 8.5 mm. The   distal segment of the duct cannot be visualized on this examination and a   stone or other abnormality within the distal duct could not be excluded.   Clinical and laboratory correlation is again recommended.    Further evaluation of these findings with abdominal MRI/MRCP and/or HIDA   scan could be performed if clinically possible. Surgical consultation is   again suggested.    Findings were discussed with Dr. Moser 5/19/20258:53 AM by Dr. Porras   with read back confirmation.    --- End of Report ---            AMNA PORRAS MD; Attending Radiologist  This document has been electronically signed. May 19 2025  8:54AM    < end of copied text >   Patient is a 75y old  Male who presents with a chief complaint of Acute respiratory failure, shock (19 May 2025 14:35)    BRIEF HOSPITAL COURSE:   76 yo male with hx of HTN, HLD, CAD, TIA, remote pericarditis in 2015, presented to the ED with complaints of abdominal pain, nausea, vomiting for since the day prior.  Upon arrival to the ED patient was lethargic and tachypneic, with abdominal distention and mottled skin.    He was noted to be hypotensive despite fluid resuscitation and placed on escalating doses of pressors.    Started on Bipap initially for tachypnea and metabolic acidosis, eventually intubated in the ED. Labs revealed severe metabolic acidosis. OLEKSANDR, transaminitis. Admitted to MICU for mixed shock, cardiogenic vs septic, requiring triple pressors.    Ovn:  No acute events.      Interval HPI:  Patient seen and examined at bedside. Is ill appearing, not responsive. Unable to obtain ROS.    PAST MEDICAL & SURGICAL HISTORY:  AMI (Acute Myocardial Infarction)  2001      Transient ischemic attack (TIA)      No Past Surgical History        Review of Systems:  All other review of systems negative except as noted in HPI    MEDICATIONS  (STANDING):  albumin human 25% IVPB 50 milliLiter(s) IV Intermittent every 6 hours  angiotensin II Infusion 40 NANOGram(s)/kG/Min (18.7 mL/Hr) IV Continuous <Continuous>  aspirin  chewable 81 milliGRAM(s) Oral daily  chlorhexidine 0.12% Liquid 15 milliLiter(s) Oral Mucosa every 12 hours  chlorhexidine 2% Cloths 1 Application(s) Topical <User Schedule>  dexMEDEtomidine Infusion 0.2 MICROgram(s)/kG/Hr (3.91 mL/Hr) IV Continuous <Continuous>  fentaNYL   Infusion. 0.5 MICROgram(s)/kG/Hr (3.91 mL/Hr) IV Continuous <Continuous>  hydrocortisone sodium succinate Injectable 50 milliGRAM(s) IV Push every 6 hours  insulin lispro (ADMELOG) corrective regimen sliding scale.   SubCutaneous every 6 hours  lactulose Syrup 10 Gram(s) Oral three times a day  meropenem Injectable 1000 milliGRAM(s) IV Push every 12 hours  midazolam Infusion 0.05 mG/kG/Hr (3.91 mL/Hr) IV Continuous <Continuous>  multiple electrolytes Injection Type 1 1000 milliLiter(s) (1000 mL/Hr) IV Continuous <Continuous>  mupirocin 2% Nasal 1 Application(s) Both Nostrils two times a day  norepinephrine Infusion 0.4 MICROgram(s)/kG/Min (29.3 mL/Hr) IV Continuous <Continuous>  pantoprazole  Injectable 40 milliGRAM(s) IV Push two times a day  pantoprazole  Injectable 40 milliGRAM(s) IV Push daily  phenylephrine    Infusion 5 MICROgram(s)/kG/Min (73.2 mL/Hr) IV Continuous <Continuous>  rifAXIMin 550 milliGRAM(s) Oral two times a day  vasopressin Infusion 0.04 Unit(s)/Min (6 mL/Hr) IV Continuous <Continuous>    MEDICATIONS  (PRN):      Mode: AC/ CMV (Assist Control/ Continuous Mandatory Ventilation)  RR (machine): 20  TV (machine): 500  FiO2: 50  PEEP: 6  ITime: 0.8  MAP: 10  PIP: 20    ICU Vital Signs Last 24 Hrs  T(C): 36.9 (20 May 2025 07:00), Max: 38.8 (20 May 2025 03:15)  T(F): 98.4 (20 May 2025 07:00), Max: 101.8 (20 May 2025 03:15)  HR: 106 (20 May 2025 07:00) (78 - 123)  BP: 83/66 (20 May 2025 05:00) (83/66 - 124/82)  BP(mean): 72 (20 May 2025 05:00) (72 - 96)  ABP: 97/63 (20 May 2025 07:00) (74/50 - 141/70)  ABP(mean): 75 (20 May 2025 07:00) (31 - 98)  RR: 20 (20 May 2025 07:00) (9 - 36)  SpO2: 100% (20 May 2025 07:00) (54% - 100%)    O2 Parameters below as of 19 May 2025 16:00  Patient On (Oxygen Delivery Method): ventilator    O2 Concentration (%): 50      ABG - ( 20 May 2025 04:15 )  pH, Arterial: 7.520 pH, Blood: x     /  pCO2: 38    /  pO2: 145   / HCO3: 31    / Base Excess: 8.1   /  SaO2: 99.3              I&O's Detail    19 May 2025 07:01  -  20 May 2025 07:00  --------------------------------------------------------  IN:    Albumin 25%  -  50 mL: 150 mL    Angiotensin II: 332 mL    Dexmedetomidine: 27.3 mL    FentaNYL: 89.7 mL    IV PiggyBack: 100 mL    IV PiggyBack: 300 mL    Midazolam: 78 mL    Norepinephrine: 80.5 mL    Norepinephrine: 213.6 mL    Phenylephrine: 152 mL    Phenylephrine: 294.3 mL    Plasma: 564 mL    Sodium Bicarbonate: 2700 mL    Vasopressin: 108 mL  Total IN: 5189.4 mL    OUT:    Bulb (mL): 110 mL    Indwelling Catheter - Urethral (mL): 3050 mL    Nasogastric/Oral tube (mL): 400 mL  Total OUT: 3560 mL    Total NET: 1629.4 mL          LABS:                        12.4   10.65 )-----------( 23       ( 20 May 2025 02:54 )             35.6     05-20    138  |  91[L]  |  42.5[H]  ----------------------------<  214[H]  3.8   |  26.0  |  1.77[H]    Ca    7.7[L]      20 May 2025 04:15  Phos  3.0     05-20  Mg     2.0     05-20    TPro  4.9[L]  /  Alb  2.8[L]  /  TBili  3.9[H]  /  DBili  x   /  AST  197[H]  /  ALT  137[H]  /  AlkPhos  154[H]  05-20      CARDIAC MARKERS ( 19 May 2025 04:10 )  x     / x     / x     / x     / 80.2 ng/mL      CAPILLARY BLOOD GLUCOSE      POCT Blood Glucose.: 210 mg/dL (20 May 2025 06:17)    PT/INR - ( 19 May 2025 21:20 )   PT: 27.6 sec;   INR: 2.40 ratio         PTT - ( 19 May 2025 21:20 )  PTT:34.9 sec  Urinalysis Basic - ( 20 May 2025 04:15 )    Color: x / Appearance: x / SG: x / pH: x  Gluc: 214 mg/dL / Ketone: x  / Bili: x / Urobili: x   Blood: x / Protein: x / Nitrite: x   Leuk Esterase: x / RBC: x / WBC x   Sq Epi: x / Non Sq Epi: x / Bacteria: x      CULTURES:  Culture Results:   Growth in anaerobic bottle: Gram Negative Rods  Growth in aerobic bottle: Gram Negative Rods (05-19 @ 10:15)  Culture Results:   >100,000 CFU/ml Escherichia coli (05-18 @ 22:08)  Culture Results:   Growth in aerobic and anaerobic bottles: Gram Negative Rods and Gram  Positive Cocci in Pairs and Chains  Growth in anaerobic bottle: Gram Positive Rods  Direct identification is available within approximately 3-5  hours either by Blood Panel Multiplexed PCR or Direct  MALDI-TOF. Details: [email or web address] (05-18 @ 20:05)      Physical Examination:    General: No acute distress.    HEENT: Pupils equal, reactive to light.  Symmetric.  PULM: Clear to auscultation bilaterally, no significant sputum production, no wheezing, crackles, or rhonchi  NECK: Supple, no lymphadenopathy, trachea midline  CVS: Regular rate and rhythm, no murmurs, rubs, or gallops  ABD: distended  EXT: Nontender, no clubbing, cyanosis, or edema  SKIN: Warm and well perfused, no rashes noted.  NEURO: sedated, not responsive      DEVICES:   P IV    RADIOLOGY:   < from: US Abdomen Upper Quadrant Right (05.19.25 @ 07:19) >    ACC: [id number] EXAM:  US ABDOMEN RT UPR QUADRANT   ORDERED BY: DANIA MEDEROS     PROCEDURE DATE:  05/19/2025          INTERPRETATION:  CLINICAL INFORMATION: 75-year-old male with shock,   sepsis, evaluate for cholecystitis, choledocholithiasis    COMPARISON: Abdominal pelvic CT 5/18/2025, right upper quadrant   ultrasound  7/9/2021    TECHNIQUE: Portable sonography of the right upper quadrant.    FINDINGS:  The study is technically limited by the portable technique and the   patient is on a ventilator.  Liver: Mildly enlarged measuring 18 cm in length. Increased echogenicity   of the liver parenchyma suggesting hepatic steatosis. No focal   abnormality is identified. The main portal vein is patent with   hepatopedal flow.  Bile ducts: The common bile duct is mildly dilated measuring up to 8.5   mm. No stone is seen in the visualized segment. However, the distal   segment of the common bile duct could not be visualized as it is obscured   by bowel gas.  Gallbladder: The gallbladder isdistended and contains stones. There is   an echogenic focus in the region of the gallbladder neck which could   represent an aggregate of sludge or nonshadowing stone. Mobility could   not be assessed. There is gallbladder wall thickening and edema measuring   up to 9 mm. The patient could not be assessed for a sonographic [place]   sign. There is pericholecystic fluid; however, there is also trace   perihepatic ascites.  Pancreas: Partially obscured by overlying bowel and not well evaluated on   this exam.  Right kidney: 12.4 cm. No hydronephrosis.  Ascites: Trace perihepatic  ascites  IVC/aorta: Poorly visualized    IMPRESSION:    1. The gallbladder is distended and contains stones. There is an   echogenic nonshadowing focus in the region of the gallbladder neck which   could represent an aggregate of sludge or nonshadowing stone. The   gallbladder wall is thickened and edematous. The patient could not be   evaluated for a sonographic [place] sign. There is trace pericholecystic   fluid;however, there is also perihepatic ascites. The constellation of   findings is concerning for possible acute cholecystitis. Clinical and   laboratory correlation is advised. Surgical consultation is suggested for   further evaluation and management.  2. The common bile duct is mildly dilated measuring up to 8.5 mm. The   distal segment of the duct cannot be visualized on this examination and a   stone or other abnormality within the distal duct could not be excluded.   Clinical and laboratory correlation is again recommended.    Further evaluation of these findings with abdominal MRI/MRCP and/or HIDA   scan could be performed if clinically possible. Surgical consultation is   again suggested.    Findings were discussed with Dr. Moser 5/19/20258:53 AM by Dr. Porras   with read back confirmation.    --- End of Report ---            AMNA PORRAS MD; Attending Radiologist  This document has been electronically signed. May 19 2025  8:54AM    < end of copied text >

## 2025-05-20 NOTE — DIETITIAN INITIAL EVALUATION ADULT - ADD RECOMMEND
Monitor electrolytes and replete prn  Insulin regimen for blood glucose control per primary teams discretion  Monitor nutrition and malnutrition parameters

## 2025-05-20 NOTE — PROGRESS NOTE ADULT - SUBJECTIVE AND OBJECTIVE BOX
[facility] DIVISION OF KIDNEY DISEASES AND HYPERTENSION -- PROGRESS NOTE    SUBJECTIVE:   doing well. making urine    MEDICATIONS    Standing Inpatient Medications  albumin human 25% IVPB 50 milliLiter(s) IV Intermittent every 6 hours  angiotensin II Infusion 40 NANOGram(s)/kG/Min IV Continuous <Continuous>  buMETAnide Infusion 0.5 mG/Hr IV Continuous <Continuous>  chlorhexidine 0.12% Liquid 15 milliLiter(s) Oral Mucosa every 12 hours  chlorhexidine 2% Cloths 1 Application(s) Topical <User Schedule>  DOBUTamine Infusion 2.5 MICROgram(s)/kG/Min IV Continuous <Continuous>  hydrocortisone sodium succinate Injectable 50 milliGRAM(s) IV Push every 6 hours  insulin lispro (ADMELOG) corrective regimen sliding scale.   SubCutaneous every 6 hours  lactulose Syrup 10 Gram(s) Oral three times a day  meropenem Injectable 1000 milliGRAM(s) IV Push every 8 hours  midazolam Infusion 0.05 mG/kG/Hr IV Continuous <Continuous>  multiple electrolytes Injection Type 1 1000 milliLiter(s) IV Continuous <Continuous>  mupirocin 2% Nasal 1 Application(s) Both Nostrils two times a day  norepinephrine Infusion 0.4 MICROgram(s)/kG/Min IV Continuous <Continuous>  pantoprazole  Injectable 40 milliGRAM(s) IV Push daily  phenylephrine    Infusion 5 MICROgram(s)/kG/Min IV Continuous <Continuous>  potassium chloride  20 mEq/100 mL IVPB 20 milliEquivalent(s) IV Intermittent every 1 hour  rifAXIMin 550 milliGRAM(s) Oral two times a day  vasopressin Infusion 0.04 Unit(s)/Min IV Continuous <Continuous>    PRN Inpatient Medications      VITALS/PHYSICAL EXAM  --------------------------------------------------------------------------------  T(C): 36.6 (05-20-25 @ 13:15), Max: 38.8 (05-20-25 @ 03:15)  HR: 121 (05-20-25 @ 13:15) (78 - 127)  BP: 100/81 (05-20-25 @ 13:15) (83/66 - 141/102)  RR: 23 (05-20-25 @ 13:15) (9 - 36)  SpO2: 94% (05-20-25 @ 13:15) (54% - 100%)  Wt(kg): --  Height (cm): 170.2 (05-19-25 @ 00:28)  Weight (kg): 78.1 (05-19-25 @ 00:28)  BMI (kg/m2): 27 (05-19-25 @ 00:28)  BSA (m2): 1.9 (05-19-25 @ 00:28)      05-19-25 @ 07:01  -  05-20-25 @ 07:00  --------------------------------------------------------  IN: 5189.4 mL / OUT: 3840 mL / NET: 1349.4 mL    05-20-25 @ 07:01  -  05-20-25 @ 13:49  --------------------------------------------------------  IN: 673.6 mL / OUT: 1165 mL / NET: -491.4 mL      Physical Exam:  intubated  HEENT: normal  Pulm: CTA B/L  CV: normal S1S2; no murmur  Abd: soft, non-tender  Extremities- + edema    LABS/STUDIES  --------------------------------------------------------------------------------  137  |  90  |  45.0  ----------------------------<  236      [05-20-25 @ 08:57]  3.3   |  27.0  |  1.61        Ca     7.7     [05-20-25 @ 08:57]      Mg     2.2     [05-20-25 @ 08:57]      Phos  3.1     [05-20-25 @ 08:57]    TPro  5.7  /  Alb  3.0  /  TBili  4.4  /  DBili  x   /  AST  186  /  ALT  141  /  AlkPhos  172  [05-20-25 @ 08:57]    PT/INR: PT 27.6 , INR 2.40       [05-19-25 @ 21:20]  PTT: 34.9       [05-19-25 @ 21:20]          [05-19-25 @ 04:10]    Creatinine Trend:  SCr 1.61 [05-20 @ 08:57]  SCr 1.77 [05-20 @ 04:15]  SCr 1.72 [05-20 @ 02:54]  SCr 1.74 [05-20 @ 00:20]  SCr 1.59 [05-19 @ 21:20]

## 2025-05-20 NOTE — PROGRESS NOTE ADULT - SUBJECTIVE AND OBJECTIVE BOX
Patient seen and examined at bedside. Remains intubated, sedated and on high doses of multiple pressors.    MEDICATIONS  (STANDING):  albumin human 25% IVPB 50 milliLiter(s) IV Intermittent every 6 hours  angiotensin II Infusion 40 NANOGram(s)/kG/Min (18.7 mL/Hr) IV Continuous <Continuous>  aspirin  chewable 81 milliGRAM(s) Oral daily  buMETAnide Infusion 0.5 mG/Hr (2.5 mL/Hr) IV Continuous <Continuous>  chlorhexidine 0.12% Liquid 15 milliLiter(s) Oral Mucosa every 12 hours  chlorhexidine 2% Cloths 1 Application(s) Topical <User Schedule>  dexMEDEtomidine Infusion 0.2 MICROgram(s)/kG/Hr (3.91 mL/Hr) IV Continuous <Continuous>  DOBUTamine Infusion 2.5 MICROgram(s)/kG/Min (5.86 mL/Hr) IV Continuous <Continuous>  fentaNYL   Infusion. 0.5 MICROgram(s)/kG/Hr (3.91 mL/Hr) IV Continuous <Continuous>  hydrocortisone sodium succinate Injectable 50 milliGRAM(s) IV Push every 6 hours  insulin lispro (ADMELOG) corrective regimen sliding scale.   SubCutaneous every 6 hours  lactulose Syrup 10 Gram(s) Oral three times a day  meropenem Injectable 1000 milliGRAM(s) IV Push every 8 hours  midazolam Infusion 0.05 mG/kG/Hr (3.91 mL/Hr) IV Continuous <Continuous>  multiple electrolytes Injection Type 1 1000 milliLiter(s) (1000 mL/Hr) IV Continuous <Continuous>  mupirocin 2% Nasal 1 Application(s) Both Nostrils two times a day  norepinephrine Infusion 0.4 MICROgram(s)/kG/Min (29.3 mL/Hr) IV Continuous <Continuous>  pantoprazole  Injectable 40 milliGRAM(s) IV Push daily  phenylephrine    Infusion 5 MICROgram(s)/kG/Min (73.2 mL/Hr) IV Continuous <Continuous>  potassium chloride  20 mEq/100 mL IVPB 20 milliEquivalent(s) IV Intermittent every 1 hour  rifAXIMin 550 milliGRAM(s) Oral two times a day  vasopressin Infusion 0.04 Unit(s)/Min (6 mL/Hr) IV Continuous <Continuous>                          13.7   13.53 )-----------( 22       ( 20 May 2025 10:24 )             39.3   05-20    137  |  90[L]  |  45.0[H]  ----------------------------<  236[H]  3.3[L]   |  27.0  |  1.61[H]    Ca    7.7[L]      20 May 2025 08:57  Phos  3.1     05-20  Mg     2.2     05-20    TPro  5.7[L]  /  Alb  3.0[L]  /  TBili  4.4[H]  /  DBili  x   /  AST  186[H]  /  ALT  141[H]  /  AlkPhos  172[H]  05-20    Vital Signs Last 24 Hrs  T(C): 35.8 (20 May 2025 11:00), Max: 38.8 (20 May 2025 03:15)  T(F): 96.4 (20 May 2025 11:00), Max: 101.8 (20 May 2025 03:15)  HR: 91 (20 May 2025 11:00) (78 - 123)  BP: 98/69 (20 May 2025 11:00) (83/66 - 124/82)  BP(mean): 79 (20 May 2025 11:00) (72 - 98)  RR: 20 (20 May 2025 11:00) (9 - 36)  SpO2: 100% (20 May 2025 11:00) (54% - 100%)    Parameters below as of 20 May 2025 08:00  Patient On (Oxygen Delivery Method): ventilator      Exam:  Gen: intubated and sedated  Resp: ac/vc  CVS: RRR  Abd: soft, ND, PCT in place with dark bilious drainage

## 2025-05-20 NOTE — DIETITIAN INITIAL EVALUATION ADULT - PERTINENT MEDS FT
MEDICATIONS  (STANDING):  angiotensin II Infusion 40 NANOGram(s)/kG/Min (9.37 mL/Hr) IV Continuous <Continuous>  buMETAnide Infusion 0.5 mG/Hr (2.5 mL/Hr) IV Continuous <Continuous>  DOBUTamine Infusion 2.5 MICROgram(s)/kG/Min (5.86 mL/Hr) IV Continuous <Continuous>  hydrocortisone sodium succinate Injectable 50 milliGRAM(s) IV Push every 6 hours  insulin lispro (ADMELOG) corrective regimen sliding scale.   SubCutaneous every 6 hours  lactulose Syrup 10 Gram(s) Oral three times a day  meropenem Injectable 1000 milliGRAM(s) IV Push every 8 hours  midazolam Infusion 0.05 mG/kG/Hr (3.91 mL/Hr) IV Continuous <Continuous>  multiple electrolytes Injection Type 1 1000 milliLiter(s) (1000 mL/Hr) IV Continuous <Continuous>  norepinephrine Infusion 0.4 MICROgram(s)/kG/Min (29.3 mL/Hr) IV Continuous <Continuous>  pantoprazole  Injectable 40 milliGRAM(s) IV Push daily  phenylephrine    Infusion 5 MICROgram(s)/kG/Min (73.2 mL/Hr) IV Continuous <Continuous>  potassium chloride  20 mEq/100 mL IVPB 20 milliEquivalent(s) IV Intermittent every 1 hour  rifAXIMin 550 milliGRAM(s) Oral two times a day  vasopressin Infusion 0.04 Unit(s)/Min (6 mL/Hr) IV Continuous <Continuous>

## 2025-05-20 NOTE — DIETITIAN INITIAL EVALUATION ADULT - NS FNS DIET ORDER
Diet, NPO:   NPO for Procedure/Test     NPO Start Date: 19-May-2025,   NPO Start Time: 02:10 (05-19-25 @ 02:10) [Active]  Diet, NPO (05-18-25 @ 23:02) [Active]

## 2025-05-20 NOTE — PROGRESS NOTE ADULT - ASSESSMENT
1.  Acute kidney injury: From ATN/shock.  doing well. cr improving. non oliguric. no need for dialysis. pressor requirements down.   2. metabolic acidosis from severe lactic acidosis.  Continue fluid resuscitation.  3.  Septic shock: From cholecystitis.  s/p drain  4.  Respiratory failure- intubated      Joel Mendoza MD, [facility]

## 2025-05-20 NOTE — PROGRESS NOTE ADULT - ASSESSMENT
Mr. Bateman is a 75-year-old male (PMH CAD s/p MI, TIA, HTN, HLD) admitted to MICU on 18-May for acute respiratory failure and shock, now intubated and sedated. He remains in profound mixed shock (septic vs. cardiogenic), now with confirmed acute cholecystitis/cholangitis s/p IR cholecystostomy tube placement (19-May, drained foul bile). He requires escalating quadruple pressor support (Norepinephrine, Phenylephrine, Vasopressin, Angiotensin II) and remains significantly volume resuscitated (+1.6L net positive/24h). His metabolic acidosis has resolved (pH 7.52, HCO3 31 from bicarb gtt, now weaning off). OLEKSANDR is improving (Cr 1.77). However, he developed severe thrombocytopenia (Plt 23), worsening coagulopathy (INR 2.40), and remains febrile (Tmax 38.8°C). Blood cultures from 19-May are positive for Gram-Negative Rods. Prognosis remains extremely guarded; DNR.    #Neuro:    -Altered mental status, intubated and sedated. Exam: sedated, not responsive.  -Fent, Midazolam and Precedex drip; titrate to RASS -4 to -5.  -Neuro checks q2-4h. Seizure/aspiration precautions. HOB >30 deg.    #CV:    -Profound mixed shock (septic/cardiogenic).  -Levo, Kamlesh, Vaso, Angiotensin II, Titrate to MAP ->65 mmHg.  -SVT s/p electrical cardioversion (19-May).  (07:00).  -Bedside US (ED): hypokinetic LV. Troponin 80.2 ng/mL (19-May).  -F/u formal TTE  -Cont Albumin 25% 50mL IV q6h.  -Cont Hydrocortisone 50mg IV q6h.  -Telemetry. A line, CVC.    #Resp:    -Acute Respiratory Failure, intubated. Vent: AC/CMV, RR 20, , FiO2 50%, PEEP 6. Exam: CTAB.  -ABG (04:15): 7.52/38/145/31 (SaO2 99.3%, P/F 290, metabolic alkalosis).  -Wean FiO2/PEEP as tolerated to SpO2 >92-94%.  -Pulmonary toilet, oral care with Chlorhexidine 0.12% liquid q12h.    #Renal/FEN:    -OLEKSANDR (Cr 1.77, BUN 42.5) - improving. UOP 3050mL/24h. Net +1629.4mL/24h.  -Lytes (04:15): Na 138, K 3.8, Cl 91, Ca 7.7 (L), Phos 3.0, Mg 2.0.  -Replete Calcium IV   -Monitor BMP, Ca, Mg, Phos q6-12h.  -Strict I&Os. Daily weights.    #GI/Abdomen:    -Acute cholecystitis/cholangitis s/p cholecystostomy tube placement by IR   -Acute pancreatitis  -NPO. NGT to LIS/LWS (output 400mL/24h).  -Cont Pantoprazole 40mg IV daily.  -Cont Lactulose 10g PO (via NGT) TID, Rifaximin 550mg PO (via NGT) BID.  -LFTs (05-20): T Bili 3.9, , , AlkPhos 154 (improving). INR 2.40 (PT 27.6).  -Monitor cholecystostomy tube output.    #Heme:    -Anemia (Hgb 12.4). Leukocytosis (WBC 10.65). Severe Thrombocytopenia (Plt 23).  -Monitor CBC daily.  -Cont Aspirin 81mg PO (via NGT) daily.    #ID:    -Septic shock (acute cholecystitis/cholangitis). Febrile (Tmax 38.8°C). WBC 10.65.  -Blood cx (19-May): GNR (aerobic & anaerobic). Urine cx (18-May): E.coli. Blood cx (18-May): GNR + GPC.  -Cont Meropenem 1g IV q12h   -Monitor temperature, WBC, follow culture results.    #Endo:    -Hyperglycemia (Serum Gluc 214, POCT 210).  -Cont Insulin Lispro SSI PRN. Goal -180 mg/dL.  -Monitor POCT BG q6h.    #Prophylaxis:    VTE: SCDs.   Stress Ulcer: Pantoprazole.  Skin: CHG cloths. Mupirocin 2% nasal BID. Turning/positioning.    Disp/GOC:    MICU for septic/cardiogenic shock, ARF, multi-organ support.  Prognosis extremely guarded.  DNR   Palliative Care consult ongoing.  Update family (wife, son Harshal, daughter) on clinical status and ongoing management.

## 2025-05-20 NOTE — PROGRESS NOTE ADULT - ASSESSMENT
Assessment: Patient is a 76 y/o male presenting with septic shock secondary to cholangitis.     Plan:  -No acute surgical intervention. Patient s/p PCT placement.  -Patient remains critically ill and is not a surgical candidate  -Remainder of care per MICU, IR and GI

## 2025-05-20 NOTE — DIETITIAN INITIAL EVALUATION ADULT - OBTAIN WEEKLY WEIGHT
"  Outpatient Physical Therapy  DAILY TREATMENT     [facility] [address].  [address]  [place] [address]  Phone:  [phone]  Fax:  [phone]    Date: 06/26/2019    Patient: Abiodun Carbajal  YOB: 1973  MRN: [id number]     Time Calculation  Start time: 1002  Stop time: 1058 Time Calculation (min): 56 minutes     Chief Complaint: Shoulder Problem    Visit #: 9    SUBJECTIVE:  No new complaints.     OBJECTIVE:  Current objective measures: Shoulder AROM: ff and abd= 95 deg HBH= subocc, HBB= hip.         Therapeutic Exercises (CPT 26141):     1. UBE, x 5 min alt    2. Pulleys ff/IR, x 2 min/1 min    3. Supine dowel OH str, 3# wate bar, x 15    4. TB shoulder planes 6 way, x 15 ea with L1, HEP    Therapeutic Treatments and Modalities:     1. Manual Therapy (CPT 73152), Scar mobilization, IASTM L shoulder/UT/LS/pec, Inf/post GHJ glides GIII, PROM stretching all planes with focus on ER, manually resisted iso holds EOR ER.     2. E Stim Unattended (CPT 57898), Vincentian: infra and supra with ball roll, 5/5\" x 15 min    Time-based treatments/modalities:  Manual therapy minutes (CPT 38707): 10 minutes  Therapeutic exercise minutes (CPT 28041): 20 minutes       ASSESSMENT:   Response to treatment: AROM with 15 deg improvement, but PROM slow to improve due to adhesions. Continued stretching still indicated, but may be at max achievable range. Will focus HEP on strengthening available range to shoulder height to improve functional use.  May be able to d/c to HEP if he shows independence with the program next visit.     PLAN/RECOMMENDATIONS:   Plan for treatment: therapy treatment to continue next visit.  Planned interventions for next visit: continue with current treatment.      "
yes

## 2025-05-20 NOTE — PROGRESS NOTE ADULT - ASSESSMENT
75yr man hx CAD, TIA, HTN, HLD admitted with shock, septic vs cardiogenic completed by respiratory failure in the setting of pancreatitis    Shock  Cardiogenic vs Sepsis  on pressors - wean per micu    Acute Respiratory Failure  on vent - mgmt per micu    Elevated Lipase, Transaminitis  Surgery and GI consulted  no planned procedures  cont IV abx, fluids    OLEKSANDR  monitor renal fxs  avoid nephrotoxic agents  trending down    Encounter for Palliative Care  Palliative Care consulted to assist with goals of care.   Patient now DNR  PC to continue to support and follow

## 2025-05-20 NOTE — PROGRESS NOTE ADULT - SUBJECTIVE AND OBJECTIVE BOX
Interventional Radiology Follow-Up Note    This is a 75y Male s/p Cholecystostomy tube placement on 5/19/25 by Dr. Piper    S: Patient seen and examined @ bedside. Intubated.     Medication:  MEDICATIONS  (STANDING):  albumin human 25% IVPB 50 milliLiter(s) IV Intermittent every 6 hours  angiotensin II Infusion 40 NANOGram(s)/kG/Min (18.7 mL/Hr) IV Continuous <Continuous>  aspirin  chewable 81 milliGRAM(s) Oral daily  chlorhexidine 0.12% Liquid 15 milliLiter(s) Oral Mucosa every 12 hours  chlorhexidine 2% Cloths 1 Application(s) Topical <User Schedule>  dexMEDEtomidine Infusion 0.2 MICROgram(s)/kG/Hr (3.91 mL/Hr) IV Continuous <Continuous>  fentaNYL   Infusion. 0.5 MICROgram(s)/kG/Hr (3.91 mL/Hr) IV Continuous <Continuous>  hydrocortisone sodium succinate Injectable 50 milliGRAM(s) IV Push every 6 hours  insulin lispro (ADMELOG) corrective regimen sliding scale.   SubCutaneous every 6 hours  lactulose Syrup 10 Gram(s) Oral three times a day  meropenem Injectable 1000 milliGRAM(s) IV Push every 8 hours  midazolam Infusion 0.05 mG/kG/Hr (3.91 mL/Hr) IV Continuous <Continuous>  multiple electrolytes Injection Type 1 1000 milliLiter(s) (1000 mL/Hr) IV Continuous <Continuous>  mupirocin 2% Nasal 1 Application(s) Both Nostrils two times a day  norepinephrine Infusion 0.4 MICROgram(s)/kG/Min (29.3 mL/Hr) IV Continuous <Continuous>  pantoprazole  Injectable 40 milliGRAM(s) IV Push daily  phenylephrine    Infusion 5 MICROgram(s)/kG/Min (73.2 mL/Hr) IV Continuous <Continuous>  rifAXIMin 550 milliGRAM(s) Oral two times a day  vasopressin Infusion 0.04 Unit(s)/Min (6 mL/Hr) IV Continuous <Continuous>    Vitals:  ICU Vital Signs Last 24 Hrs  T(C): 36.1 (20 May 2025 08:15), Max: 38.8 (20 May 2025 03:15)  T(F): 97 (20 May 2025 08:15), Max: 101.8 (20 May 2025 03:15)  HR: 103 (20 May 2025 09:32) (78 - 123)  BP: 103/84 (20 May 2025 08:00) (83/66 - 124/82)  BP(mean): 91 (20 May 2025 08:00) (72 - 96)  ABP: 107/70 (20 May 2025 08:15) (74/50 - 141/70)  ABP(mean): 83 (20 May 2025 08:15) (31 - 98)  RR: 20 (20 May 2025 08:15) (9 - 36)  SpO2: 100% (20 May 2025 09:32) (54% - 100%)    O2 Parameters below as of 20 May 2025 08:00  Patient On (Oxygen Delivery Method): ventilator    I&O's Detail    19 May 2025 07:01  -  20 May 2025 07:00  --------------------------------------------------------  IN:    Albumin 25%  -  50 mL: 150 mL    Angiotensin II: 332 mL    Dexmedetomidine: 27.3 mL    FentaNYL: 89.7 mL    IV PiggyBack: 100 mL    IV PiggyBack: 300 mL    Midazolam: 78 mL    Norepinephrine: 80.5 mL    Norepinephrine: 213.6 mL    Phenylephrine: 152 mL    Phenylephrine: 294.3 mL    Plasma: 564 mL    Sodium Bicarbonate: 2700 mL    Vasopressin: 108 mL  Total IN: 5189.4 mL    OUT:    Bulb (mL): 190 mL    Indwelling Catheter - Urethral (mL): 3050 mL    Nasogastric/Oral tube (mL): 400 mL  Total OUT: 3640 mL    Total NET: 1549.4 mL    GENERAL: NAD, Sedated.  CV: regular rate and rhythm  Lungs: Intubated. CTA bilat. no wheezes, rales or rhonchi  ABDOMEN: + primitivo tube.  EXTREMITIES:  no edema, cyanosis  skin: warm, dry, no rashes.     LABS:                        12.4   10.65 )-----------( 23       ( 20 May 2025 02:54 )             35.6     05-20    138  |  91[L]  |  42.5[H]  ----------------------------<  214[H]  3.8   |  26.0  |  1.77[H]    Ca    7.7[L]      20 May 2025 04:15  Phos  3.0     05-20  Mg     2.0     05-20    TPro  4.9[L]  /  Alb  2.8[L]  /  TBili  3.9[H]  /  DBili  x   /  AST  197[H]  /  ALT  137[H]  /  AlkPhos  154[H]  05-20    PT/INR - ( 19 May 2025 21:20 )   PT: 27.6 sec;   INR: 2.40 ratio         PTT - ( 19 May 2025 21:20 )  PTT:34.9 sec  Urinalysis Basic - ( 20 May 2025 04:15 )    Color: x / Appearance: x / SG: x / pH: x  Gluc: 214 mg/dL / Ketone: x  / Bili: x / Urobili: x   Blood: x / Protein: x / Nitrite: x   Leuk Esterase: x / RBC: x / WBC x   Sq Epi: x / Non Sq Epi: x / Bacteria: x

## 2025-05-20 NOTE — DIETITIAN INITIAL EVALUATION ADULT - OTHER INFO
Per chart: 74 yo male with pmhx of TIA, AMI, HTN, HLD, CAD, remote pericarditis in 2015, presented to ED with 1 day c/o N/V and abdominal pain, abdominal distention, tachypnea and lethargy noted on admission. Found to have metabolic acidosis - started on bipap and ultimately intubated in ED. S/p primitivo tube placement 5/19. No acute surgical intervention at this time.

## 2025-05-20 NOTE — CHART NOTE - NSCHARTNOTEFT_GEN_A_CORE
EEG preliminary read (not final) on the initial recording hour(s) (until 1430)    No seizures recorded.  Moderate slowing noted, nonspecific.  Final report to follow tomorrow morning after completion of study.    This is a preliminary impression still pending attendings attestation.     -------------------------------------------------------------------------------------------------------  Epilepsy reading room: [phone]    After-hours epilepsy service: [phone]    Elsie Mensah, PGY7  Epilepsy Fellow

## 2025-05-20 NOTE — DIETITIAN INITIAL EVALUATION ADULT - ENTERAL
When feasible, start TF Glucerna 1.5 @ 10 ml/hr and increase by 10 ml q 6 hrs until goal rate 70 ml/hr x 18 hrs (1260 ml, 1890 kcals, 104 g pro, 958 ml free water)  Additional free water per medical teams discretion

## 2025-05-20 NOTE — PROGRESS NOTE ADULT - SUBJECTIVE AND OBJECTIVE BOX
CC:  Follow up GOC , Symptoms    OVERNIGHT EVENTS:  now DNR    Present Symptoms:   Dyspnea:  No    Nausea/Vomiting:  No   Anxiety/ Agitation No    Depression:   Unable to assess  Fatigue: Unable  to assess  Loss of appetite:  Unable to assess NPO  Constipation: Not Reported   Pain:  No Signs            Location            Duration            Character            Severity            Factors            Effect    Pain AD Score:  [email or web address].[email or web address]/painad.pdf (press ctrl + left click to view)    Review of Systems: Reviewed as above  Further ROS unable  to  obtain due to poor mentation       MEDICATIONS  (STANDING):  albumin human 25% IVPB 50 milliLiter(s) IV Intermittent every 6 hours  angiotensin II Infusion 40 NANOGram(s)/kG/Min (9.37 mL/Hr) IV Continuous <Continuous>  buMETAnide Infusion 0.5 mG/Hr (2.5 mL/Hr) IV Continuous <Continuous>  chlorhexidine 0.12% Liquid 15 milliLiter(s) Oral Mucosa every 12 hours  chlorhexidine 2% Cloths 1 Application(s) Topical <User Schedule>  DOBUTamine Infusion 2.5 MICROgram(s)/kG/Min (5.86 mL/Hr) IV Continuous <Continuous>  hydrocortisone sodium succinate Injectable 50 milliGRAM(s) IV Push every 6 hours  insulin lispro (ADMELOG) corrective regimen sliding scale.   SubCutaneous every 6 hours  lactulose Syrup 10 Gram(s) Oral three times a day  meropenem Injectable 1000 milliGRAM(s) IV Push every 8 hours  midazolam Infusion 0.05 mG/kG/Hr (3.91 mL/Hr) IV Continuous <Continuous>  multiple electrolytes Injection Type 1 1000 milliLiter(s) (1000 mL/Hr) IV Continuous <Continuous>  mupirocin 2% Nasal 1 Application(s) Both Nostrils two times a day  norepinephrine Infusion 0.4 MICROgram(s)/kG/Min (29.3 mL/Hr) IV Continuous <Continuous>  pantoprazole  Injectable 40 milliGRAM(s) IV Push daily  phenylephrine    Infusion 5 MICROgram(s)/kG/Min (73.2 mL/Hr) IV Continuous <Continuous>  potassium chloride  20 mEq/100 mL IVPB 20 milliEquivalent(s) IV Intermittent every 1 hour  rifAXIMin 550 milliGRAM(s) Oral two times a day  vasopressin Infusion 0.04 Unit(s)/Min (6 mL/Hr) IV Continuous <Continuous>    MEDICATIONS  (PRN):    PHYSICAL EXAM:  Vital Signs Last 24 Hrs  T(C): 36.6 (20 May 2025 13:15), Max: 38.8 (20 May 2025 03:15)  T(F): 97.9 (20 May 2025 13:15), Max: 101.8 (20 May 2025 03:15)  HR: 121 (20 May 2025 13:15) (78 - 127)  BP: 100/81 (20 May 2025 13:15) (83/66 - 141/102)  BP(mean): 89 (20 May 2025 13:15) (72 - 116)  RR: 23 (20 May 2025 13:15) (9 - 36)  SpO2: 94% (20 May 2025 13:15) (54% - 100%)    Parameters below as of 20 May 2025 12:00    O2 Concentration (%): 50  Karnofsky: 10 %  General:  Elder man intubated NAD       HEENT:  NCAT       (x  )  ET tube   (  )  NGT  Lungs: comfortable  non labored  CV:  RR  MSK: weak  Skin:  warm/dry  Neuro  NR  Psych Non agitated    LABS:                          13.7   13.53 )-----------( 22       ( 20 May 2025 10:24 )             39.3     05-20    137  |  90[L]  |  45.0[H]  ----------------------------<  236[H]  3.3[L]   |  27.0  |  1.61[H]    Ca    7.7[L]      20 May 2025 08:57  Phos  3.1     05-20  Mg     2.2     05-20    TPro  5.7[L]  /  Alb  3.0[L]  /  TBili  4.4[H]  /  DBili  x   /  AST  186[H]  /  ALT  141[H]  /  AlkPhos  172[H]  05-20    PT/INR - ( 19 May 2025 21:20 )   PT: 27.6 sec;   INR: 2.40 ratio         PTT - ( 19 May 2025 21:20 )  PTT:34.9 sec  Urinalysis Basic - ( 20 May 2025 08:57 )    Color: x / Appearance: x / SG: x / pH: x  Gluc: 236 mg/dL / Ketone: x  / Bili: x / Urobili: x   Blood: x / Protein: x / Nitrite: x   Leuk Esterase: x / RBC: x / WBC x   Sq Epi: x / Non Sq Epi: x / Bacteria: x      I&O's Summary    19 May 2025 07:01  -  20 May 2025 07:00  --------------------------------------------------------  IN: 5189.4 mL / OUT: 3840 mL / NET: 1349.4 mL    20 May 2025 07:01  -  20 May 2025 14:05  --------------------------------------------------------  IN: 673.6 mL / OUT: 1165 mL / NET: -491.4 mL        RADIOLOGY & ADDITIONAL STUDIES:  Imaging Reviewed  (  x )   < from: Xray Chest 1 View- PORTABLE-Urgent (Xray Chest 1 View- PORTABLE-Urgent .) (05.19.25 @ 04:54) >    ACC: [id number] EXAM:  XR CHEST PORTABLE URGENT 1V   ORDERED BY: DANIA MEDEROS     ACC: [id number] EXAM:  XR CHEST PORTABLE URGENT 1V   ORDERED BY: DANIA MEDEROS     PROCEDURE DATE:  05/19/2025          INTERPRETATION:  CLINICAL HISTORY: Endotracheal tube, right IJ placement.   Acute respiratory distress.    TECHNIQUE: Frontal views of the chest    COMPARISON: Chest radiograph dated 5/18/2025 at 8:11 PM.    FINDINGS:    Chest radiograph dated 5/18/2025 at 10:52 PM and 10:56 PM:  Lines/Tubes: Interval placement of endotracheal tube with tip terminating   below clavicles and approximately 1.3 cm above the gayla on 10:52 PM   image and approximately 3.6 cm above the gayla at 10:56 PM image.   Interval placement of enteric tube coursing below the diaphragm with tip   out of field of view on 10:56 PM image. New right IJ in place with tip   terminating in the right atrium.  Lungs/Pleura: The left lower lobe consolidation is no longer seen. No new   consolidation. No pneumothorax. No pleural effusion.  No pulmonary   vascular congestion.  Heart/Mediastinum: The cardiomediastinal silhouette is within normal   limits.  Osseous Structures: No acute findings.    Chest radiograph dated 5/19/2025 at 3:59 AM:  Endotracheal tube, enteric tube, and right IJ catheter in similar   position compared to 5/18/2025 study. No consolidation or pleural   effusion. No pneumothorax.    IMPRESSION:  Endotracheal tube tip 5 cm above the gayla. Enteric tube tip below the   edge of the image, and rightIJ catheter tip in the right atrium. No   pneumothorax. No focal consolidation or pleural effusion.    --- End of Report ---          TATY CHAPPELL DO; Resident Radiologist  This document has been electronically signed.  LYN RODRÍGUEZ DO; Attending Radiologist  This document has been electronically signed. May 19 2025  4:03PM    < end of copied text >

## 2025-05-20 NOTE — DIETITIAN INITIAL EVALUATION ADULT - NSFNSGIIOFT_GEN_A_CORE
05-19-25 @ 07:01  -  05-20-25 @ 07:00  --------------------------------------------------------  OUT:    Nasogastric/Oral tube (mL): 400 mL  Total OUT: 400 mL    Total NET: -400 mL

## 2025-05-21 LAB
-  AMPICILLIN/SULBACTAM: SIGNIFICANT CHANGE UP
-  AMPICILLIN: SIGNIFICANT CHANGE UP
-  AZTREONAM: SIGNIFICANT CHANGE UP
-  CEFAZOLIN: SIGNIFICANT CHANGE UP
-  CEFEPIME: SIGNIFICANT CHANGE UP
-  CEFOXITIN: SIGNIFICANT CHANGE UP
-  CEFTRIAXONE: SIGNIFICANT CHANGE UP
-  CEFUROXIME: SIGNIFICANT CHANGE UP
-  CIPROFLOXACIN: SIGNIFICANT CHANGE UP
-  ERTAPENEM: SIGNIFICANT CHANGE UP
-  GENTAMICIN: SIGNIFICANT CHANGE UP
-  IMIPENEM: SIGNIFICANT CHANGE UP
-  LEVOFLOXACIN: SIGNIFICANT CHANGE UP
-  MEROPENEM: SIGNIFICANT CHANGE UP
-  NITROFURANTOIN: SIGNIFICANT CHANGE UP
-  PENICILLIN: SIGNIFICANT CHANGE UP
-  PIPERACILLIN/TAZOBACTAM: SIGNIFICANT CHANGE UP
-  TIGECYCLINE: SIGNIFICANT CHANGE UP
-  TOBRAMYCIN: SIGNIFICANT CHANGE UP
-  TRIMETHOPRIM/SULFAMETHOXAZOLE: SIGNIFICANT CHANGE UP
-  VANCOMYCIN: SIGNIFICANT CHANGE UP
ALBUMIN SERPL ELPH-MCNC: 3.3 G/DL — SIGNIFICANT CHANGE UP (ref 3.3–5.2)
ALBUMIN SERPL ELPH-MCNC: 3.3 G/DL — SIGNIFICANT CHANGE UP (ref 3.3–5.2)
ALBUMIN SERPL ELPH-MCNC: 3.5 G/DL — SIGNIFICANT CHANGE UP (ref 3.3–5.2)
ALBUMIN SERPL ELPH-MCNC: 3.5 G/DL — SIGNIFICANT CHANGE UP (ref 3.3–5.2)
ALP SERPL-CCNC: 147 U/L — HIGH (ref 40–120)
ALP SERPL-CCNC: 147 U/L — HIGH (ref 40–120)
ALP SERPL-CCNC: 149 U/L — HIGH (ref 40–120)
ALP SERPL-CCNC: 154 U/L — HIGH (ref 40–120)
ALT FLD-CCNC: 113 U/L — HIGH
ALT FLD-CCNC: 120 U/L — HIGH
ALT FLD-CCNC: 126 U/L — HIGH
ALT FLD-CCNC: 131 U/L — HIGH
AMMONIA BLD-MCNC: 64 UMOL/L — HIGH (ref 11–55)
AMMONIA BLD-MCNC: 66 UMOL/L — HIGH (ref 11–55)
ANION GAP SERPL CALC-SCNC: 16 MMOL/L — SIGNIFICANT CHANGE UP (ref 5–17)
ANION GAP SERPL CALC-SCNC: 16 MMOL/L — SIGNIFICANT CHANGE UP (ref 5–17)
ANION GAP SERPL CALC-SCNC: 17 MMOL/L — SIGNIFICANT CHANGE UP (ref 5–17)
ANION GAP SERPL CALC-SCNC: 17 MMOL/L — SIGNIFICANT CHANGE UP (ref 5–17)
APTT BLD: 26.2 SEC — SIGNIFICANT CHANGE UP (ref 26.1–36.8)
APTT BLD: 28.8 SEC — SIGNIFICANT CHANGE UP (ref 26.1–36.8)
AST SERPL-CCNC: 120 U/L — HIGH
AST SERPL-CCNC: 138 U/L — HIGH
AST SERPL-CCNC: 158 U/L — HIGH
AST SERPL-CCNC: 171 U/L — HIGH
BASOPHILS # BLD AUTO: 0.01 K/UL — SIGNIFICANT CHANGE UP (ref 0–0.2)
BASOPHILS NFR BLD AUTO: 0.1 % — SIGNIFICANT CHANGE UP (ref 0–2)
BILIRUB SERPL-MCNC: 2.8 MG/DL — HIGH (ref 0.4–2)
BILIRUB SERPL-MCNC: 2.9 MG/DL — HIGH (ref 0.4–2)
BILIRUB SERPL-MCNC: 3 MG/DL — HIGH (ref 0.4–2)
BILIRUB SERPL-MCNC: 3.2 MG/DL — HIGH (ref 0.4–2)
BLD GP AB SCN SERPL QL: SIGNIFICANT CHANGE UP
BUN SERPL-MCNC: 57 MG/DL — HIGH (ref 8–20)
BUN SERPL-MCNC: 58.7 MG/DL — HIGH (ref 8–20)
BUN SERPL-MCNC: 64.3 MG/DL — HIGH (ref 8–20)
BUN SERPL-MCNC: 69.5 MG/DL — HIGH (ref 8–20)
CALCIUM SERPL-MCNC: 7.7 MG/DL — LOW (ref 8.4–10.5)
CHLORIDE SERPL-SCNC: 90 MMOL/L — LOW (ref 96–108)
CHLORIDE SERPL-SCNC: 91 MMOL/L — LOW (ref 96–108)
CHLORIDE SERPL-SCNC: 91 MMOL/L — LOW (ref 96–108)
CHLORIDE SERPL-SCNC: 93 MMOL/L — LOW (ref 96–108)
CO2 SERPL-SCNC: 31 MMOL/L — HIGH (ref 22–29)
CO2 SERPL-SCNC: 31 MMOL/L — HIGH (ref 22–29)
CO2 SERPL-SCNC: 33 MMOL/L — HIGH (ref 22–29)
CO2 SERPL-SCNC: 34 MMOL/L — HIGH (ref 22–29)
CREAT SERPL-MCNC: 1.94 MG/DL — HIGH (ref 0.5–1.3)
CREAT SERPL-MCNC: 1.97 MG/DL — HIGH (ref 0.5–1.3)
CREAT SERPL-MCNC: 2.02 MG/DL — HIGH (ref 0.5–1.3)
CREAT SERPL-MCNC: 2.2 MG/DL — HIGH (ref 0.5–1.3)
CULTURE RESULTS: ABNORMAL
EGFR: 30 ML/MIN/1.73M2 — LOW
EGFR: 30 ML/MIN/1.73M2 — LOW
EGFR: 34 ML/MIN/1.73M2 — LOW
EGFR: 34 ML/MIN/1.73M2 — LOW
EGFR: 35 ML/MIN/1.73M2 — LOW
EOSINOPHIL # BLD AUTO: 0 K/UL — SIGNIFICANT CHANGE UP (ref 0–0.5)
EOSINOPHIL NFR BLD AUTO: 0 % — SIGNIFICANT CHANGE UP (ref 0–6)
GAS PNL BLDA: SIGNIFICANT CHANGE UP
GAS PNL BLDA: SIGNIFICANT CHANGE UP
GAS PNL BLDV: SIGNIFICANT CHANGE UP
GLUCOSE BLDC GLUCOMTR-MCNC: 100 MG/DL — HIGH (ref 70–99)
GLUCOSE BLDC GLUCOMTR-MCNC: 154 MG/DL — HIGH (ref 70–99)
GLUCOSE BLDC GLUCOMTR-MCNC: 191 MG/DL — HIGH (ref 70–99)
GLUCOSE BLDC GLUCOMTR-MCNC: 209 MG/DL — HIGH (ref 70–99)
GLUCOSE BLDC GLUCOMTR-MCNC: 81 MG/DL — SIGNIFICANT CHANGE UP (ref 70–99)
GLUCOSE SERPL-MCNC: 165 MG/DL — HIGH (ref 70–99)
GLUCOSE SERPL-MCNC: 174 MG/DL — HIGH (ref 70–99)
GLUCOSE SERPL-MCNC: 195 MG/DL — HIGH (ref 70–99)
GLUCOSE SERPL-MCNC: 196 MG/DL — HIGH (ref 70–99)
HCT VFR BLD CALC: 35.9 % — LOW (ref 39–50)
HCT VFR BLD CALC: 36.1 % — LOW (ref 39–50)
HCT VFR BLD CALC: 36.1 % — LOW (ref 39–50)
HCT VFR BLD CALC: 36.2 % — LOW (ref 39–50)
HGB BLD-MCNC: 12.3 G/DL — LOW (ref 13–17)
HGB BLD-MCNC: 12.3 G/DL — LOW (ref 13–17)
HGB BLD-MCNC: 12.5 G/DL — LOW (ref 13–17)
HGB BLD-MCNC: 12.5 G/DL — LOW (ref 13–17)
IMM GRANULOCYTES # BLD AUTO: 0.19 K/UL — HIGH (ref 0–0.07)
IMM GRANULOCYTES NFR BLD AUTO: 1.5 % — HIGH (ref 0–0.9)
IMMATURE PLATELET FRACTION #: 3.4 K/UL — LOW (ref 3.9–12.5)
IMMATURE PLATELET FRACTION #: 3.8 K/UL — LOW (ref 3.9–12.5)
IMMATURE PLATELET FRACTION #: 3.9 K/UL — SIGNIFICANT CHANGE UP (ref 3.9–12.5)
IMMATURE PLATELET FRACTION #: 4.6 K/UL — SIGNIFICANT CHANGE UP (ref 3.9–12.5)
IMMATURE PLATELET FRACTION %: 14.7 % — HIGH (ref 1.6–7.1)
IMMATURE PLATELET FRACTION %: 17.7 % — HIGH (ref 1.6–7.1)
IMMATURE PLATELET FRACTION %: 18.3 % — HIGH (ref 1.6–7.1)
IMMATURE PLATELET FRACTION %: 19.3 % — HIGH (ref 1.6–7.1)
INR BLD: 1.38 RATIO — HIGH (ref 0.85–1.16)
INR BLD: 1.54 RATIO — HIGH (ref 0.85–1.16)
LACTATE SERPL-SCNC: 2.7 MMOL/L — HIGH (ref 0.5–2)
LACTATE SERPL-SCNC: 2.8 MMOL/L — HIGH (ref 0.5–2)
LYMPHOCYTES # BLD AUTO: 0.58 K/UL — LOW (ref 1–3.3)
LYMPHOCYTES NFR BLD AUTO: 4.7 % — LOW (ref 13–44)
MAGNESIUM SERPL-MCNC: 2 MG/DL — SIGNIFICANT CHANGE UP (ref 1.6–2.6)
MAGNESIUM SERPL-MCNC: 2.1 MG/DL — SIGNIFICANT CHANGE UP (ref 1.6–2.6)
MAGNESIUM SERPL-MCNC: 2.1 MG/DL — SIGNIFICANT CHANGE UP (ref 1.8–2.6)
MAGNESIUM SERPL-MCNC: 2.2 MG/DL — SIGNIFICANT CHANGE UP (ref 1.6–2.6)
MCHC RBC-ENTMCNC: 28.8 PG — SIGNIFICANT CHANGE UP (ref 27–34)
MCHC RBC-ENTMCNC: 28.8 PG — SIGNIFICANT CHANGE UP (ref 27–34)
MCHC RBC-ENTMCNC: 29.1 PG — SIGNIFICANT CHANGE UP (ref 27–34)
MCHC RBC-ENTMCNC: 29.1 PG — SIGNIFICANT CHANGE UP (ref 27–34)
MCHC RBC-ENTMCNC: 34 G/DL — SIGNIFICANT CHANGE UP (ref 32–36)
MCHC RBC-ENTMCNC: 34.1 G/DL — SIGNIFICANT CHANGE UP (ref 32–36)
MCHC RBC-ENTMCNC: 34.6 G/DL — SIGNIFICANT CHANGE UP (ref 32–36)
MCHC RBC-ENTMCNC: 34.8 G/DL — SIGNIFICANT CHANGE UP (ref 32–36)
MCV RBC AUTO: 83.5 FL — SIGNIFICANT CHANGE UP (ref 80–100)
MCV RBC AUTO: 84 FL — SIGNIFICANT CHANGE UP (ref 80–100)
MCV RBC AUTO: 84.5 FL — SIGNIFICANT CHANGE UP (ref 80–100)
MCV RBC AUTO: 84.8 FL — SIGNIFICANT CHANGE UP (ref 80–100)
METHOD TYPE: SIGNIFICANT CHANGE UP
MONOCYTES # BLD AUTO: 0.7 K/UL — SIGNIFICANT CHANGE UP (ref 0–0.9)
MONOCYTES NFR BLD AUTO: 5.7 % — SIGNIFICANT CHANGE UP (ref 2–14)
NEUTROPHILS # BLD AUTO: 10.81 K/UL — HIGH (ref 1.8–7.4)
NEUTROPHILS NFR BLD AUTO: 88 % — HIGH (ref 43–77)
NRBC # BLD AUTO: 0 K/UL — SIGNIFICANT CHANGE UP (ref 0–0)
NRBC # BLD AUTO: 0 K/UL — SIGNIFICANT CHANGE UP (ref 0–0)
NRBC # BLD AUTO: 0.02 K/UL — HIGH (ref 0–0)
NRBC # BLD AUTO: 0.03 K/UL — HIGH (ref 0–0)
NRBC # FLD: 0 K/UL — SIGNIFICANT CHANGE UP (ref 0–0)
NRBC # FLD: 0 K/UL — SIGNIFICANT CHANGE UP (ref 0–0)
NRBC # FLD: 0.02 K/UL — HIGH (ref 0–0)
NRBC # FLD: 0.03 K/UL — HIGH (ref 0–0)
NRBC BLD AUTO-RTO: 0 /100 WBCS — SIGNIFICANT CHANGE UP (ref 0–0)
ORGANISM # SPEC MICROSCOPIC CNT: ABNORMAL
ORGANISM # SPEC MICROSCOPIC CNT: SIGNIFICANT CHANGE UP
PHOSPHATE SERPL-MCNC: 2.6 MG/DL — SIGNIFICANT CHANGE UP (ref 2.4–4.7)
PHOSPHATE SERPL-MCNC: 3.4 MG/DL — SIGNIFICANT CHANGE UP (ref 2.4–4.7)
PHOSPHATE SERPL-MCNC: 3.5 MG/DL — SIGNIFICANT CHANGE UP (ref 2.4–4.7)
PLATELET # BLD AUTO: 19 K/UL — CRITICAL LOW (ref 150–400)
PLATELET # BLD AUTO: 20 K/UL — CRITICAL LOW (ref 150–400)
PLATELET # BLD AUTO: 25 K/UL — LOW (ref 150–400)
PLATELET # BLD AUTO: 26 K/UL — LOW (ref 150–400)
PMV BLD: 12.9 FL — SIGNIFICANT CHANGE UP (ref 7–13)
PMV BLD: 13 FL — SIGNIFICANT CHANGE UP (ref 7–13)
PMV BLD: 13.5 FL — HIGH (ref 7–13)
PMV BLD: SIGNIFICANT CHANGE UP FL (ref 7–13)
POTASSIUM SERPL-MCNC: 3.5 MMOL/L — SIGNIFICANT CHANGE UP (ref 3.5–5.3)
POTASSIUM SERPL-MCNC: 3.6 MMOL/L — SIGNIFICANT CHANGE UP (ref 3.5–5.3)
POTASSIUM SERPL-MCNC: 3.6 MMOL/L — SIGNIFICANT CHANGE UP (ref 3.5–5.3)
POTASSIUM SERPL-MCNC: 3.7 MMOL/L — SIGNIFICANT CHANGE UP (ref 3.5–5.3)
POTASSIUM SERPL-SCNC: 3.5 MMOL/L — SIGNIFICANT CHANGE UP (ref 3.5–5.3)
POTASSIUM SERPL-SCNC: 3.6 MMOL/L — SIGNIFICANT CHANGE UP (ref 3.5–5.3)
POTASSIUM SERPL-SCNC: 3.6 MMOL/L — SIGNIFICANT CHANGE UP (ref 3.5–5.3)
POTASSIUM SERPL-SCNC: 3.7 MMOL/L — SIGNIFICANT CHANGE UP (ref 3.5–5.3)
PROCALCITONIN SERPL-MCNC: 83.01 NG/ML — HIGH (ref 0.02–0.1)
PROT SERPL-MCNC: 5.9 G/DL — LOW (ref 6.6–8.7)
PROT SERPL-MCNC: 5.9 G/DL — LOW (ref 6.6–8.7)
PROT SERPL-MCNC: 6.2 G/DL — LOW (ref 6.6–8.7)
PROT SERPL-MCNC: 6.3 G/DL — LOW (ref 6.6–8.7)
PROTHROM AB SERPL-ACNC: 15.9 SEC — HIGH (ref 9.9–13.4)
PROTHROM AB SERPL-ACNC: 17.8 SEC — HIGH (ref 9.9–13.4)
RBC # BLD: 4.27 M/UL — SIGNIFICANT CHANGE UP (ref 4.2–5.8)
RBC # BLD: 4.27 M/UL — SIGNIFICANT CHANGE UP (ref 4.2–5.8)
RBC # BLD: 4.3 M/UL — SIGNIFICANT CHANGE UP (ref 4.2–5.8)
RBC # BLD: 4.3 M/UL — SIGNIFICANT CHANGE UP (ref 4.2–5.8)
RBC # FLD: 13.4 % — SIGNIFICANT CHANGE UP (ref 10.3–14.5)
RBC # FLD: 13.7 % — SIGNIFICANT CHANGE UP (ref 10.3–14.5)
RBC # FLD: 13.7 % — SIGNIFICANT CHANGE UP (ref 10.3–14.5)
RBC # FLD: 13.8 % — SIGNIFICANT CHANGE UP (ref 10.3–14.5)
SODIUM SERPL-SCNC: 138 MMOL/L — SIGNIFICANT CHANGE UP (ref 135–145)
SODIUM SERPL-SCNC: 139 MMOL/L — SIGNIFICANT CHANGE UP (ref 135–145)
SODIUM SERPL-SCNC: 140 MMOL/L — SIGNIFICANT CHANGE UP (ref 135–145)
SODIUM SERPL-SCNC: 143 MMOL/L — SIGNIFICANT CHANGE UP (ref 135–145)
SPECIMEN SOURCE: SIGNIFICANT CHANGE UP
WBC # BLD: 12.29 K/UL — HIGH (ref 3.8–10.5)
WBC # BLD: 12.65 K/UL — HIGH (ref 3.8–10.5)
WBC # BLD: 13.83 K/UL — HIGH (ref 3.8–10.5)
WBC # BLD: 13.92 K/UL — HIGH (ref 3.8–10.5)
WBC # FLD AUTO: 12.29 K/UL — HIGH (ref 3.8–10.5)
WBC # FLD AUTO: 12.65 K/UL — HIGH (ref 3.8–10.5)
WBC # FLD AUTO: 13.83 K/UL — HIGH (ref 3.8–10.5)
WBC # FLD AUTO: 13.92 K/UL — HIGH (ref 3.8–10.5)

## 2025-05-21 PROCEDURE — 92960 CARDIOVERSION ELECTRIC EXT: CPT

## 2025-05-21 PROCEDURE — 95720 EEG PHY/QHP EA INCR W/VEEG: CPT

## 2025-05-21 PROCEDURE — 99233 SBSQ HOSP IP/OBS HIGH 50: CPT

## 2025-05-21 PROCEDURE — 93010 ELECTROCARDIOGRAM REPORT: CPT

## 2025-05-21 PROCEDURE — 99291 CRITICAL CARE FIRST HOUR: CPT | Mod: GC

## 2025-05-21 PROCEDURE — 99223 1ST HOSP IP/OBS HIGH 75: CPT

## 2025-05-21 PROCEDURE — 71045 X-RAY EXAM CHEST 1 VIEW: CPT | Mod: 26

## 2025-05-21 RX ORDER — MAGNESIUM SULFATE 500 MG/ML
2 SYRINGE (ML) INJECTION ONCE
Refills: 0 | Status: COMPLETED | OUTPATIENT
Start: 2025-05-21 | End: 2025-05-21

## 2025-05-21 RX ORDER — LIDOCAINE HCL/PF 10 MG/ML
4 VIAL (ML) INJECTION
Qty: 2 | Refills: 0 | Status: DISCONTINUED | OUTPATIENT
Start: 2025-05-21 | End: 2025-05-22

## 2025-05-21 RX ORDER — MIDAZOLAM IN 0.9 % SOD.CHLORID 1 MG/ML
0.02 PLASTIC BAG, INJECTION (ML) INTRAVENOUS
Qty: 100 | Refills: 0 | Status: DISCONTINUED | OUTPATIENT
Start: 2025-05-21 | End: 2025-05-22

## 2025-05-21 RX ORDER — BUMETANIDE 1 MG/1
2 TABLET ORAL DAILY
Refills: 0 | Status: DISCONTINUED | OUTPATIENT
Start: 2025-05-22 | End: 2025-05-22

## 2025-05-21 RX ORDER — MEROPENEM 1 G/30ML
1000 INJECTION INTRAVENOUS EVERY 8 HOURS
Refills: 0 | Status: DISCONTINUED | OUTPATIENT
Start: 2025-05-21 | End: 2025-05-23

## 2025-05-21 RX ORDER — CALCIUM GLUCONATE 20 MG/ML
2 INJECTION, SOLUTION INTRAVENOUS ONCE
Refills: 0 | Status: COMPLETED | OUTPATIENT
Start: 2025-05-21 | End: 2025-05-21

## 2025-05-21 RX ORDER — ACETAZOLAMIDE 250 MG/1
250 TABLET ORAL EVERY 12 HOURS
Refills: 0 | Status: COMPLETED | OUTPATIENT
Start: 2025-05-21 | End: 2025-05-23

## 2025-05-21 RX ORDER — FENTANYL CITRATE-0.9 % NACL/PF 100MCG/2ML
50 SYRINGE (ML) INTRAVENOUS ONCE
Refills: 0 | Status: DISCONTINUED | OUTPATIENT
Start: 2025-05-21 | End: 2025-05-21

## 2025-05-21 RX ORDER — DILTIAZEM HYDROCHLORIDE 240 MG/1
20 TABLET, EXTENDED RELEASE ORAL ONCE
Refills: 0 | Status: COMPLETED | OUTPATIENT
Start: 2025-05-21 | End: 2025-05-21

## 2025-05-21 RX ORDER — MIDAZOLAM IN 0.9 % SOD.CHLORID 1 MG/ML
2 PLASTIC BAG, INJECTION (ML) INTRAVENOUS ONCE
Refills: 0 | Status: DISCONTINUED | OUTPATIENT
Start: 2025-05-21 | End: 2025-05-21

## 2025-05-21 RX ORDER — AMIODARONE HYDROCHLORIDE 50 MG/ML
150 INJECTION, SOLUTION INTRAVENOUS ONCE
Refills: 0 | Status: COMPLETED | OUTPATIENT
Start: 2025-05-21 | End: 2025-05-21

## 2025-05-21 RX ORDER — SODIUM CHLORIDE 9 G/1000ML
1000 INJECTION, SOLUTION INTRAVENOUS ONCE
Refills: 0 | Status: COMPLETED | OUTPATIENT
Start: 2025-05-21 | End: 2025-05-21

## 2025-05-21 RX ORDER — SODIUM CHLORIDE 9 G/1000ML
500 INJECTION, SOLUTION INTRAVENOUS
Refills: 0 | Status: DISCONTINUED | OUTPATIENT
Start: 2025-05-21 | End: 2025-05-21

## 2025-05-21 RX ORDER — ACETAMINOPHEN 500 MG/5ML
1000 LIQUID (ML) ORAL ONCE
Refills: 0 | Status: COMPLETED | OUTPATIENT
Start: 2025-05-21 | End: 2025-05-21

## 2025-05-21 RX ORDER — FENTANYL CITRATE-0.9 % NACL/PF 100MCG/2ML
25 SYRINGE (ML) INTRAVENOUS ONCE
Refills: 0 | Status: DISCONTINUED | OUTPATIENT
Start: 2025-05-21 | End: 2025-05-21

## 2025-05-21 RX ORDER — MEROPENEM 1 G/30ML
1000 INJECTION INTRAVENOUS EVERY 8 HOURS
Refills: 0 | Status: DISCONTINUED | OUTPATIENT
Start: 2025-05-21 | End: 2025-05-21

## 2025-05-21 RX ADMIN — Medication 25 MICROGRAM(S): at 13:03

## 2025-05-21 RX ADMIN — INSULIN LISPRO 4: 100 INJECTION, SOLUTION INTRAVENOUS; SUBCUTANEOUS at 11:31

## 2025-05-21 RX ADMIN — Medication 1 APPLICATION(S): at 06:13

## 2025-05-21 RX ADMIN — MEROPENEM 1000 MILLIGRAM(S): 1 INJECTION INTRAVENOUS at 06:11

## 2025-05-21 RX ADMIN — Medication 400 MILLIGRAM(S): at 17:29

## 2025-05-21 RX ADMIN — Medication 25 MICROGRAM(S): at 13:30

## 2025-05-21 RX ADMIN — Medication 15 MILLILITER(S): at 06:12

## 2025-05-21 RX ADMIN — Medication 40 MILLIGRAM(S): at 11:31

## 2025-05-21 RX ADMIN — Medication 30 MG/MIN: at 21:43

## 2025-05-21 RX ADMIN — MEROPENEM 33.33 MILLIGRAM(S): 1 INJECTION INTRAVENOUS at 21:17

## 2025-05-21 RX ADMIN — Medication 50 MILLIGRAM(S): at 11:31

## 2025-05-21 RX ADMIN — Medication 50 MICROGRAM(S): at 22:00

## 2025-05-21 RX ADMIN — Medication 400 MILLIGRAM(S): at 21:07

## 2025-05-21 RX ADMIN — Medication 2 MILLIGRAM(S): at 21:17

## 2025-05-21 RX ADMIN — Medication 15 MILLILITER(S): at 17:29

## 2025-05-21 RX ADMIN — Medication 50 MICROGRAM(S): at 21:07

## 2025-05-21 RX ADMIN — Medication 1000 MILLIGRAM(S): at 18:00

## 2025-05-21 RX ADMIN — ACETAZOLAMIDE 250 MILLIGRAM(S): 250 TABLET ORAL at 17:29

## 2025-05-21 RX ADMIN — LACTULOSE 10 GRAM(S): 10 SOLUTION ORAL at 06:11

## 2025-05-21 RX ADMIN — CALCIUM GLUCONATE 200 GRAM(S): 20 INJECTION, SOLUTION INTRAVENOUS at 21:25

## 2025-05-21 RX ADMIN — Medication 1.56 MG/KG/HR: at 21:42

## 2025-05-21 RX ADMIN — Medication 100 MILLIEQUIVALENT(S): at 13:03

## 2025-05-21 RX ADMIN — AMIODARONE HYDROCHLORIDE 618 MILLIGRAM(S): 50 INJECTION, SOLUTION INTRAVENOUS at 21:00

## 2025-05-21 RX ADMIN — MEROPENEM 1000 MILLIGRAM(S): 1 INJECTION INTRAVENOUS at 13:02

## 2025-05-21 RX ADMIN — DILTIAZEM HYDROCHLORIDE 20 MILLIGRAM(S): 240 TABLET, EXTENDED RELEASE ORAL at 21:16

## 2025-05-21 RX ADMIN — MUPIROCIN CALCIUM 1 APPLICATION(S): 20 CREAM TOPICAL at 17:29

## 2025-05-21 RX ADMIN — Medication 100 MILLIEQUIVALENT(S): at 10:07

## 2025-05-21 RX ADMIN — INSULIN LISPRO 2: 100 INJECTION, SOLUTION INTRAVENOUS; SUBCUTANEOUS at 17:30

## 2025-05-21 RX ADMIN — Medication 50 MILLIGRAM(S): at 17:29

## 2025-05-21 RX ADMIN — Medication 100 MILLIEQUIVALENT(S): at 11:30

## 2025-05-21 RX ADMIN — Medication 50 MILLIGRAM(S): at 23:12

## 2025-05-21 RX ADMIN — MUPIROCIN CALCIUM 1 APPLICATION(S): 20 CREAM TOPICAL at 06:12

## 2025-05-21 RX ADMIN — Medication 25 GRAM(S): at 21:25

## 2025-05-21 RX ADMIN — ACETAZOLAMIDE 250 MILLIGRAM(S): 250 TABLET ORAL at 10:47

## 2025-05-21 RX ADMIN — Medication 50 MILLIGRAM(S): at 06:11

## 2025-05-21 RX ADMIN — SODIUM CHLORIDE 1000 MILLILITER(S): 9 INJECTION, SOLUTION INTRAVENOUS at 21:07

## 2025-05-21 RX ADMIN — Medication 100 MILLIEQUIVALENT(S): at 21:43

## 2025-05-21 RX ADMIN — LACTULOSE 10 GRAM(S): 10 SOLUTION ORAL at 13:02

## 2025-05-21 RX ADMIN — Medication 1000 MILLIGRAM(S): at 22:00

## 2025-05-21 RX ADMIN — Medication 200 MILLIEQUIVALENT(S): at 21:17

## 2025-05-21 RX ADMIN — INSULIN LISPRO 2: 100 INJECTION, SOLUTION INTRAVENOUS; SUBCUTANEOUS at 06:13

## 2025-05-21 NOTE — PROCEDURE NOTE - NSINDICATIONS_GEN_A_CORE
arrhythmia
drainage
arterial puncture to obtain ABG's/critical patient/monitoring purposes
arrhythmia/SVT
no

## 2025-05-21 NOTE — EEG REPORT - NS EEG TEXT BOX
DOMINIC ALAMO [id number]     Study Date: 		05-21-25  Duration:                      18 hrs (12:45 - 08:00)  --------------------------------------------------------------------------------------------------  History:  CC/ HPI Patient is a 75y old  Male who presents with a chief complaint of Acute respiratory failure, shock (21 May 2025 08:27)    MEDICATIONS  (STANDING):  acetaZOLAMIDE Injectable 250 milliGRAM(s) IV Push every 12 hours  angiotensin II Infusion 40 NANOGram(s)/kG/Min (9.37 mL/Hr) IV Continuous <Continuous>  chlorhexidine 0.12% Liquid 15 milliLiter(s) Oral Mucosa every 12 hours  chlorhexidine 2% Cloths 1 Application(s) Topical <User Schedule>  DOBUTamine Infusion 2.5 MICROgram(s)/kG/Min (5.86 mL/Hr) IV Continuous <Continuous>  fentaNYL   Infusion. 0.5 MICROgram(s)/kG/Hr (3.91 mL/Hr) IV Continuous <Continuous>  hydrocortisone sodium succinate Injectable 50 milliGRAM(s) IV Push every 6 hours  insulin glargine Injectable (LANTUS) 5 Unit(s) SubCutaneous every morning  insulin lispro (ADMELOG) corrective regimen sliding scale.   SubCutaneous every 6 hours  lactulose Syrup 10 Gram(s) Oral three times a day  meropenem Injectable 1000 milliGRAM(s) IV Push every 8 hours  mupirocin 2% Nasal 1 Application(s) Both Nostrils two times a day  pantoprazole  Injectable 40 milliGRAM(s) IV Push daily  potassium chloride  20 mEq/100 mL IVPB 20 milliEquivalent(s) IV Intermittent every 1 hour  rifAXIMin 550 milliGRAM(s) Oral two times a day  vasopressin Infusion 0.04 Unit(s)/Min (6 mL/Hr) IV Continuous <Continuous>    --------------------------------------------------------------------------------------------------  Study Interpretation:    [[[Abbreviation Key:  PDR=alpha rhythm/posterior dominant rhythm. A-P=anterior posterior.  Amplitude: ‘very low’:<20; ‘low’:20-49; ‘medium’:; ‘high’:>150uV.  Persistence for periodic/rhythmic patterns (% of epoch) ‘rare’:<1%; ‘occasional’:1-10%; ‘frequent’:10-50%; ‘abundant’:50-90%; ‘continuous’:>90%.  Persistence for sporadic discharges: ‘rare’:<1/hr; ‘occasional’:1/min-1/hr; ‘frequent’:>1/min; ‘abundant’:>1/10 sec.  RPP=rhythmic and periodic patterns; GRDA=generalized rhythmic delta activity; FIRDA=frontal intermittent GRDA; LRDA=lateralized rhythmic delta activity; TIRDA=temporal intermittent rhythmic delta activity;  LPD=PLED=lateralized periodic discharges; GPD=generalized periodic discharges; BIPDs =bilateral independent periodic discharges; Mf=multifocal; SIRPDs=stimulus induced rhythmic, periodic, or ictal appearing discharges; BIRDs=brief potentially ictal rhythmic discharges >4 Hz, lasting .5-10s; PFA (paroxysmal bursts >13 Hz or =8 Hz <10s).  Modifiers: +F=with fast component; +S=with spike component; +R=with rhythmic component.  S-B=burst suppression pattern.  Max=maximal. N1-drowsy; N2-stage II sleep; N3-slow wave sleep. SSS/BETS=small sharp spikes/benign epileptiform transients of sleep. HV=hyperventilation; PS=photic stimulation]]]    Daily EEG Visual Analysis    FINDINGS:      Background:  Continuity: continuous   Symmetry: symmetric  PDR: Toward tail end of study, a poorly modulated 5.5-6hz activity posterior dominant rhythm emerged, with amplitude to 20 uV. Low amplitude frontal beta noted in wakefulness.  Reactivity: present  Voltage: normal, mostly 20-150uV  Anterior Posterior Gradient: present  Other background findings: none  Breach: absent    Background Slowing:  Generalized slowing: diffuse polymorphic delta/theta slowing, which improves over course of study.   Focal slowing: none was present.    State Changes:   -Drowsiness noted with increased slowing, attenuation of fast activity, vertex transients.  -Present with N2 sleep transients with symmetric spindles and K-complexes.    Sporadic Epileptiform Discharges:    Occasional, frontocentrally predominant polyspike wave discharges at frequency of 1hz. At times these appear more generalized.     Rhythmic and Periodic Patterns (RPPs):  None     Electrographic and Electroclinical seizures:  None    Other Clinical Events:  None    Activation Procedures:   -Hyperventilation was not performed.    -Photic stimulation was not performed.    Artifacts:  Intermittent myogenic and movement artifacts were noted.    ECG:  The heart rate on single channel ECG was predominantly between 80-90 BPM.    EEG Classification / Summary:  Abnormal  EEG in the awake / drowsy / asleep state(s):   - Occasional, frontocentrally predominant, 1hz polyspike wave discharges, that at times appear more generalized.   - Moderate diffuse background slowing that improved as the study progressed.     Clinical Impression:  The EEG findings indicate a possible risk of focal-onset seizures from bilateral frontocentral regions, though risk of generalized seizures cannot be excluded. Background findings are indicative of a moderate, nonspecific disturbance in cerebral function. No seizures recorded.    -------------------------------------------------------------------------------------------------------  ***THIS IS A PRELIMINARY FELLOW REPORT PENDING REVIEW WITH ATTENDING EPILEPTOLOGIST***    Elsie Mensah, PGY7  Epilepsy Fellow     [facility] EEG Reading Room Ph#: [phone]  Epilepsy Answering Service after 5PM and before 8:30AM: Ph#: [phone]       DOMINIC ALAMO [id number]     Study Date: 		05-21-25  Duration:                      18 hrs (12:45 - 08:00)  --------------------------------------------------------------------------------------------------  History:  CC/ HPI Patient is a 75y old  Male who presents with a chief complaint of Acute respiratory failure, shock (21 May 2025 08:27)    MEDICATIONS  (STANDING):  acetaZOLAMIDE Injectable 250 milliGRAM(s) IV Push every 12 hours  angiotensin II Infusion 40 NANOGram(s)/kG/Min (9.37 mL/Hr) IV Continuous <Continuous>  chlorhexidine 0.12% Liquid 15 milliLiter(s) Oral Mucosa every 12 hours  chlorhexidine 2% Cloths 1 Application(s) Topical <User Schedule>  DOBUTamine Infusion 2.5 MICROgram(s)/kG/Min (5.86 mL/Hr) IV Continuous <Continuous>  fentaNYL   Infusion. 0.5 MICROgram(s)/kG/Hr (3.91 mL/Hr) IV Continuous <Continuous>  hydrocortisone sodium succinate Injectable 50 milliGRAM(s) IV Push every 6 hours  insulin glargine Injectable (LANTUS) 5 Unit(s) SubCutaneous every morning  insulin lispro (ADMELOG) corrective regimen sliding scale.   SubCutaneous every 6 hours  lactulose Syrup 10 Gram(s) Oral three times a day  meropenem Injectable 1000 milliGRAM(s) IV Push every 8 hours  mupirocin 2% Nasal 1 Application(s) Both Nostrils two times a day  pantoprazole  Injectable 40 milliGRAM(s) IV Push daily  potassium chloride  20 mEq/100 mL IVPB 20 milliEquivalent(s) IV Intermittent every 1 hour  rifAXIMin 550 milliGRAM(s) Oral two times a day  vasopressin Infusion 0.04 Unit(s)/Min (6 mL/Hr) IV Continuous <Continuous>    --------------------------------------------------------------------------------------------------  Study Interpretation:    [[[Abbreviation Key:  PDR=alpha rhythm/posterior dominant rhythm. A-P=anterior posterior.  Amplitude: ‘very low’:<20; ‘low’:20-49; ‘medium’:; ‘high’:>150uV.  Persistence for periodic/rhythmic patterns (% of epoch) ‘rare’:<1%; ‘occasional’:1-10%; ‘frequent’:10-50%; ‘abundant’:50-90%; ‘continuous’:>90%.  Persistence for sporadic discharges: ‘rare’:<1/hr; ‘occasional’:1/min-1/hr; ‘frequent’:>1/min; ‘abundant’:>1/10 sec.  RPP=rhythmic and periodic patterns; GRDA=generalized rhythmic delta activity; FIRDA=frontal intermittent GRDA; LRDA=lateralized rhythmic delta activity; TIRDA=temporal intermittent rhythmic delta activity;  LPD=PLED=lateralized periodic discharges; GPD=generalized periodic discharges; BIPDs =bilateral independent periodic discharges; Mf=multifocal; SIRPDs=stimulus induced rhythmic, periodic, or ictal appearing discharges; BIRDs=brief potentially ictal rhythmic discharges >4 Hz, lasting .5-10s; PFA (paroxysmal bursts >13 Hz or =8 Hz <10s).  Modifiers: +F=with fast component; +S=with spike component; +R=with rhythmic component.  S-B=burst suppression pattern.  Max=maximal. N1-drowsy; N2-stage II sleep; N3-slow wave sleep. SSS/BETS=small sharp spikes/benign epileptiform transients of sleep. HV=hyperventilation; PS=photic stimulation]]]    Daily EEG Visual Analysis    FINDINGS:      Background:  Continuity: continuous   Symmetry: symmetric  PDR: Toward tail end of study, a poorly modulated 5.5-6hz activity posterior dominant rhythm emerged, with amplitude to 20 uV. Low amplitude frontal beta noted in wakefulness.  Reactivity: present  Voltage: normal, mostly 20-150uV  Anterior Posterior Gradient: present  Other background findings: none  Breach: absent    Background Slowing:  Generalized slowing: diffuse polymorphic delta/theta slowing, which improves over course of study.   Focal slowing: none was present.    State Changes:   -Drowsiness noted with increased slowing, attenuation of fast activity, vertex transients.  -Present with N2 sleep transients with symmetric spindles and K-complexes, which appear dyshormic at times.     Sporadic Epileptiform Discharges:    Occasional, frontocentrally predominant polyspike wave discharges at frequency of 1hz. At times these appear more generalized.     Rhythmic and Periodic Patterns (RPPs):  None     Electrographic and Electroclinical seizures:  None    Other Clinical Events:  None    Activation Procedures:   -Hyperventilation was not performed.    -Photic stimulation was not performed.    Artifacts:  Intermittent myogenic and movement artifacts were noted.    ECG:  The heart rate on single channel ECG was predominantly between 80-90 BPM.    EEG Classification / Summary:  Abnormal  EEG in the awake / drowsy / asleep state(s):   - Occasional, frontocentrally predominant, 1hz polyspike wave discharges, that at times appear more generalized.   - Moderate diffuse background slowing that improved as the study progressed.   - Dyshormic sleep transients.     Clinical Impression:  The EEG findings indicate a possible risk of focal-onset seizures from bilateral frontocentral regions, though risk of generalized seizures cannot be excluded. Background findings are indicative of a moderate, nonspecific disturbance in cerebral function. No seizures recorded.    -------------------------------------------------------------------------------------------------------  ***THIS IS A PRELIMINARY FELLOW REPORT PENDING REVIEW WITH ATTENDING EPILEPTOLOGIST***    Elsie Mensah, PGY7  Epilepsy Fellow     [facility] EEG Reading Room Ph#: [phone]  Epilepsy Answering Service after 5PM and before 8:30AM: Ph#: [phone]       DOMINIC ALAMO [id number]     Study Date: 		05-21-25  Duration:                      18 hrs (12:45 - 08:00)  --------------------------------------------------------------------------------------------------  History:  CC/ HPI Patient is a 75y old  Male who presents with a chief complaint of Acute respiratory failure, shock (21 May 2025 08:27)    MEDICATIONS  (STANDING):  acetaZOLAMIDE Injectable 250 milliGRAM(s) IV Push every 12 hours  angiotensin II Infusion 40 NANOGram(s)/kG/Min (9.37 mL/Hr) IV Continuous <Continuous>  DOBUTamine Infusion 2.5 MICROgram(s)/kG/Min (5.86 mL/Hr) IV Continuous <Continuous>  fentaNYL   Infusion. 0.5 MICROgram(s)/kG/Hr (3.91 mL/Hr) IV Continuous <Continuous>  hydrocortisone sodium succinate Injectable 50 milliGRAM(s) IV Push every 6 hours  insulin glargine Injectable (LANTUS) 5 Unit(s) SubCutaneous every morning  insulin lispro (ADMELOG) corrective regimen sliding scale.   SubCutaneous every 6 hours  lactulose Syrup 10 Gram(s) Oral three times a day  meropenem Injectable 1000 milliGRAM(s) IV Push every 8 hours  mupirocin 2% Nasal 1 Application(s) Both Nostrils two times a day  pantoprazole  Injectable 40 milliGRAM(s) IV Push daily  potassium chloride  20 mEq/100 mL IVPB 20 milliEquivalent(s) IV Intermittent every 1 hour  rifAXIMin 550 milliGRAM(s) Oral two times a day  vasopressin Infusion 0.04 Unit(s)/Min (6 mL/Hr) IV Continuous <Continuous>    --------------------------------------------------------------------------------------------------  Study Interpretation:    [[[Abbreviation Key:  PDR=alpha rhythm/posterior dominant rhythm. A-P=anterior posterior.  Amplitude: ‘very low’:<20; ‘low’:20-49; ‘medium’:; ‘high’:>150uV.  Persistence for periodic/rhythmic patterns (% of epoch) ‘rare’:<1%; ‘occasional’:1-10%; ‘frequent’:10-50%; ‘abundant’:50-90%; ‘continuous’:>90%.  Persistence for sporadic discharges: ‘rare’:<1/hr; ‘occasional’:1/min-1/hr; ‘frequent’:>1/min; ‘abundant’:>1/10 sec.  RPP=rhythmic and periodic patterns; GRDA=generalized rhythmic delta activity; FIRDA=frontal intermittent GRDA; LRDA=lateralized rhythmic delta activity; TIRDA=temporal intermittent rhythmic delta activity;  LPD=PLED=lateralized periodic discharges; GPD=generalized periodic discharges; BIPDs =bilateral independent periodic discharges; Mf=multifocal; SIRPDs=stimulus induced rhythmic, periodic, or ictal appearing discharges; BIRDs=brief potentially ictal rhythmic discharges >4 Hz, lasting .5-10s; PFA (paroxysmal bursts >13 Hz or =8 Hz <10s).  Modifiers: +F=with fast component; +S=with spike component; +R=with rhythmic component.  S-B=burst suppression pattern.  Max=maximal. N1-drowsy; N2-stage II sleep; N3-slow wave sleep. SSS/BETS=small sharp spikes/benign epileptiform transients of sleep. HV=hyperventilation; PS=photic stimulation]]]    Daily EEG Visual Analysis    FINDINGS:      Background:  Continuity: continuous   Symmetry: symmetric  PDR: Toward tail end of study, a poorly modulated 5.5-6hz activity posterior dominant rhythm emerged, with amplitude to 20 uV. Low amplitude frontal beta noted in wakefulness.  Reactivity: present  Voltage: normal, mostly 20-150uV  Anterior Posterior Gradient: present  Other background findings: none  Breach: absent    Background Slowing:  Generalized slowing: diffuse polymorphic delta/theta slowing, which improves over course of study.   Focal slowing: none was present.    State Changes:   -Drowsiness noted with increased slowing, attenuation of fast activity, vertex transients.  -Present with N2 sleep transients with symmetric spindles and K-complexes, which appear dyshormic at times.     Sporadic Epileptiform Discharges:    Occasional, frontocentrally predominant polyspike wave discharges at frequency of 1hz. At times these appear more generalized.     Rhythmic and Periodic Patterns (RPPs):  None     Electrographic and Electroclinical seizures:  None    Other Clinical Events:  None    Activation Procedures:   -Hyperventilation was not performed.    -Photic stimulation was not performed.    Artifacts:  Intermittent myogenic and movement artifacts were noted.    ECG:  The heart rate on single channel ECG was predominantly between 80-90 BPM.    EEG Classification / Summary:  Abnormal  EEG in the awake / drowsy / asleep state(s):   - Occasional, frontocentrally predominant, 1hz polyspike wave discharges, that at times appear more generalized.   - Moderate diffuse background slowing that improved as the study progressed.   - Dyshormic sleep transients.     Clinical Impression:  The EEG findings indicate a risk of focal-onset seizures from the bilateral frontocentral regions, though risk of generalized seizures cannot be excluded electrographically. Background findings are otherwise indicative of a moderate, nonspecific disturbance in cerebral function. No seizures recorded.    -------------------------------------------------------------------------------------------------------    Elsie Mensah, PGY7  Epilepsy Fellow     MD ROXANE Prado  Director, Continuous EEG Monitoring Program, [facility]   and Epilepsy Fellowship ,   Department of Neurology, [facility] [employer]    [facility] EEG Reading Room Ph#: [phone]  Epilepsy Answering Service after 5PM and before 8:30AM: Ph#: [phone]

## 2025-05-21 NOTE — CONSULT NOTE ADULT - SUBJECTIVE AND OBJECTIVE BOX
DOMINIC ALAMO  [id number]      HPI:  76 yo male PMHx HTN, HLD, CAD based on abnormal nuke, TIA, remote pericarditis in  presented to the ED with complaints of abdominal pain, nausea, vomiting for since the day prior.  Upon arrival to the ED patient was lethargic and tachypneic, with abdominal distention and mottled skin.  He was noted to be hypotensive despite fluid resuscitation and placed on escalating doses of pressors.  He was started on BIPAP initially for tachypnea and metabolic acidosis, eventually intubated in the ED.  Patient remains intubated and history taken from chart and d/w RN.  Patient was on escalating doses of pressors and , but in last day has been weaned down.        ALLERGIES:  No Known Drug Allergies  SHRIMP (Unknown)  iv dye (Unknown)      PAST MEDICAL & SURGICAL HISTORY:  As noted above    MEDICATIONS:  acetaZOLAMIDE Injectable 250 milliGRAM(s) IV Push every 12 hours  angiotensin II Infusion 40 NANOGram(s)/kG/Min IV Continuous <Continuous>  chlorhexidine 0.12% Liquid 15 milliLiter(s) Oral Mucosa every 12 hours  chlorhexidine 2% Cloths 1 Application(s) Topical <User Schedule>  DOBUTamine Infusion 2.5 MICROgram(s)/kG/Min IV Continuous <Continuous>  fentaNYL   Infusion. 0.5 MICROgram(s)/kG/Hr IV Continuous <Continuous>  hydrocortisone sodium succinate Injectable 50 milliGRAM(s) IV Push every 6 hours  insulin glargine Injectable (LANTUS) 5 Unit(s) SubCutaneous every morning  insulin lispro (ADMELOG) corrective regimen sliding scale.   SubCutaneous every 6 hours  lactulose Syrup 10 Gram(s) Oral three times a day  meropenem Injectable 1000 milliGRAM(s) IV Push every 8 hours  mupirocin 2% Nasal 1 Application(s) Both Nostrils two times a day  pantoprazole  Injectable 40 milliGRAM(s) IV Push daily  potassium chloride  20 mEq/100 mL IVPB 20 milliEquivalent(s) IV Intermittent every 1 hour  rifAXIMin 550 milliGRAM(s) Oral two times a day  vasopressin Infusion 0.04 Unit(s)/Min IV Continuous <Continuous>      SOCIAL HISTORY:  Patient denies alcohol, tobacco, drug use    FAMILY HISTORY:  FH: CAD (coronary artery disease)        ROS:  Patient denies cough, and other than noted above full ROS is unremarkable      PHYSICAL EXAM:  Vital Signs Last 24 Hrs  T(C): 37 (21 May 2025 12:00), Max: 38.5 (20 May 2025 16:30)  T(F): 98.6 (21 May 2025 12:00), Max: 101.3 (20 May 2025 16:30)  HR: 84 (21 May 2025 12:00) (76 - 153)  BP: 102/78 (21 May 2025 12:00) (86/70 - 141/102)  BP(mean): 87 (21 May 2025 12:00) (76 - 116)  RR: 23 (21 May 2025 12:00) (15 - 29)  SpO2: 100% (21 May 2025 12:00) (94% - 100%)  General: Intubated on vent  HEENT: NCAT, mmm, EOMI  Neck: ETT in place  CVS: nl s1, split s2, obscured  Chest: Coarse b/l  Abdomen: soft  Extremities: No c/c/e  Neuro: Awake on vent  Psych: Calm      ECG:  AF with RVR with diffuse repol changes.  IWMI.    LABS:                        12.3   12.65 )-----------(        ( 21 May 2025 06:05 )             36.1     05-    139  |  91[L]  |  58.7[H]  ----------------------------<  196[H]  3.5   |  31.0[H]  |  1.94[H]    Ca    7.7[L]      21 May 2025 06:05  Phos  3.4     -21  Mg     2.2     21    TPro  5.9[L]  /  Alb  3.5  /  TBili  3.0[H]  /  DBili  x   /  AST  171[H]  /  ALT  131[H]  /  AlkPhos  147[H]  21        PT/INR - ( 21 May 2025 02:16 )   PT: 17.8 sec;   INR: 1.54 ratio         PTT - ( 21 May 2025 02:16 )  PTT:28.8 sec      RADIOLOGY:  Echo:   1. Left ventricular systolic function is severely decreased with an ejection fraction visually estimated at 30 to 35 %. Global left ventricular hypokinesis.   2. Enlarged right ventricular cavity size and moderately reduced right ventricular systolic function.   3. Left atrium is normal in size.   4. There is mild calcification of the mitral valve annulus.   5. Moderate to severe mitral regurgitation.   6. Moderate tricuspid regurgitation.   7. Compared to the transthoracic echocardiogram performed on 2025, the LV and RV are now better visualized.    CXR:  Endotracheal tube tip 5 cm above the gayla. Enteric tube tip below the   edge of the image, and rightIJ catheter tip in the right atrium. No   pneumothorax. No focal consolidation or pleural effusion.    CTA C/A/P:  No pulmonary embolus.  Airspace consolidations at the lung bases may represent atelectasis   and/or pneumonia.  No bowel obstruction.  Cholelithiasis.  Trace ascites.        Assessment:  76 yo male PMHx HTN, HLD, CAD based on abnormal nuke, TIA, remote pericarditis in  presented to the ED with complaints of abdominal pain, nausea, vomiting for since the day prior.  Upon arrival to the ED patient was lethargic and tachypneic, with abdominal distention and mottled skin.  He was noted to be hypotensive despite fluid resuscitation and placed on escalating doses of pressors.  He was started on BIPAP initially for tachypnea and metabolic acidosis, eventually intubated in the ED.  Patient remains intubated and history taken from chart and d/w RN.  Patient was on escalating doses of pressors and , but in last day has been weaned down.  Echo with severe LV dysfxn which appears new and likely stress myopathy.  CI on low end of normal on .  Likely sepsis from GNR septicemia from cholangitis as cause now s/p perc tube.  Noted AF with some RVR due to shock, , etc.    Plan:  1. Care per ICU.  2. Abx per ICU.  3. Tele monitor.  4. A/C for AF if no contraindication  5. Wean off  and then Vaso and Angiotensin II as BP allows.  6. Has been fluid resuscitated, maintin fluid status.  7. Eventual echo when improved to reassess EF.  GDMT as tolerated when improved.  8. No additional CV testing now.    D/w RN. Will follow.  Thanks!

## 2025-05-21 NOTE — CHART NOTE - NSCHARTNOTEFT_GEN_A_CORE
Patient HR went >170, B/P remained stable, patient synch cardioverted at 200J 1x, 50 of Fent 1x, 20 mg IVP cardizem 1x, EKG, QTC <400 150mg of amio given, stat labs ordered, cooling blanket placed 1g of tylenol given temp 38.4C, K 20mg IV administered, family at bedside event explained by MD acosta.

## 2025-05-21 NOTE — PROGRESS NOTE ADULT - ASSESSMENT
1.  Acute kidney injury: From ATN/shock.  cr stable. on diuretics for new onset CHFrEF- continue diuresis. added acetazolamide for alkalosis. agree.   2. Metabolic acidosis from severe lactic acidosis.  resolved. now alkalotic  3.  Septic shock: From cholecystitis. ESBL E.coli/ citrobacter- on meropenem.  s/p drain  4.  Respiratory failure- intubated      Joel Mendoza MD, NERIS

## 2025-05-21 NOTE — PROCEDURE NOTE - ADDITIONAL PROCEDURE DETAILS
Patient HR went >170, B/P remained stable, patient synch cardioverted at 200J 1x, 50 of Fent 1x, 20 mg IVP cardizem 1x, EKG, QTC <400 150mg of amio given, stat labs ordered, cooling blanket placed 1g of tylenol given temp 38.4C, K 20mg IV administered, lido gtt started, versed gtt started, 2g calcium and 2g mag given, fio2 increased from 50% to 70%, family at bedside event explained by MD acosta.

## 2025-05-21 NOTE — PROGRESS NOTE ADULT - ATTENDING COMMENTS
76 yo male with HTN, CAD, HLD, TIA, remote pericarditis, now admitted with septic shock, multiorgan failure, ESBL gram negative bacteremia, acute cholecystitis requiring percutaneous drainage, OLEKSANDR, severe metabolic acidosis, acute respiratory failure requiring mechanical ventilation, stress cardiomyopathy.    Pressor requirements are slightly improved since admission, however patient remains critically ill with high probability of death.    Family updated at bedside.

## 2025-05-21 NOTE — PROGRESS NOTE ADULT - SUBJECTIVE AND OBJECTIVE BOX
Patient is a 75y old  Male who presents with a chief complaint of Acute respiratory failure, shock (20 May 2025 13:55)      BRIEF HOSPITAL COURSE:     Interval HPI:    PAST MEDICAL & SURGICAL HISTORY:  AMI (Acute Myocardial Infarction)  2001      Transient ischemic attack (TIA)      No Past Surgical History        Review of Systems:  All other review of systems negative except as noted in HPI    MEDICATIONS  (STANDING):  angiotensin II Infusion 40 NANOGram(s)/kG/Min (9.37 mL/Hr) IV Continuous <Continuous>  buMETAnide Infusion 0.5 mG/Hr (2.5 mL/Hr) IV Continuous <Continuous>  chlorhexidine 0.12% Liquid 15 milliLiter(s) Oral Mucosa every 12 hours  chlorhexidine 2% Cloths 1 Application(s) Topical <User Schedule>  DOBUTamine Infusion 2.5 MICROgram(s)/kG/Min (5.86 mL/Hr) IV Continuous <Continuous>  fentaNYL   Infusion. 0.5 MICROgram(s)/kG/Hr (3.91 mL/Hr) IV Continuous <Continuous>  hydrocortisone sodium succinate Injectable 50 milliGRAM(s) IV Push every 6 hours  insulin glargine Injectable (LANTUS) 5 Unit(s) SubCutaneous every morning  insulin lispro (ADMELOG) corrective regimen sliding scale.   SubCutaneous every 6 hours  lactulose Syrup 10 Gram(s) Oral three times a day  meropenem Injectable 1000 milliGRAM(s) IV Push every 8 hours  multiple electrolytes Injection Type 1 1000 milliLiter(s) (1000 mL/Hr) IV Continuous <Continuous>  mupirocin 2% Nasal 1 Application(s) Both Nostrils two times a day  pantoprazole  Injectable 40 milliGRAM(s) IV Push daily  rifAXIMin 550 milliGRAM(s) Oral two times a day  vasopressin Infusion 0.04 Unit(s)/Min (6 mL/Hr) IV Continuous <Continuous>    MEDICATIONS  (PRN):      Mode: AC/ CMV (Assist Control/ Continuous Mandatory Ventilation)  RR (machine): 16  TV (machine): 350  FiO2: 50  PEEP: 6  ITime: 0.8  MAP: 8  PIP: 12    ICU Vital Signs Last 24 Hrs  T(C): 36.7 (21 May 2025 07:00), Max: 38.5 (20 May 2025 16:30)  T(F): 98.1 (21 May 2025 07:00), Max: 101.3 (20 May 2025 16:30)  HR: 86 (21 May 2025 07:00) (86 - 153)  BP: 110/88 (21 May 2025 07:00) (85/69 - 141/102)  BP(mean): 96 (21 May 2025 07:00) (75 - 116)  ABP: 111/71 (21 May 2025 07:00) (78/55 - 142/94)  ABP(mean): 87 (21 May 2025 07:00) (63 - 113)  RR: 15 (21 May 2025 07:00) (15 - 29)  SpO2: 99% (21 May 2025 07:00) (94% - 100%)    O2 Parameters below as of 21 May 2025 04:00  Patient On (Oxygen Delivery Method): ventilator    O2 Concentration (%): 50      ABG - ( 21 May 2025 02:40 )  pH, Arterial: 7.500 pH, Blood: x     /  pCO2: 49    /  pO2: 113   / HCO3: 38    / Base Excess: 15.0  /  SaO2: 98.9              I&O's Detail    20 May 2025 07:01  -  21 May 2025 07:00  --------------------------------------------------------  IN:    Albumin 25%  -  50 mL: 150 mL    Angiotensin II: 188 mL    Bumetanide: 50 mL    DOBUTamine: 118 mL    Enteral Tube Flush: 125 mL    FentaNYL: 54.6 mL    IV PiggyBack: 100 mL    IV PiggyBack: 300 mL    Norepinephrine: 171.6 mL    Vasopressin: 144 mL  Total IN: 1401.2 mL    OUT:    Bulb (mL): 370 mL    Dexmedetomidine: 0 mL    FentaNYL: 0 mL    Indwelling Catheter - Urethral (mL): 3010 mL    Midazolam: 0 mL    Phenylephrine: 0 mL  Total OUT: 3380 mL    Total NET: -1978.8 mL          LABS:                        12.3   12.65 )-----------( 20       ( 21 May 2025 06:05 )             36.1     05-21    139  |  91[L]  |  58.7[H]  ----------------------------<  196[H]  3.5   |  31.0[H]  |  1.94[H]    Ca    7.7[L]      21 May 2025 06:05  Phos  3.4     05-21  Mg     2.2     05-21    TPro  5.9[L]  /  Alb  3.5  /  TBili  3.0[H]  /  DBili  x   /  AST  171[H]  /  ALT  131[H]  /  AlkPhos  147[H]  05-21          CAPILLARY BLOOD GLUCOSE      POCT Blood Glucose.: 81 mg/dL (21 May 2025 08:03)    PT/INR - ( 21 May 2025 02:16 )   PT: 17.8 sec;   INR: 1.54 ratio         PTT - ( 21 May 2025 02:16 )  PTT:28.8 sec  Urinalysis Basic - ( 21 May 2025 06:05 )    Color: x / Appearance: x / SG: x / pH: x  Gluc: 196 mg/dL / Ketone: x  / Bili: x / Urobili: x   Blood: x / Protein: x / Nitrite: x   Leuk Esterase: x / RBC: x / WBC x   Sq Epi: x / Non Sq Epi: x / Bacteria: x      CULTURES:  Culture Results:   Growth in anaerobic bottle: Gram Negative Rods  Growth in aerobic bottle: Gram Negative Rods (05-19 @ 10:15)  Culture Results:   >100,000 CFU/ml Escherichia coli (05-18 @ 22:08)  Culture Results:   Growth in aerobic and anaerobic bottles: Escherichia coli ESBL  Growth in aerobic and anaerobic bottles: Citrobacter freundii  Growth in aerobic and anaerobic bottles: Streptococcus gallolyticus  Growth in anaerobic bottle: Gram Positive Rods  Direct identification is available within approximately 3-5  hours either by Blood Panel Multiplexed PCR or Direct  MALDI-TOF. Details: [email or web address] (05-18 @ 20:05)      Physical Examination:    General: No acute distress.    HEENT: Pupils equal, reactive to light.  Symmetric.  PULM: Clear to auscultation bilaterally, no significant sputum production, no wheezing, crackles, or rhonchi  NECK: Supple, no lymphadenopathy, trachea midline  CVS: Regular rate and rhythm, no murmurs, rubs, or gallops  ABD: Soft, nondistended, nontender, normoactive bowel sounds, no masses  EXT: Nontender, no clubbing, cyanosis, or edema  SKIN: Warm and well perfused, no rashes noted.  NEURO: Alert, oriented, interactive, nonfocal    DEVICES:   P IV    RADIOLOGY:  Patient is a 75y old  Male who presents with a chief complaint of Acute respiratory failure, shock (20 May 2025 13:55)      BRIEF HOSPITAL COURSE:   74 yo male with hx of HTN, HLD, CAD, TIA, remote pericarditis in 2015, presented to the ED with complaints of abdominal pain, nausea, vomiting for since the day prior.  Upon arrival to the ED patient was lethargic and tachypneic, with abdominal distention and mottled skin.    He was noted to be hypotensive despite fluid resuscitation and placed on escalating doses of pressors.    Started on Bipap initially for tachypnea and metabolic acidosis, eventually intubated in the ED. Labs revealed severe metabolic acidosis. OLEKSANDR, transaminitis. Admitted to MICU for mixed shock, cardiogenic vs septic, requiring triple pressors.    Ovn:  No acute events.      Interval HPI:  Patient seen and examined at bedside. Is ill appearing, however off sedation, is able to make some meaningful movements and nods his head when his name is called out. PCT site clean, dry, draining into bulb. Daughter Brina at bedside, discussed patient's progress with her this AM.    PAST MEDICAL & SURGICAL HISTORY:  AMI (Acute Myocardial Infarction)  2001      Transient ischemic attack (TIA)      No Past Surgical History        Review of Systems:  All other review of systems negative except as noted in HPI    MEDICATIONS  (STANDING):  angiotensin II Infusion 40 NANOGram(s)/kG/Min (9.37 mL/Hr) IV Continuous <Continuous>  buMETAnide Infusion 0.5 mG/Hr (2.5 mL/Hr) IV Continuous <Continuous>  chlorhexidine 0.12% Liquid 15 milliLiter(s) Oral Mucosa every 12 hours  chlorhexidine 2% Cloths 1 Application(s) Topical <User Schedule>  DOBUTamine Infusion 2.5 MICROgram(s)/kG/Min (5.86 mL/Hr) IV Continuous <Continuous>  fentaNYL   Infusion. 0.5 MICROgram(s)/kG/Hr (3.91 mL/Hr) IV Continuous <Continuous>  hydrocortisone sodium succinate Injectable 50 milliGRAM(s) IV Push every 6 hours  insulin glargine Injectable (LANTUS) 5 Unit(s) SubCutaneous every morning  insulin lispro (ADMELOG) corrective regimen sliding scale.   SubCutaneous every 6 hours  lactulose Syrup 10 Gram(s) Oral three times a day  meropenem Injectable 1000 milliGRAM(s) IV Push every 8 hours  multiple electrolytes Injection Type 1 1000 milliLiter(s) (1000 mL/Hr) IV Continuous <Continuous>  mupirocin 2% Nasal 1 Application(s) Both Nostrils two times a day  pantoprazole  Injectable 40 milliGRAM(s) IV Push daily  rifAXIMin 550 milliGRAM(s) Oral two times a day  vasopressin Infusion 0.04 Unit(s)/Min (6 mL/Hr) IV Continuous <Continuous>    MEDICATIONS  (PRN):      Mode: AC/ CMV (Assist Control/ Continuous Mandatory Ventilation)  RR (machine): 16  TV (machine): 350  FiO2: 50  PEEP: 6  ITime: 0.8  MAP: 8  PIP: 12    ICU Vital Signs Last 24 Hrs  T(C): 36.7 (21 May 2025 07:00), Max: 38.5 (20 May 2025 16:30)  T(F): 98.1 (21 May 2025 07:00), Max: 101.3 (20 May 2025 16:30)  HR: 86 (21 May 2025 07:00) (86 - 153)  BP: 110/88 (21 May 2025 07:00) (85/69 - 141/102)  BP(mean): 96 (21 May 2025 07:00) (75 - 116)  ABP: 111/71 (21 May 2025 07:00) (78/55 - 142/94)  ABP(mean): 87 (21 May 2025 07:00) (63 - 113)  RR: 15 (21 May 2025 07:00) (15 - 29)  SpO2: 99% (21 May 2025 07:00) (94% - 100%)    O2 Parameters below as of 21 May 2025 04:00  Patient On (Oxygen Delivery Method): ventilator    O2 Concentration (%): 50      ABG - ( 21 May 2025 02:40 )  pH, Arterial: 7.500 pH, Blood: x     /  pCO2: 49    /  pO2: 113   / HCO3: 38    / Base Excess: 15.0  /  SaO2: 98.9              I&O's Detail    20 May 2025 07:01  -  21 May 2025 07:00  --------------------------------------------------------  IN:    Albumin 25%  -  50 mL: 150 mL    Angiotensin II: 188 mL    Bumetanide: 50 mL    DOBUTamine: 118 mL    Enteral Tube Flush: 125 mL    FentaNYL: 54.6 mL    IV PiggyBack: 100 mL    IV PiggyBack: 300 mL    Norepinephrine: 171.6 mL    Vasopressin: 144 mL  Total IN: 1401.2 mL    OUT:    Bulb (mL): 370 mL    Dexmedetomidine: 0 mL    FentaNYL: 0 mL    Indwelling Catheter - Urethral (mL): 3010 mL    Midazolam: 0 mL    Phenylephrine: 0 mL  Total OUT: 3380 mL    Total NET: -1978.8 mL          LABS:                        12.3   12.65 )-----------( 20       ( 21 May 2025 06:05 )             36.1     05-21    139  |  91[L]  |  58.7[H]  ----------------------------<  196[H]  3.5   |  31.0[H]  |  1.94[H]    Ca    7.7[L]      21 May 2025 06:05  Phos  3.4     05-21  Mg     2.2     05-21    TPro  5.9[L]  /  Alb  3.5  /  TBili  3.0[H]  /  DBili  x   /  AST  171[H]  /  ALT  131[H]  /  AlkPhos  147[H]  05-21          CAPILLARY BLOOD GLUCOSE      POCT Blood Glucose.: 81 mg/dL (21 May 2025 08:03)    PT/INR - ( 21 May 2025 02:16 )   PT: 17.8 sec;   INR: 1.54 ratio         PTT - ( 21 May 2025 02:16 )  PTT:28.8 sec  Urinalysis Basic - ( 21 May 2025 06:05 )    Color: x / Appearance: x / SG: x / pH: x  Gluc: 196 mg/dL / Ketone: x  / Bili: x / Urobili: x   Blood: x / Protein: x / Nitrite: x   Leuk Esterase: x / RBC: x / WBC x   Sq Epi: x / Non Sq Epi: x / Bacteria: x      CULTURES:  Culture Results:   Growth in anaerobic bottle: Gram Negative Rods  Growth in aerobic bottle: Gram Negative Rods (05-19 @ 10:15)  Culture Results:   >100,000 CFU/ml Escherichia coli (05-18 @ 22:08)  Culture Results:   Growth in aerobic and anaerobic bottles: Escherichia coli ESBL  Growth in aerobic and anaerobic bottles: Citrobacter freundii  Growth in aerobic and anaerobic bottles: Streptococcus gallolyticus  Growth in anaerobic bottle: Gram Positive Rods  Direct identification is available within approximately 3-5  hours either by Blood Panel Multiplexed PCR or Direct  MALDI-TOF. Details: [email or web address] (05-18 @ 20:05)    PE:    General: No acute distress.    HEENT: Pupils equal, reactive to light.  Symmetric.  PULM: Clear to auscultation bilaterally, no significant sputum production, no wheezing, crackles, or rhonchi  NECK: Supple, no lymphadenopathy, trachea midline  CVS: Regular rate and rhythm, no murmurs, rubs, or gallops  ABD: distended  EXT: Nontender, no clubbing, cyanosis, or edema  SKIN: Warm and well perfused, no rashes noted.  NEURO: minimal however, meaningful movements. acknowledges presence of others by nodding. attempts to squeeze fingers.    DEVICES:   P IV    RADIOLOGY:

## 2025-05-21 NOTE — PROGRESS NOTE ADULT - SUBJECTIVE AND OBJECTIVE BOX
[facility] DIVISION OF KIDNEY DISEASES AND HYPERTENSION -- PROGRESS NOTE    SUBJECTIVE:   doing ok. responding to family    MEDICATIONS    Standing Inpatient Medications  acetaZOLAMIDE Injectable 250 milliGRAM(s) IV Push every 12 hours  angiotensin II Infusion 40 NANOGram(s)/kG/Min IV Continuous <Continuous>  chlorhexidine 0.12% Liquid 15 milliLiter(s) Oral Mucosa every 12 hours  chlorhexidine 2% Cloths 1 Application(s) Topical <User Schedule>  DOBUTamine Infusion 2.5 MICROgram(s)/kG/Min IV Continuous <Continuous>  fentaNYL   Infusion. 0.5 MICROgram(s)/kG/Hr IV Continuous <Continuous>  hydrocortisone sodium succinate Injectable 50 milliGRAM(s) IV Push every 6 hours  insulin glargine Injectable (LANTUS) 5 Unit(s) SubCutaneous every morning  insulin lispro (ADMELOG) corrective regimen sliding scale.   SubCutaneous every 6 hours  lactulose Syrup 10 Gram(s) Oral three times a day  meropenem Injectable 1000 milliGRAM(s) IV Push every 8 hours  mupirocin 2% Nasal 1 Application(s) Both Nostrils two times a day  pantoprazole  Injectable 40 milliGRAM(s) IV Push daily  potassium chloride  20 mEq/100 mL IVPB 20 milliEquivalent(s) IV Intermittent every 1 hour  rifAXIMin 550 milliGRAM(s) Oral two times a day  vasopressin Infusion 0.04 Unit(s)/Min IV Continuous <Continuous>    PRN Inpatient Medications      VITALS/PHYSICAL EXAM  --------------------------------------------------------------------------------  T(C): 36.6 (05-21-25 @ 09:30), Max: 38.5 (05-20-25 @ 16:30)  HR: 82 (05-21-25 @ 09:30) (81 - 153)  BP: 102/78 (05-21-25 @ 09:30) (85/69 - 141/102)  RR: 16 (05-21-25 @ 09:30) (15 - 29)  SpO2: 100% (05-21-25 @ 09:30) (94% - 100%)  Wt(kg): --        05-20-25 @ 07:01  -  05-21-25 @ 07:00  --------------------------------------------------------  IN: 1401.2 mL / OUT: 3380 mL / NET: -1978.8 mL    05-21-25 @ 07:01  -  05-21-25 @ 11:00  --------------------------------------------------------  IN: 58.4 mL / OUT: 700 mL / NET: -641.6 mL      Physical Exam:  intubated  HEENT: normal  Pulm: CTA B/L  CV: normal S1S2; no murmur  Abd: soft, non-tender  Extremities- + edema    LABS/STUDIES  --------------------------------------------------------------------------------  139  |  91  |  58.7  ----------------------------<  196      [05-21-25 @ 06:05]  3.5   |  31.0  |  1.94        Ca     7.7     [05-21-25 @ 06:05]      Mg     2.2     [05-21-25 @ 06:05]      Phos  3.4     [05-21-25 @ 06:05]    TPro  5.9  /  Alb  3.5  /  TBili  3.0  /  DBili  x   /  AST  171  /  ALT  131  /  AlkPhos  147  [05-21-25 @ 06:05]    PT/INR: PT 17.8 , INR 1.54       [05-21-25 @ 02:16]  PTT: 28.8       [05-21-25 @ 02:16]      Creatinine Trend:  SCr 1.94 [05-21 @ 06:05]  SCr 2.02 [05-21 @ 02:16]  SCr 1.93 [05-20 @ 21:43]  SCr 1.80 [05-20 @ 16:58]  SCr 1.61 [05-20 @ 08:57]

## 2025-05-21 NOTE — CHART NOTE - NSCHARTNOTEFT_GEN_A_CORE
No role for surgical intervention during this hospitalization as patient has PCT. Would need to wait at least 6 weeks to consider surgical intervention to allow maturation of tract. Can f/u as an outpatient in the ACS clinic. ACS to sign off.

## 2025-05-21 NOTE — PROGRESS NOTE ADULT - ASSESSMENT
Mr. Bateman is a 75-year-old male (PMH CAD s/p MI, TIA, HTN, HLD) admitted to MICU on 18-May for acute respiratory failure and shock, now intubated and sedated. He remains in profound mixed shock (septic vs. cardiogenic), now with confirmed acute cholecystitis/cholangitis s/p IR cholecystostomy tube placement (19-May, drained foul bile). He required escalating quadruple pressor support (Norepinephrine, Phenylephrine, Vasopressin, Angiotensin II) however now off levo and rob, on vaso and AG2 as of 5/21. Blood cultures from 19-May are positive for Gram-Negative Rods. Prognosis remains extremely guarded; DNR.    #Neuro:    -Altered mental status, intubated.   -Fent,drip  -Neuro checks q2-4h. Seizure/aspiration precautions. HOB >30 deg.    #CV:    -Mixed shock (septic/cardiogenic), improving.  -Pressors: Angiotensin II 40 ng/kg/min, Dobutamine 2.5 mcg/kg/min, Vasopressin 0.04 U/min. (Norepinephrine, Phenylephrine D/C'd).  -Titrate to MAP >65 mmHg. (Last /71, MAP 87 at 07:00).  -HR 86 (21-May 07:00), max 153/24h.  -TTE showing EF 30-35%, stress cardiomyopathy  -Continue Hydrocortisone 50mg IV q6h.  -Telemetry. Arterial line. Central venous catheter.    #Resp:    -Acute Respiratory Failure, intubated. Vent: AC/CMV, RR 16, , FiO2 50%, PEEP 6. (PIP 12, MAP 8).  -ABG (21-May 02:40): 7.50/49/113/38 (SaO2 98.9%, P/F 226, metabolic alkalosis).  -Wean FiO2/PEEP as tolerated to SpO2 >92-94%.  -Pulmonary toilet, oral care with Chlorhexidine 0.12% liquid q12h. VAP bundle.    #Renal/FEN:    -OLEKSANDR improving (Cr 1.94, BUN 58.7). UOP 3010mL/24h. Cholecystostomy tube output 370mL/24h. Net -1978.8mL/24h.  -Monitor BMP, Ca, Mg, Phos q6h  -Continue Bumetanide infusion 0.5 mg/hr.  -Strict I&Os. Daily weights.    #GI/Abdomen:    -Acute cholecystitis/cholangitis s/p cholecystostomy tube (19-May, drained foul bile). Pancreatitis. Exam: abdomen distended.  -NPO.  -Continue Pantoprazole 40mg IV daily.  -Continue Lactulose 10g PO (via NGT) TID, Rifaximin 550mg PO (via NGT) BID.  -LFTs (21-May): T Bili 3.0, , , AlkPhos 147 (improving). INR 1.54 (PT 17.8).  -Monitor cholecystostomy tube output (370mL/24h).    #Heme:    -Monitor CBC daily.  -Aspirin remains held.    #ID:    -Septic shock (cholecystitis/cholangitis). Febrile (Tmax 38.5°C). WBC 12.65.  -Blood cx (19-May): GNR. Blood cx (18-May): E.coli ESBL, Citrobacter freundii, Strep gallolyticus, GPR. Urine cx (18-May): E.coli. -Cholecystostomy bile cx sent.  -Continue Meropenem 1g IV q8h (ensure appropriate renal dosing).  -ID consult for antibiotic recommendations.  -Monitor temperature, WBC, follow culture results.    #Endo:    -Maintain euglycemia  -Continue Insulin Glargine 5U SC qAM. Continue Insulin Lispro SSI PRN. Goal -180 mg/dL.  -Monitor POCT BG ACHS / q6h.    #Prophylaxis:    VTE: SCDs.   Stress Ulcer: Pantoprazole.  Skin: CHG cloths. Mupirocin 2% nasal BID. Turning/positioning.  Disp/GOC:    MICU for management of septic/cardiogenic shock, ARF, multi-organ support.  Prognosis extremely guarded.  DNR   Palliative Care consult ongoing.  Update family (daughter Brina at bedside today) on clinical status and ongoing management.

## 2025-05-22 ENCOUNTER — RESULT REVIEW (OUTPATIENT)
Age: 75
End: 2025-05-22

## 2025-05-22 LAB
ACETONE SERPL-MCNC: NEGATIVE — SIGNIFICANT CHANGE UP
ALBUMIN SERPL ELPH-MCNC: 3.1 G/DL — LOW (ref 3.3–5.2)
ALBUMIN SERPL ELPH-MCNC: 3.2 G/DL — LOW (ref 3.3–5.2)
ALBUMIN SERPL ELPH-MCNC: 3.3 G/DL — SIGNIFICANT CHANGE UP (ref 3.3–5.2)
ALBUMIN SERPL ELPH-MCNC: 3.3 G/DL — SIGNIFICANT CHANGE UP (ref 3.3–5.2)
ALBUMIN SERPL ELPH-MCNC: 3.7 G/DL — SIGNIFICANT CHANGE UP (ref 3.3–5.2)
ALP SERPL-CCNC: 143 U/L — HIGH (ref 40–120)
ALP SERPL-CCNC: 143 U/L — HIGH (ref 40–120)
ALP SERPL-CCNC: 144 U/L — HIGH (ref 40–120)
ALP SERPL-CCNC: 157 U/L — HIGH (ref 40–120)
ALP SERPL-CCNC: 160 U/L — HIGH (ref 40–120)
ALT FLD-CCNC: 138 U/L — HIGH
ALT FLD-CCNC: 140 U/L — HIGH
ALT FLD-CCNC: 141 U/L — HIGH
ALT FLD-CCNC: 195 U/L — HIGH
ALT FLD-CCNC: 208 U/L — HIGH
AMMONIA BLD-MCNC: 69 UMOL/L — HIGH (ref 11–55)
AMMONIA BLD-MCNC: 77 UMOL/L — HIGH (ref 11–55)
AMMONIA BLD-MCNC: 84 UMOL/L — HIGH (ref 11–55)
ANION GAP SERPL CALC-SCNC: 17 MMOL/L — SIGNIFICANT CHANGE UP (ref 5–17)
ANION GAP SERPL CALC-SCNC: 19 MMOL/L — HIGH (ref 5–17)
ANION GAP SERPL CALC-SCNC: 19 MMOL/L — HIGH (ref 5–17)
ANION GAP SERPL CALC-SCNC: 20 MMOL/L — HIGH (ref 5–17)
ANION GAP SERPL CALC-SCNC: 20 MMOL/L — HIGH (ref 5–17)
APPEARANCE UR: CLEAR — SIGNIFICANT CHANGE UP
APTT BLD: 25.3 SEC — LOW (ref 26.1–36.8)
AST SERPL-CCNC: 211 U/L — HIGH
AST SERPL-CCNC: 250 U/L — HIGH
AST SERPL-CCNC: 258 U/L — HIGH
AST SERPL-CCNC: 438 U/L — HIGH
AST SERPL-CCNC: 499 U/L — HIGH
BACTERIA # UR AUTO: NEGATIVE /HPF — SIGNIFICANT CHANGE UP
BASE EXCESS BLDA CALC-SCNC: 11.2 MMOL/L — HIGH (ref -2–3)
BASOPHILS # BLD AUTO: 0.02 K/UL — SIGNIFICANT CHANGE UP (ref 0–0.2)
BASOPHILS NFR BLD AUTO: 0.2 % — SIGNIFICANT CHANGE UP (ref 0–2)
BILIRUB SERPL-MCNC: 2.6 MG/DL — HIGH (ref 0.4–2)
BILIRUB SERPL-MCNC: 2.6 MG/DL — HIGH (ref 0.4–2)
BILIRUB SERPL-MCNC: 2.7 MG/DL — HIGH (ref 0.4–2)
BILIRUB SERPL-MCNC: 2.8 MG/DL — HIGH (ref 0.4–2)
BILIRUB SERPL-MCNC: 2.9 MG/DL — HIGH (ref 0.4–2)
BILIRUB UR-MCNC: ABNORMAL
BLOOD GAS COMMENTS ARTERIAL: SIGNIFICANT CHANGE UP
BUN SERPL-MCNC: 73.9 MG/DL — HIGH (ref 8–20)
BUN SERPL-MCNC: 76.6 MG/DL — HIGH (ref 8–20)
BUN SERPL-MCNC: 80.4 MG/DL — HIGH (ref 8–20)
BUN SERPL-MCNC: 90.7 MG/DL — HIGH (ref 8–20)
BUN SERPL-MCNC: 91.1 MG/DL — HIGH (ref 8–20)
CALCIUM SERPL-MCNC: 7.8 MG/DL — LOW (ref 8.4–10.5)
CALCIUM SERPL-MCNC: 7.9 MG/DL — LOW (ref 8.4–10.5)
CALCIUM SERPL-MCNC: 8 MG/DL — LOW (ref 8.4–10.5)
CALCIUM SERPL-MCNC: 8.3 MG/DL — LOW (ref 8.4–10.5)
CALCIUM SERPL-MCNC: 8.7 MG/DL — SIGNIFICANT CHANGE UP (ref 8.4–10.5)
CAST: 2 /LPF — SIGNIFICANT CHANGE UP (ref 0–4)
CHLORIDE SERPL-SCNC: 90 MMOL/L — LOW (ref 96–108)
CHLORIDE SERPL-SCNC: 93 MMOL/L — LOW (ref 96–108)
CHLORIDE SERPL-SCNC: 94 MMOL/L — LOW (ref 96–108)
CHLORIDE SERPL-SCNC: 94 MMOL/L — LOW (ref 96–108)
CHLORIDE SERPL-SCNC: 96 MMOL/L — SIGNIFICANT CHANGE UP (ref 96–108)
CO2 SERPL-SCNC: 27 MMOL/L — SIGNIFICANT CHANGE UP (ref 22–29)
CO2 SERPL-SCNC: 28 MMOL/L — SIGNIFICANT CHANGE UP (ref 22–29)
CO2 SERPL-SCNC: 33 MMOL/L — HIGH (ref 22–29)
COLOR SPEC: SIGNIFICANT CHANGE UP
CREAT SERPL-MCNC: 1.98 MG/DL — HIGH (ref 0.5–1.3)
CREAT SERPL-MCNC: 2.01 MG/DL — HIGH (ref 0.5–1.3)
CREAT SERPL-MCNC: 2.05 MG/DL — HIGH (ref 0.5–1.3)
CREAT SERPL-MCNC: 2.09 MG/DL — HIGH (ref 0.5–1.3)
CREAT SERPL-MCNC: 2.18 MG/DL — HIGH (ref 0.5–1.3)
CULTURE RESULTS: ABNORMAL
DIFF PNL FLD: ABNORMAL
EGFR: 31 ML/MIN/1.73M2 — LOW
EGFR: 31 ML/MIN/1.73M2 — LOW
EGFR: 32 ML/MIN/1.73M2 — LOW
EGFR: 32 ML/MIN/1.73M2 — LOW
EGFR: 33 ML/MIN/1.73M2 — LOW
EGFR: 33 ML/MIN/1.73M2 — LOW
EGFR: 34 ML/MIN/1.73M2 — LOW
EGFR: 34 ML/MIN/1.73M2 — LOW
EGFR: 35 ML/MIN/1.73M2 — LOW
EGFR: 35 ML/MIN/1.73M2 — LOW
EOSINOPHIL # BLD AUTO: 0 K/UL — SIGNIFICANT CHANGE UP (ref 0–0.5)
EOSINOPHIL NFR BLD AUTO: 0 % — SIGNIFICANT CHANGE UP (ref 0–6)
GAS PNL BLDA: SIGNIFICANT CHANGE UP
GAS PNL BLDV: SIGNIFICANT CHANGE UP
GLUCOSE BLDC GLUCOMTR-MCNC: 131 MG/DL — HIGH (ref 70–99)
GLUCOSE BLDC GLUCOMTR-MCNC: 181 MG/DL — HIGH (ref 70–99)
GLUCOSE BLDC GLUCOMTR-MCNC: 215 MG/DL — HIGH (ref 70–99)
GLUCOSE BLDC GLUCOMTR-MCNC: 220 MG/DL — HIGH (ref 70–99)
GLUCOSE BLDC GLUCOMTR-MCNC: 225 MG/DL — HIGH (ref 70–99)
GLUCOSE BLDC GLUCOMTR-MCNC: 246 MG/DL — HIGH (ref 70–99)
GLUCOSE SERPL-MCNC: 208 MG/DL — HIGH (ref 70–99)
GLUCOSE SERPL-MCNC: 211 MG/DL — HIGH (ref 70–99)
GLUCOSE SERPL-MCNC: 212 MG/DL — HIGH (ref 70–99)
GLUCOSE SERPL-MCNC: 219 MG/DL — HIGH (ref 70–99)
GLUCOSE SERPL-MCNC: 237 MG/DL — HIGH (ref 70–99)
GLUCOSE UR QL: NEGATIVE MG/DL — SIGNIFICANT CHANGE UP
HCO3 BLDA-SCNC: 35 MMOL/L — HIGH (ref 21–28)
HCT VFR BLD CALC: 35.4 % — LOW (ref 39–50)
HCT VFR BLD CALC: 36.5 % — LOW (ref 39–50)
HCT VFR BLD CALC: 37 % — LOW (ref 39–50)
HCT VFR BLD CALC: 38 % — LOW (ref 39–50)
HGB BLD-MCNC: 12 G/DL — LOW (ref 13–17)
HGB BLD-MCNC: 12.3 G/DL — LOW (ref 13–17)
HGB BLD-MCNC: 12.6 G/DL — LOW (ref 13–17)
HGB BLD-MCNC: 12.7 G/DL — LOW (ref 13–17)
HOROWITZ INDEX BLDA+IHG-RTO: 0.5 — SIGNIFICANT CHANGE UP
IMM GRANULOCYTES # BLD AUTO: 0.13 K/UL — HIGH (ref 0–0.07)
IMM GRANULOCYTES NFR BLD AUTO: 1.2 % — HIGH (ref 0–0.9)
IMMATURE PLATELET FRACTION #: 3.5 K/UL — LOW (ref 3.9–12.5)
IMMATURE PLATELET FRACTION #: 4.3 K/UL — SIGNIFICANT CHANGE UP (ref 3.9–12.5)
IMMATURE PLATELET FRACTION #: 6.4 K/UL — SIGNIFICANT CHANGE UP (ref 3.9–12.5)
IMMATURE PLATELET FRACTION #: 6.8 K/UL — SIGNIFICANT CHANGE UP (ref 3.9–12.5)
IMMATURE PLATELET FRACTION %: 15.4 % — HIGH (ref 1.6–7.1)
IMMATURE PLATELET FRACTION %: 16.4 % — HIGH (ref 1.6–7.1)
IMMATURE PLATELET FRACTION %: 16.5 % — HIGH (ref 1.6–7.1)
IMMATURE PLATELET FRACTION %: 17 % — HIGH (ref 1.6–7.1)
INR BLD: 1.43 RATIO — HIGH (ref 0.85–1.16)
KETONES UR QL: ABNORMAL MG/DL
LEUKOCYTE ESTERASE UR-ACNC: ABNORMAL
LYMPHOCYTES # BLD AUTO: 1.43 K/UL — SIGNIFICANT CHANGE UP (ref 1–3.3)
LYMPHOCYTES NFR BLD AUTO: 12.8 % — LOW (ref 13–44)
MAGNESIUM SERPL-MCNC: 2.4 MG/DL — SIGNIFICANT CHANGE UP (ref 1.6–2.6)
MAGNESIUM SERPL-MCNC: 2.4 MG/DL — SIGNIFICANT CHANGE UP (ref 1.6–2.6)
MAGNESIUM SERPL-MCNC: 2.5 MG/DL — SIGNIFICANT CHANGE UP (ref 1.6–2.6)
MAGNESIUM SERPL-MCNC: 2.5 MG/DL — SIGNIFICANT CHANGE UP (ref 1.6–2.6)
MAGNESIUM SERPL-MCNC: 2.6 MG/DL — SIGNIFICANT CHANGE UP (ref 1.6–2.6)
MCHC RBC-ENTMCNC: 28.6 PG — SIGNIFICANT CHANGE UP (ref 27–34)
MCHC RBC-ENTMCNC: 28.9 PG — SIGNIFICANT CHANGE UP (ref 27–34)
MCHC RBC-ENTMCNC: 28.9 PG — SIGNIFICANT CHANGE UP (ref 27–34)
MCHC RBC-ENTMCNC: 29 PG — SIGNIFICANT CHANGE UP (ref 27–34)
MCHC RBC-ENTMCNC: 33.4 G/DL — SIGNIFICANT CHANGE UP (ref 32–36)
MCHC RBC-ENTMCNC: 33.7 G/DL — SIGNIFICANT CHANGE UP (ref 32–36)
MCHC RBC-ENTMCNC: 33.9 G/DL — SIGNIFICANT CHANGE UP (ref 32–36)
MCHC RBC-ENTMCNC: 34.1 G/DL — SIGNIFICANT CHANGE UP (ref 32–36)
MCV RBC AUTO: 85.1 FL — SIGNIFICANT CHANGE UP (ref 80–100)
MCV RBC AUTO: 85.3 FL — SIGNIFICANT CHANGE UP (ref 80–100)
MCV RBC AUTO: 85.6 FL — SIGNIFICANT CHANGE UP (ref 80–100)
MCV RBC AUTO: 85.7 FL — SIGNIFICANT CHANGE UP (ref 80–100)
MONOCYTES # BLD AUTO: 0.72 K/UL — SIGNIFICANT CHANGE UP (ref 0–0.9)
MONOCYTES NFR BLD AUTO: 6.5 % — SIGNIFICANT CHANGE UP (ref 2–14)
NEUTROPHILS # BLD AUTO: 8.83 K/UL — HIGH (ref 1.8–7.4)
NEUTROPHILS NFR BLD AUTO: 79.3 % — HIGH (ref 43–77)
NITRITE UR-MCNC: NEGATIVE — SIGNIFICANT CHANGE UP
NRBC # BLD AUTO: 0.03 K/UL — HIGH (ref 0–0)
NRBC # BLD AUTO: 0.03 K/UL — HIGH (ref 0–0)
NRBC # BLD AUTO: 0.09 K/UL — HIGH (ref 0–0)
NRBC # BLD AUTO: 0.18 K/UL — HIGH (ref 0–0)
NRBC # FLD: 0.03 K/UL — HIGH (ref 0–0)
NRBC # FLD: 0.03 K/UL — HIGH (ref 0–0)
NRBC # FLD: 0.09 K/UL — HIGH (ref 0–0)
NRBC # FLD: 0.18 K/UL — HIGH (ref 0–0)
NRBC BLD AUTO-RTO: 0 /100 WBCS — SIGNIFICANT CHANGE UP (ref 0–0)
NRBC BLD AUTO-RTO: 1 /100 WBCS — HIGH (ref 0–0)
PCO2 BLDA: 48 MMHG — SIGNIFICANT CHANGE UP (ref 35–48)
PH BLDA: 7.47 — HIGH (ref 7.35–7.45)
PH UR: 6.5 — SIGNIFICANT CHANGE UP (ref 5–8)
PHOSPHATE SERPL-MCNC: 3.2 MG/DL — SIGNIFICANT CHANGE UP (ref 2.4–4.7)
PHOSPHATE SERPL-MCNC: 3.6 MG/DL — SIGNIFICANT CHANGE UP (ref 2.4–4.7)
PHOSPHATE SERPL-MCNC: 3.6 MG/DL — SIGNIFICANT CHANGE UP (ref 2.4–4.7)
PHOSPHATE SERPL-MCNC: 4 MG/DL — SIGNIFICANT CHANGE UP (ref 2.4–4.7)
PHOSPHATE SERPL-MCNC: 4.5 MG/DL — SIGNIFICANT CHANGE UP (ref 2.4–4.7)
PLATELET # BLD AUTO: 23 K/UL — LOW (ref 150–400)
PLATELET # BLD AUTO: 26 K/UL — LOW (ref 150–400)
PLATELET # BLD AUTO: 39 K/UL — LOW (ref 150–400)
PLATELET # BLD AUTO: 40 K/UL — LOW (ref 150–400)
PMV BLD: 13.5 FL — HIGH (ref 7–13)
PMV BLD: SIGNIFICANT CHANGE UP FL (ref 7–13)
PO2 BLDA: 164 MMHG — HIGH (ref 83–108)
POTASSIUM SERPL-MCNC: 3.1 MMOL/L — LOW (ref 3.5–5.3)
POTASSIUM SERPL-MCNC: 3.7 MMOL/L — SIGNIFICANT CHANGE UP (ref 3.5–5.3)
POTASSIUM SERPL-MCNC: 3.7 MMOL/L — SIGNIFICANT CHANGE UP (ref 3.5–5.3)
POTASSIUM SERPL-MCNC: 3.8 MMOL/L — SIGNIFICANT CHANGE UP (ref 3.5–5.3)
POTASSIUM SERPL-MCNC: 3.9 MMOL/L — SIGNIFICANT CHANGE UP (ref 3.5–5.3)
POTASSIUM SERPL-SCNC: 3.1 MMOL/L — LOW (ref 3.5–5.3)
POTASSIUM SERPL-SCNC: 3.7 MMOL/L — SIGNIFICANT CHANGE UP (ref 3.5–5.3)
POTASSIUM SERPL-SCNC: 3.7 MMOL/L — SIGNIFICANT CHANGE UP (ref 3.5–5.3)
POTASSIUM SERPL-SCNC: 3.8 MMOL/L — SIGNIFICANT CHANGE UP (ref 3.5–5.3)
POTASSIUM SERPL-SCNC: 3.9 MMOL/L — SIGNIFICANT CHANGE UP (ref 3.5–5.3)
PROT SERPL-MCNC: 5.8 G/DL — LOW (ref 6.6–8.7)
PROT SERPL-MCNC: 6 G/DL — LOW (ref 6.6–8.7)
PROT SERPL-MCNC: 6.1 G/DL — LOW (ref 6.6–8.7)
PROT SERPL-MCNC: 6.2 G/DL — LOW (ref 6.6–8.7)
PROT SERPL-MCNC: 6.3 G/DL — LOW (ref 6.6–8.7)
PROT UR-MCNC: 100 MG/DL
PROTHROM AB SERPL-ACNC: 16.1 SEC — HIGH (ref 9.9–13.4)
RBC # BLD: 4.15 M/UL — LOW (ref 4.2–5.8)
RBC # BLD: 4.26 M/UL — SIGNIFICANT CHANGE UP (ref 4.2–5.8)
RBC # BLD: 4.35 M/UL — SIGNIFICANT CHANGE UP (ref 4.2–5.8)
RBC # BLD: 4.44 M/UL — SIGNIFICANT CHANGE UP (ref 4.2–5.8)
RBC # FLD: 13.9 % — SIGNIFICANT CHANGE UP (ref 10.3–14.5)
RBC # FLD: 14 % — SIGNIFICANT CHANGE UP (ref 10.3–14.5)
RBC # FLD: 14 % — SIGNIFICANT CHANGE UP (ref 10.3–14.5)
RBC # FLD: 14.2 % — SIGNIFICANT CHANGE UP (ref 10.3–14.5)
RBC CASTS # UR COMP ASSIST: 13 /HPF — HIGH (ref 0–4)
SAO2 % BLDA: 99.4 % — HIGH (ref 94–98)
SODIUM SERPL-SCNC: 140 MMOL/L — SIGNIFICANT CHANGE UP (ref 135–145)
SODIUM SERPL-SCNC: 141 MMOL/L — SIGNIFICANT CHANGE UP (ref 135–145)
SODIUM SERPL-SCNC: 143 MMOL/L — SIGNIFICANT CHANGE UP (ref 135–145)
SP GR SPEC: 1.02 — SIGNIFICANT CHANGE UP (ref 1–1.03)
SPECIMEN SOURCE: SIGNIFICANT CHANGE UP
SQUAMOUS # UR AUTO: 1 /HPF — SIGNIFICANT CHANGE UP (ref 0–5)
T4 AB SER-ACNC: 4.4 UG/DL — LOW (ref 4.5–12)
TSH SERPL-MCNC: 1.01 UIU/ML — SIGNIFICANT CHANGE UP (ref 0.27–4.2)
UROBILINOGEN FLD QL: 1 MG/DL — SIGNIFICANT CHANGE UP (ref 0.2–1)
WBC # BLD: 11.13 K/UL — HIGH (ref 3.8–10.5)
WBC # BLD: 13.38 K/UL — HIGH (ref 3.8–10.5)
WBC # BLD: 13.42 K/UL — HIGH (ref 3.8–10.5)
WBC # BLD: 9.91 K/UL — SIGNIFICANT CHANGE UP (ref 3.8–10.5)
WBC # FLD AUTO: 11.13 K/UL — HIGH (ref 3.8–10.5)
WBC # FLD AUTO: 13.38 K/UL — HIGH (ref 3.8–10.5)
WBC # FLD AUTO: 13.42 K/UL — HIGH (ref 3.8–10.5)
WBC # FLD AUTO: 9.91 K/UL — SIGNIFICANT CHANGE UP (ref 3.8–10.5)
WBC UR QL: 2 /HPF — SIGNIFICANT CHANGE UP (ref 0–5)

## 2025-05-22 PROCEDURE — 93308 TTE F-UP OR LMTD: CPT | Mod: 26

## 2025-05-22 PROCEDURE — 99291 CRITICAL CARE FIRST HOUR: CPT | Mod: GC

## 2025-05-22 PROCEDURE — 93321 DOPPLER ECHO F-UP/LMTD STD: CPT | Mod: 26

## 2025-05-22 PROCEDURE — 93010 ELECTROCARDIOGRAM REPORT: CPT

## 2025-05-22 PROCEDURE — 99233 SBSQ HOSP IP/OBS HIGH 50: CPT

## 2025-05-22 PROCEDURE — 95718 EEG PHYS/QHP 2-12 HR W/VEEG: CPT

## 2025-05-22 RX ORDER — FENTANYL CITRATE-0.9 % NACL/PF 100MCG/2ML
25 SYRINGE (ML) INTRAVENOUS ONCE
Refills: 0 | Status: DISCONTINUED | OUTPATIENT
Start: 2025-05-22 | End: 2025-05-22

## 2025-05-22 RX ORDER — DEXMEDETOMIDINE HYDROCHLORIDE IN SODIUM CHLORIDE 4 UG/ML
0.4 INJECTION INTRAVENOUS
Qty: 400 | Refills: 0 | Status: DISCONTINUED | OUTPATIENT
Start: 2025-05-22 | End: 2025-05-25

## 2025-05-22 RX ORDER — ANGIOTENSIN II 2.5 MG/ML
5 INJECTION INTRAVENOUS
Qty: 2.5 | Refills: 0 | Status: DISCONTINUED | OUTPATIENT
Start: 2025-05-22 | End: 2025-05-23

## 2025-05-22 RX ORDER — ALBUMIN (HUMAN) 12.5 G/50ML
100 INJECTION, SOLUTION INTRAVENOUS ONCE
Refills: 0 | Status: COMPLETED | OUTPATIENT
Start: 2025-05-22 | End: 2025-05-22

## 2025-05-22 RX ORDER — CALCIUM GLUCONATE 20 MG/ML
2 INJECTION, SOLUTION INTRAVENOUS ONCE
Refills: 0 | Status: COMPLETED | OUTPATIENT
Start: 2025-05-22 | End: 2025-05-22

## 2025-05-22 RX ORDER — LIDOCAINE HCL/PF 10 MG/ML
2 VIAL (ML) INJECTION
Qty: 2 | Refills: 0 | Status: DISCONTINUED | OUTPATIENT
Start: 2025-05-22 | End: 2025-05-22

## 2025-05-22 RX ORDER — ACETAMINOPHEN 500 MG/5ML
1000 LIQUID (ML) ORAL ONCE
Refills: 0 | Status: COMPLETED | OUTPATIENT
Start: 2025-05-22 | End: 2025-05-22

## 2025-05-22 RX ORDER — NOREPINEPHRINE BITARTRATE 8 MG
0.05 SOLUTION INTRAVENOUS
Qty: 8 | Refills: 0 | Status: DISCONTINUED | OUTPATIENT
Start: 2025-05-22 | End: 2025-05-25

## 2025-05-22 RX ORDER — INSULIN LISPRO 100 U/ML
2 INJECTION, SOLUTION INTRAVENOUS; SUBCUTANEOUS EVERY 6 HOURS
Refills: 0 | Status: DISCONTINUED | OUTPATIENT
Start: 2025-05-22 | End: 2025-05-23

## 2025-05-22 RX ADMIN — INSULIN LISPRO 2: 100 INJECTION, SOLUTION INTRAVENOUS; SUBCUTANEOUS at 23:09

## 2025-05-22 RX ADMIN — INSULIN LISPRO 2 UNIT(S): 100 INJECTION, SOLUTION INTRAVENOUS; SUBCUTANEOUS at 11:55

## 2025-05-22 RX ADMIN — INSULIN LISPRO 4: 100 INJECTION, SOLUTION INTRAVENOUS; SUBCUTANEOUS at 17:46

## 2025-05-22 RX ADMIN — Medication 25 MICROGRAM(S): at 23:23

## 2025-05-22 RX ADMIN — INSULIN GLARGINE-YFGN 5 UNIT(S): 100 INJECTION, SOLUTION SUBCUTANEOUS at 08:14

## 2025-05-22 RX ADMIN — Medication 50 MILLIEQUIVALENT(S): at 10:20

## 2025-05-22 RX ADMIN — VASOPRESSIN 6 UNIT(S)/MIN: 20 INJECTION INTRAVENOUS at 05:10

## 2025-05-22 RX ADMIN — DOBUTAMINE 5.86 MICROGRAM(S)/KG/MIN: 250 INJECTION INTRAVENOUS at 05:11

## 2025-05-22 RX ADMIN — INSULIN LISPRO 4: 100 INJECTION, SOLUTION INTRAVENOUS; SUBCUTANEOUS at 11:55

## 2025-05-22 RX ADMIN — Medication 1 APPLICATION(S): at 05:12

## 2025-05-22 RX ADMIN — NOREPINEPHRINE BITARTRATE 7.32 MICROGRAM(S)/KG/MIN: 8 SOLUTION at 11:00

## 2025-05-22 RX ADMIN — MEROPENEM 33.33 MILLIGRAM(S): 1 INJECTION INTRAVENOUS at 13:18

## 2025-05-22 RX ADMIN — Medication 50 MILLIGRAM(S): at 23:09

## 2025-05-22 RX ADMIN — Medication 100 MILLIEQUIVALENT(S): at 18:19

## 2025-05-22 RX ADMIN — ACETAZOLAMIDE 250 MILLIGRAM(S): 250 TABLET ORAL at 17:30

## 2025-05-22 RX ADMIN — Medication 50 MILLIGRAM(S): at 05:13

## 2025-05-22 RX ADMIN — Medication 40 MILLIGRAM(S): at 11:29

## 2025-05-22 RX ADMIN — Medication 15 MILLILITER(S): at 05:13

## 2025-05-22 RX ADMIN — INSULIN LISPRO 2 UNIT(S): 100 INJECTION, SOLUTION INTRAVENOUS; SUBCUTANEOUS at 23:10

## 2025-05-22 RX ADMIN — LACTULOSE 10 GRAM(S): 10 SOLUTION ORAL at 21:39

## 2025-05-22 RX ADMIN — ALBUMIN (HUMAN) 50 MILLILITER(S): 12.5 INJECTION, SOLUTION INTRAVENOUS at 05:11

## 2025-05-22 RX ADMIN — MEROPENEM 33.33 MILLIGRAM(S): 1 INJECTION INTRAVENOUS at 05:10

## 2025-05-22 RX ADMIN — Medication 15 MILLILITER(S): at 17:31

## 2025-05-22 RX ADMIN — CALCIUM GLUCONATE 200 GRAM(S): 20 INJECTION, SOLUTION INTRAVENOUS at 05:10

## 2025-05-22 RX ADMIN — ACETAZOLAMIDE 250 MILLIGRAM(S): 250 TABLET ORAL at 05:12

## 2025-05-22 RX ADMIN — Medication 400 MILLIGRAM(S): at 12:32

## 2025-05-22 RX ADMIN — Medication 50 MILLIEQUIVALENT(S): at 12:42

## 2025-05-22 RX ADMIN — LACTULOSE 10 GRAM(S): 10 SOLUTION ORAL at 13:19

## 2025-05-22 RX ADMIN — Medication 50 MILLIGRAM(S): at 17:30

## 2025-05-22 RX ADMIN — Medication 1000 MILLIGRAM(S): at 13:09

## 2025-05-22 RX ADMIN — INSULIN LISPRO 2 UNIT(S): 100 INJECTION, SOLUTION INTRAVENOUS; SUBCUTANEOUS at 17:46

## 2025-05-22 RX ADMIN — DEXMEDETOMIDINE HYDROCHLORIDE IN SODIUM CHLORIDE 7.81 MICROGRAM(S)/KG/HR: 4 INJECTION INTRAVENOUS at 11:00

## 2025-05-22 RX ADMIN — Medication 50 MILLIEQUIVALENT(S): at 14:58

## 2025-05-22 RX ADMIN — Medication 50 MILLIGRAM(S): at 11:29

## 2025-05-22 RX ADMIN — INSULIN LISPRO 4: 100 INJECTION, SOLUTION INTRAVENOUS; SUBCUTANEOUS at 06:01

## 2025-05-22 RX ADMIN — BUMETANIDE 2 MILLIGRAM(S): 1 TABLET ORAL at 05:13

## 2025-05-22 RX ADMIN — MEROPENEM 33.33 MILLIGRAM(S): 1 INJECTION INTRAVENOUS at 21:39

## 2025-05-22 NOTE — PROGRESS NOTE ADULT - SUBJECTIVE AND OBJECTIVE BOX
DOMINIC ALAMO  [id number]        Chief Complaint: follow up sepsis/CAD      Subjective: s/p DCCV yesterday for VT, given Amio/lido and cooling blanket       24 hour Tele:        acetaZOLAMIDE Injectable 250 milliGRAM(s) IV Push every 12 hours  chlorhexidine 0.12% Liquid 15 milliLiter(s) Oral Mucosa every 12 hours  chlorhexidine 2% Cloths 1 Application(s) Topical <User Schedule>  dexMEDEtomidine Infusion 0.4 MICROgram(s)/kG/Hr IV Continuous <Continuous>  DOBUTamine Infusion 2.5 MICROgram(s)/kG/Min IV Continuous <Continuous>  hydrocortisone sodium succinate Injectable 50 milliGRAM(s) IV Push every 6 hours  insulin glargine Injectable (LANTUS) 5 Unit(s) SubCutaneous every morning  insulin lispro (ADMELOG) corrective regimen sliding scale.   SubCutaneous every 6 hours  insulin lispro Injectable (ADMELOG) 2 Unit(s) SubCutaneous every 6 hours  lactulose Syrup 10 Gram(s) Oral three times a day  meropenem  IVPB 1000 milliGRAM(s) IV Intermittent every 8 hours  norepinephrine Infusion 0.05 MICROgram(s)/kG/Min IV Continuous <Continuous>  pantoprazole  Injectable 40 milliGRAM(s) IV Push daily  potassium chloride  20 mEq/100 mL IVPB 20 milliEquivalent(s) IV Intermittent every 2 hours  rifAXIMin 550 milliGRAM(s) Oral two times a day  vasopressin Infusion 0.04 Unit(s)/Min IV Continuous <Continuous>          Physical Exam:  T(C): 37.3 (25 @ 10:00), Max: 38.4 (25 @ 17:15)  HR: 78 (25 @ 10:00) (69 - 167)  BP: 100/83 (25 @ 00:00) (94/77 - 114/77)  RR: 23 (25 @ 10:00) (10 - 40)  SpO2: 100% (25 @ 10:00) (95% - 100%)  Wt(kg): --  General: intubated/sedated  HEENT: MMM, sclera anicteric  Resp: CTA bilaterally  CVS: nl s1s2, rrr, no s3/JVD  Abd: soft, NT, ND, BS+  Ext: No c/c/e  Neuro sedated  Psych: Normal affect    I&O's Summary    21 May 2025 07:01  -  22 May 2025 07:00  --------------------------------------------------------  IN: 2792.7 mL / OUT: 5485 mL / NET: -2692.3 mL    22 May 2025 07:01  -  22 May 2025 10:45  --------------------------------------------------------  IN: 33.8 mL / OUT: 1000 mL / NET: -966.2 mL          Labs:   22 May 2025 08:45    140    |  90     |  76.6   ----------------------------<  237    3.1     |  33.0   |  2.01     Ca    8.7        22 May 2025 08:45  Phos  3.6       22 May 2025 08:45  Mg     2.5       22 May 2025 08:45    TPro  6.3    /  Alb  3.7    /  TBili  2.8    /  DBili  x      /  AST  250    /  ALT  138    /  AlkPhos  143    22 May 2025 08:45                          12.0   9.91  )-----------( 26       ( 22 May 2025 08:45 )             35.4     PT/INR - ( 22 May 2025 08:45 )   PT: 16.1 sec;   INR: 1.43 ratio         PTT - ( 22 May 2025 08:45 )  PTT:25.3 sec      Echo: 2025:    1. Left ventricular systolic function is severely decreased with an ejection fraction visually estimated at 30 to 35 %. Global left ventricular hypokinesis.   2. Enlarged right ventricular cavity size and moderately reduced right ventricular systolic function.   3. Left atrium is normal in size.   4. There is mild calcification of the mitral valve annulus.   5. Moderate to severe mitral regurgitation.   6. Moderate tricuspid regurgitation.   7. Compared to the transthoracic echocardiogram performed on 2025, the LV and RV are now better visualized.    CXR:  Endotracheal tube tip 5 cm above the gayla. Enteric tube tip below the   edge of the image, and rightIJ catheter tip in the right atrium. No   pneumothorax. No focal consolidation or pleural effusion.    CTA C/A/P:  No pulmonary embolus.  Airspace consolidations at the lung bases may represent atelectasis   and/or pneumonia.  No bowel obstruction.  Cholelithiasis.  Trace ascites.        Assessment:  76 yo male PMHx HTN, HLD, CAD based on abnormal nuke, TIA, remote pericarditis in  presented to the ED with complaints of abdominal pain, nausea, vomiting for since the day prior.  Upon arrival to the ED patient was lethargic and tachypneic, with abdominal distention and mottled skin.  He was noted to be hypotensive despite fluid resuscitation and placed on escalating doses of pressors.  He was started on BIPAP initially for tachypnea and metabolic acidosis, eventually intubated in the ED.  Patient remains intubated and history taken from chart and d/w RN.  Patient was on escalating doses of pressors and , but in last day has been weaned down.  Echo with severe LV dysfxn which appears new and likely stress myopathy.  CI on low end of normal on .  Likely sepsis from GNR septicemia from cholangitis as cause now s/p perc tube.  Noted AF with some RVR due to shock, , etc.  -DCCV yesterday   -REmains in SR  -On multiple pressors and dobutamine     Plan: (TO BE SEEN)   1. Care per ICU.  2. Abx per ICU.  3. Tele monitor.  4. A/C for AF if no contraindication  5.  6. Has been fluid resuscitated, maintin fluid status.  7. Eventual echo when improved to reassess EF.  GDMT as tolerated when improved.  8.       Gallo Black MD DOMINIC ALAMO  [id number]        Chief Complaint: follow up sepsis/CAD      Subjective: s/p DCCV yesterday for rapid AF, given Amio/lido and cooling blanket       24 hour Tele: AF now SR        acetaZOLAMIDE Injectable 250 milliGRAM(s) IV Push every 12 hours  chlorhexidine 0.12% Liquid 15 milliLiter(s) Oral Mucosa every 12 hours  chlorhexidine 2% Cloths 1 Application(s) Topical <User Schedule>  dexMEDEtomidine Infusion 0.4 MICROgram(s)/kG/Hr IV Continuous <Continuous>  DOBUTamine Infusion 2.5 MICROgram(s)/kG/Min IV Continuous <Continuous>  hydrocortisone sodium succinate Injectable 50 milliGRAM(s) IV Push every 6 hours  insulin glargine Injectable (LANTUS) 5 Unit(s) SubCutaneous every morning  insulin lispro (ADMELOG) corrective regimen sliding scale.   SubCutaneous every 6 hours  insulin lispro Injectable (ADMELOG) 2 Unit(s) SubCutaneous every 6 hours  lactulose Syrup 10 Gram(s) Oral three times a day  meropenem  IVPB 1000 milliGRAM(s) IV Intermittent every 8 hours  norepinephrine Infusion 0.05 MICROgram(s)/kG/Min IV Continuous <Continuous>  pantoprazole  Injectable 40 milliGRAM(s) IV Push daily  potassium chloride  20 mEq/100 mL IVPB 20 milliEquivalent(s) IV Intermittent every 2 hours  rifAXIMin 550 milliGRAM(s) Oral two times a day  vasopressin Infusion 0.04 Unit(s)/Min IV Continuous <Continuous>          Physical Exam:  T(C): 37.3 (25 @ 10:00), Max: 38.4 (25 @ 17:15)  HR: 78 (25 @ 10:00) (69 - 167)  BP: 100/83 (25 @ 00:00) (94/77 - 114/77)  RR: 23 (25 @ 10:00) (10 - 40)  SpO2: 100% (25 @ 10:00) (95% - 100%)  Wt(kg): --  General: intubated/sedated  HEENT: MMM, sclera anicteric  Resp: CTA bilaterally  CVS: nl s1s2, rrr, no s3/JVD  Abd: soft, NT, ND, BS+  Ext: No c/c/e  Neuro sedated  Psych: Normal affect    I&O's Summary    21 May 2025 07:01  -  22 May 2025 07:00  --------------------------------------------------------  IN: 2792.7 mL / OUT: 5485 mL / NET: -2692.3 mL    22 May 2025 07:01  -  22 May 2025 10:45  --------------------------------------------------------  IN: 33.8 mL / OUT: 1000 mL / NET: -966.2 mL          Labs:   22 May 2025 08:45    140    |  90     |  76.6   ----------------------------<  237    3.1     |  33.0   |  2.01     Ca    8.7        22 May 2025 08:45  Phos  3.6       22 May 2025 08:45  Mg     2.5       22 May 2025 08:45    TPro  6.3    /  Alb  3.7    /  TBili  2.8    /  DBili  x      /  AST  250    /  ALT  138    /  AlkPhos  143    22 May 2025 08:45                          12.0   9.91  )-----------( 26       ( 22 May 2025 08:45 )             35.4     PT/INR - ( 22 May 2025 08:45 )   PT: 16.1 sec;   INR: 1.43 ratio         PTT - ( 22 May 2025 08:45 )  PTT:25.3 sec      Echo: 2025:    1. Left ventricular systolic function is severely decreased with an ejection fraction visually estimated at 30 to 35 %. Global left ventricular hypokinesis.   2. Enlarged right ventricular cavity size and moderately reduced right ventricular systolic function.   3. Left atrium is normal in size.   4. There is mild calcification of the mitral valve annulus.   5. Moderate to severe mitral regurgitation.   6. Moderate tricuspid regurgitation.   7. Compared to the transthoracic echocardiogram performed on 2025, the LV and RV are now better visualized.    Echo 2025:   1. Technically difficult image quality.   2. Left ventricular systolic function is moderately decreased with an ejection fraction visually estimated at 30 to 35 %. Global left ventricular hypokinesis.   3. There is no evidence of a left ventricular thrombus.   4. Enlarged right ventricular cavity size and moderately reduced right ventricular systolic function.   5. No pericardial effusion seen.   6. Compared to the transthoracic echocardiogram performed on 2025, there have been no significant interval changes.      CXR:  Endotracheal tube tip 5 cm above the gayla. Enteric tube tip below the   edge of the image, and rightIJ catheter tip in the right atrium. No   pneumothorax. No focal consolidation or pleural effusion.    CTA C/A/P:  No pulmonary embolus.  Airspace consolidations at the lung bases may represent atelectasis   and/or pneumonia.  No bowel obstruction.  Cholelithiasis.  Trace ascites.        Assessment:  76 yo male PMHx HTN, HLD, CAD based on abnormal nuke, TIA, remote pericarditis in  presented to the ED with complaints of abdominal pain, nausea, vomiting for since the day prior.  Upon arrival to the ED patient was lethargic and tachypneic, with abdominal distention and mottled skin.  He was noted to be hypotensive despite fluid resuscitation and placed on escalating doses of pressors.  He was started on BIPAP initially for tachypnea and metabolic acidosis, eventually intubated in the ED.  Patient remains intubated and history taken from chart and d/w RN.  Patient was on escalating doses of pressors and , but in last day has been weaned down.  Echo with severe LV dysfxn which appears new and likely stress myopathy.  CI on low end of normal on .  Likely sepsis from GNR septicemia from cholangitis as cause now s/p perc tube.  Noted AF with some RVR due to shock, , etc.  -DCCV yesterday for rapid AF, reviewed tele and no VT   -Remains in SR this morning    -Weaned down to single pressor now, if can dobutamine would keep off given rapid AF    Plan:  1. Care per ICU, prognosis guarded.   2. Abx per ICU.  3. Tele monitor.  4. A/C for AF if no contraindication  5. Has been fluid resuscitated, maintain euvolemia   6. Eventual echo when improved to reassess EF.  GDMT as tolerated if/when improved.  7. On going discussions regarding GOC with ICU team  8. CAn add digoxin if needed for rapid AF, limited AAT options due to renal dysfunction, liver dysfunction and low EF      Gallo Black MD

## 2025-05-22 NOTE — CHART NOTE - NSCHARTNOTEFT_GEN_A_CORE
Called to patient bedside secondary to pt stacking breaths/dysynchrony.   I time was adjusted but didn't have any effect.  Pt then placed on CPAP 6/6/50% and seemed more synchronous and comfortable. MICU NP's at bedside at this time to assess and agreed pt looked more comfortable on CPAP at this time.  Will continue to monitor patient at this time.

## 2025-05-22 NOTE — PROGRESS NOTE ADULT - ASSESSMENT
1.  Acute kidney injury: From ATN/shock.  cr stable- 2 today. neg 3 L in the last 24 hours. consider holding bumex. continue acetazolamide for alkalosis.   2. Metabolic acidosis from severe lactic acidosis.  resolved. now alkalotic. continue diamox  3.  Septic shock: From cholecystitis. ESBL E.coli/ citrobacter- on meropenem.  s/p drain  4.  Respiratory failure- intubated  5. Hypokalemia- agressive Kcl repletion       Joel Mendoza MD, [facility]

## 2025-05-22 NOTE — PROGRESS NOTE ADULT - ASSESSMENT
Mr. Bateman is a 75-year-old male (PMH CAD s/p MI, TIA, HTN, HLD) admitted to MICU on 18-May for acute respiratory failure and shock, now intubated and sedated. He remains in profound mixed shock (septic vs. cardiogenic), now with confirmed acute cholecystitis/cholangitis s/p IR cholecystostomy tube placement (19-May, drained foul bile). He required escalating quadruple pressor support (Norepinephrine, Phenylephrine, Vasopressin, Angiotensin II) however now off levo and rob, on vaso and AG2 as of 5/21. Blood cultures from 19-May are positive for Gram-Negative Rods. Prognosis remains extremely guarded; DNR.    #Neuro:    -Altered mental status, intubated.   -Fent,drip  -Neuro checks q2-4h. Seizure/aspiration precautions. HOB >30 deg.    #CV:    -Mixed shock (septic/cardiogenic), improving.  -Pressors: Angiotensin II 40 ng/kg/min, Dobutamine 2.5 mcg/kg/min, Vasopressin 0.04 U/min. (Norepinephrine, Phenylephrine D/C'd).  -Titrate to MAP >65 mmHg.  -TTE showing EF 30-35%, stress cardiomyopathy  -Continue Hydrocortisone 50mg IV q6h.  -Telemetry. Arterial line. Central venous catheter.    #Resp:    -Acute Respiratory Failure, intubated. Vent: AC/CMV, RR 16, , FiO2 50%, PEEP 6.   -Wean FiO2/PEEP as tolerated to SpO2 >92-94%.  -Pulmonary toilet, oral care with Chlorhexidine 0.12% liquid q12h. VAP bundle.    #Renal/FEN:    -A  -Monitor BMP, Ca, Mg, Phos q6h  -Continue Bumetanide infusion 0.5 mg/hr.  -Strict I&Os. Daily weights.    #GI/Abdomen:    -Acute cholecystitis/cholangitis s/p cholecystostomy tube (19-May, drained foul bile). Pancreatitis. Exam: abdomen distended.  -NPO.  -Continue Pantoprazole 40mg IV daily.  -Continue Lactulose 10g PO (via NGT) TID, Rifaximin 550mg PO (via NGT) BID.  -Monitor cholecystostomy tube output (150 ml)    #Heme:    -Monitor CBC daily.  -Aspirin remains held.    #ID:    -Septic shock (cholecystitis/cholangitis). Febrile (Tmax 38.5°C). WBC 12.65.  -Blood cx (19-May): GNR. Blood cx (18-May): E.coli ESBL, Citrobacter freundii, Strep gallolyticus, GPR. Urine cx (18-May): E.coli.  -Continue Meropenem 1g IV q8h   -ID consult for antibiotic recommendations.  -Monitor temperature, WBC, follow culture results.    #Endo:    -Maintain euglycemia  -Continue Insulin Glargine 5U SC qAM. Continue Insulin Lispro SSI PRN. Goal -180 mg/dL.  -Monitor POCT BG ACHS / q6h.    #Prophylaxis:    VTE: SCDs.   Stress Ulcer: Pantoprazole.  Skin: CHG cloths. Mupirocin 2% nasal BID. Turning/positioning.  Disp/GOC:    MICU for management of septic/cardiogenic shock, ARF, multi-organ support.  Prognosis extremely guarded.  DNR   Palliative Care consult ongoing.  Update family (daughter Brina at bedside today) on clinical status and ongoing management.

## 2025-05-22 NOTE — PROGRESS NOTE ADULT - SUBJECTIVE AND OBJECTIVE BOX
[facility] DIVISION OF KIDNEY DISEASES AND HYPERTENSION -- PROGRESS NOTE    SUBJECTIVE:   doing ok. in negative balance 3 L.    MEDICATIONS    Standing Inpatient Medications  acetaZOLAMIDE Injectable 250 milliGRAM(s) IV Push every 12 hours  angiotensin II Infusion 5 NANOGram(s)/kG/Min IV Continuous <Continuous>  chlorhexidine 0.12% Liquid 15 milliLiter(s) Oral Mucosa every 12 hours  chlorhexidine 2% Cloths 1 Application(s) Topical <User Schedule>  dexMEDEtomidine Infusion 0.4 MICROgram(s)/kG/Hr IV Continuous <Continuous>  DOBUTamine Infusion 2.5 MICROgram(s)/kG/Min IV Continuous <Continuous>  hydrocortisone sodium succinate Injectable 50 milliGRAM(s) IV Push every 6 hours  insulin glargine Injectable (LANTUS) 5 Unit(s) SubCutaneous every morning  insulin lispro (ADMELOG) corrective regimen sliding scale.   SubCutaneous every 6 hours  insulin lispro Injectable (ADMELOG) 2 Unit(s) SubCutaneous every 6 hours  lactulose Syrup 10 Gram(s) Oral three times a day  meropenem  IVPB 1000 milliGRAM(s) IV Intermittent every 8 hours  norepinephrine Infusion 0.05 MICROgram(s)/kG/Min IV Continuous <Continuous>  pantoprazole  Injectable 40 milliGRAM(s) IV Push daily  potassium chloride  20 mEq/100 mL IVPB 20 milliEquivalent(s) IV Intermittent every 2 hours  rifAXIMin 550 milliGRAM(s) Oral two times a day  vasopressin Infusion 0.04 Unit(s)/Min IV Continuous <Continuous>    PRN Inpatient Medications      VITALS/PHYSICAL EXAM  --------------------------------------------------------------------------------  T(C): 37.7 (05-22-25 @ 11:15), Max: 38.4 (05-21-25 @ 17:15)  HR: 74 (05-22-25 @ 11:18) (69 - 167)  BP: 100/83 (05-22-25 @ 00:00) (94/77 - 114/77)  RR: 21 (05-22-25 @ 11:15) (10 - 40)  SpO2: 100% (05-22-25 @ 11:18) (95% - 100%)  Wt(kg): --        05-21-25 @ 07:01  -  05-22-25 @ 07:00  --------------------------------------------------------  IN: 2792.7 mL / OUT: 5485 mL / NET: -2692.3 mL    05-22-25 @ 07:01  -  05-22-25 @ 11:43  --------------------------------------------------------  IN: 154.9 mL / OUT: 1150 mL / NET: -995.1 mL      Physical Exam:  intubated  HEENT: normal  Pulm: CTA B/L  CV: normal S1S2; no murmur  Abd: soft, non-tender  Extremities- + edema    LABS/STUDIES  --------------------------------------------------------------------------------  140  |  90  |  76.6  ----------------------------<  237      [05-22-25 @ 08:45]  3.1   |  33.0  |  2.01        Ca     8.7     [05-22-25 @ 08:45]      Mg     2.5     [05-22-25 @ 08:45]      Phos  3.6     [05-22-25 @ 08:45]    TPro  6.3  /  Alb  3.7  /  TBili  2.8  /  DBili  x   /  AST  250  /  ALT  138  /  AlkPhos  143  [05-22-25 @ 08:45]    PT/INR: PT 16.1 , INR 1.43       [05-22-25 @ 08:45]  PTT: 25.3       [05-22-25 @ 08:45]      Creatinine Trend:  SCr 2.01 [05-22 @ 08:45]  SCr 2.18 [05-22 @ 03:05]  SCr 2.20 [05-21 @ 21:00]  SCr 1.97 [05-21 @ 14:39]  SCr 1.94 [05-21 @ 06:05]

## 2025-05-22 NOTE — PROGRESS NOTE ADULT - ATTENDING COMMENTS
76 yo male with HTN, CAD, HLD, TIA, remote pericarditis, now admitted with septic shock, multiorgan failure, ESBL gram negative bacteremia, acute cholecystitis requiring percutaneous drainage, OLEKSANDR, severe metabolic acidosis, acute respiratory failure requiring mechanical ventilation, stress cardiomyopathy/ cardiogenic shock.    Slight improvement in pressor requirements, trying to wean off angiotensin2.  Turned off dobutamine due to afib with RVR, if svO2 drops then will try milrinone.  Switch off versed, change to precedex  Trickle tube feeds to start today  Family updated at bedside.

## 2025-05-22 NOTE — EEG REPORT - NS EEG TEXT BOX
DOMINIC ALAMO [id number]     Study Date: 		05-21-25 (0800) - 05-22-25 (0800)   Duration: 23h 58min  --------------------------------------------------------------------------------------------------  History:  CC/ HPI Patient is a 75y old  Male who presents with a chief complaint of Acute respiratory failure, shock (22 May 2025 07:37)    MEDICATIONS  (STANDING):    --------------------------------------------------------------------------------------------------  Study Interpretation:    [[[Abbreviation Key:  PDR=alpha rhythm/posterior dominant rhythm. A-P=anterior posterior gradient.  Amplitude: ‘very low’:<20; ‘low’:20-50; ‘medium’:; ‘high’:>200uV.  Persistence for periodic/rhythmic patterns (% of epoch) ‘rare’:<1%; ‘occasional’:1-10%; ‘frequent’:10-50%; ‘abundant’:50-90%; ‘continuous’:>90%.  Persistence for sporadic discharges: ‘rare’:<1/hr; ‘occasional’:1/min-1/hr; ‘frequent’:>1/min; ‘abundant’:>1/10 sec.  GRDA=generalized rhythmic delta activity; FIRDA=frontal intermittent GRDA; LRDA=lateralized rhythmic delta activity; TIRDA=temporal intermittent rhythmic delta activity;  LPD=PLED=lateralized periodic discharges; GPD=generalized periodic discharges; BiPDs=BiPLEDs=bilateral independent periodic epileptiform discharges; SIRPID=stimulus induced rhythmic, periodic, or ictal appearing discharges; BIRDs=brief potentially ictal rhythmic discharges >4 Hz, lasting .5-10s; PFA=paroxysmal bursts of beta/gamma; LVFA=low voltage fast activity.  Modifiers: +F=with fast component; +S=with spike component; +R=with rhythmic component.  S-B=burst suppression pattern.  Max=maximal. N1-drowsy; N2-stage II sleep; N3-slow wave sleep. SSS/BETS=small sharp spikes/benign epileptiform transients of sleep. HV=hyperventilation; PS=photic stimulation]]]    Daily EEG Visual Analysis    FINDINGS:      Background:  Continuity: continuous  Symmetry: symmetric  PDR: none  Reactivity: present  Voltage: normal, mostly 20-150uV  Anterior Posterior Gradient: absent  Other background findings: none  Breach: absent    Background Slowing:  Generalized slowing: diffuse theta-delta slowing   Focal slowing: none was present.    State Changes:   -Drowsiness noted with increased slowing, attenuation of fast activity, vertex transients.  -Present with N2 sleep transients with symmetric spindles and K-complexes.    Sporadic Epileptiform Discharges:    Rare to occasional right frontal sharp waves    Rhythmic and Periodic Patterns (RPPs):  None     Electrographic and Electroclinical seizures:  None    Other Clinical Events:  None    Activation Procedures:   -Hyperventilation was not performed.    -Photic stimulation was not performed.     Artifacts:  Intermittent myogenic and movement artifacts were noted.    ECG:  The heart rate on single channel ECG was predominantly between 70-90 BPM.    EEG Classification / Summary:   Abnormal  EEG in the awake / drowsy / asleep states:   -Focal sharp waves, right frontal region   -Generalized background slowing, moderate     Clinical Impression:   -Risk of focal seizures from the right frontal region   -Moderate diffuse cerebral dysfunction  -There were no seizures recorded.      *PRELIM READ, ATTENDING REVIEW PENDING*       -------------------------------------------------------------------------------------------------------  [facility] EEG Reading Room Ph#: [phone]  Epilepsy Answering Service after 5PM and before 8:30AM: Ph#: [phone]    Leon Corrales DO   Epilepsy Fellow    DOMINIC ALAMO [id number]     Study Date: 		05-21-25 (0800) - 05-22-25 (0800)   Duration: 23h 58min  --------------------------------------------------------------------------------------------------  History:  CC/ HPI Patient is a 75y old  Male who presents with a chief complaint of Acute respiratory failure, shock (22 May 2025 07:37)    MEDICATIONS  (STANDING):    --------------------------------------------------------------------------------------------------  Study Interpretation:    [[[Abbreviation Key:  PDR=alpha rhythm/posterior dominant rhythm. A-P=anterior posterior gradient.  Amplitude: ‘very low’:<20; ‘low’:20-50; ‘medium’:; ‘high’:>200uV.  Persistence for periodic/rhythmic patterns (% of epoch) ‘rare’:<1%; ‘occasional’:1-10%; ‘frequent’:10-50%; ‘abundant’:50-90%; ‘continuous’:>90%.  Persistence for sporadic discharges: ‘rare’:<1/hr; ‘occasional’:1/min-1/hr; ‘frequent’:>1/min; ‘abundant’:>1/10 sec.  GRDA=generalized rhythmic delta activity; FIRDA=frontal intermittent GRDA; LRDA=lateralized rhythmic delta activity; TIRDA=temporal intermittent rhythmic delta activity;  LPD=PLED=lateralized periodic discharges; GPD=generalized periodic discharges; BiPDs=BiPLEDs=bilateral independent periodic epileptiform discharges; SIRPID=stimulus induced rhythmic, periodic, or ictal appearing discharges; BIRDs=brief potentially ictal rhythmic discharges >4 Hz, lasting .5-10s; PFA=paroxysmal bursts of beta/gamma; LVFA=low voltage fast activity.  Modifiers: +F=with fast component; +S=with spike component; +R=with rhythmic component.  S-B=burst suppression pattern.  Max=maximal. N1-drowsy; N2-stage II sleep; N3-slow wave sleep. SSS/BETS=small sharp spikes/benign epileptiform transients of sleep. HV=hyperventilation; PS=photic stimulation]]]    Daily EEG Visual Analysis    FINDINGS:      Background:  Continuity: continuous  Symmetry: symmetric  PDR: none  Reactivity: present  Voltage: normal, mostly 20-150uV  Anterior Posterior Gradient: absent  Other background findings: none  Breach: absent    Background Slowing:  Generalized slowing: diffuse theta-delta slowing   Focal slowing: none was present.    State Changes:   -Drowsiness noted with increased slowing, attenuation of fast activity, vertex transients.  -Present with N2 sleep transients with symmetric spindles and K-complexes.    Sporadic Epileptiform Discharges:    Rare to occasional right frontal sharp waves    Rhythmic and Periodic Patterns (RPPs):  None     Electrographic and Electroclinical seizures:  None    Other Clinical Events:  None    Activation Procedures:   -Hyperventilation was not performed.    -Photic stimulation was not performed.     Artifacts:  Intermittent myogenic and movement artifacts were noted.    ECG:  The heart rate on single channel ECG was predominantly between 70-90 BPM.    EEG Classification / Summary:   Abnormal  EEG in the awake / drowsy / asleep states:   -Focal sharp waves, right frontal region, infrequent to occasional  -Generalized background slowing, moderate     Clinical Impression:   -Risk of focal seizures from the right frontal region   -Moderate diffuse cerebral dysfunction  -There were no seizures recorded.          -------------------------------------------------------------------------------------------------------  [facility] EEG Reading Room Ph#: [phone]  Epilepsy Answering Service after 5PM and before 8:30AM: Ph#: [phone]    Leon Corrales, DO   Epilepsy Fellow    DOMINIC ALAMO [id number]     Study Date: 		05-21-25 (0800) - 05-22-25 (0800)   Duration: 23h 58min  --------------------------------------------------------------------------------------------------  History:  CC/ HPI Patient is a 75y old  Male who presents with a chief complaint of Acute respiratory failure, shock (22 May 2025 07:37)    MEDICATIONS  (STANDING):    --------------------------------------------------------------------------------------------------  Study Interpretation:    [[[Abbreviation Key:  PDR=alpha rhythm/posterior dominant rhythm. A-P=anterior posterior gradient.  Amplitude: ‘very low’:<20; ‘low’:20-50; ‘medium’:; ‘high’:>200uV.  Persistence for periodic/rhythmic patterns (% of epoch) ‘rare’:<1%; ‘occasional’:1-10%; ‘frequent’:10-50%; ‘abundant’:50-90%; ‘continuous’:>90%.  Persistence for sporadic discharges: ‘rare’:<1/hr; ‘occasional’:1/min-1/hr; ‘frequent’:>1/min; ‘abundant’:>1/10 sec.  GRDA=generalized rhythmic delta activity; FIRDA=frontal intermittent GRDA; LRDA=lateralized rhythmic delta activity; TIRDA=temporal intermittent rhythmic delta activity;  LPD=PLED=lateralized periodic discharges; GPD=generalized periodic discharges; BiPDs=BiPLEDs=bilateral independent periodic epileptiform discharges; SIRPID=stimulus induced rhythmic, periodic, or ictal appearing discharges; BIRDs=brief potentially ictal rhythmic discharges >4 Hz, lasting .5-10s; PFA=paroxysmal bursts of beta/gamma; LVFA=low voltage fast activity.  Modifiers: +F=with fast component; +S=with spike component; +R=with rhythmic component.  S-B=burst suppression pattern.  Max=maximal. N1-drowsy; N2-stage II sleep; N3-slow wave sleep. SSS/BETS=small sharp spikes/benign epileptiform transients of sleep. HV=hyperventilation; PS=photic stimulation]]]    Daily EEG Visual Analysis    FINDINGS:      Background:  Continuity: continuous  Symmetry: symmetric  PDR: none  Reactivity: present  Voltage: normal, mostly 20-150uV  Anterior Posterior Gradient: absent  Other background findings: none  Breach: absent    Background Slowing:  Generalized slowing: diffuse theta-delta slowing   Focal slowing: none was present.    State Changes:   -Drowsiness noted with increased slowing, attenuation of fast activity, vertex transients.  -Present with N2 sleep transients with symmetric spindles and K-complexes.    Sporadic Epileptiform Discharges:    Rare to occasional right frontal sharp waves    Rhythmic and Periodic Patterns (RPPs):  None     Electrographic and Electroclinical seizures:  None    Other Clinical Events:  None    Activation Procedures:   -Hyperventilation was not performed.    -Photic stimulation was not performed.     Artifacts:  Intermittent myogenic and movement artifacts were noted.    ECG:  The heart rate on single channel ECG was predominantly between 70-90 BPM, tachycardia to 120-150BPM+ noted in AM 5/22    EEG Classification / Summary:   Abnormal  EEG in the awake / drowsy / asleep states:   -Focal sharp waves, right frontal region, infrequent to occasional  -Generalized background slowing, moderate     Clinical Impression:   -Risk of focal seizures from the right frontal region   -Moderate diffuse cerebral dysfunction  -There were no seizures recorded.      Tachycardia to 120-150BPM+ noted in AM 5/22, correlate with tele ECG/clinically    -------------------------------------------------------------------------------------------------------  [facility] EEG Reading Room Ph#: [phone]  Epilepsy Answering Service after 5PM and before 8:30AM: Ph#: [phone]    Leon Corrales DO   Epilepsy Fellow    DOMINIC ALAMO [id number]     Study Date: 		05-21-25 (0800) - 05-22-25 (1143)   Duration: 26h 41min  --------------------------------------------------------------------------------------------------  History:  CC/ HPI Patient is a 75y old  Male who presents with a chief complaint of Acute respiratory failure, shock (22 May 2025 07:37)    MEDICATIONS  (STANDING):    --------------------------------------------------------------------------------------------------  Study Interpretation:    [[[Abbreviation Key:  PDR=alpha rhythm/posterior dominant rhythm. A-P=anterior posterior gradient.  Amplitude: ‘very low’:<20; ‘low’:20-50; ‘medium’:; ‘high’:>200uV.  Persistence for periodic/rhythmic patterns (% of epoch) ‘rare’:<1%; ‘occasional’:1-10%; ‘frequent’:10-50%; ‘abundant’:50-90%; ‘continuous’:>90%.  Persistence for sporadic discharges: ‘rare’:<1/hr; ‘occasional’:1/min-1/hr; ‘frequent’:>1/min; ‘abundant’:>1/10 sec.  GRDA=generalized rhythmic delta activity; FIRDA=frontal intermittent GRDA; LRDA=lateralized rhythmic delta activity; TIRDA=temporal intermittent rhythmic delta activity;  LPD=PLED=lateralized periodic discharges; GPD=generalized periodic discharges; BiPDs=BiPLEDs=bilateral independent periodic epileptiform discharges; SIRPID=stimulus induced rhythmic, periodic, or ictal appearing discharges; BIRDs=brief potentially ictal rhythmic discharges >4 Hz, lasting .5-10s; PFA=paroxysmal bursts of beta/gamma; LVFA=low voltage fast activity.  Modifiers: +F=with fast component; +S=with spike component; +R=with rhythmic component.  S-B=burst suppression pattern.  Max=maximal. N1-drowsy; N2-stage II sleep; N3-slow wave sleep. SSS/BETS=small sharp spikes/benign epileptiform transients of sleep. HV=hyperventilation; PS=photic stimulation]]]    Daily EEG Visual Analysis    FINDINGS:      Background:  Continuity: continuous  Symmetry: symmetric  PDR: none  Reactivity: present  Voltage: normal, mostly 20-150uV  Anterior Posterior Gradient: absent  Other background findings: none  Breach: absent    Background Slowing:  Generalized slowing: diffuse theta-delta slowing   Focal slowing: none was present.    State Changes:   -Drowsiness noted with increased slowing, attenuation of fast activity, vertex transients.  -Present with N2 sleep transients with symmetric spindles and K-complexes.    Sporadic Epileptiform Discharges:    Rare to occasional right frontal sharp waves    Rhythmic and Periodic Patterns (RPPs):  None     Electrographic and Electroclinical seizures:  None    Other Clinical Events:  None    Activation Procedures:   -Hyperventilation was not performed.    -Photic stimulation was not performed.     Artifacts:  Intermittent myogenic and movement artifacts were noted.    ECG:  The heart rate on single channel ECG was predominantly between 70-90 BPM, tachycardia to 120-150BPM+ noted in AM 5/22    EEG Classification / Summary:   Abnormal  EEG in the awake / drowsy / asleep states:   -Focal sharp waves, right frontal region, infrequent to occasional  -Generalized background slowing, moderate     Clinical Impression:   -Risk of focal seizures from the right frontal region   -Moderate diffuse cerebral dysfunction  -There were no seizures recorded.      Tachycardia to 120-150BPM+ noted in AM 5/22, correlate with tele ECG/clinically    -------------------------------------------------------------------------------------------------------  [facility] EEG Reading Room Ph#: [phone]  Epilepsy Answering Service after 5PM and before 8:30AM: Ph#: [phone]    Leon Corrales DO   Epilepsy Fellow    DOMINIC ALAMO [id number]     Study Date: 		05-21-25 (0800) - 05-22-25 (1143)   Duration: 26h 41min  --------------------------------------------------------------------------------------------------  History:  CC/ HPI Patient is a 75y old  Male who presents with a chief complaint of Acute respiratory failure, shock (22 May 2025 07:37)    MEDICATIONS  (STANDING):    --------------------------------------------------------------------------------------------------  Study Interpretation:    [[[Abbreviation Key:  PDR=alpha rhythm/posterior dominant rhythm. A-P=anterior posterior gradient.  Amplitude: ‘very low’:<20; ‘low’:20-50; ‘medium’:; ‘high’:>200uV.  Persistence for periodic/rhythmic patterns (% of epoch) ‘rare’:<1%; ‘occasional’:1-10%; ‘frequent’:10-50%; ‘abundant’:50-90%; ‘continuous’:>90%.  Persistence for sporadic discharges: ‘rare’:<1/hr; ‘occasional’:1/min-1/hr; ‘frequent’:>1/min; ‘abundant’:>1/10 sec.  GRDA=generalized rhythmic delta activity; FIRDA=frontal intermittent GRDA; LRDA=lateralized rhythmic delta activity; TIRDA=temporal intermittent rhythmic delta activity;  LPD=PLED=lateralized periodic discharges; GPD=generalized periodic discharges; BiPDs=BiPLEDs=bilateral independent periodic epileptiform discharges; SIRPID=stimulus induced rhythmic, periodic, or ictal appearing discharges; BIRDs=brief potentially ictal rhythmic discharges >4 Hz, lasting .5-10s; PFA=paroxysmal bursts of beta/gamma; LVFA=low voltage fast activity.  Modifiers: +F=with fast component; +S=with spike component; +R=with rhythmic component.  S-B=burst suppression pattern.  Max=maximal. N1-drowsy; N2-stage II sleep; N3-slow wave sleep. SSS/BETS=small sharp spikes/benign epileptiform transients of sleep. HV=hyperventilation; PS=photic stimulation]]]    Daily EEG Visual Analysis    FINDINGS:      Background:  Continuity: continuous  Symmetry: symmetric  PDR: none  Reactivity: present  Voltage: normal, mostly 20-150uV  Anterior Posterior Gradient: absent  Other background findings: none  Breach: absent    Background Slowing:  Generalized slowing: diffuse theta-delta slowing   Focal slowing: none was present.    State Changes:   -Drowsiness noted with increased slowing, attenuation of fast activity, vertex transients.  -Present with N2 sleep transients with symmetric spindles and K-complexes.    Sporadic Epileptiform Discharges:    Rare to occasional right frontal sharp waves    Rhythmic and Periodic Patterns (RPPs):  None     Electrographic and Electroclinical seizures:  None    Other Clinical Events:  None    Activation Procedures:   -Hyperventilation was not performed.    -Photic stimulation was not performed.     Artifacts:  Intermittent myogenic and movement artifacts were noted.    ECG:  The heart rate on single channel ECG was predominantly between 70-90 BPM, tachycardia to 120-150BPM+ noted in AM 5/22    EEG Classification / Summary:   Abnormal  EEG in the awake / drowsy / asleep states:   -Focal sharp waves, right frontal region, infrequent to occasional  -Generalized background slowing, moderate     Clinical Impression:   -Risk of focal seizures from the right frontal region   -Moderate diffuse cerebral dysfunction  -There were no seizures recorded.      Tachycardia to 120-150BPM+ noted in AM 5/22, correlate with tele ECG/clinically  -------------------------------------------------------------------------------------------------------  [facility] EEG Reading Room Ph#: [phone]  Epilepsy Answering Service after 5PM and before 8:30AM: Ph#: [phone]    Leon Corrales DO   Epilepsy Fellow

## 2025-05-22 NOTE — PROGRESS NOTE ADULT - SUBJECTIVE AND OBJECTIVE BOX
Patient is a 75y old  Male who presents with a chief complaint of Acute respiratory failure, shock (22 May 2025 07:40)      BRIEF HOSPITAL COURSE:   74 yo male with hx of HTN, HLD, CAD, TIA, remote pericarditis in 2015, presented to the ED with complaints of abdominal pain, nausea, vomiting for since the day prior.  Upon arrival to the ED patient was lethargic and tachypneic, with abdominal distention and mottled skin.    He was noted to be hypotensive despite fluid resuscitation and placed on escalating doses of pressors.    Started on Bipap initially for tachypnea and metabolic acidosis, eventually intubated in the ED. Labs revealed severe metabolic acidosis. OLEKSANDR, transaminitis. Admitted to MICU for mixed shock, cardiogenic vs septic, requiring triple pressors.    OVN:   Patient HR went >170, B/P remained stable, patient synch cardioverted at 200J 1x, 50 of Fent 1x, 20 mg IVP cardizem 1x, EKG, QTC <400 150mg of amio given, stat labs ordered, cooling blanket placed 1g of tylenol given temp 38.4C, K 20mg IV administered,    Interval HPI:  Patient seen and examined at bedside. Is ill appearing. PCT site clean, dry, draining into bulb.       PAST MEDICAL & SURGICAL HISTORY:  AMI (Acute Myocardial Infarction)  2001      Transient ischemic attack (TIA)      No Past Surgical History        Review of Systems:  All other review of systems negative except as noted in HPI    MEDICATIONS  (STANDING):  acetaZOLAMIDE Injectable 250 milliGRAM(s) IV Push every 12 hours  angiotensin II Infusion 40 NANOGram(s)/kG/Min (9.37 mL/Hr) IV Continuous <Continuous>  buMETAnide Injectable 2 milliGRAM(s) IV Push daily  chlorhexidine 0.12% Liquid 15 milliLiter(s) Oral Mucosa every 12 hours  chlorhexidine 2% Cloths 1 Application(s) Topical <User Schedule>  DOBUTamine Infusion 2.5 MICROgram(s)/kG/Min (5.86 mL/Hr) IV Continuous <Continuous>  hydrocortisone sodium succinate Injectable 50 milliGRAM(s) IV Push every 6 hours  insulin glargine Injectable (LANTUS) 5 Unit(s) SubCutaneous every morning  insulin lispro (ADMELOG) corrective regimen sliding scale.   SubCutaneous every 6 hours  lactulose Syrup 10 Gram(s) Oral three times a day  meropenem  IVPB 1000 milliGRAM(s) IV Intermittent every 8 hours  midazolam Infusion 0.02 mG/kG/Hr (1.56 mL/Hr) IV Continuous <Continuous>  pantoprazole  Injectable 40 milliGRAM(s) IV Push daily  rifAXIMin 550 milliGRAM(s) Oral two times a day  vasopressin Infusion 0.04 Unit(s)/Min (6 mL/Hr) IV Continuous <Continuous>    MEDICATIONS  (PRN):      Mode: AC/ CMV (Assist Control/ Continuous Mandatory Ventilation)  RR (machine): 16  TV (machine): 400  FiO2: 50  PEEP: 6  ITime: 0.7  MAP: 7  PIP: 14    ICU Vital Signs Last 24 Hrs  T(C): 36.3 (22 May 2025 07:00), Max: 38.4 (21 May 2025 17:15)  T(F): 97.3 (22 May 2025 07:00), Max: 101.1 (21 May 2025 17:15)  HR: 78 (22 May 2025 07:00) (69 - 167)  BP: 100/83 (22 May 2025 00:00) (94/70 - 114/77)  BP(mean): 90 (22 May 2025 00:00) (78 - 94)  ABP: 100/61 (22 May 2025 07:00) (81/54 - 115/70)  ABP(mean): 75 (22 May 2025 07:00) (64 - 88)  RR: 21 (22 May 2025 07:00) (10 - 40)  SpO2: 100% (22 May 2025 07:00) (95% - 100%)    O2 Parameters below as of 22 May 2025 04:00  Patient On (Oxygen Delivery Method): ventilator    O2 Concentration (%): 50      ABG - ( 22 May 2025 04:50 )  pH, Arterial: 7.460 pH, Blood: x     /  pCO2: 50    /  pO2: 101   / HCO3: 36    / Base Excess: 11.8  /  SaO2: 98.5              I&O's Detail    21 May 2025 07:01  -  22 May 2025 07:00  --------------------------------------------------------  IN:    Angiotensin II: 117.9 mL    Bumetanide: 2.5 mL    DOBUTamine: 109.4 mL    FentaNYL: 7.8 mL    IV PiggyBack: 200 mL    IV PiggyBack: 500 mL    IV PiggyBack: 100 mL    IV PiggyBack: 200 mL    IV PiggyBack: 200 mL    IV PiggyBack: 100 mL    Lactated Ringers Bolus: 1000 mL    Lidocaine: 82.5 mL    Midazolam: 28.6 mL    Vasopressin: 144 mL  Total IN: 2792.7 mL    OUT:    Bulb (mL): 150 mL    Indwelling Catheter - Urethral (mL): 4885 mL    Nasogastric/Oral tube (mL): 50 mL    Rectal Tube (mL): 400 mL  Total OUT: 5485 mL    Total NET: -2692.3 mL          LABS:                        12.3   11.13 )-----------( 23       ( 22 May 2025 03:05 )             36.5     05-22    140  |  93[L]  |  73.9[H]  ----------------------------<  219[H]  3.9   |  27.0  |  2.18[H]    Ca    7.8[L]      22 May 2025 03:05  Phos  4.5     05-22  Mg     2.6     05-22    TPro  5.8[L]  /  Alb  3.1[L]  /  TBili  2.9[H]  /  DBili  x   /  AST  211[H]  /  ALT  140[H]  /  AlkPhos  143[H]  05-22          CAPILLARY BLOOD GLUCOSE      POCT Blood Glucose.: 225 mg/dL (22 May 2025 05:59)    PT/INR - ( 21 May 2025 21:00 )   PT: 15.9 sec;   INR: 1.38 ratio         PTT - ( 21 May 2025 21:00 )  PTT:26.2 sec  Urinalysis Basic - ( 22 May 2025 03:05 )    Color: x / Appearance: x / SG: x / pH: x  Gluc: 219 mg/dL / Ketone: x  / Bili: x / Urobili: x   Blood: x / Protein: x / Nitrite: x   Leuk Esterase: x / RBC: x / WBC x   Sq Epi: x / Non Sq Epi: x / Bacteria: x      CULTURES:  Culture Results:   No growth at 24 hours (05-20 @ 11:15)  Culture Results:   Numerous Gram Negative Rods Identification and susceptibility to follow. (05-19 @ 17:15)  Culture Results:   Growth in aerobic and anaerobic bottles: Escherichia coli ESBL  See previous culture [id number] (05-19 @ 10:15)  Culture Results:   >100,000 CFU/ml Escherichia coli ESBL (05-18 @ 22:08)  Culture Results:   Growth in aerobic and anaerobic bottles: Escherichia coli ESBL  Growth in aerobic and anaerobic bottles: Citrobacter freundii  Growth in aerobic and anaerobic bottles: Streptococcus gallolyticus  Growth in anaerobic bottle: Gram Positive Rods  Direct identification is available within approximately 3-5  hours either by Blood Panel Multiplexed PCR or Direct  MALDI-TOF. Details: [email or web address] (05-18 @ 20:05)      PE:    General: No acute distress.    HEENT: Pupils equal, reactive to light.  Symmetric.  PULM: Clear to auscultation bilaterally, no significant sputum production, no wheezing, crackles, or rhonchi  NECK: Supple, no lymphadenopathy, trachea midline  CVS: Regular rate and rhythm, no murmurs, rubs, or gallops  ABD: distended  EXT: Nontender, no clubbing, cyanosis, or edema  SKIN: Warm and well perfused, no rashes noted.  NEURO: minimal however, meaningful movements. acknowledges presence of others by nodding. attempts to squeeze fingers.      DEVICES:   P IV    RADIOLOGY:

## 2025-05-23 LAB
-  AMOXICILLIN/CLAVULANIC ACID: SIGNIFICANT CHANGE UP
-  AMPICILLIN/SULBACTAM: SIGNIFICANT CHANGE UP
-  AMPICILLIN: SIGNIFICANT CHANGE UP
-  AZTREONAM: SIGNIFICANT CHANGE UP
-  CEFAZOLIN: SIGNIFICANT CHANGE UP
-  CEFEPIME: SIGNIFICANT CHANGE UP
-  CEFOXITIN: SIGNIFICANT CHANGE UP
-  CEFTRIAXONE: SIGNIFICANT CHANGE UP
-  CIPROFLOXACIN: SIGNIFICANT CHANGE UP
-  ERTAPENEM: SIGNIFICANT CHANGE UP
-  GENTAMICIN: SIGNIFICANT CHANGE UP
-  IMIPENEM: SIGNIFICANT CHANGE UP
-  LEVOFLOXACIN: SIGNIFICANT CHANGE UP
-  MEROPENEM: SIGNIFICANT CHANGE UP
-  PIPERACILLIN/TAZOBACTAM: SIGNIFICANT CHANGE UP
-  TIGECYCLINE: SIGNIFICANT CHANGE UP
-  TOBRAMYCIN: SIGNIFICANT CHANGE UP
-  TRIMETHOPRIM/SULFAMETHOXAZOLE: SIGNIFICANT CHANGE UP
-  VANCOMYCIN: SIGNIFICANT CHANGE UP
-  VANCOMYCIN: SIGNIFICANT CHANGE UP
ALBUMIN SERPL ELPH-MCNC: 3.1 G/DL — LOW (ref 3.3–5.2)
ALBUMIN SERPL ELPH-MCNC: 3.1 G/DL — LOW (ref 3.3–5.2)
ALBUMIN SERPL ELPH-MCNC: 3.2 G/DL — LOW (ref 3.3–5.2)
ALBUMIN SERPL ELPH-MCNC: 3.4 G/DL — SIGNIFICANT CHANGE UP (ref 3.3–5.2)
ALP SERPL-CCNC: 143 U/L — HIGH (ref 40–120)
ALP SERPL-CCNC: 148 U/L — HIGH (ref 40–120)
ALP SERPL-CCNC: 149 U/L — HIGH (ref 40–120)
ALP SERPL-CCNC: 155 U/L — HIGH (ref 40–120)
ALT FLD-CCNC: 135 U/L — HIGH
ALT FLD-CCNC: 154 U/L — HIGH
ALT FLD-CCNC: 169 U/L — HIGH
ALT FLD-CCNC: 195 U/L — HIGH
AMMONIA BLD-MCNC: 65 UMOL/L — HIGH (ref 11–55)
AMMONIA BLD-MCNC: 79 UMOL/L — HIGH (ref 11–55)
AMMONIA BLD-MCNC: 81 UMOL/L — HIGH (ref 11–55)
AMMONIA BLD-MCNC: 97 UMOL/L — HIGH (ref 11–55)
ANION GAP SERPL CALC-SCNC: 15 MMOL/L — SIGNIFICANT CHANGE UP (ref 5–17)
ANION GAP SERPL CALC-SCNC: 16 MMOL/L — SIGNIFICANT CHANGE UP (ref 5–17)
ANION GAP SERPL CALC-SCNC: 17 MMOL/L — SIGNIFICANT CHANGE UP (ref 5–17)
ANION GAP SERPL CALC-SCNC: 21 MMOL/L — HIGH (ref 5–17)
APTT BLD: 22.4 SEC — LOW (ref 26.1–36.8)
APTT BLD: 23.7 SEC — LOW (ref 26.1–36.8)
AST SERPL-CCNC: 168 U/L — HIGH
AST SERPL-CCNC: 233 U/L — HIGH
AST SERPL-CCNC: 331 U/L — HIGH
AST SERPL-CCNC: 498 U/L — HIGH
B-OH-BUTYR SERPL-SCNC: 0.4 MMOL/L — SIGNIFICANT CHANGE UP
BILIRUB SERPL-MCNC: 2.1 MG/DL — HIGH (ref 0.4–2)
BILIRUB SERPL-MCNC: 2.3 MG/DL — HIGH (ref 0.4–2)
BILIRUB SERPL-MCNC: 2.3 MG/DL — HIGH (ref 0.4–2)
BILIRUB SERPL-MCNC: 2.4 MG/DL — HIGH (ref 0.4–2)
BUN SERPL-MCNC: 86 MG/DL — HIGH (ref 8–20)
BUN SERPL-MCNC: 86.8 MG/DL — HIGH (ref 8–20)
BUN SERPL-MCNC: 90.5 MG/DL — HIGH (ref 8–20)
BUN SERPL-MCNC: 91.5 MG/DL — HIGH (ref 8–20)
CALCIUM SERPL-MCNC: 7.9 MG/DL — LOW (ref 8.4–10.5)
CALCIUM SERPL-MCNC: 8.1 MG/DL — LOW (ref 8.4–10.5)
CALCIUM SERPL-MCNC: 8.2 MG/DL — LOW (ref 8.4–10.5)
CALCIUM SERPL-MCNC: 8.4 MG/DL — SIGNIFICANT CHANGE UP (ref 8.4–10.5)
CHLORIDE SERPL-SCNC: 100 MMOL/L — SIGNIFICANT CHANGE UP (ref 96–108)
CHLORIDE SERPL-SCNC: 104 MMOL/L — SIGNIFICANT CHANGE UP (ref 96–108)
CHLORIDE SERPL-SCNC: 105 MMOL/L — SIGNIFICANT CHANGE UP (ref 96–108)
CHLORIDE SERPL-SCNC: 98 MMOL/L — SIGNIFICANT CHANGE UP (ref 96–108)
CO2 SERPL-SCNC: 26 MMOL/L — SIGNIFICANT CHANGE UP (ref 22–29)
CO2 SERPL-SCNC: 26 MMOL/L — SIGNIFICANT CHANGE UP (ref 22–29)
CO2 SERPL-SCNC: 27 MMOL/L — SIGNIFICANT CHANGE UP (ref 22–29)
CO2 SERPL-SCNC: 29 MMOL/L — SIGNIFICANT CHANGE UP (ref 22–29)
CREAT SERPL-MCNC: 1.45 MG/DL — HIGH (ref 0.5–1.3)
CREAT SERPL-MCNC: 1.54 MG/DL — HIGH (ref 0.5–1.3)
CREAT SERPL-MCNC: 1.72 MG/DL — HIGH (ref 0.5–1.3)
CREAT SERPL-MCNC: 1.82 MG/DL — HIGH (ref 0.5–1.3)
EGFR: 38 ML/MIN/1.73M2 — LOW
EGFR: 38 ML/MIN/1.73M2 — LOW
EGFR: 41 ML/MIN/1.73M2 — LOW
EGFR: 41 ML/MIN/1.73M2 — LOW
EGFR: 47 ML/MIN/1.73M2 — LOW
EGFR: 47 ML/MIN/1.73M2 — LOW
EGFR: 50 ML/MIN/1.73M2 — LOW
EGFR: 50 ML/MIN/1.73M2 — LOW
GAS PNL BLDA: SIGNIFICANT CHANGE UP
GAS PNL BLDV: SIGNIFICANT CHANGE UP
GLUCOSE BLDC GLUCOMTR-MCNC: 251 MG/DL — HIGH (ref 70–99)
GLUCOSE BLDC GLUCOMTR-MCNC: 257 MG/DL — HIGH (ref 70–99)
GLUCOSE BLDC GLUCOMTR-MCNC: 258 MG/DL — HIGH (ref 70–99)
GLUCOSE BLDC GLUCOMTR-MCNC: 267 MG/DL — HIGH (ref 70–99)
GLUCOSE BLDC GLUCOMTR-MCNC: 330 MG/DL — HIGH (ref 70–99)
GLUCOSE SERPL-MCNC: 227 MG/DL — HIGH (ref 70–99)
GLUCOSE SERPL-MCNC: 230 MG/DL — HIGH (ref 70–99)
GLUCOSE SERPL-MCNC: 244 MG/DL — HIGH (ref 70–99)
GLUCOSE SERPL-MCNC: 302 MG/DL — HIGH (ref 70–99)
HCT VFR BLD CALC: 38.2 % — LOW (ref 39–50)
HCT VFR BLD CALC: 38.3 % — LOW (ref 39–50)
HCT VFR BLD CALC: 38.5 % — LOW (ref 39–50)
HCT VFR BLD CALC: 39.5 % — SIGNIFICANT CHANGE UP (ref 39–50)
HGB BLD-MCNC: 12.7 G/DL — LOW (ref 13–17)
HGB BLD-MCNC: 12.9 G/DL — LOW (ref 13–17)
HGB BLD-MCNC: 13 G/DL — SIGNIFICANT CHANGE UP (ref 13–17)
HGB BLD-MCNC: 13.1 G/DL — SIGNIFICANT CHANGE UP (ref 13–17)
IMMATURE PLATELET FRACTION #: 7 K/UL — SIGNIFICANT CHANGE UP (ref 3.9–12.5)
IMMATURE PLATELET FRACTION #: 7.5 K/UL — SIGNIFICANT CHANGE UP (ref 3.9–12.5)
IMMATURE PLATELET FRACTION #: 9.8 K/UL — SIGNIFICANT CHANGE UP (ref 3.9–12.5)
IMMATURE PLATELET FRACTION #: 9.9 K/UL — SIGNIFICANT CHANGE UP (ref 3.9–12.5)
IMMATURE PLATELET FRACTION %: 12.4 % — HIGH (ref 1.6–7.1)
IMMATURE PLATELET FRACTION %: 13.9 % — HIGH (ref 1.6–7.1)
IMMATURE PLATELET FRACTION %: 14.5 % — HIGH (ref 1.6–7.1)
IMMATURE PLATELET FRACTION %: 16.6 % — HIGH (ref 1.6–7.1)
INR BLD: 1.57 RATIO — HIGH (ref 0.85–1.16)
INR BLD: 1.58 RATIO — HIGH (ref 0.85–1.16)
LACTATE SERPL-SCNC: 2 MMOL/L — SIGNIFICANT CHANGE UP (ref 0.5–2)
LIDOCAIN IGE QN: 48 U/L — SIGNIFICANT CHANGE UP (ref 22–51)
MAGNESIUM SERPL-MCNC: 2.2 MG/DL — SIGNIFICANT CHANGE UP (ref 1.6–2.6)
MAGNESIUM SERPL-MCNC: 2.2 MG/DL — SIGNIFICANT CHANGE UP (ref 1.6–2.6)
MAGNESIUM SERPL-MCNC: 2.3 MG/DL — SIGNIFICANT CHANGE UP (ref 1.6–2.6)
MAGNESIUM SERPL-MCNC: 2.4 MG/DL — SIGNIFICANT CHANGE UP (ref 1.6–2.6)
MCHC RBC-ENTMCNC: 28.5 PG — SIGNIFICANT CHANGE UP (ref 27–34)
MCHC RBC-ENTMCNC: 28.6 PG — SIGNIFICANT CHANGE UP (ref 27–34)
MCHC RBC-ENTMCNC: 28.8 PG — SIGNIFICANT CHANGE UP (ref 27–34)
MCHC RBC-ENTMCNC: 28.9 PG — SIGNIFICANT CHANGE UP (ref 27–34)
MCHC RBC-ENTMCNC: 33.2 G/DL — SIGNIFICANT CHANGE UP (ref 32–36)
MCHC RBC-ENTMCNC: 33.2 G/DL — SIGNIFICANT CHANGE UP (ref 32–36)
MCHC RBC-ENTMCNC: 33.8 G/DL — SIGNIFICANT CHANGE UP (ref 32–36)
MCHC RBC-ENTMCNC: 33.8 G/DL — SIGNIFICANT CHANGE UP (ref 32–36)
MCV RBC AUTO: 85.2 FL — SIGNIFICANT CHANGE UP (ref 80–100)
MCV RBC AUTO: 85.5 FL — SIGNIFICANT CHANGE UP (ref 80–100)
MCV RBC AUTO: 85.9 FL — SIGNIFICANT CHANGE UP (ref 80–100)
MCV RBC AUTO: 86.3 FL — SIGNIFICANT CHANGE UP (ref 80–100)
METHOD TYPE: SIGNIFICANT CHANGE UP
NRBC # BLD AUTO: 0.19 K/UL — HIGH (ref 0–0)
NRBC # BLD AUTO: 0.21 K/UL — HIGH (ref 0–0)
NRBC # BLD AUTO: 0.28 K/UL — HIGH (ref 0–0)
NRBC # BLD AUTO: 0.29 K/UL — HIGH (ref 0–0)
NRBC # FLD: 0.19 K/UL — HIGH (ref 0–0)
NRBC # FLD: 0.21 K/UL — HIGH (ref 0–0)
NRBC # FLD: 0.28 K/UL — HIGH (ref 0–0)
NRBC # FLD: 0.29 K/UL — HIGH (ref 0–0)
NRBC BLD AUTO-RTO: 2 /100 WBCS — HIGH (ref 0–0)
PHOSPHATE SERPL-MCNC: 1.7 MG/DL — LOW (ref 2.4–4.7)
PHOSPHATE SERPL-MCNC: 2.1 MG/DL — LOW (ref 2.4–4.7)
PHOSPHATE SERPL-MCNC: 2.2 MG/DL — LOW (ref 2.4–4.7)
PHOSPHATE SERPL-MCNC: 3.2 MG/DL — SIGNIFICANT CHANGE UP (ref 2.4–4.7)
PLATELET # BLD AUTO: 45 K/UL — LOW (ref 150–400)
PLATELET # BLD AUTO: 52 K/UL — LOW (ref 150–400)
PLATELET # BLD AUTO: 71 K/UL — LOW (ref 150–400)
PLATELET # BLD AUTO: 79 K/UL — LOW (ref 150–400)
PMV BLD: 13.3 FL — HIGH (ref 7–13)
PMV BLD: 13.5 FL — HIGH (ref 7–13)
PMV BLD: 14.2 FL — HIGH (ref 7–13)
PMV BLD: 14.6 FL — HIGH (ref 7–13)
POTASSIUM SERPL-MCNC: 3 MMOL/L — LOW (ref 3.5–5.3)
POTASSIUM SERPL-MCNC: 3.1 MMOL/L — LOW (ref 3.5–5.3)
POTASSIUM SERPL-MCNC: 3.3 MMOL/L — LOW (ref 3.5–5.3)
POTASSIUM SERPL-MCNC: 3.7 MMOL/L — SIGNIFICANT CHANGE UP (ref 3.5–5.3)
POTASSIUM SERPL-SCNC: 3 MMOL/L — LOW (ref 3.5–5.3)
POTASSIUM SERPL-SCNC: 3.1 MMOL/L — LOW (ref 3.5–5.3)
POTASSIUM SERPL-SCNC: 3.3 MMOL/L — LOW (ref 3.5–5.3)
POTASSIUM SERPL-SCNC: 3.7 MMOL/L — SIGNIFICANT CHANGE UP (ref 3.5–5.3)
PROT SERPL-MCNC: 5.9 G/DL — LOW (ref 6.6–8.7)
PROT SERPL-MCNC: 5.9 G/DL — LOW (ref 6.6–8.7)
PROT SERPL-MCNC: 6 G/DL — LOW (ref 6.6–8.7)
PROT SERPL-MCNC: 6.2 G/DL — LOW (ref 6.6–8.7)
PROTHROM AB SERPL-ACNC: 18.1 SEC — HIGH (ref 9.9–13.4)
PROTHROM AB SERPL-ACNC: 18.3 SEC — HIGH (ref 9.9–13.4)
RBC # BLD: 4.44 M/UL — SIGNIFICANT CHANGE UP (ref 4.2–5.8)
RBC # BLD: 4.47 M/UL — SIGNIFICANT CHANGE UP (ref 4.2–5.8)
RBC # BLD: 4.52 M/UL — SIGNIFICANT CHANGE UP (ref 4.2–5.8)
RBC # BLD: 4.6 M/UL — SIGNIFICANT CHANGE UP (ref 4.2–5.8)
RBC # FLD: 14.2 % — SIGNIFICANT CHANGE UP (ref 10.3–14.5)
RBC # FLD: 14.3 % — SIGNIFICANT CHANGE UP (ref 10.3–14.5)
RBC # FLD: 14.3 % — SIGNIFICANT CHANGE UP (ref 10.3–14.5)
RBC # FLD: 14.4 % — SIGNIFICANT CHANGE UP (ref 10.3–14.5)
SODIUM SERPL-SCNC: 143 MMOL/L — SIGNIFICANT CHANGE UP (ref 135–145)
SODIUM SERPL-SCNC: 145 MMOL/L — SIGNIFICANT CHANGE UP (ref 135–145)
SODIUM SERPL-SCNC: 148 MMOL/L — HIGH (ref 135–145)
SODIUM SERPL-SCNC: 148 MMOL/L — HIGH (ref 135–145)
WBC # BLD: 11.64 K/UL — HIGH (ref 3.8–10.5)
WBC # BLD: 12.82 K/UL — HIGH (ref 3.8–10.5)
WBC # BLD: 13.77 K/UL — HIGH (ref 3.8–10.5)
WBC # BLD: 14.24 K/UL — HIGH (ref 3.8–10.5)
WBC # FLD AUTO: 11.64 K/UL — HIGH (ref 3.8–10.5)
WBC # FLD AUTO: 12.82 K/UL — HIGH (ref 3.8–10.5)
WBC # FLD AUTO: 13.77 K/UL — HIGH (ref 3.8–10.5)
WBC # FLD AUTO: 14.24 K/UL — HIGH (ref 3.8–10.5)

## 2025-05-23 PROCEDURE — 93010 ELECTROCARDIOGRAM REPORT: CPT

## 2025-05-23 PROCEDURE — 99232 SBSQ HOSP IP/OBS MODERATE 35: CPT

## 2025-05-23 PROCEDURE — 99291 CRITICAL CARE FIRST HOUR: CPT | Mod: GC

## 2025-05-23 PROCEDURE — 99223 1ST HOSP IP/OBS HIGH 75: CPT

## 2025-05-23 PROCEDURE — 99233 SBSQ HOSP IP/OBS HIGH 50: CPT

## 2025-05-23 PROCEDURE — G0545: CPT

## 2025-05-23 RX ORDER — MILRINONE LACTATE 1 MG/ML
0.12 INJECTION, SOLUTION INTRAVENOUS
Qty: 20 | Refills: 0 | Status: DISCONTINUED | OUTPATIENT
Start: 2025-05-23 | End: 2025-05-25

## 2025-05-23 RX ORDER — SOD PHOS DI, MONO/K PHOS MONO 250 MG
1 TABLET ORAL
Refills: 0 | Status: DISCONTINUED | OUTPATIENT
Start: 2025-05-23 | End: 2025-05-25

## 2025-05-23 RX ORDER — MEROPENEM 1 G/30ML
1000 INJECTION INTRAVENOUS EVERY 8 HOURS
Refills: 0 | Status: COMPLETED | OUTPATIENT
Start: 2025-05-23 | End: 2025-05-31

## 2025-05-23 RX ORDER — INSULIN GLARGINE-YFGN 100 [IU]/ML
10 INJECTION, SOLUTION SUBCUTANEOUS EVERY MORNING
Refills: 0 | Status: DISCONTINUED | OUTPATIENT
Start: 2025-05-23 | End: 2025-05-24

## 2025-05-23 RX ORDER — LACTULOSE 10 G/15ML
20 SOLUTION ORAL EVERY 8 HOURS
Refills: 0 | Status: DISCONTINUED | OUTPATIENT
Start: 2025-05-23 | End: 2025-05-24

## 2025-05-23 RX ORDER — INSULIN LISPRO 100 U/ML
4 INJECTION, SOLUTION INTRAVENOUS; SUBCUTANEOUS EVERY 6 HOURS
Refills: 0 | Status: DISCONTINUED | OUTPATIENT
Start: 2025-05-23 | End: 2025-05-25

## 2025-05-23 RX ORDER — FENTANYL CITRATE-0.9 % NACL/PF 100MCG/2ML
25 SYRINGE (ML) INTRAVENOUS EVERY 4 HOURS
Refills: 0 | Status: DISCONTINUED | OUTPATIENT
Start: 2025-05-23 | End: 2025-05-25

## 2025-05-23 RX ADMIN — INSULIN LISPRO 6: 100 INJECTION, SOLUTION INTRAVENOUS; SUBCUTANEOUS at 11:23

## 2025-05-23 RX ADMIN — INSULIN LISPRO 6: 100 INJECTION, SOLUTION INTRAVENOUS; SUBCUTANEOUS at 17:07

## 2025-05-23 RX ADMIN — LACTULOSE 20 GRAM(S): 10 SOLUTION ORAL at 05:37

## 2025-05-23 RX ADMIN — Medication 50 MILLIEQUIVALENT(S): at 14:46

## 2025-05-23 RX ADMIN — Medication 100 MILLIEQUIVALENT(S): at 05:36

## 2025-05-23 RX ADMIN — Medication 1 PACKET(S): at 23:18

## 2025-05-23 RX ADMIN — Medication 100 MILLIEQUIVALENT(S): at 16:56

## 2025-05-23 RX ADMIN — Medication 50 MILLIEQUIVALENT(S): at 23:18

## 2025-05-23 RX ADMIN — Medication 15 MILLILITER(S): at 05:37

## 2025-05-23 RX ADMIN — Medication 100 MILLIEQUIVALENT(S): at 15:57

## 2025-05-23 RX ADMIN — MEROPENEM 100 MILLIGRAM(S): 1 INJECTION INTRAVENOUS at 23:17

## 2025-05-23 RX ADMIN — Medication 50 MILLIGRAM(S): at 17:02

## 2025-05-23 RX ADMIN — MEROPENEM 100 MILLIGRAM(S): 1 INJECTION INTRAVENOUS at 13:02

## 2025-05-23 RX ADMIN — INSULIN LISPRO 6: 100 INJECTION, SOLUTION INTRAVENOUS; SUBCUTANEOUS at 05:38

## 2025-05-23 RX ADMIN — Medication 50 MILLIEQUIVALENT(S): at 21:33

## 2025-05-23 RX ADMIN — Medication 100 MILLIEQUIVALENT(S): at 07:23

## 2025-05-23 RX ADMIN — Medication 25 MICROGRAM(S): at 00:00

## 2025-05-23 RX ADMIN — INSULIN GLARGINE-YFGN 10 UNIT(S): 100 INJECTION, SOLUTION SUBCUTANEOUS at 07:24

## 2025-05-23 RX ADMIN — DOBUTAMINE 5.86 MICROGRAM(S)/KG/MIN: 250 INJECTION INTRAVENOUS at 03:56

## 2025-05-23 RX ADMIN — Medication 1 PACKET(S): at 17:01

## 2025-05-23 RX ADMIN — INSULIN LISPRO 4 UNIT(S): 100 INJECTION, SOLUTION INTRAVENOUS; SUBCUTANEOUS at 23:28

## 2025-05-23 RX ADMIN — NOREPINEPHRINE BITARTRATE 7.32 MICROGRAM(S)/KG/MIN: 8 SOLUTION at 16:56

## 2025-05-23 RX ADMIN — MILRINONE LACTATE 2.93 MICROGRAM(S)/KG/MIN: 1 INJECTION, SOLUTION INTRAVENOUS at 12:04

## 2025-05-23 RX ADMIN — INSULIN LISPRO 4 UNIT(S): 100 INJECTION, SOLUTION INTRAVENOUS; SUBCUTANEOUS at 17:08

## 2025-05-23 RX ADMIN — Medication 15 MILLILITER(S): at 17:01

## 2025-05-23 RX ADMIN — DEXMEDETOMIDINE HYDROCHLORIDE IN SODIUM CHLORIDE 7.81 MICROGRAM(S)/KG/HR: 4 INJECTION INTRAVENOUS at 09:37

## 2025-05-23 RX ADMIN — Medication 100 MILLIEQUIVALENT(S): at 02:49

## 2025-05-23 RX ADMIN — Medication 100 MILLIEQUIVALENT(S): at 06:23

## 2025-05-23 RX ADMIN — INSULIN LISPRO 4 UNIT(S): 100 INJECTION, SOLUTION INTRAVENOUS; SUBCUTANEOUS at 11:23

## 2025-05-23 RX ADMIN — ACETAZOLAMIDE 250 MILLIGRAM(S): 250 TABLET ORAL at 05:38

## 2025-05-23 RX ADMIN — INSULIN LISPRO 8: 100 INJECTION, SOLUTION INTRAVENOUS; SUBCUTANEOUS at 23:28

## 2025-05-23 RX ADMIN — LACTULOSE 20 GRAM(S): 10 SOLUTION ORAL at 13:04

## 2025-05-23 RX ADMIN — Medication 25 MICROGRAM(S): at 22:30

## 2025-05-23 RX ADMIN — MEROPENEM 33.33 MILLIGRAM(S): 1 INJECTION INTRAVENOUS at 05:38

## 2025-05-23 RX ADMIN — Medication 50 MILLIGRAM(S): at 05:37

## 2025-05-23 RX ADMIN — Medication 1 APPLICATION(S): at 05:37

## 2025-05-23 RX ADMIN — Medication 25 MICROGRAM(S): at 21:32

## 2025-05-23 RX ADMIN — Medication 50 MILLIGRAM(S): at 23:18

## 2025-05-23 RX ADMIN — Medication 50 MILLIGRAM(S): at 11:21

## 2025-05-23 RX ADMIN — VASOPRESSIN 6 UNIT(S)/MIN: 20 INJECTION INTRAVENOUS at 09:37

## 2025-05-23 RX ADMIN — Medication 40 MILLIGRAM(S): at 11:23

## 2025-05-23 NOTE — PROGRESS NOTE ADULT - SUBJECTIVE AND OBJECTIVE BOX
DOMINIC ALAMO  [id number]      Chief Complaint:   follow up sepsis/CAD    Subjective:  Intubated on vent, not responsive    24 hour Tele:   SR, PACs, no AF          chlorhexidine 0.12% Liquid 15 milliLiter(s) Oral Mucosa every 12 hours  chlorhexidine 2% Cloths 1 Application(s) Topical <User Schedule>  dexMEDEtomidine Infusion 0.4 MICROgram(s)/kG/Hr IV Continuous <Continuous>  hydrocortisone sodium succinate Injectable 50 milliGRAM(s) IV Push every 6 hours  insulin glargine Injectable (LANTUS) 10 Unit(s) SubCutaneous every morning  insulin lispro (ADMELOG) corrective regimen sliding scale.   SubCutaneous every 6 hours  insulin lispro Injectable (ADMELOG) 4 Unit(s) SubCutaneous every 6 hours  lactulose Syrup 20 Gram(s) Oral every 8 hours  meropenem  IVPB 1000 milliGRAM(s) IV Intermittent every 8 hours  milrinone Infusion 0.125 MICROgram(s)/kG/Min IV Continuous <Continuous>  norepinephrine Infusion 0.05 MICROgram(s)/kG/Min IV Continuous <Continuous>  pantoprazole  Injectable 40 milliGRAM(s) IV Push daily  rifAXIMin 550 milliGRAM(s) Oral two times a day  vasopressin Infusion 0.04 Unit(s)/Min IV Continuous <Continuous>          Physical Exam:  T(C): 38 (25 @ 12:45), Max: 38.1 (25 @ 23:00)  HR: 82 (25 @ 12:45) (52 - 114)  RR: 23 (25 @ 12:45) (11 - 28)  SpO2: 100% (25 @ 12:45) (92% - 100%)  General: intubated, sedated on vent  HEENT: MMM, sclera anicteric  Resp: CTA bilaterally  CVS: nl s1s2, rrr, no s3/JVD  Abd: soft, NT, ND, BS+  Ext: No c/c/e  Neuro  sedated  Psych: sedated        Labs:   23 May 2025 08:35    143    |  100    |  91.5   ----------------------------<  244    3.7     |  26.0   |  1.72     Ca    8.1        23 May 2025 08:35  Phos  2.1       23 May 2025 08:35  Mg     2.2       23 May 2025 08:35    TPro  5.9    /  Alb  3.1    /  TBili  2.3    /  DBili  x      /  AST  331    /  ALT  169    /  AlkPhos  143    23 May 2025 08:35                          12.9   11.64 )-----------( 52       ( 23 May 2025 08:35 )             38.2     PT/INR - ( 23 May 2025 02:40 )   PT: 18.3 sec;   INR: 1.58 ratio         PTT - ( 23 May 2025 02:40 )  PTT:22.4 sec          Echo: 2025:    1. Left ventricular systolic function is severely decreased with an ejection fraction visually estimated at 30 to 35 %. Global left ventricular hypokinesis.   2. Enlarged right ventricular cavity size and moderately reduced right ventricular systolic function.   3. Left atrium is normal in size.   4. There is mild calcification of the mitral valve annulus.   5. Moderate to severe mitral regurgitation.   6. Moderate tricuspid regurgitation.   7. Compared to the transthoracic echocardiogram performed on 2025, the LV and RV are now better visualized.    Echo 2025:   1. Technically difficult image quality.   2. Left ventricular systolic function is moderately decreased with an ejection fraction visually estimated at 30 to 35 %. Global left ventricular hypokinesis.   3. There is no evidence of a left ventricular thrombus.   4. Enlarged right ventricular cavity size and moderately reduced right ventricular systolic function.   5. No pericardial effusion seen.   6. Compared to the transthoracic echocardiogram performed on 2025, there have been no significant interval changes.      CXR:  Endotracheal tube tip 5 cm above the gayla. Enteric tube tip below the   edge of the image, and rightIJ catheter tip in the right atrium. No   pneumothorax. No focal consolidation or pleural effusion.    CTA C/A/P:  No pulmonary embolus.  Airspace consolidations at the lung bases may represent atelectasis   and/or pneumonia.  No bowel obstruction.  Cholelithiasis.  Trace ascites.        Assessment:  76 yo male PMHx HTN, HLD, CAD based on abnormal nuke, TIA, remote pericarditis in 2015 presented to the ED with complaints of abdominal pain, nausea, vomiting for since the day prior.  Upon arrival to the ED patient was lethargic and tachypneic, with abdominal distention and mottled skin.  He was noted to be hypotensive despite fluid resuscitation and placed on escalating doses of pressors.  He was started on BIPAP initially for tachypnea and metabolic acidosis, eventually intubated in the ED.  Patient remains intubated and history taken from chart and d/w RN.  Patient was on escalating doses of pressors and , but in last day has been weaned down.  Echo with severe LV dysfxn which appears new and likely stress myopathy.  CI on low end of normal on .  Likely sepsis from GNR septicemia from cholangitis as cause now s/p perc tube.  Noted AF with some RVR due to shock, , etc.  -DCCV yesterday for rapid AF, reviewed tele and no VT   -Remains in SR this morning    -Weaned down to single pressor now, if can dobutamine would keep off given rapid AF    Plan:  1. Care per ICU, prognosis guarded.   2. Abx per ICU.  3. Tele monitor.  4. A/C for AF if no contraindication.  5. Has been fluid resuscitated, maintain euvolemia.  Wean pressors, Milrinone as tolerated.  6. Eventual echo when improved to reassess EF.  GDMT as tolerated if/when improved.  7. Can add digoxin if needed for rapid AF, limited AAT options due to renal dysfunction, liver dysfunction and low EF.  8. GOC conversations and palliative f/u.       DOMINIC ALAMO  [id number]      Chief Complaint:   follow up sepsis/CAD    Subjective:  Intubated on vent, not responsive    24 hour Tele:   SR, PACs, no AF          chlorhexidine 0.12% Liquid 15 milliLiter(s) Oral Mucosa every 12 hours  chlorhexidine 2% Cloths 1 Application(s) Topical <User Schedule>  dexMEDEtomidine Infusion 0.4 MICROgram(s)/kG/Hr IV Continuous <Continuous>  hydrocortisone sodium succinate Injectable 50 milliGRAM(s) IV Push every 6 hours  insulin glargine Injectable (LANTUS) 10 Unit(s) SubCutaneous every morning  insulin lispro (ADMELOG) corrective regimen sliding scale.   SubCutaneous every 6 hours  insulin lispro Injectable (ADMELOG) 4 Unit(s) SubCutaneous every 6 hours  lactulose Syrup 20 Gram(s) Oral every 8 hours  meropenem  IVPB 1000 milliGRAM(s) IV Intermittent every 8 hours  milrinone Infusion 0.125 MICROgram(s)/kG/Min IV Continuous <Continuous>  norepinephrine Infusion 0.05 MICROgram(s)/kG/Min IV Continuous <Continuous>  pantoprazole  Injectable 40 milliGRAM(s) IV Push daily  rifAXIMin 550 milliGRAM(s) Oral two times a day  vasopressin Infusion 0.04 Unit(s)/Min IV Continuous <Continuous>          Physical Exam:  T(C): 38 (25 @ 12:45), Max: 38.1 (25 @ 23:00)  HR: 82 (25 @ 12:45) (52 - 114)  RR: 23 (25 @ 12:45) (11 - 28)  SpO2: 100% (25 @ 12:45) (92% - 100%)  General: intubated, sedated on vent  HEENT: MMM, sclera anicteric  Resp: CTA bilaterally  CVS: nl s1s2, rrr, no s3/JVD  Abd: soft, NT, ND, BS+  Ext: No c/c/e  Neuro  sedated  Psych: sedated        Labs:   23 May 2025 08:35    143    |  100    |  91.5   ----------------------------<  244    3.7     |  26.0   |  1.72     Ca    8.1        23 May 2025 08:35  Phos  2.1       23 May 2025 08:35  Mg     2.2       23 May 2025 08:35    TPro  5.9    /  Alb  3.1    /  TBili  2.3    /  DBili  x      /  AST  331    /  ALT  169    /  AlkPhos  143    23 May 2025 08:35                          12.9   11.64 )-----------( 52       ( 23 May 2025 08:35 )             38.2     PT/INR - ( 23 May 2025 02:40 )   PT: 18.3 sec;   INR: 1.58 ratio         PTT - ( 23 May 2025 02:40 )  PTT:22.4 sec          Echo: 2025:    1. Left ventricular systolic function is severely decreased with an ejection fraction visually estimated at 30 to 35 %. Global left ventricular hypokinesis.   2. Enlarged right ventricular cavity size and moderately reduced right ventricular systolic function.   3. Left atrium is normal in size.   4. There is mild calcification of the mitral valve annulus.   5. Moderate to severe mitral regurgitation.   6. Moderate tricuspid regurgitation.   7. Compared to the transthoracic echocardiogram performed on 2025, the LV and RV are now better visualized.    Echo 2025:   1. Technically difficult image quality.   2. Left ventricular systolic function is moderately decreased with an ejection fraction visually estimated at 30 to 35 %. Global left ventricular hypokinesis.   3. There is no evidence of a left ventricular thrombus.   4. Enlarged right ventricular cavity size and moderately reduced right ventricular systolic function.   5. No pericardial effusion seen.   6. Compared to the transthoracic echocardiogram performed on 2025, there have been no significant interval changes.      CXR:  Endotracheal tube tip 5 cm above the gayla. Enteric tube tip below the   edge of the image, and rightIJ catheter tip in the right atrium. No   pneumothorax. No focal consolidation or pleural effusion.    CTA C/A/P:  No pulmonary embolus.  Airspace consolidations at the lung bases may represent atelectasis   and/or pneumonia.  No bowel obstruction.  Cholelithiasis.  Trace ascites.        Assessment:  74 yo male PMHx HTN, HLD, CAD based on abnormal nuke, TIA, remote pericarditis in 2015 presented to the ED with complaints of abdominal pain, nausea, vomiting for since the day prior.  Upon arrival to the ED patient was lethargic and tachypneic, with abdominal distention and mottled skin.  He was noted to be hypotensive despite fluid resuscitation and placed on escalating doses of pressors.  He was started on BIPAP initially for tachypnea and metabolic acidosis, eventually intubated in the ED.  Patient remains intubated and history taken from chart and d/w RN.  Patient was on escalating doses of pressors and , but in last day has been weaned down.  Echo with severe LV dysfxn which appears new and likely stress myopathy.  CI on low end of normal on .  Likely sepsis from GNR septicemia from cholangitis as cause now s/p perc tube.  Noted AF with some RVR due to shock, , etc.  -DCCV yesterday for rapid AF, reviewed tele and no VT   -Remains in SR this morning    -Weaned down to single pressor now along with Milrinone in leiu of  with rapid AF.    Plan:  1. Care per ICU, prognosis guarded.   2. Abx per ICU.  3. Tele monitor.  4. A/C for AF if no contraindication.  5. Has been fluid resuscitated, maintain euvolemia.  Wean pressors, Milrinone as tolerated.  6. Eventual echo when improved to reassess EF.  GDMT as tolerated if/when improved.  7. Can add digoxin if needed for rapid AF, limited AAT options due to renal dysfunction, liver dysfunction and low EF.  8. GOC conversations and palliative f/u.

## 2025-05-23 NOTE — CHART NOTE - NSCHARTNOTEFT_GEN_A_CORE
Source: Patient [ ]  Family [ ]   other [x] EMR/staff    Current Diet: Diet, NPO with Tube Feed:   Tube Feeding Modality: Orogastric  [employer] Peptide 1.5 (KFPEPT1.5RTH)  Total Volume for 24 Hours (mL): 960  Continuous  Starting Tube Feed Rate {mL per Hour}: 10  Increase Tube Feed Rate by (mL): 10     Every 4 hours  Until Goal Tube Feed Rate (mL per Hour): 40  Tube Feed Duration (in Hours): 24  Tube Feed Start Time: 11:00 (05-22-25 @ 10:30)    Enteral /Parenteral Nutrition:   [email or web address] Peptide 1.5 @ 40 ml/hr x 24 hrs = 960 ml, 1440 kcals, 71 g protein, 1080 ml free water    Current Weight:   5/23 87.8  5/22 88.6 kg  5/18 79.4 kg  +1 generalized edema per documentation likely influencing upward weight trend    Pertinent Medications: MEDICATIONS  (STANDING):  dexMEDEtomidine Infusion 0.4 MICROgram(s)/kG/Hr (7.81 mL/Hr) IV Continuous <Continuous>  hydrocortisone sodium succinate Injectable 50 milliGRAM(s) IV Push every 6 hours  insulin glargine Injectable (LANTUS) 10 Unit(s) SubCutaneous every morning  insulin lispro (ADMELOG) corrective regimen sliding scale.   SubCutaneous every 6 hours  insulin lispro Injectable (ADMELOG) 4 Unit(s) SubCutaneous every 6 hours  lactulose Syrup 20 Gram(s) Oral every 8 hours  meropenem  IVPB 1000 milliGRAM(s) IV Intermittent every 8 hours  norepinephrine Infusion 0.05 MICROgram(s)/kG/Min (7.32 mL/Hr) IV Continuous <Continuous>  pantoprazole  Injectable 40 milliGRAM(s) IV Push daily  rifAXIMin 550 milliGRAM(s) Oral two times a day  vasopressin Infusion 0.04 Unit(s)/Min (6 mL/Hr) IV Continuous <Continuous>    Pertinent Labs: 05-23 Na143 mmol/L Glu 244 mg/dL[H] K+ 3.7 mmol/L Cr  1.72 mg/dL[H] BUN 91.5 mg/dL[H] Phos 2.1 mg/dL[L] Alb 3.1 g/dL[L]  Elevated ammonia levels and LFTs. Hypophosphatemia noted. A1c 7.4% and hyperglycemia.    Skin: IAD, DTI upper lip    Nutrition focused physical exam limited - found signs of malnutrition [ ]absent [x]present    Subcutaneous fat loss: [ ] Orbital fat pads region, [ ]Buccal fat region, [ ]Triceps region,  [ ]Ribs region    Muscle wasting: [ ]Temples region, [x]Clavicle region, [x]Shoulder region, [ ]Scapula region, [ ]Interosseous region,  [ ]thigh region, [ ]Calf region    Estimated Needs:   6739-2035 kcals/day (based on 25-30 kcal/kg BW 78.1 kg)  78-94 g pro/day (based on 1.0-1.2 g/kg BW 78.1 kg)  4588-9636 ml fluid/day (based on 25-30 ml/kg BW 78.1 kg)    Clinical Course per notes: 74 yo male with hx of HTN, HLD, CAD, TIA, remote pericarditis in 2015, presented to the ED with complaints of abdominal pain, nausea, vomiting for since the day prior.  Upon arrival to the ED patient was lethargic and tachypneic, with abdominal distention and mottled skin. He was noted to be hypotensive despite fluid resuscitation and placed on escalating doses of pressors. Started on Bipap initially for tachypnea and metabolic acidosis, eventually intubated in the ED. Labs revealed severe metabolic acidosis. OLEKSANDR, transaminitis. Admitted to MICU for mixed shock, cardiogenic vs septic, requiring triple pressors.    Current Nutrition Diagnosis: Pt meets criteria for acute-moderate protein-calorie malnutrition related to inability to consume adequate kcals/protein in setting of acute respiratory failure and acute illness pta as evidenced by meeting < 75% est needs > 7 days, +1 edema, mild muscle/fat wasting.  Chart and events reviewed. Pt was NPO from 5/18-5/22 (5 days), had nausea/vomiting prior to admission x 1 day. Started on [email or web address] Peptide 1.5 tube feeds yesterday, reached 40, on hold this AM. Pt has not had adequate nutrition for a minimum of 7 days now. ?Increase in glucose and drop in phosphorus secondary to possible refeeding syndrome. Weights likely skewed with edema. Hyperglycemia noted, carb steady formula would be more appropriate, no h/o DM documented, however a1c is 7.4%. Rectal tube in place - 250 ml output today, 600 ml output yesterday. Lactulose and abx likely contributing to loose stools. Recommendations below.    Recommendations:   1. Change TF to [email or web address] Glucose Support 1.2; start @ 20 ml/hr and increase by 10 ml q 8 hrs until goal rate 75 ml/hr x 18 hrs (1350 ml, 1620 kcals, 86 g protein, 1232 ml free water)  2. Additional free water per medical teams discretion  3. If loose BMs continue, can consider banatrol BID to assist with stool bulking  4. Monitor lytes and replete prn; monitor for refeeding syndrome  5. Insulin regimen per primary teams discretion for optimal BG control    Monitoring and Evaluation:   [x] Tolerance to diet prescription [X] Weights  [X] Follow up per protocol [X] Labs: BMP, phos, mg, BGM

## 2025-05-23 NOTE — CONSULT NOTE ADULT - SUBJECTIVE AND OBJECTIVE BOX
INFECTIOUS DISEASES AND INTERNAL MEDICINE at [place]  =======================================================  Chip Meade MD  Diplomates American Board of Internal Medicine and Infectious Diseases  Telephone [phone]  Fax            [phone]  =======================================================    DOMINIC [id number]      HPI:  74 yo male with hx of HTN, HLD, CAD, TIA, remote pericarditis in 2015, presented to the ED with complaints of abdominal pain, nausea, vomiting for since the day prior.  Upon arrival to the ED patient was lethargic and tachypneic, with abdominal distention and mottled skin.    He was noted to be hypotensive despite fluid resuscitation and placed on escalating doses of pressors.    Started on Bipap initially for tachypnea and metabolic acidosis, eventually intubated in the ED.  Labs revealed severe metabolic acidosis. OLKESANDR, transaminitis   AS ABOVE ADMITTED WITH ABD PAIN HYPOTENSION BLOOD CX POLYMICROBIAL   ULTIMATELY HAD CHOLECYSTOTOMY TUBE PLACED ALSO POLYMICROBIAL  ASKED TO EVALUATE FROM ID STANDPOINT            PAST MEDICAL & SURGICAL HISTORY:  AMI (Acute Myocardial Infarction)  2001      Transient ischemic attack (TIA)      No Past Surgical History          ANTIBIOTICS  meropenem  IVPB 1000 milliGRAM(s) IV Intermittent every 8 hours  rifAXIMin 550 milliGRAM(s) Oral two times a day      Allergies    No Known Drug Allergies  SHRIMP (Unknown)  iv dye (Unknown)    Intolerances        SOCIAL HISTORY:     FAMILY HX   FAMILY HISTORY:  FH: CAD (coronary artery disease)        Vital Signs Last 24 Hrs  T(C): 36.6 (23 May 2025 16:00), Max: 38.1 (22 May 2025 23:00)  T(F): 97.9 (23 May 2025 16:00), Max: 100.6 (22 May 2025 23:00)  HR: 100 (23 May 2025 16:00) (52 - 114)  BP: --  BP(mean): --  RR: 18 (23 May 2025 16:00) (11 - 28)  SpO2: 100% (23 May 2025 16:00) (92% - 100%)    Parameters below as of 23 May 2025 16:00  Patient On (Oxygen Delivery Method): ventilator    O2 Concentration (%): 30  Drug Dosing Weight  Height (cm): 170.2 (19 May 2025 00:28)  Weight (kg): 78.1 (19 May 2025 00:28)  BMI (kg/m2): 27 (19 May 2025 00:28)  BSA (m2): 1.9 (19 May 2025 00:28)      REVIEW OF SYSTEMS:    UNABLE TO OBTAIN              PHYSICAL EXAMINATION:    GENERAL: The patient is aWAKE ON RESPIRATOR     VITAL SIGNS: T(C): 36.6 (05-23-25 @ 16:00), Max: 38.1 (05-22-25 @ 23:00)  HR: 100 (05-23-25 @ 16:00) (52 - 114)  BP: --  RR: 18 (05-23-25 @ 16:00) (11 - 28)  SpO2: 100% (05-23-25 @ 16:00) (92% - 100%)  Wt(kg): --    HEENT: Head is normocephalic and atraumatic.  ANICTERIC  NECK: Supple. No carotid bruits.  No lymphadenopathy or thyromegaly.    LUNGS:COARSE BREATH SOUNDS    HEART: Regular rate and rhythm without murmur.    ABDOMEN: Soft, nontender, and nondistended.  Positive bowel sounds.  No hepatosplenomegaly was noted. NO REBOUND NO GUARDING    EXTREMITIES: NO EDEMA NO ERYTHEMA    NEUROLOGIC: AWAKE ON VENT      SKIN: No ulceration or induration present. NO RASH        BLOOD CULTURES  Culture Results:   No growth at 24 hours (05-21 @ 02:50)  Culture Results:   No growth at 48 Hours (05-20 @ 11:15)  Culture Results:   Numerous Escherichia coli Susceptibility to follow.  Numerous Citrobacter freundii complex  Numerous Enterococcus gallinarum  Numerous Enterococcus faecalis (05-19 @ 17:15)  Culture Results:   Growth in aerobic and anaerobic bottles: Escherichia coli ESBL  See previous culture [id number] (05-19 @ 10:15)  Culture Results:   >100,000 CFU/ml Escherichia coli ESBL (05-18 @ 22:08)  Culture Results:   Growth in aerobic and anaerobic bottles: Escherichia coli ESBL  Growth in aerobic and anaerobic bottles: Citrobacter freundii  Growth in aerobic and anaerobic bottles: Streptococcus gallolyticus  Growth in anaerobic bottle: Gram Positive Rods  Direct identification is available within approximately 3-5  hours either by Blood Panel Multiplexed PCR or Direct  MALDI-TOF. Details: [email or web address] (05-18 @ 20:05)       URINE CX          LABS:                        13.0   14.24 )-----------( 71       ( 23 May 2025 14:15 )             38.5     05-23    148[H]  |  104  |  86.0[H]  ----------------------------<  230[H]  3.0[L]   |  29.0  |  1.54[H]    Ca    8.4      23 May 2025 14:15  Phos  1.7     05-23  Mg     2.3     05-23    TPro  6.2[L]  /  Alb  3.4  /  TBili  2.3[H]  /  DBili  x   /  AST  233[H]  /  ALT  154[H]  /  AlkPhos  155[H]  05-23    PT/INR - ( 23 May 2025 14:15 )   PT: 18.1 sec;   INR: 1.57 ratio         PTT - ( 23 May 2025 14:15 )  PTT:23.7 sec  Urinalysis Basic - ( 23 May 2025 14:15 )    Color: x / Appearance: x / SG: x / pH: x  Gluc: 230 mg/dL / Ketone: x  / Bili: x / Urobili: x   Blood: x / Protein: x / Nitrite: x   Leuk Esterase: x / RBC: x / WBC x   Sq Epi: x / Non Sq Epi: x / Bacteria: x        RADIOLOGY & ADDITIONAL STUDIES:      ASSESSMENT/PLAN  74 yo male with hx of HTN, HLD, CAD, TIA, remote pericarditis in 2015, presented to the ED with complaints of abdominal pain, nausea, vomiting for since the day prior.  Upon arrival to the ED patient was lethargic and tachypneic, with abdominal distention and mottled skin.    He was noted to be hypotensive despite fluid resuscitation and placed on escalating doses of pressors.    Started on Bipap initially for tachypnea and metabolic acidosis, eventually intubated in the ED.  Labs revealed severe metabolic acidosis. OLEKSANDR, transaminitis   AS ABOVE ADMITTED WITH ABD PAIN HYPOTENSION BLOOD CX POLYMICROBIAL   ULTIMATELY HAD CHOLECYSTOTOMY TUBE PLACED ALSO POLYMICROBIAL  BLOOD CX WITH ESBL ECOLI AND CITROBACTER  BILE CX E-COLI ENTEROCOCCAL FAECALIS AND E GALLINARUM   PT CURRENTLY INTUBATED AWAKE ON PRESSORS  PT ON MERREM WHICH  WILL COVER ESBL AND STREP  MERREM HAS SOME COVERAGE FOR ENTEROCOCCUS   ALTHOUGH HE DID NOT HAVE ENTEROCOCCAL BACTEREMIAADN THE  SIGNIFICANCE IN BILE FLUID  UNCLEAR WOULD CONTINUE CURRENT IV MRERRM   WILL FOLLOWUP WITH FURTHER RECOMMENDATIONS                    NIRAJ PEPPER MD

## 2025-05-23 NOTE — DIETITIAN NUTRITION RISK NOTIFICATION - TREATMENT: THE FOLLOWING DIET HAS BEEN RECOMMENDED
Diet, NPO with Tube Feed:   Tube Feeding Modality: Orogastric  Cornelia Huerta Peptide 1.5 (KFPEPT1.5RTH)  Total Volume for 24 Hours (mL): 960  Continuous  Starting Tube Feed Rate {mL per Hour}: 10  Increase Tube Feed Rate by (mL): 10     Every 4 hours  Until Goal Tube Feed Rate (mL per Hour): 40  Tube Feed Duration (in Hours): 24  Tube Feed Start Time: 11:00 (05-22-25 @ 10:30) [Active]

## 2025-05-23 NOTE — PROGRESS NOTE ADULT - ASSESSMENT
1.  Acute kidney injury: From ATN/shock.  cr improving. 1.7 today. amking excellent urine. alkalosis improved. can stop acetazolamide   2. Metabolic acidosis from severe lactic acidosis.  resolved.   3.  Septic shock: From cholecystitis. ESBL E.coli/ citrobacter- on meropenem.  s/p drain  4.  Respiratory failure- intubated  5. Hypokalemia- resolved       Nephrology will sign off, please call us if you have any questions or any changes in clinical status       Joel Mendoza MD, NERIS

## 2025-05-23 NOTE — PROGRESS NOTE ADULT - SUBJECTIVE AND OBJECTIVE BOX
Patient is a 75y old  Male who presents with a chief complaint of Acute respiratory failure, shock (22 May 2025 11:42)    BRIEF HOSPITAL COURSE:   76 yo male with hx of HTN, HLD, CAD, TIA, remote pericarditis in , presented to the ED with complaints of abdominal pain, nausea, vomiting for since the day prior.  Upon arrival to the ED patient was lethargic and tachypneic, with abdominal distention and mottled skin.    He was noted to be hypotensive despite fluid resuscitation and placed on escalating doses of pressors.    Started on Bipap initially for tachypnea and metabolic acidosis, eventually intubated in the ED. Labs revealed severe metabolic acidosis. OLEKSANDR, transaminitis. Admitted to MICU for mixed shock, cardiogenic vs septic, requiring triple pressors.    OVN:   No acute events.    Interval HPI:  Patient seen and examined at bedside. Is ill appearing. PCT site clean, dry, draining into bulb.     PAST MEDICAL & SURGICAL HISTORY:  AMI (Acute Myocardial Infarction)  2001      Transient ischemic attack (TIA)      No Past Surgical History        Review of Systems:  All other review of systems negative except as noted in HPI    MEDICATIONS  (STANDING):  chlorhexidine 0.12% Liquid 15 milliLiter(s) Oral Mucosa every 12 hours  chlorhexidine 2% Cloths 1 Application(s) Topical <User Schedule>  dexMEDEtomidine Infusion 0.4 MICROgram(s)/kG/Hr (7.81 mL/Hr) IV Continuous <Continuous>  DOBUTamine Infusion 2.5 MICROgram(s)/kG/Min (5.86 mL/Hr) IV Continuous <Continuous>  hydrocortisone sodium succinate Injectable 50 milliGRAM(s) IV Push every 6 hours  insulin glargine Injectable (LANTUS) 10 Unit(s) SubCutaneous every morning  insulin lispro (ADMELOG) corrective regimen sliding scale.   SubCutaneous every 6 hours  insulin lispro Injectable (ADMELOG) 4 Unit(s) SubCutaneous every 6 hours  lactulose Syrup 20 Gram(s) Oral every 8 hours  meropenem  IVPB 1000 milliGRAM(s) IV Intermittent every 8 hours  norepinephrine Infusion 0.05 MICROgram(s)/kG/Min (7.32 mL/Hr) IV Continuous <Continuous>  pantoprazole  Injectable 40 milliGRAM(s) IV Push daily  rifAXIMin 550 milliGRAM(s) Oral two times a day  vasopressin Infusion 0.04 Unit(s)/Min (6 mL/Hr) IV Continuous <Continuous>    MEDICATIONS  (PRN):      Mode: AC/ CMV (Assist Control/ Continuous Mandatory Ventilation)  RR (machine): 18  TV (machine): 420  FiO2: 30  PEEP: 6  ITime: 0.9  MAP: 8  PIP: 15    ICU Vital Signs Last 24 Hrs  T(C): 37.2 (23 May 2025 08:45), Max: 38.2 (22 May 2025 12:30)  T(F): 99 (23 May 2025 08:45), Max: 100.8 (22 May 2025 12:30)  HR: 110 (23 May 2025 08:45) (52 - 130)  BP: --  BP(mean): --  ABP: 111/64 (23 May 2025 08:45) (81/50 - 121/72)  ABP(mean): 82 (23 May 2025 08:45) (36 - 92)  RR: 23 (23 May 2025 08:45) (11 - 28)  SpO2: 100% (23 May 2025 08:45) (92% - 100%)    O2 Parameters below as of 23 May 2025 08:00  Patient On (Oxygen Delivery Method): ventilator    O2 Concentration (%): 30      ABG - ( 23 May 2025 08:50 )  pH, Arterial: 7.490 pH, Blood: x     /  pCO2: 47    /  pO2: 86    / HCO3: 36    / Base Excess: 12.5  /  SaO2: 98.0              I&O's Detail    22 May 2025 07:01  -  23 May 2025 07:00  --------------------------------------------------------  IN:    Angiotensin II: 6.9 mL    Dexmedetomidine: 74.3 mL    DOBUTamine: 23.6 mL    IV PiggyBack: 100 mL    IV PiggyBack: 400 mL    IV PiggyBack: 299.9 mL    IV PiggyBack: 300 mL    Midazolam: 8.9 mL    Miscellaneous Tube Feedin mL    Norepinephrine: 284.5 mL    Vasopressin: 144 mL  Total IN: 2042.1 mL    OUT:    Angiotensin II: 0 mL    Bulb (mL): 140 mL    Indwelling Catheter - Urethral (mL): 2940 mL    Rectal Tube (mL): 450 mL  Total OUT: 3530 mL    Total NET: -1487.9 mL      23 May 2025 07:01  -  23 May 2025 09:03  --------------------------------------------------------  IN:    Dexmedetomidine: 5.9 mL    DOBUTamine: 5.9 mL    IV PiggyBack: 100 mL    Norepinephrine: 2.9 mL    Vasopressin: 6 mL  Total IN: 120.7 mL    OUT:    Indwelling Catheter - Urethral (mL): 300 mL  Total OUT: 300 mL    Total NET: -179.3 mL          LABS:                        13.1   12.82 )-----------( 45       ( 23 May 2025 02:40 )             39.5     05-    145  |  98  |  90.5[H]  ----------------------------<  227[H]  3.1[L]   |  26.0  |  1.82[H]    Ca    7.9[L]      23 May 2025 02:40  Phos  3.2       Mg     2.4         TPro  6.0[L]  /  Alb  3.2[L]  /  TBili  2.4[H]  /  DBili  x   /  AST  498[H]  /  ALT  195[H]  /  AlkPhos  149[H]            CAPILLARY BLOOD GLUCOSE      POCT Blood Glucose.: 251 mg/dL (23 May 2025 07:22)    PT/INR - ( 23 May 2025 02:40 )   PT: 18.3 sec;   INR: 1.58 ratio         PTT - ( 23 May 2025 02:40 )  PTT:22.4 sec  Urinalysis Basic - ( 23 May 2025 02:40 )    Color: x / Appearance: x / SG: x / pH: x  Gluc: 227 mg/dL / Ketone: x  / Bili: x / Urobili: x   Blood: x / Protein: x / Nitrite: x   Leuk Esterase: x / RBC: x / WBC x   Sq Epi: x / Non Sq Epi: x / Bacteria: x      CULTURES:  Culture Results:   No growth at 24 hours ( @ 02:50)  Culture Results:   No growth at 48 Hours ( @ 11:15)  Culture Results:   Numerous Escherichia coli  Numerous Citrobacter freundii complex Susceptibility to follow.  Numerous Enterococcus gallinarum  Numerous Enterococcus faecalis Susceptibility to follow. ( @ 17:15)  Culture Results:   Growth in aerobic and anaerobic bottles: Escherichia coli ESBL  See previous culture [id number] ( @ 10:15)  Culture Results:   >100,000 CFU/ml Escaherichia coli ESBL ( @ 22:08)  Culture Results:   Growth in aerobic and anaerobic bottles: Escherichia coli ESBL  Growth in aerobic and anaerobic bottles: Citrobacter freundii  Growth in aerobic and anaerobic bottles: Streptococcus gallolyticus  Growth in anaerobic bottle: Gram Positive Rods  Direct identification is available within approximately 3-5  hours either by Blood Panel Multiplexed PCR or Direct  MALDI-TOF. Details: [email or web address] ( @ 20:05)    PE:    General: No acute distress.    HEENT: Pupils equal, reactive to light.  Symmetric.  PULM: Clear to auscultation bilaterally, no significant sputum production, no wheezing, crackles, or rhonchi  NECK: Supple, no lymphadenopathy, trachea midline  CVS: Regular rate and rhythm, no murmurs, rubs, or gallops  ABD: distended  EXT: Nontender, no clubbing, cyanosis, or edema  SKIN: Warm and well perfused, no rashes noted.  NEURO: minimal however, meaningful movements. acknowledges presence of others by nodding. attempts to squeeze fingers.      DEVICES:   P IV    RADIOLOGY:

## 2025-05-23 NOTE — GOALS OF CARE CONVERSATION - ADVANCED CARE PLANNING - CONVERSATION DETAILS
Patient's family asking to rescind DNR order secondary to patient awake and appears to be improving.  We discussed that he is still requiring 2 vasopressors and inotropic support.  They endorse understanding but would like to "give him a chance that he did not have before" now that the amount of support and renal failure has improved.  At this time patient is full code. Patient's family asking to rescind DNR order secondary to patient awake and appears to be improving.  We discussed that he is still requiring 2 vasopressors and inotropic support.  They endorse understanding but would like to "give him a chance that he did not have before" now that the amount of support and renal failure has improved.  At this time patient is full code.    : Venancio

## 2025-05-23 NOTE — PROVIDER CONTACT NOTE (OTHER) - ASSESSMENT
Vitals in flowsheet, pt has periods of afib and NSR Vitals in flowsheet, pt has periods of afib and NSR, now Sinus shea

## 2025-05-23 NOTE — PROGRESS NOTE ADULT - ATTENDING COMMENTS
75 M w/ HTN, CAD, presented w/ abd pain, N/V found to have septic shock secondary to acute cholecystitis s/p perc primitivo 5/19, biliary pancreatitis, acute hypoxemic respiratory failure secondary to above, ESBL E.coli bacteremia and biliary Cx polymicrobial including enterococcus, mixed shock (septic/cardiogenic) on inotropes and vasopressors. Continue to titrate pressors for goal MAP > 65. Dobutamine switched to milrinone today due to tachycardia. End-organ perfusion markers improved on milrinone. Not ready for extubation today. Got final dose of acetazolamide this AM and is diuresing 100-175 cc/hr. Continue to monitor in MICU.

## 2025-05-23 NOTE — DIETITIAN NUTRITION RISK NOTIFICATION - ADDITIONAL COMMENTS/DIETITIAN RECOMMENDATIONS
1. Change TF to MSI Glucose Support 1.2; start @ 20 ml/hr and increase by 10 ml q 8 hrs until goal rate 75 ml/hr x 18 hrs (1350 ml, 1620 kcals, 86 g protein, 1232 ml free water)  2. Additional free water per medical teams discretion  3. If loose BMs continue, can consider banatrol BID to assist with stool bulking  4. Monitor lytes and replete prn; monitor for refeeding syndrome  5. Insulin regimen per primary teams discretion for optimal BG control

## 2025-05-23 NOTE — PROVIDER CONTACT NOTE (CRITICAL VALUE NOTIFICATION) - ACTION/TREATMENT ORDERED:
Continue to monitor closely.
IV Potassium to be ordered.
MD boyce
follow tx
Continue to monitor closely.
follow tx
Continue to monitor closely.
follow tx

## 2025-05-23 NOTE — PROGRESS NOTE ADULT - ASSESSMENT
Mr. Bateman is a 75-year-old male (PMH CAD s/p MI, TIA, HTN, HLD) admitted -May for ARF/shock, now s/p cholecystostomy tube for cholecystitis/cholangitis, MICU day +5. He shows some neurologic improvement, with minimal meaningful movements and acknowledging presence, now sedated on precedex. Shock state improving, weaning off levo, vaso. Respiratory status is stable on AC/CMV with FiO2 weaned to 30%. His OLEKSANDR continues to improve (Cr 1.82), and he has a net negative fluid balance of ~1.5L/24h.  Polymicrobial bacteremia (ESBL E.coli, Citrobacter, Strep gallolyticus, GPR, and now Enterococcus faecalis/gallinarum) is being treated with Meropenem, though he had a fever overnight (Tmax 38.2°C). Based on bile culture, may switch to imipenem. Prognosis remains extremely guarded; DNR.    #Neuro:    -Improved mental status.  -Sedation: Dexmedetomidine 0.4 mcg/kg/hr.  -Neuro checks q2-4h. Seizure/aspiration precautions. HOB >30 deg.    #CV:    -Mixed shock (septic/cardiogenic), improving.  -Pressors: weaning off levo and vaso, switching from  to milrinone  -Titrate to MAP >65 mmHg. (Last /64, MAP 82 at 08:45).  -TTE -May: LVEF 30-35% (moderately decreased), global hypokinesis, enlarged RV, mod-severe MR, mod TR.  -Taper hydrocortisone, 50 mg q8h  -Telemetry. Arterial line. Central venous catheter.  -Cardiology following.    #Resp:    -Acute Respiratory Failure, intubated. Vent: AC/CMV, RR 18, , FiO2 30%, PEEP 6. (PIP 15, MAP 8).  -Wean FiO2/PEEP as tolerated to SpO2 >92-94%.  -Pulmonary toilet, oral care with Chlorhexidine 0.12% liquid q12h. VAP bundle.    #Renal/FEN:    -OLEKSANDR improving (Cr 1.82, BUN 90.5). UOP 2940mL/24h. Cholecystostomy tube output 140mL/24h. Net -1487.9mL/24h.  -Metabolic alkalosis (HCO3 36).  -Monitor BMP, Ca, Mg, Phos q AM and PRN.  -Strict I&Os. Daily weights.  -Diet: Miscellaneous Tube Feeding @ 400mL/24h via NGT.    #GI/Abdomen:    -Acute cholecystitis/cholangitis s/p cholecystostomy tube (19-May). Pancreatitis.  -Continue Pantoprazole 40mg IV daily.  -Continue Lactulose 20g PO (via NGT) q8h, Rifaximin 550mg PO (via NGT) BID.  -Monitor cholecystostomy tube output (140mL/24h).    #Heme:    -Monitor CBC daily.    #ID:    -Septic shock (cholecystitis/cholangitis). Tmax 38.2°C. WBC 12.82.  -Blood cx (19-May): E. coli ESBL, Citrobacter freundii, Strep gallolyticus, GPR. Cholecystostomy bile cx (19-May): E. coli, Citrobacter freundii -omplex, Enterococcus gallinarum, Enterococcus faecalis. Urine cx (18-May): E.coli ESBL.  -Continue Meropenem 1g IV q8h.  -Will consult ID for abx regimen management  -Monitor temperature, WBC, follow culture results.    #Endo:    -Hyperglycemia (Serum Gluc 227, POCT 251).  -Monitor POCT BG q6h.    #Prophylaxis:    VTE: SCDs. Hold chemical DVT ppx with Plt 45 and INR 1.58.  Stress Ulcer: Pantoprazole.  Skin: CHG cloths. Mupirocin 2% nasal BID. Turning/positioning.    Disp/GOC:    MICU for management of septic/cardiogenic shock, ARF, multi-organ support.  Prognosis extremely guarded.  DNR

## 2025-05-23 NOTE — PROGRESS NOTE ADULT - SUBJECTIVE AND OBJECTIVE BOX
[facility] DIVISION OF KIDNEY DISEASES AND HYPERTENSION -- PROGRESS NOTE    SUBJECTIVE:   doing well. off pressors    MEDICATIONS    Standing Inpatient Medications  chlorhexidine 0.12% Liquid 15 milliLiter(s) Oral Mucosa every 12 hours  chlorhexidine 2% Cloths 1 Application(s) Topical <User Schedule>  dexMEDEtomidine Infusion 0.4 MICROgram(s)/kG/Hr IV Continuous <Continuous>  DOBUTamine Infusion 2.5 MICROgram(s)/kG/Min IV Continuous <Continuous>  hydrocortisone sodium succinate Injectable 50 milliGRAM(s) IV Push every 6 hours  insulin glargine Injectable (LANTUS) 10 Unit(s) SubCutaneous every morning  insulin lispro (ADMELOG) corrective regimen sliding scale.   SubCutaneous every 6 hours  insulin lispro Injectable (ADMELOG) 4 Unit(s) SubCutaneous every 6 hours  lactulose Syrup 20 Gram(s) Oral every 8 hours  meropenem  IVPB 1000 milliGRAM(s) IV Intermittent every 8 hours  norepinephrine Infusion 0.05 MICROgram(s)/kG/Min IV Continuous <Continuous>  pantoprazole  Injectable 40 milliGRAM(s) IV Push daily  rifAXIMin 550 milliGRAM(s) Oral two times a day  vasopressin Infusion 0.04 Unit(s)/Min IV Continuous <Continuous>    PRN Inpatient Medications      VITALS/PHYSICAL EXAM  --------------------------------------------------------------------------------  T(C): 37.8 (05-23-25 @ 10:45), Max: 38.2 (05-22-25 @ 12:30)  HR: 70 (05-23-25 @ 10:45) (52 - 114)  BP: --  RR: 25 (05-23-25 @ 10:45) (11 - 28)  SpO2: 100% (05-23-25 @ 10:45) (92% - 100%)  Wt(kg): --        05-22-25 @ 07:01  -  05-23-25 @ 07:00  --------------------------------------------------------  IN: 2042.1 mL / OUT: 3530 mL / NET: -1487.9 mL    05-23-25 @ 07:01  -  05-23-25 @ 10:57  --------------------------------------------------------  IN: 162.6 mL / OUT: 900 mL / NET: -737.4 mL      Physical Exam:  intubated  HEENT: normal  Pulm: CTA B/L  CV: normal S1S2; no murmur  Abd: soft, non-tender  Extremities- no edema    LABS/STUDIES  --------------------------------------------------------------------------------  143  |  100  |  91.5  ----------------------------<  244      [05-23-25 @ 08:35]  3.7   |  26.0  |  1.72        Ca     8.1     [05-23-25 @ 08:35]      Mg     2.2     [05-23-25 @ 08:35]      Phos  2.1     [05-23-25 @ 08:35]    TPro  5.9  /  Alb  3.1  /  TBili  2.3  /  DBili  x   /  AST  331  /  ALT  169  /  AlkPhos  143  [05-23-25 @ 08:35]    PT/INR: PT 18.3 , INR 1.58       [05-23-25 @ 02:40]  PTT: 22.4       [05-23-25 @ 02:40]      Creatinine Trend:  SCr 1.72 [05-23 @ 08:35]  SCr 1.82 [05-23 @ 02:40]  SCr 2.05 [05-22 @ 22:10]  SCr 2.09 [05-22 @ 20:15]  SCr 1.98 [05-22 @ 14:00]

## 2025-05-24 LAB
-  AMPICILLIN/SULBACTAM: SIGNIFICANT CHANGE UP
-  AMPICILLIN: SIGNIFICANT CHANGE UP
-  AZTREONAM: SIGNIFICANT CHANGE UP
-  CEFAZOLIN: SIGNIFICANT CHANGE UP
-  CEFEPIME: SIGNIFICANT CHANGE UP
-  CEFTRIAXONE: SIGNIFICANT CHANGE UP
-  CIPROFLOXACIN: SIGNIFICANT CHANGE UP
-  ERTAPENEM: SIGNIFICANT CHANGE UP
-  GENTAMICIN: SIGNIFICANT CHANGE UP
-  IMIPENEM: SIGNIFICANT CHANGE UP
-  LEVOFLOXACIN: SIGNIFICANT CHANGE UP
-  MEROPENEM: SIGNIFICANT CHANGE UP
-  PIPERACILLIN/TAZOBACTAM: SIGNIFICANT CHANGE UP
-  TIGECYCLINE: SIGNIFICANT CHANGE UP
-  TOBRAMYCIN: SIGNIFICANT CHANGE UP
-  TRIMETHOPRIM/SULFAMETHOXAZOLE: SIGNIFICANT CHANGE UP
ALBUMIN SERPL ELPH-MCNC: 2.8 G/DL — LOW (ref 3.3–5.2)
ALBUMIN SERPL ELPH-MCNC: 3 G/DL — LOW (ref 3.3–5.2)
ALBUMIN SERPL ELPH-MCNC: 3 G/DL — LOW (ref 3.3–5.2)
ALP SERPL-CCNC: 113 U/L — SIGNIFICANT CHANGE UP (ref 40–120)
ALP SERPL-CCNC: 121 U/L — HIGH (ref 40–120)
ALP SERPL-CCNC: 121 U/L — HIGH (ref 40–120)
ALT FLD-CCNC: 108 U/L — HIGH
ALT FLD-CCNC: 114 U/L — HIGH
ALT FLD-CCNC: 95 U/L — HIGH
AMMONIA BLD-MCNC: 59 UMOL/L — HIGH (ref 11–55)
AMMONIA BLD-MCNC: 72 UMOL/L — HIGH (ref 11–55)
ANION GAP SERPL CALC-SCNC: 15 MMOL/L — SIGNIFICANT CHANGE UP (ref 5–17)
ANION GAP SERPL CALC-SCNC: 16 MMOL/L — SIGNIFICANT CHANGE UP (ref 5–17)
ANION GAP SERPL CALC-SCNC: 18 MMOL/L — HIGH (ref 5–17)
APTT BLD: 21.8 SEC — LOW (ref 26.1–36.8)
APTT BLD: 22.9 SEC — LOW (ref 26.1–36.8)
AST SERPL-CCNC: 104 U/L — HIGH
AST SERPL-CCNC: 124 U/L — HIGH
AST SERPL-CCNC: 91 U/L — HIGH
BILIRUB SERPL-MCNC: 1.8 MG/DL — SIGNIFICANT CHANGE UP (ref 0.4–2)
BILIRUB SERPL-MCNC: 1.8 MG/DL — SIGNIFICANT CHANGE UP (ref 0.4–2)
BILIRUB SERPL-MCNC: 2 MG/DL — SIGNIFICANT CHANGE UP (ref 0.4–2)
BUN SERPL-MCNC: 72.4 MG/DL — HIGH (ref 8–20)
BUN SERPL-MCNC: 79.9 MG/DL — HIGH (ref 8–20)
BUN SERPL-MCNC: 81.4 MG/DL — HIGH (ref 8–20)
CALCIUM SERPL-MCNC: 7.7 MG/DL — LOW (ref 8.4–10.5)
CALCIUM SERPL-MCNC: 8.1 MG/DL — LOW (ref 8.4–10.5)
CALCIUM SERPL-MCNC: 8.1 MG/DL — LOW (ref 8.4–10.5)
CHLORIDE SERPL-SCNC: 108 MMOL/L — SIGNIFICANT CHANGE UP (ref 96–108)
CHLORIDE SERPL-SCNC: 110 MMOL/L — HIGH (ref 96–108)
CHLORIDE SERPL-SCNC: 114 MMOL/L — HIGH (ref 96–108)
CO2 SERPL-SCNC: 23 MMOL/L — SIGNIFICANT CHANGE UP (ref 22–29)
CO2 SERPL-SCNC: 25 MMOL/L — SIGNIFICANT CHANGE UP (ref 22–29)
CO2 SERPL-SCNC: 25 MMOL/L — SIGNIFICANT CHANGE UP (ref 22–29)
CREAT SERPL-MCNC: 1.14 MG/DL — SIGNIFICANT CHANGE UP (ref 0.5–1.3)
CREAT SERPL-MCNC: 1.16 MG/DL — SIGNIFICANT CHANGE UP (ref 0.5–1.3)
CREAT SERPL-MCNC: 1.29 MG/DL — SIGNIFICANT CHANGE UP (ref 0.5–1.3)
EGFR: 58 ML/MIN/1.73M2 — LOW
EGFR: 58 ML/MIN/1.73M2 — LOW
EGFR: 66 ML/MIN/1.73M2 — SIGNIFICANT CHANGE UP
EGFR: 66 ML/MIN/1.73M2 — SIGNIFICANT CHANGE UP
EGFR: 67 ML/MIN/1.73M2 — SIGNIFICANT CHANGE UP
EGFR: 67 ML/MIN/1.73M2 — SIGNIFICANT CHANGE UP
GAS PNL BLDA: SIGNIFICANT CHANGE UP
GAS PNL BLDA: SIGNIFICANT CHANGE UP
GAS PNL BLDV: SIGNIFICANT CHANGE UP
GLUCOSE BLDC GLUCOMTR-MCNC: 209 MG/DL — HIGH (ref 70–99)
GLUCOSE BLDC GLUCOMTR-MCNC: 330 MG/DL — HIGH (ref 70–99)
GLUCOSE SERPL-MCNC: 157 MG/DL — HIGH (ref 70–99)
GLUCOSE SERPL-MCNC: 227 MG/DL — HIGH (ref 70–99)
GLUCOSE SERPL-MCNC: 305 MG/DL — HIGH (ref 70–99)
GRAM STN FLD: ABNORMAL
HCT VFR BLD CALC: 34.3 % — LOW (ref 39–50)
HCT VFR BLD CALC: 35.7 % — LOW (ref 39–50)
HCT VFR BLD CALC: 37 % — LOW (ref 39–50)
HGB BLD-MCNC: 11.6 G/DL — LOW (ref 13–17)
HGB BLD-MCNC: 12.1 G/DL — LOW (ref 13–17)
HGB BLD-MCNC: 12.4 G/DL — LOW (ref 13–17)
IMMATURE PLATELET FRACTION #: 10.7 K/UL — SIGNIFICANT CHANGE UP (ref 3.9–12.5)
IMMATURE PLATELET FRACTION #: 9.5 K/UL — SIGNIFICANT CHANGE UP (ref 3.9–12.5)
IMMATURE PLATELET FRACTION #: 9.8 K/UL — SIGNIFICANT CHANGE UP (ref 3.9–12.5)
IMMATURE PLATELET FRACTION %: 10.1 % — HIGH (ref 1.6–7.1)
IMMATURE PLATELET FRACTION %: 11.5 % — HIGH (ref 1.6–7.1)
IMMATURE PLATELET FRACTION %: 11.8 % — HIGH (ref 1.6–7.1)
INR BLD: 1.64 RATIO — HIGH (ref 0.85–1.16)
INR BLD: 1.68 RATIO — HIGH (ref 0.85–1.16)
MAGNESIUM SERPL-MCNC: 2.1 MG/DL — SIGNIFICANT CHANGE UP (ref 1.6–2.6)
MAGNESIUM SERPL-MCNC: 2.1 MG/DL — SIGNIFICANT CHANGE UP (ref 1.6–2.6)
MAGNESIUM SERPL-MCNC: 2.2 MG/DL — SIGNIFICANT CHANGE UP (ref 1.6–2.6)
MCHC RBC-ENTMCNC: 28.8 PG — SIGNIFICANT CHANGE UP (ref 27–34)
MCHC RBC-ENTMCNC: 29.2 PG — SIGNIFICANT CHANGE UP (ref 27–34)
MCHC RBC-ENTMCNC: 29.5 PG — SIGNIFICANT CHANGE UP (ref 27–34)
MCHC RBC-ENTMCNC: 33.5 G/DL — SIGNIFICANT CHANGE UP (ref 32–36)
MCHC RBC-ENTMCNC: 33.8 G/DL — SIGNIFICANT CHANGE UP (ref 32–36)
MCHC RBC-ENTMCNC: 33.9 G/DL — SIGNIFICANT CHANGE UP (ref 32–36)
MCV RBC AUTO: 85.8 FL — SIGNIFICANT CHANGE UP (ref 80–100)
MCV RBC AUTO: 86 FL — SIGNIFICANT CHANGE UP (ref 80–100)
MCV RBC AUTO: 87.3 FL — SIGNIFICANT CHANGE UP (ref 80–100)
METHOD TYPE: SIGNIFICANT CHANGE UP
NRBC # BLD AUTO: 0.11 K/UL — HIGH (ref 0–0)
NRBC # BLD AUTO: 0.15 K/UL — HIGH (ref 0–0)
NRBC # BLD AUTO: 0.19 K/UL — HIGH (ref 0–0)
NRBC # FLD: 0.11 K/UL — HIGH (ref 0–0)
NRBC # FLD: 0.15 K/UL — HIGH (ref 0–0)
NRBC # FLD: 0.19 K/UL — HIGH (ref 0–0)
NRBC BLD AUTO-RTO: 0 /100 WBCS — SIGNIFICANT CHANGE UP (ref 0–0)
NRBC BLD AUTO-RTO: 1 /100 WBCS — HIGH (ref 0–0)
NRBC BLD AUTO-RTO: 1 /100 WBCS — HIGH (ref 0–0)
PHOSPHATE SERPL-MCNC: 2 MG/DL — LOW (ref 2.4–4.7)
PHOSPHATE SERPL-MCNC: 2.5 MG/DL — SIGNIFICANT CHANGE UP (ref 2.4–4.7)
PHOSPHATE SERPL-MCNC: 2.5 MG/DL — SIGNIFICANT CHANGE UP (ref 2.4–4.7)
PLATELET # BLD AUTO: 83 K/UL — LOW (ref 150–400)
PLATELET # BLD AUTO: 93 K/UL — LOW (ref 150–400)
PLATELET # BLD AUTO: 94 K/UL — LOW (ref 150–400)
PMV BLD: 12.7 FL — SIGNIFICANT CHANGE UP (ref 7–13)
PMV BLD: 12.9 FL — SIGNIFICANT CHANGE UP (ref 7–13)
PMV BLD: 13.2 FL — HIGH (ref 7–13)
POTASSIUM SERPL-MCNC: 3.3 MMOL/L — LOW (ref 3.5–5.3)
POTASSIUM SERPL-MCNC: 3.4 MMOL/L — LOW (ref 3.5–5.3)
POTASSIUM SERPL-MCNC: 3.4 MMOL/L — LOW (ref 3.5–5.3)
POTASSIUM SERPL-SCNC: 3.3 MMOL/L — LOW (ref 3.5–5.3)
POTASSIUM SERPL-SCNC: 3.4 MMOL/L — LOW (ref 3.5–5.3)
POTASSIUM SERPL-SCNC: 3.4 MMOL/L — LOW (ref 3.5–5.3)
PROT SERPL-MCNC: 5.4 G/DL — LOW (ref 6.6–8.7)
PROT SERPL-MCNC: 5.4 G/DL — LOW (ref 6.6–8.7)
PROT SERPL-MCNC: 5.5 G/DL — LOW (ref 6.6–8.7)
PROTHROM AB SERPL-ACNC: 18.4 SEC — HIGH (ref 9.9–13.4)
PROTHROM AB SERPL-ACNC: 18.9 SEC — HIGH (ref 9.9–13.4)
RBC # BLD: 3.93 M/UL — LOW (ref 4.2–5.8)
RBC # BLD: 4.15 M/UL — LOW (ref 4.2–5.8)
RBC # BLD: 4.31 M/UL — SIGNIFICANT CHANGE UP (ref 4.2–5.8)
RBC # FLD: 14.4 % — SIGNIFICANT CHANGE UP (ref 10.3–14.5)
RBC # FLD: 14.5 % — SIGNIFICANT CHANGE UP (ref 10.3–14.5)
RBC # FLD: 14.7 % — HIGH (ref 10.3–14.5)
SODIUM SERPL-SCNC: 149 MMOL/L — HIGH (ref 135–145)
SODIUM SERPL-SCNC: 151 MMOL/L — HIGH (ref 135–145)
SODIUM SERPL-SCNC: 154 MMOL/L — HIGH (ref 135–145)
SPECIMEN SOURCE: SIGNIFICANT CHANGE UP
WBC # BLD: 13.36 K/UL — HIGH (ref 3.8–10.5)
WBC # BLD: 14.34 K/UL — HIGH (ref 3.8–10.5)
WBC # BLD: 16.79 K/UL — HIGH (ref 3.8–10.5)
WBC # FLD AUTO: 13.36 K/UL — HIGH (ref 3.8–10.5)
WBC # FLD AUTO: 14.34 K/UL — HIGH (ref 3.8–10.5)
WBC # FLD AUTO: 16.79 K/UL — HIGH (ref 3.8–10.5)

## 2025-05-24 PROCEDURE — 99233 SBSQ HOSP IP/OBS HIGH 50: CPT

## 2025-05-24 PROCEDURE — 99291 CRITICAL CARE FIRST HOUR: CPT | Mod: GC

## 2025-05-24 RX ORDER — SODIUM CHLORIDE 9 G/1000ML
1000 INJECTION, SOLUTION INTRAVENOUS
Refills: 0 | Status: DISCONTINUED | OUTPATIENT
Start: 2025-05-24 | End: 2025-05-26

## 2025-05-24 RX ORDER — SOD PHOS DI, MONO/K PHOS MONO 250 MG
1 TABLET ORAL ONCE
Refills: 0 | Status: COMPLETED | OUTPATIENT
Start: 2025-05-24 | End: 2025-05-24

## 2025-05-24 RX ORDER — DEXTROSE 50 % IN WATER 50 %
25 SYRINGE (ML) INTRAVENOUS ONCE
Refills: 0 | Status: DISCONTINUED | OUTPATIENT
Start: 2025-05-24 | End: 2025-05-26

## 2025-05-24 RX ORDER — ALBUMIN (HUMAN) 12.5 G/50ML
50 INJECTION, SOLUTION INTRAVENOUS EVERY 6 HOURS
Refills: 0 | Status: COMPLETED | OUTPATIENT
Start: 2025-05-24 | End: 2025-05-26

## 2025-05-24 RX ORDER — DEXTROSE 50 % IN WATER 50 %
12.5 SYRINGE (ML) INTRAVENOUS ONCE
Refills: 0 | Status: DISCONTINUED | OUTPATIENT
Start: 2025-05-24 | End: 2025-05-26

## 2025-05-24 RX ORDER — INSULIN GLARGINE-YFGN 100 [IU]/ML
12 INJECTION, SOLUTION SUBCUTANEOUS EVERY MORNING
Refills: 0 | Status: DISCONTINUED | OUTPATIENT
Start: 2025-05-24 | End: 2025-05-26

## 2025-05-24 RX ORDER — HYDROCORTISONE 20 MG
50 TABLET ORAL EVERY 12 HOURS
Refills: 0 | Status: DISCONTINUED | OUTPATIENT
Start: 2025-05-24 | End: 2025-05-24

## 2025-05-24 RX ORDER — DEXTROSE 50 % IN WATER 50 %
15 SYRINGE (ML) INTRAVENOUS ONCE
Refills: 0 | Status: DISCONTINUED | OUTPATIENT
Start: 2025-05-24 | End: 2025-05-26

## 2025-05-24 RX ORDER — SODIUM CHLORIDE 9 G/1000ML
1000 INJECTION, SOLUTION INTRAVENOUS
Refills: 0 | Status: COMPLETED | OUTPATIENT
Start: 2025-05-24 | End: 2025-05-24

## 2025-05-24 RX ORDER — SODIUM CHLORIDE 9 G/1000ML
1000 INJECTION, SOLUTION INTRAVENOUS
Refills: 0 | Status: COMPLETED | OUTPATIENT
Start: 2025-05-24 | End: 2025-05-25

## 2025-05-24 RX ORDER — BACITRACIN 500 UNIT/G
1 OINTMENT (GRAM) TOPICAL
Refills: 0 | Status: DISCONTINUED | OUTPATIENT
Start: 2025-05-24 | End: 2025-06-05

## 2025-05-24 RX ORDER — GLUCAGON 3 MG/1
1 POWDER NASAL ONCE
Refills: 0 | Status: DISCONTINUED | OUTPATIENT
Start: 2025-05-24 | End: 2025-05-26

## 2025-05-24 RX ORDER — LANOLIN/MINERAL OIL/PETROLATUM
1 OINTMENT (GRAM) OPHTHALMIC (EYE) ONCE
Refills: 0 | Status: COMPLETED | OUTPATIENT
Start: 2025-05-24 | End: 2025-05-24

## 2025-05-24 RX ORDER — HYDROCORTISONE 20 MG
50 TABLET ORAL EVERY 8 HOURS
Refills: 0 | Status: DISCONTINUED | OUTPATIENT
Start: 2025-05-24 | End: 2025-05-25

## 2025-05-24 RX ORDER — INSULIN LISPRO 100 U/ML
INJECTION, SOLUTION INTRAVENOUS; SUBCUTANEOUS EVERY 6 HOURS
Refills: 0 | Status: DISCONTINUED | OUTPATIENT
Start: 2025-05-24 | End: 2025-05-26

## 2025-05-24 RX ADMIN — ALBUMIN (HUMAN) 50 MILLILITER(S): 12.5 INJECTION, SOLUTION INTRAVENOUS at 17:28

## 2025-05-24 RX ADMIN — MEROPENEM 100 MILLIGRAM(S): 1 INJECTION INTRAVENOUS at 14:48

## 2025-05-24 RX ADMIN — ALBUMIN (HUMAN) 50 MILLILITER(S): 12.5 INJECTION, SOLUTION INTRAVENOUS at 13:27

## 2025-05-24 RX ADMIN — INSULIN LISPRO 4: 100 INJECTION, SOLUTION INTRAVENOUS; SUBCUTANEOUS at 13:28

## 2025-05-24 RX ADMIN — Medication 50 MILLIGRAM(S): at 05:34

## 2025-05-24 RX ADMIN — Medication 15 MILLILITER(S): at 17:28

## 2025-05-24 RX ADMIN — Medication 15 MILLILITER(S): at 05:35

## 2025-05-24 RX ADMIN — DEXMEDETOMIDINE HYDROCHLORIDE IN SODIUM CHLORIDE 7.81 MICROGRAM(S)/KG/HR: 4 INJECTION INTRAVENOUS at 05:35

## 2025-05-24 RX ADMIN — MILRINONE LACTATE 2.93 MICROGRAM(S)/KG/MIN: 1 INJECTION, SOLUTION INTRAVENOUS at 13:33

## 2025-05-24 RX ADMIN — Medication 1 DROP(S): at 07:32

## 2025-05-24 RX ADMIN — VASOPRESSIN 6 UNIT(S)/MIN: 20 INJECTION INTRAVENOUS at 17:28

## 2025-05-24 RX ADMIN — INSULIN LISPRO 8: 100 INJECTION, SOLUTION INTRAVENOUS; SUBCUTANEOUS at 05:36

## 2025-05-24 RX ADMIN — Medication 50 MILLIGRAM(S): at 13:26

## 2025-05-24 RX ADMIN — Medication 1 PACKET(S): at 05:34

## 2025-05-24 RX ADMIN — Medication 50 MILLIEQUIVALENT(S): at 13:27

## 2025-05-24 RX ADMIN — Medication 50 MILLIEQUIVALENT(S): at 01:35

## 2025-05-24 RX ADMIN — Medication 50 MILLIEQUIVALENT(S): at 14:47

## 2025-05-24 RX ADMIN — Medication 50 MILLIEQUIVALENT(S): at 19:30

## 2025-05-24 RX ADMIN — Medication 1 APPLICATION(S): at 05:35

## 2025-05-24 RX ADMIN — Medication 40 MILLIGRAM(S): at 13:26

## 2025-05-24 RX ADMIN — Medication 50 MILLIGRAM(S): at 21:23

## 2025-05-24 RX ADMIN — ALBUMIN (HUMAN) 50 MILLILITER(S): 12.5 INJECTION, SOLUTION INTRAVENOUS at 23:50

## 2025-05-24 RX ADMIN — INSULIN LISPRO 4 UNIT(S): 100 INJECTION, SOLUTION INTRAVENOUS; SUBCUTANEOUS at 13:28

## 2025-05-24 RX ADMIN — Medication 1 APPLICATION(S): at 17:30

## 2025-05-24 RX ADMIN — VASOPRESSIN 6 UNIT(S)/MIN: 20 INJECTION INTRAVENOUS at 05:35

## 2025-05-24 RX ADMIN — Medication 1 PACKET(S): at 13:27

## 2025-05-24 RX ADMIN — INSULIN LISPRO 4 UNIT(S): 100 INJECTION, SOLUTION INTRAVENOUS; SUBCUTANEOUS at 05:35

## 2025-05-24 RX ADMIN — MEROPENEM 100 MILLIGRAM(S): 1 INJECTION INTRAVENOUS at 21:24

## 2025-05-24 RX ADMIN — Medication 50 MILLIEQUIVALENT(S): at 21:23

## 2025-05-24 RX ADMIN — INSULIN GLARGINE-YFGN 10 UNIT(S): 100 INJECTION, SOLUTION SUBCUTANEOUS at 07:35

## 2025-05-24 RX ADMIN — MEROPENEM 100 MILLIGRAM(S): 1 INJECTION INTRAVENOUS at 05:50

## 2025-05-24 NOTE — PROGRESS NOTE ADULT - ATTENDING COMMENTS
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 Remains on levo/vaso. Continue to titrate pressors for goal MAP > 65. On milrinone now. End-organ markers stable. I adjusted pt's vent settings to PSV 10/6 and minute ventilation was adequate. I reduced pt's PS further to 6/6 and demonstrated the same. Subsequently we extubated the pt. He is following complex commands. Has adequate UOP without diuretics for now. Family made patient full code given his improvement. Will attempt to titrate off milrinone today. 75 M w/ HTN, CAD, presented w/ abd pain, N/V found to have septic shock secondary to acute cholecystitis s/p perc primitivo 5/19, biliary pancreatitis, acute hypoxemic respiratory failure secondary to above, ESBL E.coli bacteremia and biliary Cx polymicrobial including enterococcus, mixed shock (septic/cardiogenic) on inotropes and vasopressors, on lower doses now. Remains on levo/vaso. Continue to titrate pressors for goal MAP > 65. On milrinone now. End-organ markers stable. I adjusted pt's vent settings to PSV 10/6 and minute ventilation was adequate. I reduced pt's PS further to 6/6 and demonstrated the same. Subsequently we extubated the pt. He is following complex commands. Has adequate UOP without diuretics for now. Family made patient full code given his improvement. Will attempt to titrate off milrinone today. d/w ID attending, Dr. Ybarra re: enterococcus infection, okay to continue IV meropenem for now.

## 2025-05-24 NOTE — PROGRESS NOTE ADULT - ASSESSMENT
Mr. Bateman is a 75-year-old male (PMH CAD s/p MI, TIA, HTN, HLD) admitted 18-May for ARF/shock, now s/p cholecystostomy tube for cholecystitis/cholangitis, MICU day +5. He shows some neurologic improvement, with minimal meaningful movements and acknowledging presence, now sedated on precedex. Shock state improving, weaning off levo, vaso. Respiratory status is stable on AC/CMV with FiO2 weaned to 30%. His OLEKSANDR continues to improve (Cr 1.82), and he has a net negative fluid balance of ~1.5L/24h.  Polymicrobial bacteremia (ESBL E.coli, Citrobacter, Strep gallolyticus, GPR, and now Enterococcus faecalis/gallinarum) is being treated with Meropenem, though he had a fever overnight (Tmax 38.2°C). Based on bile culture, may switch to imipenem. Prognosis remains extremely guarded; DNR.    #Neuro:  -Improved mental status. Responsive and follows commands.   -Sedation: Dexmedetomidine is off 0.4 mcg/kg/hr.  -Neuro checks q2-4h. Seizure/aspiration precautions. HOB >30 deg.    #CV:  -Mixed shock (septic/cardiogenic), improving.  -Pressors: weaning off levo 0.03) and vaso, switched from  to milrinone (CO 4.6, CI 2.4)   -Titrate to MAP >65 mmHg  -TTE -May: LVEF 30-35% (moderately decreased), global hypokinesis, enlarged RV, mod-severe MR, mod TR.  -Taper hydrocortisone, 50 mg q8h  -Telemetry. Arterial line. Central venous catheter.  -Cardiology following. Can add digoxin if needed for rapid AF, limited AAT options due to renal dysfunction, liver dysfunction and low EF.  - Eventual repeat echo when improved to reassess EF.  GDMT as tolerated if/when improved.    #Resp:  -Acute Respiratory Failure, intubated. Vent: AC/CMV, RR 18, , FiO2 30%, PEEP 6. (PIP 15, MAP 8). Tolerating Cpap 6/6   -Wean FiO2/PEEP as tolerated to SpO2 >92-94%.  -Pulmonary toilet, oral care with Chlorhexidine 0.12% liquid q12h. VAP bundle.    #Renal/FEN:  -OLEKSANDR improving (Cr 1.29, BUN 81.4). net negative 1.9L 24/h  -Metabolic alkalosis (HCO3 36).  -Monitor BMP, Ca, Mg, Phos q AM and PRN.  -Strict I&Os. Daily weights.  -Diet: Miscellaneous Tube Feeding @ 400mL/24h via NGT.    #GI/Abdomen:  -Acute cholecystitis/cholangitis s/p cholecystostomy tube (19-May). Pancreatitis.  -Continue Pantoprazole 40mg IV daily.  -Continue Lactulose 20g PO (via NGT) q8h, Rifaximin 550mg PO (via NGT) BID.  -Monitor cholecystostomy tube output (140mL/24h).    #Heme:  -Monitor CBC daily.  - h/h stable    #ID:  -Septic shock (cholecystitis/cholangitis). WBC 13.36 lactate 2.1 from 2.3  -Blood cx (19-May): E. coli ESBL, Citrobacter freundii, Strep gallolyticus, GPR.   - No growth   - Cholecystostomy bile cx (19-May): E. coli, Citrobacter freundii -omplex, Enterococcus gallinarum, Enterococcus faecalis. Urine cx (18-May): E.coli ESBL.  -Continue Meropenem 1g IV q8h.  -Will consult ID for abx regimen management  -Monitor temperature, WBC, follow culture results.    #Endo:  -Hyperglycemia (Serum Gluc 227, POCT 251).  -Monitor POCT BG q6h.  -10 U lantus daily, A1C 7.4 BGM >300 consider increasing to 12U (titrated down steroid)    #Prophylaxis:  VTE: SCDs. Hold chemical DVT ppx with Plt 52 and INR 1.68.  Stress Ulcer: Pantoprazole.  Skin: CHG cloths. Mupirocin 2% nasal BID. Turning/positioning.    Disp/GOC:  MICU for management of septic/cardiogenic shock, ARF, multi-organ support.  Prognosis extremely guarded.  DNR rescinded by family    Mr. Bateman is a 75-year-old male (PMH CAD s/p MI, TIA, HTN, HLD) admitted -May for ARF/shock, now s/p cholecystostomy tube for cholecystitis/cholangitis, MICU day +5. He shows some neurologic improvement, with minimal meaningful movements and acknowledging presence, now sedated on precedex. Shock state improving, weaning off levo, vaso. Respiratory status is stable on AC/CMV with FiO2 weaned to 30%. His OLEKSANDR continues to improve (Cr 1.82), and he has a net negative fluid balance of ~1.5L/24h.  Polymicrobial bacteremia (ESBL E.coli, Citrobacter, Strep gallolyticus, GPR, and now Enterococcus faecalis/gallinarum) is being treated with Meropenem, though he had a fever overnight (Tmax 38.2°C). Based on bile culture, may switch to imipenem. Prognosis remains extremely guarded; DNR.    #Neuro:  -Improved mental status. Responsive and follows commands.   -Sedation: Dexmedetomidine is off 0.4 mcg/kg/hr.  -Neuro checks q2-4h. Seizure/aspiration precautions. HOB >30 deg.    #CV:  -Mixed shock (septic/cardiogenic), improving.  -Pressors: weaning off levo 0.03) and vaso, switched from  to milrinone (CO 4.6, CI 2.4)   -Titrate to MAP >65 mmHg  -TTE 22-May: LVEF 30-35% (moderately decreased), global hypokinesis, enlarged RV, mod-severe MR, mod TR.  -Taper hydrocortisone, 50 mg q8h  -Telemetry. Arterial line. Central venous catheter.  -Cardiology following. Can add digoxin if needed for rapid AF, limited AAT options due to renal dysfunction, liver dysfunction and low EF.  - Eventual repeat echo when improved to reassess EF.  GDMT as tolerated if/when improved.    #Resp:  -Acute Respiratory Failure, intubated. Vent: AC/CMV, RR 18, , FiO2 30%, PEEP 6. (PIP 15, MAP 8). Tolerating Cpap 6/6   -Wean FiO2/PEEP as tolerated to SpO2 >92-94%.  -Pulmonary toilet, oral care with Chlorhexidine 0.12% liquid q12h. VAP bundle.  - Extubate     #Renal/FEN:  -OLEKSANDR improving (Cr 1.29, BUN 81.4). UOP 2940mL/24h. Cholecystostomy tube output 140mL/24h. Net -1487.9mL/24h.  -Metabolic alkalosis (HCO3 36).  -Monitor BMP, Ca, Mg, Phos q AM and PRN.  -Strict I&Os. Daily weights.  -Diet: NPO pending S&S following extuabtion     #GI/Abdomen:  -Acute cholecystitis/cholangitis s/p cholecystostomy tube (19-May). Pancreatitis.  -Continue Pantoprazole 40mg IV daily.  -Continue Lactulose 20g PO (via NGT) q8h, Rifaximin 550mg PO (via NGT) BID.  -Monitor cholecystostomy tube output (140mL/24h).    #Heme:  -Monitor CBC daily.  - h/h stable    #ID:  -Septic shock (cholecystitis/cholangitis). WBC 13.36 lactate 2.1 from 2.3  -Blood cx (19-May): E. coli ESBL, Citrobacter freundii, Strep gallolyticus, GPR.   - No growth   - Cholecystostomy bile cx (19-May): E. coli, Citrobacter freundii -omplex, Enterococcus gallinarum, Enterococcus faecalis. Urine cx (18-May): E.coli ESBL.  -Continue Meropenem 1g IV q8h.  -Will consult ID for abx regimen management  -Monitor temperature, WBC, follow culture results.    #Endo:  -Hyperglycemia (Serum Gluc 227, POCT 251).  -Monitor POCT BG q6h.  -10 U lantus daily, A1C 7.4 BGM >300 consider increasing to 12U (titrated down steroid)    #Prophylaxis:  VTE: SCDs. Hold chemical DVT ppx with Plt 52 and INR 1.68.  Stress Ulcer: Pantoprazole.  Skin: CHG cloths. Mupirocin 2% nasal BID. Turning/positioning.    Disp/GOC:  MICU for management of septic/cardiogenic shock, ARF, multi-organ support.  Prognosis extremely guarded.  DNR rescinded by family

## 2025-05-24 NOTE — PROGRESS NOTE ADULT - SUBJECTIVE AND OBJECTIVE BOX
MICU NOTE    CHIEF COMPLAINT: Patient is a 75y old  Male who presents with a chief complaint of Acute respiratory failure, shock (23 May 2025 15:59)      HPI:  74 yo male with hx of HTN, HLD, CAD, TIA, remote pericarditis in 2015, presented to the ED with complaints of abdominal pain, nausea, vomiting for since the day prior.  Upon arrival to the ED patient was lethargic and tachypneic, with abdominal distention and mottled skin.    He was noted to be hypotensive despite fluid resuscitation and placed on escalating doses of pressors.    Started on Bipap initially for tachypnea and metabolic acidosis, eventually intubated in the ED.  Labs revealed severe metabolic acidosis. OLEKSANDR, transaminitis      (18 May 2025 23:45)      PAST MEDICAL & SURGICAL HISTORY:  AMI (Acute Myocardial Infarction)  2001      Transient ischemic attack (TIA)      No Past Surgical History          FAMILY HISTORY:  FH: CAD (coronary artery disease)        SOCIAL HISTORY:  Smoking:   Substance Use:   EtOH Use:   Advance Directives:     MEDICATIONS  (STANDING):  artificial  tears Solution 1 Drop(s) Both EYES once  chlorhexidine 0.12% Liquid 15 milliLiter(s) Oral Mucosa every 12 hours  chlorhexidine 2% Cloths 1 Application(s) Topical <User Schedule>  dexMEDEtomidine Infusion 0.4 MICROgram(s)/kG/Hr (7.81 mL/Hr) IV Continuous <Continuous>  dextrose 5%. 1000 milliLiter(s) (50 mL/Hr) IV Continuous <Continuous>  dextrose 5%. 1000 milliLiter(s) (100 mL/Hr) IV Continuous <Continuous>  dextrose 50% Injectable 25 Gram(s) IV Push once  dextrose 50% Injectable 12.5 Gram(s) IV Push once  dextrose 50% Injectable 25 Gram(s) IV Push once  glucagon  Injectable 1 milliGRAM(s) IntraMuscular once  hydrocortisone sodium succinate Injectable 50 milliGRAM(s) IV Push every 6 hours  insulin glargine Injectable (LANTUS) 10 Unit(s) SubCutaneous every morning  insulin lispro (ADMELOG) corrective regimen sliding scale   SubCutaneous every 6 hours  insulin lispro Injectable (ADMELOG) 4 Unit(s) SubCutaneous every 6 hours  lactulose Syrup 20 Gram(s) Oral every 8 hours  meropenem  IVPB 1000 milliGRAM(s) IV Intermittent every 8 hours  milrinone Infusion 0.125 MICROgram(s)/kG/Min (2.93 mL/Hr) IV Continuous <Continuous>  norepinephrine Infusion 0.05 MICROgram(s)/kG/Min (7.32 mL/Hr) IV Continuous <Continuous>  pantoprazole  Injectable 40 milliGRAM(s) IV Push daily  potassium phosphate / sodium phosphate Powder (PHOS-NaK) 1 Packet(s) Oral four times a day  rifAXIMin 550 milliGRAM(s) Oral two times a day  vasopressin Infusion 0.04 Unit(s)/Min (6 mL/Hr) IV Continuous <Continuous>    MEDICATIONS  (PRN):  dextrose Oral Gel 15 Gram(s) Oral once PRN Blood Glucose LESS THAN 70 milliGRAM(s)/deciliter  fentaNYL    Injectable 25 MICROGram(s) IV Push every 4 hours PRN pain/vent dyschrony      Allergies    No Known Drug Allergies  SHRIMP (Unknown)  iv dye (Unknown)    Intolerances        REVIEW OF SYSTEMS:  CONSTITUTIONAL: No weakness, fevers or chills  EYES/ENT: No visual changes;  No vertigo or throat pain   NECK: No pain or stiffness  RESPIRATORY: No cough, wheezing, hemoptysis; No shortness of breath  CARDIOVASCULAR: No chest pain or palpitations  GASTROINTESTINAL: No abdominal or epigastric pain. No nausea, vomiting, or hematemesis; No diarrhea or constipation. No melena or hematochezia.  GENITOURINARY: No dysuria, frequency or hematuria  NEUROLOGICAL: No numbness or weakness  SKIN: No itching, rashes  [ ] All other systems negative  [ ] Unable to assess ROS because ________    OBJECTIVE:  ICU Vital Signs Last 24 Hrs  T(C): 37.4 (24 May 2025 07:30), Max: 38.1 (23 May 2025 13:00)  T(F): 99.3 (24 May 2025 07:30), Max: 100.6 (23 May 2025 13:00)  HR: 63 (24 May 2025 07:30) (54 - 114)  BP: --  BP(mean): --  ABP: 100/49 (24 May 2025 07:30) (61/41 - 129/76)  ABP(mean): 68 (24 May 2025 07:30) (-16 - 97)  RR: 20 (24 May 2025 07:30) (10 - 28)  SpO2: 99% (24 May 2025 07:30) (97% - 100%)    O2 Parameters below as of 23 May 2025 20:00  Patient On (Oxygen Delivery Method): ventilator          Mode: AC/ CMV (Assist Control/ Continuous Mandatory Ventilation), RR (machine): 18, TV (machine): 420, FiO2: 30, PEEP: 6, ITime: 0.8, MAP: 9, PIP: 12    05-23 @ 07:01  -  05-24 @ 07:00  --------------------------------------------------------  IN: 2212.6 mL / OUT: 4155 mL / NET: -1942.4 mL      CAPILLARY BLOOD GLUCOSE      POCT Blood Glucose.: 330 mg/dL (24 May 2025 05:21)      PHYSICAL EXAM:  GENERAL: NAD, lying in bed comfortably  HEAD:  Atraumatic, normocephalic  EYES: EOMI, PERRLA, conjunctiva and sclera clear  ENT: Moist mucous membranes  NECK: Supple, no JVD  HEART: S1, S2, regular rate and rhythm, no murmurs, rubs, or gallops  LUNGS: Unlabored respirations.  Clear to auscultation bilaterally, no crackles, wheezing, or rhonchi  ABDOMEN: Soft, nontender, nondistended, +BS  EXTREMITIES: 2+ peripheral pulses bilaterally. No clubbing, cyanosis, or edema  NERVOUS SYSTEM:  A&Ox3, no focal deficits   SKIN: No rashes or lesions  LINES:     LABS:                        12.4   13.36 )-----------( 83       ( 24 May 2025 02:15 )             37.0     Hgb Trend: 12.4<--, 12.7<--, 13.0<--, 12.9<--, 13.1<--  05-24    149[H]  |  108  |  81.4[H]  ----------------------------<  305[H]  3.4[L]   |  25.0  |  1.29    Ca    7.7[L]      24 May 2025 02:15  Phos  2.0     05-24  Mg     2.1     05-24    TPro  5.4[L]  /  Alb  2.8[L]  /  TBili  1.8  /  DBili  x   /  AST  124[H]  /  ALT  114[H]  /  AlkPhos  121[H]  05-24    Creatinine Trend: 1.29<--, 1.45<--, 1.54<--, 1.72<--, 1.82<--, 2.05<--  PT/INR - ( 24 May 2025 02:15 )   PT: 18.9 sec;   INR: 1.68 ratio         PTT - ( 24 May 2025 02:15 )  PTT:21.8 sec  Urinalysis Basic - ( 24 May 2025 02:15 )    Color: x / Appearance: x / SG: x / pH: x  Gluc: 305 mg/dL / Ketone: x  / Bili: x / Urobili: x   Blood: x / Protein: x / Nitrite: x   Leuk Esterase: x / RBC: x / WBC x   Sq Epi: x / Non Sq Epi: x / Bacteria: x      Arterial Blood Gas:  05-24 @ 02:08  7.460/43/91/31/97.8/6.8  ABG lactate: --  Arterial Blood Gas:  05-23 @ 20:02  7.460/46/96/33/98.4/8.9  ABG lactate: --  Arterial Blood Gas:  05-23 @ 14:23  7.480/45/88/34/98.3/10.0  ABG lactate: --  Arterial Blood Gas:  05-23 @ 08:50  7.490/47/86/36/98.0/12.5  ABG lactate: --  Arterial Blood Gas:  05-23 @ 03:16  7.460/50/119/36/99.5/11.8  ABG lactate: --  Arterial Blood Gas:  05-22 @ 22:14  7.480/48/147/36/99.8/12.2  ABG lactate: --  Arterial Blood Gas:  05-22 @ 20:20  7.470/48/164/35/99.4/11.2  ABG lactate: --    Venous Blood Gas:  05-24 @ 02:15  7.420/51/42/33/63.6  VBG Lactate: 2.10  Venous Blood Gas:  05-23 @ 20:25  7.430/52/39/34/61.0  VBG Lactate: 1.70  Venous Blood Gas:  05-23 @ 14:15  7.440/53/36/36/61.3  VBG Lactate: 2.00  Venous Blood Gas:  05-23 @ 08:35  7.450/55/35/38/60.1  VBG Lactate: 1.90  Venous Blood Gas:  05-23 @ 02:40  7.420/61/35/40/47.8  VBG Lactate: 2.20  Venous Blood Gas:  05-22 @ 20:15  7.440/60/34/41/51.3  VBG Lactate: 2.60  Venous Blood Gas:  05-22 @ 14:00  7.500/46/155/36/99.3  VBG Lactate: 2.60      MICROBIOLOGY:     RADIOLOGY & ADDITIONAL TESTS:

## 2025-05-24 NOTE — PROGRESS NOTE ADULT - SUBJECTIVE AND OBJECTIVE BOX
DOMINIC ALAMO  [id number]      Chief Complaint:   follow up sepsis/CAD    Subjective:  extubated    24 hour Tele:   SR, PACs, no AF      albumin human 25% IVPB 50 milliLiter(s) IV Intermittent every 6 hours  bacitracin   Ointment 1 Application(s) Topical two times a day PRN  chlorhexidine 0.12% Liquid 15 milliLiter(s) Oral Mucosa every 12 hours  chlorhexidine 2% Cloths 1 Application(s) Topical <User Schedule>  dexMEDEtomidine Infusion 0.4 MICROgram(s)/kG/Hr IV Continuous <Continuous>  dextrose 5%. 1000 milliLiter(s) IV Continuous <Continuous>  dextrose 5%. 1000 milliLiter(s) IV Continuous <Continuous>  dextrose 50% Injectable 25 Gram(s) IV Push once  dextrose 50% Injectable 12.5 Gram(s) IV Push once  dextrose 50% Injectable 25 Gram(s) IV Push once  dextrose Oral Gel 15 Gram(s) Oral once PRN  fentaNYL    Injectable 25 MICROGram(s) IV Push every 4 hours PRN  glucagon  Injectable 1 milliGRAM(s) IntraMuscular once  hydrocortisone sodium succinate Injectable 50 milliGRAM(s) IV Push every 8 hours  insulin glargine Injectable (LANTUS) 10 Unit(s) SubCutaneous every morning  insulin lispro (ADMELOG) corrective regimen sliding scale   SubCutaneous every 6 hours  insulin lispro Injectable (ADMELOG) 4 Unit(s) SubCutaneous every 6 hours  meropenem  IVPB 1000 milliGRAM(s) IV Intermittent every 8 hours  milrinone Infusion 0.125 MICROgram(s)/kG/Min IV Continuous <Continuous>  norepinephrine Infusion 0.05 MICROgram(s)/kG/Min IV Continuous <Continuous>  pantoprazole  Injectable 40 milliGRAM(s) IV Push daily  potassium phosphate / sodium phosphate Powder (PHOS-NaK) 1 Packet(s) Oral four times a day  rifAXIMin 550 milliGRAM(s) Oral two times a day  vasopressin Infusion 0.04 Unit(s)/Min IV Continuous <Continuous>          Physical Exam:  T(C): 36.3 (25 @ 15:50), Max: 37.9 (25 @ 23:15)  HR: 66 (25 @ 14:00) (54 - 110)  BP: --  RR: 20 (25 @ 14:00) (10 - 24)  SpO2: 97% (25 @ 14:00) (97% - 100%)  Wt(kg): --  General: Comfortable in NAD  Neck: No JVD  CVS: nl s1s2, no s3  Pulm: CTA b/l  Abd: soft, non-tender  Ext: No c/c/e  Neuro A&O x3  Psych: Normal affect      Labs:   24 May 2025 09:40    151    |  110    |  79.9   ----------------------------<  227    3.3     |  23.0   |  1.14     Ca    8.1        24 May 2025 09:40  Phos  2.5       24 May 2025 09:40  Mg     2.2       24 May 2025 09:40    TPro  5.5    /  Alb  3.0    /  TBili  1.8    /  DBili  x      /  AST  104    /  ALT  108    /  AlkPhos  121    24 May 2025 09:40                          12.1   14.34 )-----------( 93       ( 24 May 2025 09:40 )             35.7     PT/INR - ( 24 May 2025 09:40 )   PT: 18.4 sec;   INR: 1.64 ratio         PTT - ( 24 May 2025 09:40 )  PTT:22.9 sec            Echo: 2025:    1. Left ventricular systolic function is severely decreased with an ejection fraction visually estimated at 30 to 35 %. Global left ventricular hypokinesis.   2. Enlarged right ventricular cavity size and moderately reduced right ventricular systolic function.   3. Left atrium is normal in size.   4. There is mild calcification of the mitral valve annulus.   5. Moderate to severe mitral regurgitation.   6. Moderate tricuspid regurgitation.   7. Compared to the transthoracic echocardiogram performed on 2025, the LV and RV are now better visualized.    Echo 2025:   1. Technically difficult image quality.   2. Left ventricular systolic function is moderately decreased with an ejection fraction visually estimated at 30 to 35 %. Global left ventricular hypokinesis.   3. There is no evidence of a left ventricular thrombus.   4. Enlarged right ventricular cavity size and moderately reduced right ventricular systolic function.   5. No pericardial effusion seen.   6. Compared to the transthoracic echocardiogram performed on 2025, there have been no significant interval changes.      CXR:  Endotracheal tube tip 5 cm above the gayla. Enteric tube tip below the   edge of the image, and rightIJ catheter tip in the right atrium. No   pneumothorax. No focal consolidation or pleural effusion.    CTA C/A/P:  No pulmonary embolus.  Airspace consolidations at the lung bases may represent atelectasis   and/or pneumonia.  No bowel obstruction.  Cholelithiasis.  Trace ascites.        Assessment:  74 yo male PMHx HTN, HLD, CAD based on abnormal nuke, TIA, remote pericarditis in  presented to the ED with complaints of abdominal pain, nausea, vomiting for since the day prior.  Upon arrival to the ED patient was lethargic and tachypneic, with abdominal distention and mottled skin.  He was noted to be hypotensive despite fluid resuscitation and placed on escalating doses of pressors.  He was started on BIPAP initially for tachypnea and metabolic acidosis, eventually intubated in the ED.  Patient remains intubated and history taken from chart and d/w RN.  Patient was on escalating doses of pressors and , but in last day has been weaned down.  Echo with severe LV dysfxn which appears new and likely stress myopathy.  CI on low end of normal on .  Likely sepsis from GNR septicemia from cholangitis as cause now s/p perc tube.  Noted AF with some RVR due to shock, , etc.  -DCCV yesterday for rapid AF, reviewed tele and no VT   -Remains in SR this morning    -extubated    Plan:  1. continue efforts to wean off pressors   2. GDMT once more stable   3. Rest as per ICU   4. Eventual repeat echo when improved to reassess EF  5. Will continue to follow.

## 2025-05-25 LAB
ALBUMIN SERPL ELPH-MCNC: 3.1 G/DL — LOW (ref 3.3–5.2)
ALBUMIN SERPL ELPH-MCNC: 3.2 G/DL — LOW (ref 3.3–5.2)
ALBUMIN SERPL ELPH-MCNC: 3.3 G/DL — SIGNIFICANT CHANGE UP (ref 3.3–5.2)
ALBUMIN SERPL ELPH-MCNC: 3.3 G/DL — SIGNIFICANT CHANGE UP (ref 3.3–5.2)
ALP SERPL-CCNC: 103 U/L — SIGNIFICANT CHANGE UP (ref 40–120)
ALP SERPL-CCNC: 104 U/L — SIGNIFICANT CHANGE UP (ref 40–120)
ALP SERPL-CCNC: 105 U/L — SIGNIFICANT CHANGE UP (ref 40–120)
ALP SERPL-CCNC: 98 U/L — SIGNIFICANT CHANGE UP (ref 40–120)
ALT FLD-CCNC: 66 U/L — HIGH
ALT FLD-CCNC: 72 U/L — HIGH
ALT FLD-CCNC: 75 U/L — HIGH
ALT FLD-CCNC: 83 U/L — HIGH
AMMONIA BLD-MCNC: 48 UMOL/L — SIGNIFICANT CHANGE UP (ref 11–55)
ANION GAP SERPL CALC-SCNC: 13 MMOL/L — SIGNIFICANT CHANGE UP (ref 5–17)
ANION GAP SERPL CALC-SCNC: 14 MMOL/L — SIGNIFICANT CHANGE UP (ref 5–17)
APTT BLD: 23.5 SEC — LOW (ref 26.1–36.8)
AST SERPL-CCNC: 51 U/L — HIGH
AST SERPL-CCNC: 61 U/L — HIGH
AST SERPL-CCNC: 64 U/L — HIGH
AST SERPL-CCNC: 77 U/L — HIGH
BILIRUB SERPL-MCNC: 2 MG/DL — SIGNIFICANT CHANGE UP (ref 0.4–2)
BILIRUB SERPL-MCNC: 2.1 MG/DL — HIGH (ref 0.4–2)
BILIRUB SERPL-MCNC: 2.3 MG/DL — HIGH (ref 0.4–2)
BILIRUB SERPL-MCNC: 2.4 MG/DL — HIGH (ref 0.4–2)
BUN SERPL-MCNC: 47.4 MG/DL — HIGH (ref 8–20)
BUN SERPL-MCNC: 54.9 MG/DL — HIGH (ref 8–20)
BUN SERPL-MCNC: 61.5 MG/DL — HIGH (ref 8–20)
BUN SERPL-MCNC: 68.5 MG/DL — HIGH (ref 8–20)
CALCIUM SERPL-MCNC: 7.4 MG/DL — LOW (ref 8.4–10.5)
CALCIUM SERPL-MCNC: 7.7 MG/DL — LOW (ref 8.4–10.5)
CALCIUM SERPL-MCNC: 7.9 MG/DL — LOW (ref 8.4–10.5)
CALCIUM SERPL-MCNC: 8 MG/DL — LOW (ref 8.4–10.5)
CHLORIDE SERPL-SCNC: 109 MMOL/L — HIGH (ref 96–108)
CHLORIDE SERPL-SCNC: 110 MMOL/L — HIGH (ref 96–108)
CHLORIDE SERPL-SCNC: 111 MMOL/L — HIGH (ref 96–108)
CHLORIDE SERPL-SCNC: 112 MMOL/L — HIGH (ref 96–108)
CO2 SERPL-SCNC: 25 MMOL/L — SIGNIFICANT CHANGE UP (ref 22–29)
CO2 SERPL-SCNC: 25 MMOL/L — SIGNIFICANT CHANGE UP (ref 22–29)
CO2 SERPL-SCNC: 26 MMOL/L — SIGNIFICANT CHANGE UP (ref 22–29)
CO2 SERPL-SCNC: 26 MMOL/L — SIGNIFICANT CHANGE UP (ref 22–29)
CREAT SERPL-MCNC: 0.79 MG/DL — SIGNIFICANT CHANGE UP (ref 0.5–1.3)
CREAT SERPL-MCNC: 0.86 MG/DL — SIGNIFICANT CHANGE UP (ref 0.5–1.3)
CREAT SERPL-MCNC: 0.95 MG/DL — SIGNIFICANT CHANGE UP (ref 0.5–1.3)
CREAT SERPL-MCNC: 1.02 MG/DL — SIGNIFICANT CHANGE UP (ref 0.5–1.3)
CULTURE RESULTS: ABNORMAL
CULTURE RESULTS: ABNORMAL
CULTURE RESULTS: SIGNIFICANT CHANGE UP
EGFR: 77 ML/MIN/1.73M2 — SIGNIFICANT CHANGE UP
EGFR: 77 ML/MIN/1.73M2 — SIGNIFICANT CHANGE UP
EGFR: 83 ML/MIN/1.73M2 — SIGNIFICANT CHANGE UP
EGFR: 83 ML/MIN/1.73M2 — SIGNIFICANT CHANGE UP
EGFR: 90 ML/MIN/1.73M2 — SIGNIFICANT CHANGE UP
EGFR: 90 ML/MIN/1.73M2 — SIGNIFICANT CHANGE UP
EGFR: 93 ML/MIN/1.73M2 — SIGNIFICANT CHANGE UP
EGFR: 93 ML/MIN/1.73M2 — SIGNIFICANT CHANGE UP
GAS PNL BLDA: SIGNIFICANT CHANGE UP
GAS PNL BLDV: SIGNIFICANT CHANGE UP
GLUCOSE BLDC GLUCOMTR-MCNC: 158 MG/DL — HIGH (ref 70–99)
GLUCOSE BLDC GLUCOMTR-MCNC: 171 MG/DL — HIGH (ref 70–99)
GLUCOSE BLDC GLUCOMTR-MCNC: 279 MG/DL — HIGH (ref 70–99)
GLUCOSE SERPL-MCNC: 176 MG/DL — HIGH (ref 70–99)
GLUCOSE SERPL-MCNC: 279 MG/DL — HIGH (ref 70–99)
GLUCOSE SERPL-MCNC: 292 MG/DL — HIGH (ref 70–99)
GLUCOSE SERPL-MCNC: 306 MG/DL — HIGH (ref 70–99)
HCT VFR BLD CALC: 32.2 % — LOW (ref 39–50)
HCT VFR BLD CALC: 32.8 % — LOW (ref 39–50)
HCT VFR BLD CALC: 33.7 % — LOW (ref 39–50)
HCT VFR BLD CALC: 36 % — LOW (ref 39–50)
HGB BLD-MCNC: 10.6 G/DL — LOW (ref 13–17)
HGB BLD-MCNC: 11 G/DL — LOW (ref 13–17)
HGB BLD-MCNC: 11.2 G/DL — LOW (ref 13–17)
HGB BLD-MCNC: 11.9 G/DL — LOW (ref 13–17)
IMMATURE PLATELET FRACTION #: 10.8 K/UL — SIGNIFICANT CHANGE UP (ref 3.9–12.5)
IMMATURE PLATELET FRACTION #: 8.6 K/UL — SIGNIFICANT CHANGE UP (ref 3.9–12.5)
IMMATURE PLATELET FRACTION #: 9.7 K/UL — SIGNIFICANT CHANGE UP (ref 3.9–12.5)
IMMATURE PLATELET FRACTION %: 10.4 % — HIGH (ref 1.6–7.1)
IMMATURE PLATELET FRACTION %: 11 % — HIGH (ref 1.6–7.1)
IMMATURE PLATELET FRACTION %: 11.1 % — HIGH (ref 1.6–7.1)
INR BLD: 1.71 RATIO — HIGH (ref 0.85–1.16)
MAGNESIUM SERPL-MCNC: 1.9 MG/DL — SIGNIFICANT CHANGE UP (ref 1.6–2.6)
MAGNESIUM SERPL-MCNC: 1.9 MG/DL — SIGNIFICANT CHANGE UP (ref 1.6–2.6)
MAGNESIUM SERPL-MCNC: 2 MG/DL — SIGNIFICANT CHANGE UP (ref 1.6–2.6)
MAGNESIUM SERPL-MCNC: 2.5 MG/DL — SIGNIFICANT CHANGE UP (ref 1.6–2.6)
MCHC RBC-ENTMCNC: 28.6 PG — SIGNIFICANT CHANGE UP (ref 27–34)
MCHC RBC-ENTMCNC: 29.1 PG — SIGNIFICANT CHANGE UP (ref 27–34)
MCHC RBC-ENTMCNC: 29.2 PG — SIGNIFICANT CHANGE UP (ref 27–34)
MCHC RBC-ENTMCNC: 29.2 PG — SIGNIFICANT CHANGE UP (ref 27–34)
MCHC RBC-ENTMCNC: 32.9 G/DL — SIGNIFICANT CHANGE UP (ref 32–36)
MCHC RBC-ENTMCNC: 33.1 G/DL — SIGNIFICANT CHANGE UP (ref 32–36)
MCHC RBC-ENTMCNC: 33.2 G/DL — SIGNIFICANT CHANGE UP (ref 32–36)
MCHC RBC-ENTMCNC: 33.5 G/DL — SIGNIFICANT CHANGE UP (ref 32–36)
MCV RBC AUTO: 86.8 FL — SIGNIFICANT CHANGE UP (ref 80–100)
MCV RBC AUTO: 87 FL — SIGNIFICANT CHANGE UP (ref 80–100)
MCV RBC AUTO: 87.8 FL — SIGNIFICANT CHANGE UP (ref 80–100)
MCV RBC AUTO: 88.5 FL — SIGNIFICANT CHANGE UP (ref 80–100)
NRBC # BLD AUTO: 0.04 K/UL — HIGH (ref 0–0)
NRBC # BLD AUTO: 0.04 K/UL — HIGH (ref 0–0)
NRBC # BLD AUTO: 0.05 K/UL — HIGH (ref 0–0)
NRBC # BLD AUTO: 0.06 K/UL — HIGH (ref 0–0)
NRBC # FLD: 0.04 K/UL — HIGH (ref 0–0)
NRBC # FLD: 0.04 K/UL — HIGH (ref 0–0)
NRBC # FLD: 0.05 K/UL — HIGH (ref 0–0)
NRBC # FLD: 0.06 K/UL — HIGH (ref 0–0)
NRBC BLD AUTO-RTO: 0 /100 WBCS — SIGNIFICANT CHANGE UP (ref 0–0)
ORGANISM # SPEC MICROSCOPIC CNT: ABNORMAL
ORGANISM # SPEC MICROSCOPIC CNT: SIGNIFICANT CHANGE UP
ORGANISM # SPEC MICROSCOPIC CNT: SIGNIFICANT CHANGE UP
PHOSPHATE SERPL-MCNC: 2.3 MG/DL — LOW (ref 2.4–4.7)
PHOSPHATE SERPL-MCNC: 2.5 MG/DL — SIGNIFICANT CHANGE UP (ref 2.4–4.7)
PHOSPHATE SERPL-MCNC: 2.8 MG/DL — SIGNIFICANT CHANGE UP (ref 2.4–4.7)
PHOSPHATE SERPL-MCNC: 3.3 MG/DL — SIGNIFICANT CHANGE UP (ref 2.4–4.7)
PLATELET # BLD AUTO: 106 K/UL — LOW (ref 150–400)
PLATELET # BLD AUTO: 83 K/UL — LOW (ref 150–400)
PLATELET # BLD AUTO: 88 K/UL — LOW (ref 150–400)
PLATELET # BLD AUTO: 97 K/UL — LOW (ref 150–400)
PMV BLD: 11.9 FL — SIGNIFICANT CHANGE UP (ref 7–13)
PMV BLD: 12.2 FL — SIGNIFICANT CHANGE UP (ref 7–13)
PMV BLD: 12.4 FL — SIGNIFICANT CHANGE UP (ref 7–13)
PMV BLD: 12.9 FL — SIGNIFICANT CHANGE UP (ref 7–13)
POTASSIUM SERPL-MCNC: 3.1 MMOL/L — LOW (ref 3.5–5.3)
POTASSIUM SERPL-MCNC: 3.2 MMOL/L — LOW (ref 3.5–5.3)
POTASSIUM SERPL-MCNC: 3.4 MMOL/L — LOW (ref 3.5–5.3)
POTASSIUM SERPL-MCNC: 3.7 MMOL/L — SIGNIFICANT CHANGE UP (ref 3.5–5.3)
POTASSIUM SERPL-SCNC: 3.1 MMOL/L — LOW (ref 3.5–5.3)
POTASSIUM SERPL-SCNC: 3.2 MMOL/L — LOW (ref 3.5–5.3)
POTASSIUM SERPL-SCNC: 3.4 MMOL/L — LOW (ref 3.5–5.3)
POTASSIUM SERPL-SCNC: 3.7 MMOL/L — SIGNIFICANT CHANGE UP (ref 3.5–5.3)
PROT SERPL-MCNC: 5.3 G/DL — LOW (ref 6.6–8.7)
PROT SERPL-MCNC: 5.4 G/DL — LOW (ref 6.6–8.7)
PROT SERPL-MCNC: 5.5 G/DL — LOW (ref 6.6–8.7)
PROT SERPL-MCNC: 5.6 G/DL — LOW (ref 6.6–8.7)
PROTHROM AB SERPL-ACNC: 19.7 SEC — HIGH (ref 9.9–13.4)
RBC # BLD: 3.7 M/UL — LOW (ref 4.2–5.8)
RBC # BLD: 3.78 M/UL — LOW (ref 4.2–5.8)
RBC # BLD: 3.84 M/UL — LOW (ref 4.2–5.8)
RBC # BLD: 4.07 M/UL — LOW (ref 4.2–5.8)
RBC # FLD: 14.6 % — HIGH (ref 10.3–14.5)
RBC # FLD: 14.6 % — HIGH (ref 10.3–14.5)
RBC # FLD: 14.9 % — HIGH (ref 10.3–14.5)
RBC # FLD: 15 % — HIGH (ref 10.3–14.5)
SODIUM SERPL-SCNC: 148 MMOL/L — HIGH (ref 135–145)
SODIUM SERPL-SCNC: 150 MMOL/L — HIGH (ref 135–145)
SODIUM SERPL-SCNC: 150 MMOL/L — HIGH (ref 135–145)
SODIUM SERPL-SCNC: 151 MMOL/L — HIGH (ref 135–145)
SPECIMEN SOURCE: SIGNIFICANT CHANGE UP
WBC # BLD: 12.51 K/UL — HIGH (ref 3.8–10.5)
WBC # BLD: 13.93 K/UL — HIGH (ref 3.8–10.5)
WBC # BLD: 14.85 K/UL — HIGH (ref 3.8–10.5)
WBC # BLD: 15.35 K/UL — HIGH (ref 3.8–10.5)
WBC # FLD AUTO: 12.51 K/UL — HIGH (ref 3.8–10.5)
WBC # FLD AUTO: 13.93 K/UL — HIGH (ref 3.8–10.5)
WBC # FLD AUTO: 14.85 K/UL — HIGH (ref 3.8–10.5)
WBC # FLD AUTO: 15.35 K/UL — HIGH (ref 3.8–10.5)

## 2025-05-25 PROCEDURE — 93308 TTE F-UP OR LMTD: CPT | Mod: 26

## 2025-05-25 PROCEDURE — 99291 CRITICAL CARE FIRST HOUR: CPT | Mod: 25

## 2025-05-25 PROCEDURE — 99232 SBSQ HOSP IP/OBS MODERATE 35: CPT

## 2025-05-25 RX ORDER — HYDROCORTISONE 20 MG
50 TABLET ORAL EVERY 12 HOURS
Refills: 0 | Status: DISCONTINUED | OUTPATIENT
Start: 2025-05-25 | End: 2025-05-26

## 2025-05-25 RX ORDER — SODIUM CHLORIDE 9 G/1000ML
1000 INJECTION, SOLUTION INTRAVENOUS
Refills: 0 | Status: DISCONTINUED | OUTPATIENT
Start: 2025-05-25 | End: 2025-05-25

## 2025-05-25 RX ORDER — POTASSIUM PHOSPHATE, MONOBASIC POTASSIUM PHOSPHATE, DIBASIC INJECTION, 236; 224 MG/ML; MG/ML
15 SOLUTION, CONCENTRATE INTRAVENOUS ONCE
Refills: 0 | Status: COMPLETED | OUTPATIENT
Start: 2025-05-25 | End: 2025-05-25

## 2025-05-25 RX ORDER — ACETAMINOPHEN 500 MG/5ML
1000 LIQUID (ML) ORAL ONCE
Refills: 0 | Status: COMPLETED | OUTPATIENT
Start: 2025-05-25 | End: 2025-05-26

## 2025-05-25 RX ORDER — MAGNESIUM SULFATE 500 MG/ML
2 SYRINGE (ML) INJECTION ONCE
Refills: 0 | Status: COMPLETED | OUTPATIENT
Start: 2025-05-25 | End: 2025-05-25

## 2025-05-25 RX ORDER — INSULIN LISPRO 100 U/ML
7 INJECTION, SOLUTION INTRAVENOUS; SUBCUTANEOUS EVERY 6 HOURS
Refills: 0 | Status: DISCONTINUED | OUTPATIENT
Start: 2025-05-25 | End: 2025-05-26

## 2025-05-25 RX ORDER — LIDOCAINE HYDROCHLORIDE 20 MG/ML
5 JELLY TOPICAL ONCE
Refills: 0 | Status: COMPLETED | OUTPATIENT
Start: 2025-05-25 | End: 2025-05-25

## 2025-05-25 RX ORDER — SODIUM CHLORIDE 9 G/1000ML
1000 INJECTION, SOLUTION INTRAVENOUS
Refills: 0 | Status: DISCONTINUED | OUTPATIENT
Start: 2025-05-25 | End: 2025-05-26

## 2025-05-25 RX ORDER — ENOXAPARIN SODIUM 100 MG/ML
40 INJECTION SUBCUTANEOUS EVERY 24 HOURS
Refills: 0 | Status: DISCONTINUED | OUTPATIENT
Start: 2025-05-25 | End: 2025-05-26

## 2025-05-25 RX ADMIN — MEROPENEM 100 MILLIGRAM(S): 1 INJECTION INTRAVENOUS at 12:40

## 2025-05-25 RX ADMIN — INSULIN LISPRO 7 UNIT(S): 100 INJECTION, SOLUTION INTRAVENOUS; SUBCUTANEOUS at 11:52

## 2025-05-25 RX ADMIN — INSULIN LISPRO 8: 100 INJECTION, SOLUTION INTRAVENOUS; SUBCUTANEOUS at 06:03

## 2025-05-25 RX ADMIN — POTASSIUM PHOSPHATE, MONOBASIC POTASSIUM PHOSPHATE, DIBASIC INJECTION, 62.5 MILLIMOLE(S): 236; 224 SOLUTION, CONCENTRATE INTRAVENOUS at 15:10

## 2025-05-25 RX ADMIN — INSULIN LISPRO 2: 100 INJECTION, SOLUTION INTRAVENOUS; SUBCUTANEOUS at 23:22

## 2025-05-25 RX ADMIN — Medication 50 MILLIGRAM(S): at 11:54

## 2025-05-25 RX ADMIN — Medication 50 MILLIEQUIVALENT(S): at 17:27

## 2025-05-25 RX ADMIN — ALBUMIN (HUMAN) 50 MILLILITER(S): 12.5 INJECTION, SOLUTION INTRAVENOUS at 23:10

## 2025-05-25 RX ADMIN — INSULIN LISPRO 6: 100 INJECTION, SOLUTION INTRAVENOUS; SUBCUTANEOUS at 00:21

## 2025-05-25 RX ADMIN — Medication 50 MILLIGRAM(S): at 17:29

## 2025-05-25 RX ADMIN — Medication 50 MILLIEQUIVALENT(S): at 20:53

## 2025-05-25 RX ADMIN — Medication 25 GRAM(S): at 15:09

## 2025-05-25 RX ADMIN — INSULIN LISPRO 6: 100 INJECTION, SOLUTION INTRAVENOUS; SUBCUTANEOUS at 11:53

## 2025-05-25 RX ADMIN — Medication 100 MILLIEQUIVALENT(S): at 10:00

## 2025-05-25 RX ADMIN — INSULIN LISPRO 2: 100 INJECTION, SOLUTION INTRAVENOUS; SUBCUTANEOUS at 17:35

## 2025-05-25 RX ADMIN — Medication 100 MILLIEQUIVALENT(S): at 06:39

## 2025-05-25 RX ADMIN — Medication 25 MICROGRAM(S): at 02:54

## 2025-05-25 RX ADMIN — ENOXAPARIN SODIUM 40 MILLIGRAM(S): 100 INJECTION SUBCUTANEOUS at 11:45

## 2025-05-25 RX ADMIN — SODIUM CHLORIDE 100 MILLILITER(S): 9 INJECTION, SOLUTION INTRAVENOUS at 21:00

## 2025-05-25 RX ADMIN — Medication 1 APPLICATION(S): at 05:05

## 2025-05-25 RX ADMIN — ALBUMIN (HUMAN) 50 MILLILITER(S): 12.5 INJECTION, SOLUTION INTRAVENOUS at 05:05

## 2025-05-25 RX ADMIN — Medication 1 APPLICATION(S): at 10:00

## 2025-05-25 RX ADMIN — Medication 50 MILLIGRAM(S): at 05:05

## 2025-05-25 RX ADMIN — Medication 25 MICROGRAM(S): at 01:54

## 2025-05-25 RX ADMIN — MEROPENEM 100 MILLIGRAM(S): 1 INJECTION INTRAVENOUS at 21:38

## 2025-05-25 RX ADMIN — SODIUM CHLORIDE 125 MILLILITER(S): 9 INJECTION, SOLUTION INTRAVENOUS at 11:49

## 2025-05-25 RX ADMIN — INSULIN GLARGINE-YFGN 12 UNIT(S): 100 INJECTION, SOLUTION SUBCUTANEOUS at 10:12

## 2025-05-25 RX ADMIN — ALBUMIN (HUMAN) 50 MILLILITER(S): 12.5 INJECTION, SOLUTION INTRAVENOUS at 11:44

## 2025-05-25 RX ADMIN — ALBUMIN (HUMAN) 50 MILLILITER(S): 12.5 INJECTION, SOLUTION INTRAVENOUS at 17:27

## 2025-05-25 RX ADMIN — Medication 40 MILLIGRAM(S): at 11:45

## 2025-05-25 RX ADMIN — MEROPENEM 100 MILLIGRAM(S): 1 INJECTION INTRAVENOUS at 05:04

## 2025-05-25 RX ADMIN — Medication 100 MILLIEQUIVALENT(S): at 15:09

## 2025-05-25 NOTE — SWALLOW BEDSIDE ASSESSMENT ADULT - COMMENTS
5/19 CTAChest: No pulmonary embolus. Airspace consolidations at the lung bases may represent atelectasis and/or pneumonia. No bowel obstruction. Cholelithiasis. Trace ascites.  5/21 CXR: Enteric tube tip is either in the distal stomach or adjacent proximal duodenum. ET tube and right IJ line as above.  Increased right basilar opacity. Previous left basilar/retrocardiac opacity, not significantly changed. Atelectasis, pneumonia, and/or pleural effusions with associated passive atelectasis is possible.

## 2025-05-25 NOTE — SWALLOW BEDSIDE ASSESSMENT ADULT - ASR SWALLOW LINGUAL MOBILITY
lingual dorsum appears atrophied on L; reduced strength/impaired protrusion/impaired left lateral movement

## 2025-05-25 NOTE — PROCEDURE NOTE - NSPROCNAME_GEN_A_CORE
Point of Care Ultrasound Cardiac
Interventional Radiology
Cardioversion
Gastric Intubation/Gastric Lavage
Arterial Puncture/Cannulation
Cardioversion

## 2025-05-25 NOTE — PROGRESS NOTE ADULT - SUBJECTIVE AND OBJECTIVE BOX
Patient is a 75y old  Male who presents with a chief complaint of Acute respiratory failure, shock (25 May 2025 14:24)      BRIEF HOSPITAL COURSE:     Interval HPI:    PAST MEDICAL & SURGICAL HISTORY:  AMI (Acute Myocardial Infarction)  2001      Transient ischemic attack (TIA)      No Past Surgical History        Review of Systems:  All other review of systems negative except as noted in HPI    MEDICATIONS  (STANDING):  albumin human 25% IVPB 50 milliLiter(s) IV Intermittent every 6 hours  chlorhexidine 2% Cloths 1 Application(s) Topical <User Schedule>  dextrose 5%. 1000 milliLiter(s) (125 mL/Hr) IV Continuous <Continuous>  dextrose 5%. 1000 milliLiter(s) (100 mL/Hr) IV Continuous <Continuous>  dextrose 5%. 1000 milliLiter(s) (50 mL/Hr) IV Continuous <Continuous>  dextrose 50% Injectable 25 Gram(s) IV Push once  dextrose 50% Injectable 12.5 Gram(s) IV Push once  dextrose 50% Injectable 25 Gram(s) IV Push once  enoxaparin Injectable 40 milliGRAM(s) SubCutaneous every 24 hours  glucagon  Injectable 1 milliGRAM(s) IntraMuscular once  hydrocortisone sodium succinate Injectable 50 milliGRAM(s) IV Push every 8 hours  insulin glargine Injectable (LANTUS) 12 Unit(s) SubCutaneous every morning  insulin lispro (ADMELOG) corrective regimen sliding scale   SubCutaneous every 6 hours  insulin lispro Injectable (ADMELOG) 7 Unit(s) SubCutaneous every 6 hours  meropenem  IVPB 1000 milliGRAM(s) IV Intermittent every 8 hours  milrinone Infusion 0.125 MICROgram(s)/kG/Min (2.93 mL/Hr) IV Continuous <Continuous>  norepinephrine Infusion 0.05 MICROgram(s)/kG/Min (7.32 mL/Hr) IV Continuous <Continuous>  pantoprazole  Injectable 40 milliGRAM(s) IV Push daily  rifAXIMin 550 milliGRAM(s) Oral two times a day    MEDICATIONS  (PRN):  bacitracin   Ointment 1 Application(s) Topical two times a day PRN wound care  dextrose Oral Gel 15 Gram(s) Oral once PRN Blood Glucose LESS THAN 70 milliGRAM(s)/deciliter        ICU Vital Signs Last 24 Hrs  T(C): 36.8 (25 May 2025 11:00), Max: 37.2 (24 May 2025 20:00)  T(F): 98.2 (25 May 2025 11:00), Max: 99 (24 May 2025 20:00)  HR: 68 (25 May 2025 12:15) (63 - 75)  BP: --  BP(mean): --  ABP: 110/48 (25 May 2025 12:15) (94/40 - 124/61)  ABP(mean): 71 (25 May 2025 12:15) (60 - 85)  RR: 23 (25 May 2025 12:15) (14 - 26)  SpO2: 96% (25 May 2025 12:15) (90% - 99%)    O2 Parameters below as of 25 May 2025 08:00  Patient On (Oxygen Delivery Method): nasal cannula  O2 Flow (L/min): 2        ABG - ( 24 May 2025 09:54 )  pH, Arterial: 7.490 pH, Blood: x     /  pCO2: 42    /  pO2: 92    / HCO3: 32    / Base Excess: 8.7   /  SaO2: 98.1              I&O's Detail    24 May 2025 07:01  -  25 May 2025 07:00  --------------------------------------------------------  IN:    Albumin 25%  -  50 mL: 100 mL    dextrose 5%: 1200 mL    IV PiggyBack: 100 mL    IV PiggyBack: 200 mL    IV PiggyBack: 870 mL    Milrinone: 69.6 mL    Norepinephrine: 50 mL    Vasopressin: 144 mL  Total IN: 2733.6 mL    OUT:    Bulb (mL): 180 mL    Dexmedetomidine: 0 mL    Indwelling Catheter - Urethral (mL): 2360 mL    Rectal Tube (mL): 250 mL  Total OUT: 2790 mL    Total NET: -56.4 mL      25 May 2025 07:01  -  25 May 2025 14:32  --------------------------------------------------------  IN:    dextrose 5%: 100 mL    dextrose 5%: 350 mL    Milrinone: 8.7 mL    Vasopressin: 18 mL  Total IN: 476.7 mL    OUT:    Indwelling Catheter - Urethral (mL): 300 mL    Norepinephrine: 0 mL  Total OUT: 300 mL    Total NET: 176.7 mL          LABS:                        11.2   13.93 )-----------( 97       ( 25 May 2025 12:00 )             33.7     05-25    148[H]  |  109[H]  |  54.9[H]  ----------------------------<  279[H]  3.2[L]   |  25.0  |  0.86    Ca    8.0[L]      25 May 2025 12:00  Phos  2.3     05-25  Mg     1.9     05-25    TPro  5.6[L]  /  Alb  3.3  /  TBili  2.4[H]  /  DBili  x   /  AST  61[H]  /  ALT  72[H]  /  AlkPhos  105  05-25          CAPILLARY BLOOD GLUCOSE      POCT Blood Glucose.: 279 mg/dL (25 May 2025 11:52)    PT/INR - ( 25 May 2025 05:00 )   PT: 19.7 sec;   INR: 1.71 ratio         PTT - ( 25 May 2025 05:00 )  PTT:23.5 sec  Urinalysis Basic - ( 25 May 2025 12:00 )    Color: x / Appearance: x / SG: x / pH: x  Gluc: 279 mg/dL / Ketone: x  / Bili: x / Urobili: x   Blood: x / Protein: x / Nitrite: x   Leuk Esterase: x / RBC: x / WBC x   Sq Epi: x / Non Sq Epi: x / Bacteria: x      CULTURES:  Culture Results:   Commensal yair consistent with body site (05-24 @ 05:15)  Culture Results:   No growth at 72 Hours (05-21 @ 02:50)  Culture Results:   No growth at 4 days (05-20 @ 11:15)  Culture Results:   Numerous Escherichia coli ESBL  Numerous Citrobacter freundii complex  Numerous Enterococcus gallinarum  Numerous Enterococcus faecalis (05-19 @ 17:15)  Culture Results:   Growth in aerobic and anaerobic bottles: Escherichia coli ESBL  See previous culture [id number] (05-19 @ 10:15)  Culture Results:   >100,000 CFU/ml Escherichia coli ESBL (05-18 @ 22:08)  Culture Results:   Growth in aerobic and anaerobic bottles: Escherichia coli ESBL  Growth in aerobic and anaerobic bottles: Citrobacter freundii  Growth in aerobic and anaerobic bottles: Streptococcus gallolyticus  Growth in anaerobic bottle: Gram Positive Rods Organism seen in Gram  stain is non-viable after prolonged  incubation and repeated subculture.  Direct identification is available within approximately 3-5  hours either by Blood Panel Multiplexed PCR or Direct  MALDI-TOF. Details: [email or web address] (05-18 @ 20:05)      Physical Examination:    General: No acute distress.    HEENT: Pupils equal, reactive to light.  Symmetric.  PULM: Clear to auscultation bilaterally, no significant sputum production, no wheezing, crackles, or rhonchi  NECK: Supple, no lymphadenopathy, trachea midline  CVS: Regular rate and rhythm, no murmurs, rubs, or gallops  ABD: Soft, nondistended, nontender, normoactive bowel sounds, no masses  EXT: Nontender, no clubbing, cyanosis, or edema  SKIN: Warm and well perfused, no rashes noted.  NEURO: Alert, oriented, interactive, nonfocal    DEVICES:   P IV    RADIOLOGY:  Patient is a 75y old  Male who presents with a chief complaint of Acute respiratory failure, shock (25 May 2025 14:24)    CHIEF COMPLAINT: Patient is a 75y old  Male who presents with a chief complaint of Acute respiratory failure, shock (23 May 2025 15:59)      HPI:  74 yo male with hx of HTN, HLD, CAD, TIA, remote pericarditis in 2015, presented to the ED with complaints of abdominal pain, nausea, vomiting for since the day prior.  Upon arrival to the ED patient was lethargic and tachypneic, with abdominal distention and mottled skin.    He was noted to be hypotensive despite fluid resuscitation and placed on escalating doses of pressors.    Started on Bipap initially for tachypnea and metabolic acidosis, eventually intubated in the ED.  Labs revealed severe metabolic acidosis. OLEKSANDR, transaminitis     Interval:  Seen and examined at bedside. S/p extubation. Doing well, no acute complaints.    PAST MEDICAL & SURGICAL HISTORY:  AMI (Acute Myocardial Infarction)  2001      Transient ischemic attack (TIA)      No Past Surgical History        Review of Systems:  All other review of systems negative except as noted in HPI    MEDICATIONS  (STANDING):  albumin human 25% IVPB 50 milliLiter(s) IV Intermittent every 6 hours  chlorhexidine 2% Cloths 1 Application(s) Topical <User Schedule>  dextrose 5%. 1000 milliLiter(s) (125 mL/Hr) IV Continuous <Continuous>  dextrose 5%. 1000 milliLiter(s) (100 mL/Hr) IV Continuous <Continuous>  dextrose 5%. 1000 milliLiter(s) (50 mL/Hr) IV Continuous <Continuous>  dextrose 50% Injectable 25 Gram(s) IV Push once  dextrose 50% Injectable 12.5 Gram(s) IV Push once  dextrose 50% Injectable 25 Gram(s) IV Push once  enoxaparin Injectable 40 milliGRAM(s) SubCutaneous every 24 hours  glucagon  Injectable 1 milliGRAM(s) IntraMuscular once  hydrocortisone sodium succinate Injectable 50 milliGRAM(s) IV Push every 8 hours  insulin glargine Injectable (LANTUS) 12 Unit(s) SubCutaneous every morning  insulin lispro (ADMELOG) corrective regimen sliding scale   SubCutaneous every 6 hours  insulin lispro Injectable (ADMELOG) 7 Unit(s) SubCutaneous every 6 hours  meropenem  IVPB 1000 milliGRAM(s) IV Intermittent every 8 hours  milrinone Infusion 0.125 MICROgram(s)/kG/Min (2.93 mL/Hr) IV Continuous <Continuous>  norepinephrine Infusion 0.05 MICROgram(s)/kG/Min (7.32 mL/Hr) IV Continuous <Continuous>  pantoprazole  Injectable 40 milliGRAM(s) IV Push daily  rifAXIMin 550 milliGRAM(s) Oral two times a day    MEDICATIONS  (PRN):  bacitracin   Ointment 1 Application(s) Topical two times a day PRN wound care  dextrose Oral Gel 15 Gram(s) Oral once PRN Blood Glucose LESS THAN 70 milliGRAM(s)/deciliter        ICU Vital Signs Last 24 Hrs  T(C): 36.8 (25 May 2025 11:00), Max: 37.2 (24 May 2025 20:00)  T(F): 98.2 (25 May 2025 11:00), Max: 99 (24 May 2025 20:00)  HR: 68 (25 May 2025 12:15) (63 - 75)  BP: --  BP(mean): --  ABP: 110/48 (25 May 2025 12:15) (94/40 - 124/61)  ABP(mean): 71 (25 May 2025 12:15) (60 - 85)  RR: 23 (25 May 2025 12:15) (14 - 26)  SpO2: 96% (25 May 2025 12:15) (90% - 99%)    O2 Parameters below as of 25 May 2025 08:00  Patient On (Oxygen Delivery Method): nasal cannula  O2 Flow (L/min): 2        ABG - ( 24 May 2025 09:54 )  pH, Arterial: 7.490 pH, Blood: x     /  pCO2: 42    /  pO2: 92    / HCO3: 32    / Base Excess: 8.7   /  SaO2: 98.1              I&O's Detail    24 May 2025 07:01  -  25 May 2025 07:00  --------------------------------------------------------  IN:    Albumin 25%  -  50 mL: 100 mL    dextrose 5%: 1200 mL    IV PiggyBack: 100 mL    IV PiggyBack: 200 mL    IV PiggyBack: 870 mL    Milrinone: 69.6 mL    Norepinephrine: 50 mL    Vasopressin: 144 mL  Total IN: 2733.6 mL    OUT:    Bulb (mL): 180 mL    Dexmedetomidine: 0 mL    Indwelling Catheter - Urethral (mL): 2360 mL    Rectal Tube (mL): 250 mL  Total OUT: 2790 mL    Total NET: -56.4 mL      25 May 2025 07:01  -  25 May 2025 14:32  --------------------------------------------------------  IN:    dextrose 5%: 100 mL    dextrose 5%: 350 mL    Milrinone: 8.7 mL    Vasopressin: 18 mL  Total IN: 476.7 mL    OUT:    Indwelling Catheter - Urethral (mL): 300 mL    Norepinephrine: 0 mL  Total OUT: 300 mL    Total NET: 176.7 mL          LABS:                        11.2   13.93 )-----------( 97       ( 25 May 2025 12:00 )             33.7     05-25    148[H]  |  109[H]  |  54.9[H]  ----------------------------<  279[H]  3.2[L]   |  25.0  |  0.86    Ca    8.0[L]      25 May 2025 12:00  Phos  2.3     05-25  Mg     1.9     05-25    TPro  5.6[L]  /  Alb  3.3  /  TBili  2.4[H]  /  DBili  x   /  AST  61[H]  /  ALT  72[H]  /  AlkPhos  105  05-25          CAPILLARY BLOOD GLUCOSE      POCT Blood Glucose.: 279 mg/dL (25 May 2025 11:52)    PT/INR - ( 25 May 2025 05:00 )   PT: 19.7 sec;   INR: 1.71 ratio         PTT - ( 25 May 2025 05:00 )  PTT:23.5 sec  Urinalysis Basic - ( 25 May 2025 12:00 )    Color: x / Appearance: x / SG: x / pH: x  Gluc: 279 mg/dL / Ketone: x  / Bili: x / Urobili: x   Blood: x / Protein: x / Nitrite: x   Leuk Esterase: x / RBC: x / WBC x   Sq Epi: x / Non Sq Epi: x / Bacteria: x      CULTURES:  Culture Results:   Commensal yair consistent with body site (05-24 @ 05:15)  Culture Results:   No growth at 72 Hours (05-21 @ 02:50)  Culture Results:   No growth at 4 days (05-20 @ 11:15)  Culture Results:   Numerous Escherichia coli ESBL  Numerous Citrobacter freundii complex  Numerous Enterococcus gallinarum  Numerous Enterococcus faecalis (05-19 @ 17:15)  Culture Results:   Growth in aerobic and anaerobic bottles: Escherichia coli ESBL  See previous culture [id number] (05-19 @ 10:15)  Culture Results:   >100,000 CFU/ml Escherichia coli ESBL (05-18 @ 22:08)  Culture Results:   Growth in aerobic and anaerobic bottles: Escherichia coli ESBL  Growth in aerobic and anaerobic bottles: Citrobacter freundii  Growth in aerobic and anaerobic bottles: Streptococcus gallolyticus  Growth in anaerobic bottle: Gram Positive Rods Organism seen in Gram  stain is non-viable after prolonged  incubation and repeated subculture.  Direct identification is available within approximately 3-5  hours either by Blood Panel Multiplexed PCR or Direct  MALDI-TOF. Details: [email or web address] (05-18 @ 20:05)      Physical Examination:    General: No acute distress.    HEENT: Pupils equal, reactive to light.  Symmetric.  PULM: Clear to auscultation bilaterally, no significant sputum production, no wheezing, crackles, or rhonchi  NECK: Supple, no lymphadenopathy, trachea midline  CVS: Regular rate and rhythm, no murmurs, rubs, or gallops  ABD: Soft, nondistended, nontender, normoactive bowel sounds, no masses  EXT: Nontender, no clubbing, cyanosis, or edema  SKIN: Warm and well perfused, no rashes noted.  NEURO: Alert, oriented, interactive, nonfocal    DEVICES:   P IV    RADIOLOGY:

## 2025-05-25 NOTE — PROGRESS NOTE ADULT - SUBJECTIVE AND OBJECTIVE BOX
OOB in chair today. Off pressors. He has no complaints.    Vital Signs:    T(C): 36.8 (05-25-25 @ 11:00), Max: 37.5 (05-24-25 @ 14:30)  HR: 68 (05-25-25 @ 12:15) (63 - 75)  BP: --  RR: 23 (05-25-25 @ 12:15) (14 - 26)  SpO2: 96% (05-25-25 @ 12:15) (90% - 99%)    Vent data:        I's/O's:      05-24-25 @ 07:01  -  05-25-25 @ 07:00  --------------------------------------------------------  IN: 2733.6 mL / OUT: 2790 mL / NET: -56.4 mL    05-25-25 @ 07:01  -  05-25-25 @ 14:24  --------------------------------------------------------  IN: 476.7 mL / OUT: 300 mL / NET: 176.7 mL        PMH:    Acute respiratory failure with hypoxia    Intubate    No pertinent family history in first degree relatives    FH: CAD (coronary artery disease)    Handoff    MEWS Score    AMI (Acute Myocardial Infarction)    Hyperlipidemia    Transient ischemic attack (TIA)    Acute respiratory failure with hypoxia    No Past Surgical History    VOMITING , DIARRHEA    90+    [email or web address]        Allergies:    No Known Drug Allergies  SHRIMP (Unknown)  iv dye (Unknown)      Labs:        Micro:        Physical Exam:    Gen: NAD  HEENT: AT  CV: No obvious murmurs  Lungs: CTA B/L  Abd: Soft, NT  Ext: No edema  Neuro: CN II - XII grossly intact  Mental status: At baseline    Radiology:      Medications:    albumin human 25% IVPB 50 milliLiter(s) IV Intermittent every 6 hours  bacitracin   Ointment 1 Application(s) Topical two times a day PRN  chlorhexidine 2% Cloths 1 Application(s) Topical <User Schedule>  dextrose 5%. 1000 milliLiter(s) IV Continuous <Continuous>  dextrose 5%. 1000 milliLiter(s) IV Continuous <Continuous>  dextrose 5%. 1000 milliLiter(s) IV Continuous <Continuous>  dextrose 50% Injectable 25 Gram(s) IV Push once  dextrose 50% Injectable 12.5 Gram(s) IV Push once  dextrose 50% Injectable 25 Gram(s) IV Push once  dextrose Oral Gel 15 Gram(s) Oral once PRN  enoxaparin Injectable 40 milliGRAM(s) SubCutaneous every 24 hours  glucagon  Injectable 1 milliGRAM(s) IntraMuscular once  hydrocortisone sodium succinate Injectable 50 milliGRAM(s) IV Push every 8 hours  insulin glargine Injectable (LANTUS) 12 Unit(s) SubCutaneous every morning  insulin lispro (ADMELOG) corrective regimen sliding scale   SubCutaneous every 6 hours  insulin lispro Injectable (ADMELOG) 7 Unit(s) SubCutaneous every 6 hours  meropenem  IVPB 1000 milliGRAM(s) IV Intermittent every 8 hours  milrinone Infusion 0.125 MICROgram(s)/kG/Min IV Continuous <Continuous>  norepinephrine Infusion 0.05 MICROgram(s)/kG/Min IV Continuous <Continuous>  pantoprazole  Injectable 40 milliGRAM(s) IV Push daily  rifAXIMin 550 milliGRAM(s) Oral two times a day

## 2025-05-25 NOTE — SWALLOW BEDSIDE ASSESSMENT ADULT - SLP PERTINENT HISTORY OF CURRENT PROBLEM
76 yo male with hx of HTN, HLD, CAD, TIA, remote pericarditis in 2015, presented to the ED with complaints of abdominal pain, nausea, vomiting for since the day prior.  Upon arrival to the ED patient was lethargic and tachypneic, with abdominal distention and mottled skin. He was noted to be hypotensive despite fluid resuscitation and placed on escalating doses of pressors. Started on Bipap initially for tachypnea and metabolic acidosis, eventually intubated in the ED. Labs revealed severe metabolic acidosis. OLEKSANDR, transaminitis. Admitted to MICU for mixed shock, cardiogenic vs septic, requiring triple pressors. Extubated 5/24.

## 2025-05-25 NOTE — SWALLOW BEDSIDE ASSESSMENT ADULT - SLP GENERAL OBSERVATIONS
Pt encountered OOB in chair, repositioned by RN. A&Ox2 (partially to place), low vocal intensity, voice dysphonic, able to follow commands for evaluation purposes, denies pain pre/post treatment.

## 2025-05-25 NOTE — SWALLOW BEDSIDE ASSESSMENT ADULT - ASR SWALLOW RECOMMEND DIAG
Defer at this time given significant, overt s/s of dysphagia. This service will continue to follow to reassess for candidacy for initiation of oral diet vs MBS.

## 2025-05-25 NOTE — SWALLOW BEDSIDE ASSESSMENT ADULT - SWALLOW EVAL: DIAGNOSIS
Oral and suspected pharyngeal dysphagia c/b impaired oral management and overt s/s of aspiration. Unable to recommend oral diet given risk of aspiration related complication considering pt's acuity of illness and prolonged intubation. Voice is dysphonic, ? reduced respiratory support vs laryngeal dysfunction in setting of prolonged intubation. Swallow prognosis is likely good. Oral and suspected pharyngeal dysphagia c/b impaired oral management and overt s/s of aspiration. Unable to recommend oral diet given risk of aspiration related complication considering pt's acuity of illness and prolonged intubation. Voice is dysphonic, ? reduced respiratory support vs laryngeal dysfunction in setting of prolonged intubation. MBS to formally assess. Swallow prognosis is likely good.

## 2025-05-25 NOTE — PROGRESS NOTE ADULT - ASSESSMENT
75 M w/ HTN, CAD, presented w/ abd pain, N/V found to have septic shock secondary to acute cholecystitis s/p perc primitivo 5/19, biliary pancreatitis, acute hypoxemic respiratory failure secondary to above, ESBL E.coli bacteremia and biliary Cx polymicrobial including enterococcus, mixed shock (septic/cardiogenic), vasopressors titrated off today and MAP > 65. On milrinone today, end-organ markers show adequate perfusion. Milrinone discontinued. Off milrinone, ccTTE shows mildly reduced LV systolic function, normal RV function, LVOT VTI 21 cm, estimated LVOT CO 5 L/min. No significant pericardial effusion. Will d/c A-line, central line. Continue meropenem. ID/cardiology following. Monitor in MICU.   Sotyktu Pregnancy And Lactation Text: There is insufficient data to evaluate whether or not Sotyktu is safe to use during pregnancy.   It is not known if Sotyktu passes into breast milk and whether or not it is safe to use when breastfeeding.

## 2025-05-25 NOTE — PROGRESS NOTE ADULT - CRITICAL CARE ATTENDING COMMENT
Critical care time spent titrating IV sedatives, vasoactive medications, inotropic agents, interpreting blood gases and chest imaging.
Critical care time spent titrating IV sedatives, vasoactive medications, adjusting mechanical ventilator settings, interpreting blood gases and chest imaging.

## 2025-05-25 NOTE — SWALLOW BEDSIDE ASSESSMENT ADULT - PHARYNGEAL PHASE
Throat clear post oral intake/Delayed cough post oral intake/Multiple swallows throat clearing and delayed cough post all trials with increased RR from 20-27/Multiple swallows

## 2025-05-25 NOTE — SWALLOW BEDSIDE ASSESSMENT ADULT - ORAL PREPARATORY PHASE
impaired ability to form labial seal to generate intra-oral pressure to draw from straw; anterior loss of bolus 2/2 impaired containment Within functional limits

## 2025-05-25 NOTE — PROGRESS NOTE ADULT - ASSESSMENT
Mr. Bateman is a 75-year-old male with a history of CAD s/p MI, TIA, HTN, and HLD, who was admitted to the MICU on 18-May-2025 (now MICU Day +7) for acute respiratory failure and shock secondary to cholecystitis/cholangitis. Over the past 24 hours, he was successfully extubated on 24-May and currently remains on 2L nasal cannula. He underwent DCCV for atrial fibrillation on 19 and 21-May and is now in sinus rhythm. He is off pressors and inotropes as of this AM. Polymicrobial bacteremia (including ESBL E. coli and Enterococcus) and biliary infection are being treated with meropenem. He is alert and interactive. Code status reverted to Full Code.    #Neuro:    -Status: Alert, oriented, and interactive post-extubation (24-May). No active sedation.  -Monitoring: Neuro checks qShift and PRN. Monitor for delirium. Aspiration precautions. HOB elevated.    #CV:    -Status: Mixed shock (septic/cardiogenic) requiring ongoing vasopressor/inotropic support. S/p DCCV (24-May) for A-fib, currently in SR.   -Hx of CAD, EF 30-35%.  -Pressors/Inotropes: Dc'd milrinone and levophed today  -Steroids: Hydrocortisone sodium succinate 50 mg IV Push q12h.  -Consults: Cardiology following. Plan for GDMT once more stable and eventual repeat echo.    #Resp:    -Status: Acute respiratory failure, successfully extubated on 24-May.  -Oxygen: 2L nasal cannula, SpO2 96% (12:15). RR 23/min.  -Pulmonary Toilet: Encourage cough/deep breathing, incentive spirometry. Monitor for respiratory distress.    #Renal/FEN:    -Status: OLEKSANDR significantly improved. Metabolic alkalosis.  -Albumin human 25% 50 mL IV q6h.  Electrolytes:  Hypokalemia: K 3.2 (L). Replete Potassium IV.  Hypocalcemia: Ca 8.0 (L). Monitor, replete if symptomatic or drops further.  Hypophosphatemia: Phos 2.3. Replete Phosphorus.  Hypomagnesemia: Mg 1.9. Replete Magnesium.  Nutrition: NPO. Discuss advancing diet today.  GI/Abdomen:    Status: Acute cholecystitis/cholangitis s/p cholecystostomy tube (19-May). Pancreatitis.  Medications:  Pantoprazole 40 mg IV Push daily.  Rifaximin 550 mg PO BID (Lactulose discontinued).  Cholecystostomy tube: Output 180 mL/24h.  LFTs (25-May 12:00): T.Prot 5.6 (L), Alb 3.3, T.Bili 2.4 (H), AST 61 (H), ALT 72 (H), Alk Phos 105 (improving trend).  Coags (25-May 05:00): PT 19.7 sec, INR 1.71 (H). PTT 23.5 sec.  Heme/Onc:    Status: Anemia, thrombocytopenia, leukocytosis.  Labs (25-May 12:00): WBC 13.93 (H), Hgb 11.2 g/dL, Hct 33.7%, Plt 97 K/uL (L).  Anticoagulation: Enoxaparin 40 mg SubCutaneous q24h.  Transfusion: Monitor Hgb. Transfuse PRBCs if Hgb <7 g/dL or symptomatic. Monitor platelets.  ID:    Status: Septic shock from cholecystitis/cholangitis. Polymicrobial bacteremia. Tmax 37.2°C (24-May), current T 36.8°C (25-May 11:00).  WBC 13.93 K/uL (H) (25-May 12:00).  Antibiotics: Meropenem 1000 mg IV q8h.  Cultures:  Blood Cx (18-May): E. coli ESBL, Citrobacter freundii, Strep gallolyticus, GPR.  Cholecystostomy bile cx (19-May): E. coli ESBL, Citrobacter freundii complex, Enterococcus gallinarum, Enterococcus faecalis.  Urine cx (18-May): E.coli ESBL.  Wound cx (24-May): Commensal yair.  Monitor for signs of infection. Continue ID recommendations (consult pending/ongoing).  Endo:    Status: Hyperglycemia.  Glucose control:  Serum Glucose (25-May 12:00): 279 mg/dL (H).  POCT Glucose (25-May 11:52): 279 mg/dL (H).  Management: Insulin glargine (LANTUS) 12 Units SubCutaneous qAM. Insulin lispro (ADMELOG) 7 Units SubCutaneous q6h + corrective regimen sliding scale. D5W infusions ongoing.  Steroids: Hydrocortisone 50 mg IV q8h.  Prophylaxis:    VTE: Enoxaparin 40 mg SQ q24h.  Stress Ulcer: Pantoprazole 40 mg IV Push daily.  Skin: Chlorhexidine 2% cloths topical daily. Bacitracin ointment 1 Application(s) Topical BID PRN wound care.  Disp/GOC:    Location: MICU.  Primary Reason for ICU: Management of shock (septic/cardiogenic) and respiratory failure, post-extubation monitoring.  Prognosis: Guarded, though showing some improvement with extubation, resolving OLEKSANDR, and recent DCCV.  Code Status: Full Code (DNR rescinded by family on 24-May).  Healthcare Proxy: (Not specified in provided documents).  Family Communication: Update family on progress, current hemodynamic support, and ongoing guarded prognosis. Mr. Bateman is a 75-year-old male with a history of CAD s/p MI, TIA, HTN, and HLD, who was admitted to the MICU on 18-May-2025 (now MICU Day +7) for acute respiratory failure and shock secondary to cholecystitis/cholangitis. Over the past 24 hours, he was successfully extubated on 24-May and currently remains on 2L nasal cannula. He underwent DCCV for atrial fibrillation on 19 and 21-May and is now in sinus rhythm. He is off pressors and inotropes as of this AM. Polymicrobial bacteremia (including ESBL E. coli and Enterococcus) and biliary infection are being treated with meropenem. He is alert and interactive. Code status reverted to Full Code.    #Neuro:    -Status: Alert, oriented, and interactive post-extubation (24-May). No active sedation.  -Monitoring: Neuro checks qShift and PRN. Monitor for delirium. Aspiration precautions. HOB elevated.    #CV:    -Status: Mixed shock (septic/cardiogenic) requiring ongoing vasopressor/inotropic support. S/p DCCV (24-May) for A-fib, currently in SR.   -Hx of CAD, EF 30-35%.  -Pressors/Inotropes: Dc'd milrinone and levophed today  -Steroids: Hydrocortisone sodium succinate 50 mg IV Push q12h.  -Consults: Cardiology following. Plan for GDMT once more stable and eventual repeat echo.    #Resp:    -Status: Acute respiratory failure, successfully extubated on 24-May.  -Oxygen: 2L nasal cannula, SpO2 96% (12:15). RR 23/min.  -Pulmonary Toilet: Encourage cough/deep breathing, incentive spirometry. Monitor for respiratory distress.    #Renal/FEN:    -Status: OLEKSANDR significantly improved. Metabolic alkalosis.  -Albumin human 25% 50 mL IV q6h.  Electrolytes:  Hypokalemia: K 3.2 (L). Replete Potassium IV.  Hypocalcemia: Ca 8.0 (L). Monitor, replete if symptomatic or drops further.  Hypophosphatemia: Phos 2.3. Replete Phosphorus.  Hypomagnesemia: Mg 1.9. Replete Magnesium.  Nutrition: NPO. Discuss advancing diet today.    #GI/Abdomen:    -Acute cholecystitis/cholangitis s/p cholecystostomy tube (19-May). Pancreatitis.  -Pantoprazole 40 mg IV Push daily.  -Rifaximin 550 mg PO BID (Lactulose discontinued).  -Cholecystostomy tube: Output 180 mL/24h.    #Heme/Onc:    -Anemia, thrombocytopenia, leukocytosis.  -Anticoagulation: Enoxaparin 40 mg SubCutaneous q24h.  -Monitor Hgb. Transfuse PRBCs if Hgb <7 g/dL or symptomatic. Monitor platelets.    ID:    -Septic shock from cholecystitis/cholangitis. Polymicrobial bacteremia. Tmax 37.2°C (24-May), current T 36.8°C (25-May 11:00).  -Antibiotics: Meropenem 1000 mg IV q8h.  Cultures:  Blood Cx (18-May): E. coli ESBL, Citrobacter freundii, Strep gallolyticus, GPR.  Cholecystostomy bile cx (19-May): E. coli ESBL, Citrobacter freundii complex, Enterococcus gallinarum, Enterococcus faecalis.  Urine cx (18-May): E.coli ESBL.  Wound cx (24-May): Commensal yair.  Monitor for signs of infection. Continue ID recommendation.    Endo:    Status: Hyperglycemia.  Glucose control:  Serum Glucose (25-May 12:00): 279 mg/dL (H).  POCT Glucose (25-May 11:52): 279 mg/dL (H).  Management: Insulin glargine (LANTUS) 12 Units SubCutaneous qAM. Insulin lispro (ADMELOG) 7 Units SubCutaneous q6h + corrective regimen sliding scale. D5W infusions ongoing.  Steroids: Hydrocortisone 50 mg IV q12h.    Prophylaxis:    VTE: Enoxaparin 40 mg SQ q24h.  Stress Ulcer: Pantoprazole 40 mg IV Push daily.  Skin: Chlorhexidine 2% cloths topical daily. Bacitracin ointment 1 Application(s) Topical BID PRN wound care.    Disp/GOC:    Location: MICU.  Primary Reason for ICU: Management of shock (septic/cardiogenic) and respiratory failure, post-extubation monitoring.  Prognosis: Guarded, though showing some improvement with extubation, resolving OLEKSANDR, and recent DCCV.  Code Status: Full Code (DNR rescinded by family on 24-May).  Family Communication: Update family on progress, current hemodynamic support, and ongoing guarded prognosis.

## 2025-05-25 NOTE — PROCEDURE NOTE - NSUS ED ADDITIONAL DETAIL1 FT
:   Judith Cox  INDICATION:   Shock, respiratory failure  PROCEDURE:  [ x] LIMITED ECHO  [ ] LIMITED CHEST  [ ] LIMITED RETROPERITONEAL  [ ] LIMITED ABDOMINAL  [ ] LIMITED DVT  [ ] NEEDLE GUIDANCE VASCULAR  [ ] NEEDLE GUIDANCE THORACENTESIS  [ ] NEEDLE GUIDANCE PARACENTESIS  [ ] NEEDLE GUIDANCE PERICARDIOCENTESIS  [ ] OTHER    FINDINGS:  ccTTE shows mildly reduced LV systolic function, normal RV function, LVOT VTI 21 cm, estimated LVOT CO 5 L/min. No significant pericardial effusion.     INTERPRETATION:  Normal CO off inotropes.        Image saved in qpath

## 2025-05-25 NOTE — PROGRESS NOTE ADULT - SUBJECTIVE AND OBJECTIVE BOX
DOMINIC ALAMO  [id number]    Chief Complaint:   follow up sepsis/CAD    Subjective:  No events    24 hour Tele:   SR, PACs,         albumin human 25% IVPB 50 milliLiter(s) IV Intermittent every 6 hours  bacitracin   Ointment 1 Application(s) Topical two times a day PRN  chlorhexidine 2% Cloths 1 Application(s) Topical <User Schedule>  dextrose 5%. 1000 milliLiter(s) IV Continuous <Continuous>  dextrose 5%. 1000 milliLiter(s) IV Continuous <Continuous>  dextrose 5%. 1000 milliLiter(s) IV Continuous <Continuous>  dextrose 50% Injectable 25 Gram(s) IV Push once  dextrose 50% Injectable 12.5 Gram(s) IV Push once  dextrose 50% Injectable 25 Gram(s) IV Push once  dextrose Oral Gel 15 Gram(s) Oral once PRN  enoxaparin Injectable 40 milliGRAM(s) SubCutaneous every 24 hours  glucagon  Injectable 1 milliGRAM(s) IntraMuscular once  hydrocortisone sodium succinate Injectable 50 milliGRAM(s) IV Push every 8 hours  insulin glargine Injectable (LANTUS) 12 Unit(s) SubCutaneous every morning  insulin lispro (ADMELOG) corrective regimen sliding scale   SubCutaneous every 6 hours  insulin lispro Injectable (ADMELOG) 7 Unit(s) SubCutaneous every 6 hours  meropenem  IVPB 1000 milliGRAM(s) IV Intermittent every 8 hours  milrinone Infusion 0.125 MICROgram(s)/kG/Min IV Continuous <Continuous>  norepinephrine Infusion 0.05 MICROgram(s)/kG/Min IV Continuous <Continuous>  pantoprazole  Injectable 40 milliGRAM(s) IV Push daily  rifAXIMin 550 milliGRAM(s) Oral two times a day          Physical Exam:  T(C): 36.8 (25 @ 11:00), Max: 37.5 (25 @ 14:00)  HR: 68 (25 @ 12:15) (63 - 75)  BP: --  RR: 23 (25 @ 12:15) (14 - 26)  SpO2: 96% (25 @ 12:15) (90% - 99%)  Wt(kg): --  General: Comfortable in NAD  Neck: No JVD  CVS: nl s1s2, no s3  Pulm: CTA b/l  Abd: soft, non-tender  Ext: No c/c/e  Neuro A&O x3  Psych: Normal affect      Labs:   25 May 2025 12:00    148    |  109    |  54.9   ----------------------------<  279    3.2     |  25.0   |  0.86     Ca    8.0        25 May 2025 12:00  Phos  2.3       25 May 2025 12:00  Mg     1.9       25 May 2025 12:00    TPro  5.6    /  Alb  3.3    /  TBili  2.4    /  DBili  x      /  AST  61     /  ALT  72     /  AlkPhos  105    25 May 2025 12:00                          11.2   13.93 )-----------( 97       ( 25 May 2025 12:00 )             33.7     PT/INR - ( 25 May 2025 05:00 )   PT: 19.7 sec;   INR: 1.71 ratio         PTT - ( 25 May 2025 05:00 )  PTT:23.5 sec          Echo: 2025:    1. Left ventricular systolic function is severely decreased with an ejection fraction visually estimated at 30 to 35 %. Global left ventricular hypokinesis.   2. Enlarged right ventricular cavity size and moderately reduced right ventricular systolic function.   3. Left atrium is normal in size.   4. There is mild calcification of the mitral valve annulus.   5. Moderate to severe mitral regurgitation.   6. Moderate tricuspid regurgitation.   7. Compared to the transthoracic echocardiogram performed on 2025, the LV and RV are now better visualized.    Echo 2025:   1. Technically difficult image quality.   2. Left ventricular systolic function is moderately decreased with an ejection fraction visually estimated at 30 to 35 %. Global left ventricular hypokinesis.   3. There is no evidence of a left ventricular thrombus.   4. Enlarged right ventricular cavity size and moderately reduced right ventricular systolic function.   5. No pericardial effusion seen.   6. Compared to the transthoracic echocardiogram performed on 2025, there have been no significant interval changes.      CXR:  Endotracheal tube tip 5 cm above the gayla. Enteric tube tip below the   edge of the image, and rightIJ catheter tip in the right atrium. No   pneumothorax. No focal consolidation or pleural effusion.    CTA C/A/P:  No pulmonary embolus.  Airspace consolidations at the lung bases may represent atelectasis   and/or pneumonia.  No bowel obstruction.  Cholelithiasis.  Trace ascites.        Assessment:  76 yo male PMHx HTN, HLD, CAD based on abnormal nuke, TIA, remote pericarditis in  presented to the ED with complaints of abdominal pain, nausea, vomiting for since the day prior.  Upon arrival to the ED patient was lethargic and tachypneic, with abdominal distention and mottled skin.  He was noted to be hypotensive despite fluid resuscitation and placed on escalating doses of pressors.  He was started on BIPAP initially for tachypnea and metabolic acidosis, eventually intubated in the ED.  Patient remains intubated and history taken from chart and d/w RN.  Patient was on escalating doses of pressors and , but in last day has been weaned down.  Echo with severe LV dysfxn which appears new and likely stress myopathy.  CI on low end of normal on .  Likely sepsis from GNR septicemia from cholangitis as cause now s/p perc tube.  Noted AF with some RVR due to shock, , etc.  -DCCV yesterday for rapid AF, reviewed tele and no VT   -Remains in SR this morning    -extubated  -off pressors/ inotropic support     Plan:  1. GDMT once more stable   2. Rest as per ICU   3. Eventual repeat echo when improved to reassess EF  4. Will continue to follow.

## 2025-05-26 LAB
ACETONE SERPL-MCNC: NEGATIVE — SIGNIFICANT CHANGE UP
ALBUMIN SERPL ELPH-MCNC: 3.2 G/DL — LOW (ref 3.3–5.2)
ALP SERPL-CCNC: 99 U/L — SIGNIFICANT CHANGE UP (ref 40–120)
ALT FLD-CCNC: 54 U/L — HIGH
ANION GAP SERPL CALC-SCNC: 15 MMOL/L — SIGNIFICANT CHANGE UP (ref 5–17)
APPEARANCE UR: CLEAR — SIGNIFICANT CHANGE UP
AST SERPL-CCNC: 41 U/L — HIGH
BACTERIA # UR AUTO: NEGATIVE /HPF — SIGNIFICANT CHANGE UP
BASOPHILS # BLD AUTO: 0.03 K/UL — SIGNIFICANT CHANGE UP (ref 0–0.2)
BASOPHILS NFR BLD AUTO: 0.2 % — SIGNIFICANT CHANGE UP (ref 0–2)
BILIRUB SERPL-MCNC: 2.3 MG/DL — HIGH (ref 0.4–2)
BILIRUB UR-MCNC: ABNORMAL
BUN SERPL-MCNC: 42.3 MG/DL — HIGH (ref 8–20)
CALCIUM SERPL-MCNC: 7.9 MG/DL — LOW (ref 8.4–10.5)
CHLORIDE SERPL-SCNC: 113 MMOL/L — HIGH (ref 96–108)
CO2 SERPL-SCNC: 21 MMOL/L — LOW (ref 22–29)
COLOR SPEC: SIGNIFICANT CHANGE UP
CREAT SERPL-MCNC: 0.75 MG/DL — SIGNIFICANT CHANGE UP (ref 0.5–1.3)
CULTURE RESULTS: SIGNIFICANT CHANGE UP
DIFF PNL FLD: ABNORMAL
EGFR: 94 ML/MIN/1.73M2 — SIGNIFICANT CHANGE UP
EGFR: 94 ML/MIN/1.73M2 — SIGNIFICANT CHANGE UP
EOSINOPHIL # BLD AUTO: 0 K/UL — SIGNIFICANT CHANGE UP (ref 0–0.5)
EOSINOPHIL NFR BLD AUTO: 0 % — SIGNIFICANT CHANGE UP (ref 0–6)
GLUCOSE BLDC GLUCOMTR-MCNC: 120 MG/DL — HIGH (ref 70–99)
GLUCOSE BLDC GLUCOMTR-MCNC: 128 MG/DL — HIGH (ref 70–99)
GLUCOSE BLDC GLUCOMTR-MCNC: 219 MG/DL — HIGH (ref 70–99)
GLUCOSE BLDC GLUCOMTR-MCNC: 258 MG/DL — HIGH (ref 70–99)
GLUCOSE SERPL-MCNC: 198 MG/DL — HIGH (ref 70–99)
GLUCOSE UR QL: NEGATIVE MG/DL — SIGNIFICANT CHANGE UP
HCT VFR BLD CALC: 35.4 % — LOW (ref 39–50)
HGB BLD-MCNC: 12 G/DL — LOW (ref 13–17)
IMM GRANULOCYTES # BLD AUTO: 0.4 K/UL — HIGH (ref 0–0.07)
IMM GRANULOCYTES NFR BLD AUTO: 2.4 % — HIGH (ref 0–0.9)
KETONES UR QL: NEGATIVE MG/DL — SIGNIFICANT CHANGE UP
LACTATE SERPL-SCNC: 1.4 MMOL/L — SIGNIFICANT CHANGE UP (ref 0.5–2)
LEUKOCYTE ESTERASE UR-ACNC: ABNORMAL
LYMPHOCYTES # BLD AUTO: 1.19 K/UL — SIGNIFICANT CHANGE UP (ref 1–3.3)
LYMPHOCYTES NFR BLD AUTO: 7.2 % — LOW (ref 13–44)
MAGNESIUM SERPL-MCNC: 2.1 MG/DL — SIGNIFICANT CHANGE UP (ref 1.6–2.6)
MCHC RBC-ENTMCNC: 29.1 PG — SIGNIFICANT CHANGE UP (ref 27–34)
MCHC RBC-ENTMCNC: 33.9 G/DL — SIGNIFICANT CHANGE UP (ref 32–36)
MCV RBC AUTO: 85.9 FL — SIGNIFICANT CHANGE UP (ref 80–100)
MONOCYTES # BLD AUTO: 0.89 K/UL — SIGNIFICANT CHANGE UP (ref 0–0.9)
MONOCYTES NFR BLD AUTO: 5.4 % — SIGNIFICANT CHANGE UP (ref 2–14)
NEUTROPHILS # BLD AUTO: 13.91 K/UL — HIGH (ref 1.8–7.4)
NEUTROPHILS NFR BLD AUTO: 84.8 % — HIGH (ref 43–77)
NITRITE UR-MCNC: NEGATIVE — SIGNIFICANT CHANGE UP
NRBC # BLD AUTO: 0.03 K/UL — HIGH (ref 0–0)
NRBC # FLD: 0.03 K/UL — HIGH (ref 0–0)
NRBC BLD AUTO-RTO: 0 /100 WBCS — SIGNIFICANT CHANGE UP (ref 0–0)
PH UR: 6.5 — SIGNIFICANT CHANGE UP (ref 5–8)
PHOSPHATE SERPL-MCNC: 3.5 MG/DL — SIGNIFICANT CHANGE UP (ref 2.4–4.7)
PLATELET # BLD AUTO: 118 K/UL — LOW (ref 150–400)
PMV BLD: 12.9 FL — SIGNIFICANT CHANGE UP (ref 7–13)
POTASSIUM SERPL-MCNC: 3.7 MMOL/L — SIGNIFICANT CHANGE UP (ref 3.5–5.3)
POTASSIUM SERPL-SCNC: 3.7 MMOL/L — SIGNIFICANT CHANGE UP (ref 3.5–5.3)
PROT SERPL-MCNC: 5.3 G/DL — LOW (ref 6.6–8.7)
PROT UR-MCNC: 30 MG/DL
RBC # BLD: 4.12 M/UL — LOW (ref 4.2–5.8)
RBC # FLD: 14.7 % — HIGH (ref 10.3–14.5)
RBC CASTS # UR COMP ASSIST: 23 /HPF — HIGH (ref 0–4)
SODIUM SERPL-SCNC: 149 MMOL/L — HIGH (ref 135–145)
SP GR SPEC: 1.02 — SIGNIFICANT CHANGE UP (ref 1–1.03)
SPECIMEN SOURCE: SIGNIFICANT CHANGE UP
SQUAMOUS # UR AUTO: 1 /HPF — SIGNIFICANT CHANGE UP (ref 0–5)
URATE SERPL-MCNC: 4.6 MG/DL — SIGNIFICANT CHANGE UP (ref 3.4–7)
UROBILINOGEN FLD QL: 1 MG/DL — SIGNIFICANT CHANGE UP (ref 0.2–1)
WBC # BLD: 16.42 K/UL — HIGH (ref 3.8–10.5)
WBC # FLD AUTO: 16.42 K/UL — HIGH (ref 3.8–10.5)
WBC UR QL: 3 /HPF — SIGNIFICANT CHANGE UP (ref 0–5)

## 2025-05-26 PROCEDURE — 99233 SBSQ HOSP IP/OBS HIGH 50: CPT

## 2025-05-26 PROCEDURE — 93971 EXTREMITY STUDY: CPT | Mod: 26,RT

## 2025-05-26 PROCEDURE — 99232 SBSQ HOSP IP/OBS MODERATE 35: CPT

## 2025-05-26 PROCEDURE — 99233 SBSQ HOSP IP/OBS HIGH 50: CPT | Mod: GC

## 2025-05-26 PROCEDURE — G0545: CPT

## 2025-05-26 PROCEDURE — 74019 RADEX ABDOMEN 2 VIEWS: CPT | Mod: 26

## 2025-05-26 RX ORDER — DEXTROSE 50 % IN WATER 50 %
25 SYRINGE (ML) INTRAVENOUS ONCE
Refills: 0 | Status: DISCONTINUED | OUTPATIENT
Start: 2025-05-26 | End: 2025-06-05

## 2025-05-26 RX ORDER — ENOXAPARIN SODIUM 100 MG/ML
78 INJECTION SUBCUTANEOUS EVERY 12 HOURS
Refills: 0 | Status: DISCONTINUED | OUTPATIENT
Start: 2025-05-26 | End: 2025-05-26

## 2025-05-26 RX ORDER — ACETAMINOPHEN 500 MG/5ML
1000 LIQUID (ML) ORAL ONCE
Refills: 0 | Status: COMPLETED | OUTPATIENT
Start: 2025-05-26 | End: 2025-05-26

## 2025-05-26 RX ORDER — DEXTROSE 50 % IN WATER 50 %
15 SYRINGE (ML) INTRAVENOUS ONCE
Refills: 0 | Status: DISCONTINUED | OUTPATIENT
Start: 2025-05-26 | End: 2025-05-26

## 2025-05-26 RX ORDER — DEXTROSE 50 % IN WATER 50 %
12.5 SYRINGE (ML) INTRAVENOUS ONCE
Refills: 0 | Status: DISCONTINUED | OUTPATIENT
Start: 2025-05-26 | End: 2025-06-05

## 2025-05-26 RX ORDER — SODIUM CHLORIDE 9 G/1000ML
1000 INJECTION, SOLUTION INTRAVENOUS
Refills: 0 | Status: DISCONTINUED | OUTPATIENT
Start: 2025-05-26 | End: 2025-05-26

## 2025-05-26 RX ORDER — ENOXAPARIN SODIUM 100 MG/ML
80 INJECTION SUBCUTANEOUS EVERY 12 HOURS
Refills: 0 | Status: DISCONTINUED | OUTPATIENT
Start: 2025-05-26 | End: 2025-05-28

## 2025-05-26 RX ORDER — INSULIN LISPRO 100 U/ML
INJECTION, SOLUTION INTRAVENOUS; SUBCUTANEOUS
Refills: 0 | Status: DISCONTINUED | OUTPATIENT
Start: 2025-05-26 | End: 2025-06-05

## 2025-05-26 RX ORDER — SODIUM CHLORIDE 9 G/1000ML
1000 INJECTION, SOLUTION INTRAVENOUS
Refills: 0 | Status: DISCONTINUED | OUTPATIENT
Start: 2025-05-26 | End: 2025-05-28

## 2025-05-26 RX ADMIN — INSULIN GLARGINE-YFGN 12 UNIT(S): 100 INJECTION, SOLUTION SUBCUTANEOUS at 09:42

## 2025-05-26 RX ADMIN — Medication 1 APPLICATION(S): at 11:47

## 2025-05-26 RX ADMIN — Medication 1000 MILLIGRAM(S): at 14:00

## 2025-05-26 RX ADMIN — Medication 400 MILLIGRAM(S): at 13:45

## 2025-05-26 RX ADMIN — Medication 100 MILLIEQUIVALENT(S): at 09:43

## 2025-05-26 RX ADMIN — ENOXAPARIN SODIUM 40 MILLIGRAM(S): 100 INJECTION SUBCUTANEOUS at 11:46

## 2025-05-26 RX ADMIN — ALBUMIN (HUMAN) 50 MILLILITER(S): 12.5 INJECTION, SOLUTION INTRAVENOUS at 05:18

## 2025-05-26 RX ADMIN — MEROPENEM 100 MILLIGRAM(S): 1 INJECTION INTRAVENOUS at 11:45

## 2025-05-26 RX ADMIN — ENOXAPARIN SODIUM 80 MILLIGRAM(S): 100 INJECTION SUBCUTANEOUS at 17:53

## 2025-05-26 RX ADMIN — Medication 1 APPLICATION(S): at 05:17

## 2025-05-26 RX ADMIN — SODIUM CHLORIDE 150 MILLILITER(S): 9 INJECTION, SOLUTION INTRAVENOUS at 00:00

## 2025-05-26 RX ADMIN — Medication 40 MILLIGRAM(S): at 11:46

## 2025-05-26 RX ADMIN — INSULIN LISPRO 4: 100 INJECTION, SOLUTION INTRAVENOUS; SUBCUTANEOUS at 05:19

## 2025-05-26 RX ADMIN — SODIUM CHLORIDE 60 MILLILITER(S): 9 INJECTION, SOLUTION INTRAVENOUS at 15:55

## 2025-05-26 RX ADMIN — Medication 50 MILLIGRAM(S): at 05:17

## 2025-05-26 RX ADMIN — Medication 1000 MILLIGRAM(S): at 01:00

## 2025-05-26 RX ADMIN — MEROPENEM 100 MILLIGRAM(S): 1 INJECTION INTRAVENOUS at 23:27

## 2025-05-26 RX ADMIN — MEROPENEM 100 MILLIGRAM(S): 1 INJECTION INTRAVENOUS at 05:17

## 2025-05-26 RX ADMIN — Medication 100 MILLIEQUIVALENT(S): at 06:14

## 2025-05-26 RX ADMIN — Medication 400 MILLIGRAM(S): at 00:00

## 2025-05-26 RX ADMIN — Medication 100 MILLIEQUIVALENT(S): at 05:17

## 2025-05-26 NOTE — PROGRESS NOTE ADULT - ASSESSMENT
76 yo male with hx of HTN, HLD, CAD, TIA, remote pericarditis in 2015, presented to the ED with complaints of abdominal pain, nausea, vomiting for since the day prior.  Upon arrival to the ED patient was lethargic and tachypneic, with abdominal distention and mottled skin.    He was noted to be hypotensive despite fluid resuscitation and placed on escalating doses of pressors.    Started on Bipap initially for tachypnea and metabolic acidosis, eventually intubated in the ED.  Labs revealed severe metabolic acidosis. OLEKSANDR, transaminitis   AS ABOVE ADMITTED WITH ABD PAIN HYPOTENSION BLOOD CX POLYMICROBIAL   ULTIMATELY HAD CHOLECYSTOTOMY TUBE PLACED ALSO POLYMICROBIAL  BLOOD CX WITH ESBL ECOLI AND CITROBACTER  BILE CX E-COLI ENTEROCOCCAL FAECALIS AND E GALLINARUM   PT CURRENTLY AWAKE ALERT OOB TO CHAIR   PT ON [facility] WHICH  WILL COVER ESBL AND STREP  [facility] HAS SOME COVERAGE FOR ENTEROCOCCUS   ALTHOUGH HE DID NOT HAVE ENTEROCOCCAL BACTEREMIAADN THE  SIGNIFICANCE IN BILE FLUID  UNCLEAR WOULD CONTINUE CURRENT IV MRERRM   AS HE CLINICALLY APPEARS TO BE IMRPOVING  WILL FOLLOW UP WITH FURTHER RECOMMENDATIONS

## 2025-05-26 NOTE — PROGRESS NOTE ADULT - SUBJECTIVE AND OBJECTIVE BOX
INFECTIOUS DISEASES AND INTERNAL MEDICINE at [place]  =======================================================  Chip Meade MD  Diplomates American Board of Internal Medicine and Infectious Diseases  Telephone [phone]  Fax            [phone]  =======================================================    DOMINIC ALAMO [id number]    Follow up: BACTEREMIA    Allergies:  No Known Drug Allergies  SHRIMP (Unknown)  iv dye (Unknown)      Medications:  bacitracin   Ointment 1 Application(s) Topical two times a day PRN  chlorhexidine 2% Cloths 1 Application(s) Topical <User Schedule>  dextrose 5%. 1000 milliLiter(s) IV Continuous <Continuous>  dextrose 5%. 1000 milliLiter(s) IV Continuous <Continuous>  dextrose 5%. 1000 milliLiter(s) IV Continuous <Continuous>  enoxaparin Injectable 40 milliGRAM(s) SubCutaneous every 24 hours  glucagon  Injectable 1 milliGRAM(s) IntraMuscular once  hydrocortisone sodium succinate Injectable 50 milliGRAM(s) IV Push every 12 hours  meropenem  IVPB 1000 milliGRAM(s) IV Intermittent every 8 hours  pantoprazole  Injectable 40 milliGRAM(s) IV Push daily  rifAXIMin 550 milliGRAM(s) Oral two times a day    SOCIAL       FAMILY   FAMILY HISTORY:  FH: CAD (coronary artery disease)      REVIEW OF SYSTEMS:  CONSTITUTIONAL:  No Fever or chills  HEENT:   No diplopia or blurred vision.  No earache, sore throat or runny nose.  CARDIOVASCULAR:  No pressure, squeezing, strangling, tightness, heaviness or aching about the chest, neck, axilla or epigastrium.  RESPIRATORY:  No cough, shortness of breath, PND or orthopnea.  GASTROINTESTINAL:  No nausea, vomiting or diarrhea.  GENITOURINARY:  No dysuria, frequency or urgency. No Blood in urine  MUSCULOSKELETAL:   moves all joints  SKIN:  No change in skin, hair or nails.  NEUROLOGIC:  No paresthesias, fasciculations, seizures or weakness.  PSYCHIATRIC:  No disorder of thought or mood.  ENDOCRINE:  No heat or cold intolerance, polyuria or polydipsia.  HEMATOLOGICAL:  No easy bruising or bleeding.            Physical Exam:  ICU Vital Signs Last 24 Hrs  T(C): 36.7 (26 May 2025 08:00), Max: 37.2 (26 May 2025 04:00)  T(F): 98.1 (26 May 2025 08:00), Max: 99 (26 May 2025 04:00)  HR: 72 (26 May 2025 08:00) (68 - 84)  BP: 120/65 (26 May 2025 08:00) (108/60 - 141/73)  BP(mean): 83 (26 May 2025 08:00) (74 - 94)  ABP: 106/48 (25 May 2025 18:00) (94/40 - 118/55)  ABP(mean): -5 (25 May 2025 18:00) (-23 - 80)  RR: 22 (26 May 2025 08:00) (20 - 28)  SpO2: 95% (26 May 2025 08:00) (90% - 98%)    O2 Parameters below as of 26 May 2025 08:00  Patient On (Oxygen Delivery Method): nasal cannula  O2 Flow (L/min): 2        GEN: NAD,  OOB TO CHAIR  HEENT: nor mocephalic and atraumatic. EOMI. TROY.    NECK: Supple. No carotid bruits.  No lymphadenopathy or thyromegaly.  LUNGS: Clear to auscultation.  HEART: Regular rate and rhythm without murmur.  ABDOMEN: Soft, nontender, and nondistended.  Positive bowel sounds.    : No CVA tenderness  EXTREMITIES: Without any cyanosis, clubbing, rash, lesions or edema.  MSK: no joint swelling  NEUROLOGIC: Cranial nerves II through XII are grossly intact.  PSYCHIATRIC: Appropriate affect .  SKIN: No ulceration or induration present.        Labs:  Vitals:  ============  T(F): 98.1 (26 May 2025 08:00), Max: 99 (26 May 2025 04:00)  HR: 72 (26 May 2025 08:00)  BP: 120/65 (26 May 2025 08:00)  RR: 22 (26 May 2025 08:00)  SpO2: 95% (26 May 2025 08:00) (90% - 98%)  temp max in last 48H T(F): , Max: 99.5 (05-24-25 @ 14:00)    =======================================================  Current Antibiotics:  meropenem  IVPB 1000 milliGRAM(s) IV Intermittent every 8 hours  rifAXIMin 550 milliGRAM(s) Oral two times a day    Other medications:  chlorhexidine 2% Cloths 1 Application(s) Topical <User Schedule>  dextrose 5%. 1000 milliLiter(s) IV Continuous <Continuous>  dextrose 5%. 1000 milliLiter(s) IV Continuous <Continuous>  dextrose 5%. 1000 milliLiter(s) IV Continuous <Continuous>  enoxaparin Injectable 40 milliGRAM(s) SubCutaneous every 24 hours  glucagon  Injectable 1 milliGRAM(s) IntraMuscular once  hydrocortisone sodium succinate Injectable 50 milliGRAM(s) IV Push every 12 hours  pantoprazole  Injectable 40 milliGRAM(s) IV Push daily      =======================================================  Labs:                        12.0   16.42 )-----------( 118      ( 26 May 2025 02:30 )             35.4     05-26    149[H]  |  113[H]  |  42.3[H]  ----------------------------<  198[H]  3.7   |  21.0[L]  |  0.75    Ca    7.9[L]      26 May 2025 02:30  Phos  3.5     05-26  Mg     2.1     05-26    TPro  5.3[L]  /  Alb  3.2[L]  /  TBili  2.3[H]  /  DBili  x   /  AST  41[H]  /  ALT  54[H]  /  AlkPhos  99  05-26      Culture - Sputum (collected 05-24-25 @ 05:15)  Source: Sputum Sputum  Gram Stain (05-24-25 @ 14:11):    Few polymorphonuclear leukocytes per low power field    No Squamous epithelial cells per low power field    Moderate Yeast like cells per oil power field  Preliminary Report (05-25-25 @ 19:00):    Few Mold Like Fungus Referred to Mycology    Commensal yair consistent with body site    Culture - Blood (collected 05-21-25 @ 02:50)  Source: Blood Blood  Preliminary Report (05-25-25 @ 17:00):    No growth at 4 days    Culture - Blood (collected 05-20-25 @ 11:15)  Source: Blood Blood  Final Report (05-25-25 @ 17:00):    No growth at 5 days    Culture - Body Fluid with Gram Stain (collected 05-19-25 @ 17:15)  Source: Bile Bile Fluid  Gram Stain (05-20-25 @ 08:03):    polymorphonuclear leukocytes seen    Gram Negative Rods seen    by cytocentrifuge  Final Report (05-25-25 @ 08:14):    Numerous Escherichia coli ESBL    Numerous Citrobacter freundii complex    Numerous Enterococcus gallinarum    Numerous Enterococcus faecalis  Organism: Escherichia coli ESBL  Citrobacter freundii complex  Enterococcus gallinarum  Enterococcus faecalis (05-25-25 @ 08:14)  Organism: Enterococcus faecalis (05-25-25 @ 08:14)    Sensitivities:      -  Vancomycin: S 1      -  Ampicillin: S <=2 Predicts results to ampicillin/sulbactam, amoxacillin-clavulanate and  piperacillin-tazobactam.      Method Type: CURTIS  Organism: Escherichia coli ESBL (05-25-25 @ 08:14)    Sensitivities:      -  Levofloxacin: R >4      -  Tobramycin: R >8      -  Aztreonam: R >16      -  Gentamicin: R >8      -  Ceftriaxone: R >32      -  Cefazolin: R >16      -  Cefepime: R >16      -  Piperacillin/Tazobactam: R <=8      -  Ciprofloxacin: R >2      -  Imipenem: S <=1      -  Ampicillin: R >16 These ampicillin results predict results for amoxicillin      Method Type: CURTIS      -  Meropenem: S <=1      -  Ampicillin/Sulbactam: R 16/8      -  Trimethoprim/Sulfamethoxazole: S <=0.5/9.5      -  Ertapenem: S <=0.5      -  Tigecycline: S <=2 Interpretations based on FDA breakpoints  Organism: Citrobacter freundii complex (05-25-25 @ 08:14)    Sensitivities:      -  Levofloxacin: S <=0.5      -  Tobramycin: R >8      -  Aztreonam: S <=4      -  Gentamicin: R >8      -  Cefepime: S <=2      -  Cefazolin: R >16      -  Piperacillin/Tazobactam: S <=8 Treatment with Pipercillin/Tazobactam is not recommended in severe infections casued by Klebsiella aerogenes, Enterobacter cloacae complex, and Citrobacter freundii complex.      -  Ciprofloxacin: S <=0.25      -  Imipenem: S <=1      -  Ceftriaxone: S <=1 Enterobacter cloacae, Klebsiella aerogenes, and Citrobacter freundii may develop resistance during prolonged therapy.      -  Ampicillin: R >16 These ampicillin results predict results for amoxicillin      Method Type: CURTIS      -  Meropenem: S <=1      -  Ampicillin/Sulbactam: R 16/8      -  Cefoxitin: R >16      -  Amoxicillin/Clavulanic Acid: R >16/8      -  Trimethoprim/Sulfamethoxazole: R >2/38      -  Tigecycline: S <=2 Interpretations based on FDA breakpoints      -  Ertapenem: S <=0.5  Organism: Enterococcus gallinarum (05-25-25 @ 08:14)    Sensitivities:      -  Vancomycin: R 2 E. casseliflavus/gallinarum have intrinsic, low level resistance to vancomycin. They are not considered to be VRE.      -  Ampicillin: S <=2 Predicts results to ampicillin/sulbactam, amoxacillin-clavulanate and  piperacillin-tazobactam.      Method Type: CURTIS    Culture - Blood (collected 05-19-25 @ 10:15)  Source: Blood Blood  Gram Stain (05-20-25 @ 06:31):    Growth in anaerobic bottle: Gram Negative Rods    Growth in aerobic bottle: Gram Negative Rods  Final Report (05-22-25 @ 19:19):    Growth in aerobic and anaerobic bottles: Escherichia coli ESBL    See previous culture [id number]    Urinalysis with Rflx Culture (collected 05-18-25 @ 22:08)    Culture - Urine (collected 05-18-25 @ 22:08)  Source: Clean Catch  Final Report (05-21-25 @ 09:50):    >100,000 CFU/ml Escherichia coli ESBL  Organism: Escherichia coli ESBL (05-21-25 @ 09:50)  Organism: Escherichia coli ESBL (05-21-25 @ 09:50)    Sensitivities:      -  Levofloxacin: R >4      -  Tobramycin: R >8      -  Nitrofurantoin: S <=32 Should not be used to treat pyelonephritis      -  Aztreonam: R >16      -  Gentamicin: R >8      -  Cefazolin: R >16 For uncomplicated UTI with K. pneumoniae, E. coli, or P. mirablis: CURTIS <=16 is sensitive and CURTIS >=32 is resistant. This also predicts results for oral agents cefaclor, cefdinir, cefpodoxime, cefprozil, cefuroxime axetil, cephalexin and locarbef for uncomplicated UTI. Note that some isolates may be susceptible to these agents while testing resistant to cefazolin.      -  Cefepime: R >16      -  Piperacillin/Tazobactam: S <=8      -  Ciprofloxacin: R >2      -  Imipenem: S <=1      -  Ceftriaxone: R >32      -  Ampicillin: R >16 These ampicillin results predict results for amoxicillin      Method Type: CURTIS      -  Meropenem: S <=1      -  Ampicillin/Sulbactam: I 16/8      -  Cefuroxime: R >16      -  Trimethoprim/Sulfamethoxazole: S <=0.5/9.5      -  Ertapenem: S <=0.5      -  Tigecycline: S <=2 Interpretations based on FDA breakpoints    Culture - Blood (collected 05-18-25 @ 20:05)  Source: Blood Blood  Gram Stain (05-19-25 @ 12:41):    Growth in aerobic and anaerobic bottles: Gram Negative Rods and Gram    Positive Cocci in Pairs and Chains    Growth in anaerobic bottle: Gram Positive Rods  Final Report (05-25-25 @ 09:54):    Growth in aerobic and anaerobic bottles: Escherichia coli ESBL    Growth in aerobic and anaerobic bottles: Citrobacter freundii    Growth in aerobic and anaerobic bottles: Streptococcus gallolyticus    Growth in anaerobic bottle: Gram Positive Rods Organism seen in Gram    stain is non-viable after prolonged    incubation and repeated subculture.    Direct identification is available within approximately 3-5    hours either by Blood Panel Multiplexed PCR or Direct    MALDI-TOF. Details: [email or web address]  Organism: Blood Culture PCR  Streptococcus gallolyticus  Escherichia coli ESBL  Citrobacter freundii (05-25-25 @ 09:54)  Organism: Escherichia coli ESBL (05-25-25 @ 09:54)    Sensitivities:      -  Levofloxacin: R >4      -  Tobramycin: R >8      -  Aztreonam: R >16      -  Gentamicin: R >8      -  Imipenem: S <=1      -  Cefepime: R >16      -  Cefazolin: R >16      -  Piperacillin/Tazobactam: R <=8      -  Ciprofloxacin: R >2      -  Ceftriaxone: R >32      -  Ampicillin: R >16 These ampicillin results predict results for amoxicillin      Method Type: CURTIS      -  Meropenem: S <=1      -  Ampicillin/Sulbactam: R 8/4      -  Trimethoprim/Sulfamethoxazole: S <=0.5/9.5      -  Tigecycline: S <=2 Interpretations based on FDA breakpoints      -  Ertapenem: S <=0.5  Organism: Citrobacter freundii (05-25-25 @ 09:54)    Sensitivities:      -  Levofloxacin: S <=0.5      -  Tobramycin: R 8      -  Aztreonam: S <=4      -  Gentamicin: R >8      -  Cefepime: S <=2      -  Cefazolin: R >16      -  Piperacillin/Tazobactam: S <=8 Treatment with Pipercillin/Tazobactam is not recommended in severe infections casued by Klebsiella aerogenes, Enterobacter cloacae complex, and Citrobacter freundii complex.      -  Ciprofloxacin: S <=0.25      -  Imipenem: S <=1      -  Ceftriaxone: S <=1 Enterobacter cloacae, Klebsiella aerogenes, and Citrobacter freundii may develop resistance during prolonged therapy.      -  Ampicillin: R >16 These ampicillin results predict results for amoxicillin      Method Type: CURTIS      -  Meropenem: S <=1      -  Ampicillin/Sulbactam: R >16/8      -  Cefoxitin: R >16      -  Trimethoprim/Sulfamethoxazole: R >2/38      -  Tigecycline: S <=2 Interpretations based on FDA breakpoints      -  Ertapenem: S <=0.5  Organism: Blood Culture PCR (05-25-25 @ 09:54)    Sensitivities:      -  CTX-M Resistance Marker: Detec      -  Citrobacter species: Detec      -  Escherichia coli: Detec      Method Type: PCR      -  Streptococcus sp. (Not Grp A, B or S pneumoniae): Detec      -  ESBL: Detec  Organism: Streptococcus gallolyticus (05-25-25 @ 09:54)    Sensitivities:      -  Vancomycin: S 0.5      -  Ceftriaxone: S <=0.25      Method Type: CURTIS      -  Penicillin: S 0.06      Creatinine: 0.75 mg/dL (05-26-25 @ 02:30)  Creatinine: 0.79 mg/dL (05-25-25 @ 17:35)  Creatinine: 0.86 mg/dL (05-25-25 @ 12:00)  Creatinine: 0.95 mg/dL (05-25-25 @ 05:00)  Creatinine: 1.02 mg/dL (05-24-25 @ 23:39)  Creatinine: 1.16 mg/dL (05-24-25 @ 17:20)  Creatinine: 1.14 mg/dL (05-24-25 @ 09:40)  Creatinine: 1.29 mg/dL (05-24-25 @ 02:15)  Creatinine: 1.45 mg/dL (05-23-25 @ 20:15)  Creatinine: 1.54 mg/dL (05-23-25 @ 14:15)  Creatinine: 1.72 mg/dL (05-23-25 @ 08:35)  Creatinine: 1.82 mg/dL (05-23-25 @ 02:40)  Creatinine: 2.05 mg/dL (05-22-25 @ 22:10)  Creatinine: 2.09 mg/dL (05-22-25 @ 20:15)  Creatinine: 1.98 mg/dL (05-22-25 @ 14:00)  Creatinine: 2.01 mg/dL (05-22-25 @ 08:45)  Creatinine: 2.18 mg/dL (05-22-25 @ 03:05)  Creatinine: 2.20 mg/dL (05-21-25 @ 21:00)  Creatinine: 1.97 mg/dL (05-21-25 @ 14:39)    Procalcitonin: 83.01 ng/mL (05-21-25 @ 14:39)  Procalcitonin: >100.00 ng/mL (05-19-25 @ 21:20)  Procalcitonin: >100.00 ng/mL (05-19-25 @ 00:45)          WBC Count: 16.42 K/uL (05-26-25 @ 02:30)  WBC Count: 15.35 K/uL (05-25-25 @ 17:35)  WBC Count: 13.93 K/uL (05-25-25 @ 12:00)  WBC Count: 12.51 K/uL (05-25-25 @ 05:00)  WBC Count: 14.85 K/uL (05-24-25 @ 23:39)  WBC Count: 16.79 K/uL (05-24-25 @ 17:20)  WBC Count: 14.34 K/uL (05-24-25 @ 09:40)  WBC Count: 13.36 K/uL (05-24-25 @ 02:15)  WBC Count: 13.77 K/uL (05-23-25 @ 20:15)  WBC Count: 14.24 K/uL (05-23-25 @ 14:15)  WBC Count: 11.64 K/uL (05-23-25 @ 08:35)  WBC Count: 12.82 K/uL (05-23-25 @ 02:40)  WBC Count: 13.38 K/uL (05-22-25 @ 20:15)  WBC Count: 13.42 K/uL (05-22-25 @ 14:00)  WBC Count: 9.91 K/uL (05-22-25 @ 08:45)  WBC Count: 11.13 K/uL (05-22-25 @ 03:05)  WBC Count: 13.83 K/uL (05-21-25 @ 21:00)  WBC Count: 13.92 K/uL (05-21-25 @ 14:39)    SARS-CoV-2 Result: NotDetec (05-18-25 @ 22:11)    Lactate Dehydrogenase, Serum: 421 U/L (05-19-25 @ 04:10)    Alkaline Phosphatase: 99 U/L (05-26-25 @ 02:30)  Alkaline Phosphatase: 103 U/L (05-25-25 @ 17:35)  Alkaline Phosphatase: 105 U/L (05-25-25 @ 12:00)  Alkaline Phosphatase: 98 U/L (05-25-25 @ 05:00)  Alkaline Phosphatase: 104 U/L (05-24-25 @ 23:39)  Alkaline Phosphatase: 113 U/L (05-24-25 @ 17:20)  Alkaline Phosphatase: 121 U/L (05-24-25 @ 09:40)  Alkaline Phosphatase: 121 U/L (05-24-25 @ 02:15)  Alkaline Phosphatase: 148 U/L (05-23-25 @ 20:15)  Alkaline Phosphatase: 155 U/L (05-23-25 @ 14:15)  Alkaline Phosphatase: 143 U/L (05-23-25 @ 08:35)  Alkaline Phosphatase: 149 U/L (05-23-25 @ 02:40)  Alkaline Phosphatase: 157 U/L (05-22-25 @ 22:10)  Alkaline Phosphatase: 160 U/L (05-22-25 @ 20:15)  Alkaline Phosphatase: 144 U/L (05-22-25 @ 14:00)  Alanine Aminotransferase (ALT/SGPT): 54 U/L (05-26-25 @ 02:30)  Alanine Aminotransferase (ALT/SGPT): 66 U/L (05-25-25 @ 17:35)  Alanine Aminotransferase (ALT/SGPT): 72 U/L (05-25-25 @ 12:00)  Alanine Aminotransferase (ALT/SGPT): 75 U/L (05-25-25 @ 05:00)  Alanine Aminotransferase (ALT/SGPT): 83 U/L (05-24-25 @ 23:39)  Alanine Aminotransferase (ALT/SGPT): 95 U/L (05-24-25 @ 17:20)  Alanine Aminotransferase (ALT/SGPT): 108 U/L (05-24-25 @ 09:40)  Alanine Aminotransferase (ALT/SGPT): 114 U/L (05-24-25 @ 02:15)  Alanine Aminotransferase (ALT/SGPT): 135 U/L (05-23-25 @ 20:15)  Alanine Aminotransferase (ALT/SGPT): 154 U/L (05-23-25 @ 14:15)  Alanine Aminotransferase (ALT/SGPT): 169 U/L (05-23-25 @ 08:35)  Alanine Aminotransferase (ALT/SGPT): 195 U/L (05-23-25 @ 02:40)  Alanine Aminotransferase (ALT/SGPT): 208 U/L (05-22-25 @ 22:10)  Alanine Aminotransferase (ALT/SGPT): 195 U/L (05-22-25 @ 20:15)  Alanine Aminotransferase (ALT/SGPT): 141 U/L (05-22-25 @ 14:00)  Aspartate Aminotransferase (AST/SGOT): 41 U/L (05-26-25 @ 02:30)  Aspartate Aminotransferase (AST/SGOT): 51 U/L (05-25-25 @ 17:35)  Aspartate Aminotransferase (AST/SGOT): 61 U/L (05-25-25 @ 12:00)  Aspartate Aminotransferase (AST/SGOT): 64 U/L (05-25-25 @ 05:00)  Aspartate Aminotransferase (AST/SGOT): 77 U/L (05-24-25 @ 23:39)  Aspartate Aminotransferase (AST/SGOT): 91 U/L (05-24-25 @ 17:20)  Aspartate Aminotransferase (AST/SGOT): 104 U/L (05-24-25 @ 09:40)  Aspartate Aminotransferase (AST/SGOT): 124 U/L (05-24-25 @ 02:15)  Aspartate Aminotransferase (AST/SGOT): 168 U/L (05-23-25 @ 20:15)  Aspartate Aminotransferase (AST/SGOT): 233 U/L (05-23-25 @ 14:15)  Aspartate Aminotransferase (AST/SGOT): 331 U/L (05-23-25 @ 08:35)  Aspartate Aminotransferase (AST/SGOT): 498 U/L (05-23-25 @ 02:40)  Aspartate Aminotransferase (AST/SGOT): 499 U/L (05-22-25 @ 22:10)  Aspartate Aminotransferase (AST/SGOT): 438 U/L (05-22-25 @ 20:15)  Aspartate Aminotransferase (AST/SGOT): 258 U/L (05-22-25 @ 14:00)  Bilirubin Total: 2.3 mg/dL (05-26-25 @ 02:30)  Bilirubin Total: 2.3 mg/dL (05-25-25 @ 17:35)  Bilirubin Total: 2.4 mg/dL (05-25-25 @ 12:00)  Bilirubin Total: 2.0 mg/dL (05-25-25 @ 05:00)  Bilirubin Total: 2.1 mg/dL (05-24-25 @ 23:39)  Bilirubin Total: 2.0 mg/dL (05-24-25 @ 17:20)  Bilirubin Total: 1.8 mg/dL (05-24-25 @ 09:40)  Bilirubin Total: 1.8 mg/dL (05-24-25 @ 02:15)  Bilirubin Total: 2.1 mg/dL (05-23-25 @ 20:15)  Bilirubin Total: 2.3 mg/dL (05-23-25 @ 14:15)  Bilirubin Total: 2.3 mg/dL (05-23-25 @ 08:35)  Bilirubin Total: 2.4 mg/dL (05-23-25 @ 02:40)  Bilirubin Total: 2.6 mg/dL (05-22-25 @ 22:10)  Bilirubin Total: 2.6 mg/dL (05-22-25 @ 20:15)  Bilirubin Total: 2.7 mg/dL (05-22-25 @ 14:00)

## 2025-05-26 NOTE — CHART NOTE - NSCHARTNOTEFT_GEN_A_CORE
Patient with short run of AFIB, patient now back to sinus rhythm  Patient has hx of AFIB and was cardioverted, patient on full dose Lovenox   Patient is asymptomatic, VSS   STAT CBC, Mag, Phos, and BMP pending   RN to notify with any acute changes

## 2025-05-26 NOTE — PROGRESS NOTE ADULT - SUBJECTIVE AND OBJECTIVE BOX
MICU Transfer / Hospital course:   74 y/o M w/ PMH of HTN, HLD, CAD, TIA, remote hx of pericarditis (2015) presented 5/18 to ED c/o abd pain, N/V.  In the ED pt was found to be hypotensive and failed fluid resuscitation and started on pressor support.  Pt also hypoxic and initially placed on BIPAP but eventually required intubation and admitted to MICU for septic shock 2/2 cholangitis, UTI and acute pancreatitis.  Pt also profoundly acidotic w/ ATN from septic shock, transaminitis from shock liver/cholangitis, lipase >3K, , LA>16, procal >100 and abd US w/ distended GB w/ stones and CT a/p w/ cholelithiasis but pancreas reported to be normal.  GI and surgery consulted but pt deemed too unstable to undergo any procedures and ultimately had IR guided primitivo tube placed 5/19 and dark malodorous bile drained and sent for cultures which grew ESBL ecoli, citrobacter freundii, Enterococcus gallinarum and enterococcus faecalis.  Bcx 5/18 gew ESBL ecoli, Citerobacter freundii and strep gallalyticus.  Ucx 5/18 grew ESBL ecoli.  Repeat Bcx 5/19 grew only ESBL Ecoli and BCxs 5/20 NGTD.  Sputum cx 5/24 growing mold like fungus on prelim report and refered to mycology.  ID consulted and pt currently on merrem.  Pt required quadruple pressor support and given methylene blue 5/19 for vasoplegia and was on course of solucortef 5/19-5/25 to help wean off pressors in setting of septic shock.   Hospital course complicated by SVT 5/19 that required emergent cardioversion.  Pt also found to have high ammonia and placed on rifaxamin and lactulose but no known hx of cirrhosis and have since been d/c'd.  Pt successfully weaned off pressors 5/23.  During hospital course pt also found to have new rEF likely stress cardiomyopathy and required dobutamine (5/20-5/23) and milrinone gtts (5/23-5/25).  Hospital course further complicated by AF RVR requiring emergent cardioversion 5/21 and was amio loaded but now in NSR and not on amio.  Full dose AC was held despite AF w/ chadsvasc 6 bc severe thrombocytopenia likely 2/2 sepsis.  Pt extubated 5/24 and OGT removed.  Pt failed SLP eval and family declined NGT for now.  Pt will go for [facility] tomorrow 5/27 to better assess his ability to swallow.  Pt medically stable for transfer to medicine for continued management.          I&O's Summary    25 May 2025 07:01  -  26 May 2025 07:00  --------------------------------------------------------  IN: 2226.7 mL / OUT: 1695 mL / NET: 531.7 mL    26 May 2025 07:01  -  26 May 2025 17:00  --------------------------------------------------------  IN: 0 mL / OUT: 60 mL / NET: -60 mL          PHYSICAL EXAM:  Vital Signs Last 24 Hrs  T(C): 37 (26 May 2025 16:49), Max: 37.2 (26 May 2025 04:00)  T(F): 98.6 (26 May 2025 16:49), Max: 99 (26 May 2025 04:00)  HR: 71 (26 May 2025 16:49) (69 - 102)  BP: 146/77 (26 May 2025 16:49) (111/67 - 146/77)  BP(mean): 88 (26 May 2025 13:00) (79 - 98)  RR: 20 (26 May 2025 16:49) (20 - 28)  SpO2: 97% (26 May 2025 16:49) (90% - 98%)    Parameters below as of 26 May 2025 13:37  Patient On (Oxygen Delivery Method): nasal cannula  O2 Flow (L/min): 3      GENERAL: pt examined bedside, laying comfortably in bed in NAD  HEENT: NC/AT, dry oral mucosa, clear conjunctiva, sclera nonicteric  RESPIRATORY: Normal respiratory effort, no wheezing, rhonchi, rales  CARDIOVASCULAR: RRR, normal S1 and S2  ABDOMEN: soft, distended, tender to palpation in all quadrants, +bowel sounds, no guarding or rigidity, +perc primitivo tube draining dark bilious fluid   EXTREMITIES: No cynaosis, no clubbing, no lower extremity edema, +RUE nonpitting edema  NEUROLOGY: A+O to person, place, and time, no focal neurologic deficits appreciated   SKIN: small area of Rt toe ischemic       LABS:                        12.0   16.42 )-----------( 118      ( 26 May 2025 02:30 )             35.4     05-26    149[H]  |  113[H]  |  42.3[H]  ----------------------------<  198[H]  3.7   |  21.0[L]  |  0.75    Ca    7.9[L]      26 May 2025 02:30  Phos  3.5     05-26  Mg     2.1     05-26    TPro  5.3[L]  /  Alb  3.2[L]  /  TBili  2.3[H]  /  DBili  x   /  AST  41[H]  /  ALT  54[H]  /  AlkPhos  99  05-26    PT/INR - ( 25 May 2025 05:00 )   PT: 19.7 sec;   INR: 1.71 ratio         PTT - ( 25 May 2025 05:00 )  PTT:23.5 sec      Urinalysis Basic - ( 26 May 2025 02:30 )    Color: x / Appearance: x / SG: x / pH: x  Gluc: 198 mg/dL / Ketone: x  / Bili: x / Urobili: x   Blood: x / Protein: x / Nitrite: x   Leuk Esterase: x / RBC: x / WBC x   Sq Epi: x / Non Sq Epi: x / Bacteria: x        Culture - Sputum (collected 24 May 2025 05:15)  Source: Sputum Sputum  Gram Stain (24 May 2025 14:11):    Few polymorphonuclear leukocytes per low power field    No Squamous epithelial cells per low power field    Moderate Yeast like cells per oil power field  Preliminary Report (25 May 2025 19:00):    Few Mold Like Fungus Referred to Mycology    Commensal yair consistent with body site      CAPILLARY BLOOD GLUCOSE      POCT Blood Glucose.: 258 mg/dL (26 May 2025 11:43)  POCT Blood Glucose.: 219 mg/dL (26 May 2025 05:16)  POCT Blood Glucose.: 171 mg/dL (25 May 2025 23:11)  POCT Blood Glucose.: 158 mg/dL (25 May 2025 17:32)        RADIOLOGY & ADDITIONAL TESTS:    < from: TTE Limited W or WO Ultrasound Enhancing Agent (05.22.25 @ 09:56) >  CONCLUSIONS:    This was a limited study to assess biventricular systolic function.      1. Technically difficult image quality.   2. Left ventricular systolic function is moderately decreased with an ejection fraction visually estimated at 30 to 35 %. Global left ventricular hypokinesis.   3. There is no evidence of a left ventricular thrombus.   4. Enlarged right ventricular cavity size and moderately reduced right ventricular systolic function.   5. No pericardial effusion seen.   6. Compared to the transthoracic echocardiogram performed on 5/20/2025, there have been no significant interval changes.    < end of copied text >      < from: US Abdomen Upper Quadrant Right (05.19.25 @ 07:19) >  IMPRESSION:    1. The gallbladder is distended and contains stones. There is an   echogenic nonshadowing focus in the region of the gallbladder neck which   could represent an aggregate of sludge or nonshadowing stone. The   gallbladder wall is thickened and edematous. The patient could not be   evaluated for a sonographic [place] sign. There is trace pericholecystic   fluid;however, there is also perihepatic ascites. The constellation of   findings is concerning for possible acute cholecystitis. Clinical and   laboratory correlation is advised. Surgical consultation is suggested for   further evaluation and management.  2. The common bile duct is mildly dilated measuring up to 8.5 mm. The   distal segment of the duct cannot be visualized on this examination and a   stone or other abnormality within the distal duct could not be excluded.   Clinical and laboratory correlation is again recommended.    Further evaluation of these findings with abdominal MRI/MRCP and/or HIDA   scan could be performed if clinically possible. Surgical consultation is   again suggested.    Findings were discussed with Dr. Moser 5/19/20258:53 AM by Dr. Porras   with read back confirmation.    < end of copied text >    < from: CT Angio Chest PE Protocol w/ IV Cont (05.19.25 @ 00:03) >    IMPRESSION:    No pulmonary embolus.    Airspace consolidations at the lung bases may represent atelectasis   and/or pneumonia.    No bowel obstruction.    Cholelithiasis.    Trace ascites.    < end of copied text >      MEDICATIONS  (STANDING):  chlorhexidine 2% Cloths 1 Application(s) Topical <User Schedule>  dextrose 50% Injectable 25 Gram(s) IV Push once  dextrose 50% Injectable 12.5 Gram(s) IV Push once  dextrose 50% Injectable 25 Gram(s) IV Push once  enoxaparin Injectable 80 milliGRAM(s) SubCutaneous every 12 hours  insulin lispro (ADMELOG) corrective regimen sliding scale   SubCutaneous three times a day before meals  meropenem  IVPB 1000 milliGRAM(s) IV Intermittent every 8 hours  multiple electrolytes Injection Type 1 1000 milliLiter(s) (55 mL/Hr) IV Continuous <Continuous>  pantoprazole  Injectable 40 milliGRAM(s) IV Push daily    MEDICATIONS  (PRN):  bacitracin   Ointment 1 Application(s) Topical two times a day PRN wound care  morphine  - Injectable 2 milliGRAM(s) IV Push every 6 hours PRN Severe Pain (7 - 10)  morphine  - Injectable 1 milliGRAM(s) IV Push every 6 hours PRN Moderate Pain (4 - 6)

## 2025-05-26 NOTE — PROGRESS NOTE ADULT - SUBJECTIVE AND OBJECTIVE BOX
DOMINIC ALAMO  [id number]      Chief Complaint:   follow up sepsis/CAD    Subjective:  Patient awake and alert in chairs, offers no c/o.    24 hour Tele:   SR, PACs          bacitracin   Ointment 1 Application(s) Topical two times a day PRN  chlorhexidine 2% Cloths 1 Application(s) Topical <User Schedule>  dextrose 5%. 1000 milliLiter(s) IV Continuous <Continuous>  dextrose 5%. 1000 milliLiter(s) IV Continuous <Continuous>  dextrose 5%. 1000 milliLiter(s) IV Continuous <Continuous>  enoxaparin Injectable 40 milliGRAM(s) SubCutaneous every 24 hours  glucagon  Injectable 1 milliGRAM(s) IntraMuscular once  hydrocortisone sodium succinate Injectable 50 milliGRAM(s) IV Push every 12 hours  meropenem  IVPB 1000 milliGRAM(s) IV Intermittent every 8 hours  pantoprazole  Injectable 40 milliGRAM(s) IV Push daily  rifAXIMin 550 milliGRAM(s) Oral two times a day          Physical Exam:  T(C): 37 (25 @ 09:00), Max: 37.2 (25 @ 04:00)  HR: 72 (25 @ 10:22) (68 - 84)  BP: 115/88 (25 @ 10:22) (108/60 - 141/73)  RR: 27 (25 @ 10:22) (20 - 28)  SpO2: 95% (25 @ 10:22) (90% - 98%)  General: Comfortable in NAD  Neck: No JVD  CVS: nl s1s2, no s3  Pulm: CTA b/l  Abd: soft, non-tender  Ext: No c/c/e  Neuro A&O x3  Psych: Normal affect      Labs:   26 May 2025 02:30    149    |  113    |  42.3   ----------------------------<  198    3.7     |  21.0   |  0.75     Ca    7.9        26 May 2025 02:30  Phos  3.5       26 May 2025 02:30  Mg     2.1       26 May 2025 02:30    TPro  5.3    /  Alb  3.2    /  TBili  2.3    /  DBili  x      /  AST  41     /  ALT  54     /  AlkPhos  99     26 May 2025 02:30                          12.0   16.42 )-----------( 118      ( 26 May 2025 02:30 )             35.4     PT/INR - ( 25 May 2025 05:00 )   PT: 19.7 sec;   INR: 1.71 ratio         PTT - ( 25 May 2025 05:00 )  PTT:23.5 sec            Echo: 2025:    1. Left ventricular systolic function is severely decreased with an ejection fraction visually estimated at 30 to 35 %. Global left ventricular hypokinesis.   2. Enlarged right ventricular cavity size and moderately reduced right ventricular systolic function.   3. Left atrium is normal in size.   4. There is mild calcification of the mitral valve annulus.   5. Moderate to severe mitral regurgitation.   6. Moderate tricuspid regurgitation.   7. Compared to the transthoracic echocardiogram performed on 2025, the LV and RV are now better visualized.    Echo 2025:   1. Technically difficult image quality.   2. Left ventricular systolic function is moderately decreased with an ejection fraction visually estimated at 30 to 35 %. Global left ventricular hypokinesis.   3. There is no evidence of a left ventricular thrombus.   4. Enlarged right ventricular cavity size and moderately reduced right ventricular systolic function.   5. No pericardial effusion seen.   6. Compared to the transthoracic echocardiogram performed on 2025, there have been no significant interval changes.      CXR:  Endotracheal tube tip 5 cm above the gayla. Enteric tube tip below the   edge of the image, and rightIJ catheter tip in the right atrium. No   pneumothorax. No focal consolidation or pleural effusion.    CTA C/A/P:  No pulmonary embolus.  Airspace consolidations at the lung bases may represent atelectasis   and/or pneumonia.  No bowel obstruction.  Cholelithiasis.  Trace ascites.        Assessment:  76 yo male PMHx HTN, HLD, CAD based on abnormal nuke, TIA, remote pericarditis in 2015 presented to the ED with complaints of abdominal pain, nausea, vomiting for since the day prior.  Upon arrival to the ED patient was lethargic and tachypneic, with abdominal distention and mottled skin.  He was noted to be hypotensive despite fluid resuscitation and placed on escalating doses of pressors.  He was started on BIPAP initially for tachypnea and metabolic acidosis, eventually intubated in the ED.  Patient remains intubated and history taken from chart and d/w RN.  Patient was on escalating doses of pressors and , but in last day has been weaned down.  Echo with severe LV dysfxn which appears new and likely stress myopathy.  CI on low end of normal on .  Likely sepsis from GNR septicemia from cholangitis as cause now s/p perc tube.  Noted AF with some RVR due to shock, , etc.  -DCCV yesterday for rapid AF, reviewed tele and no VT.  -Remains in SR.  -Patient extubated and doing well.  Now off pressors/ inotropic support with normal CO.    Plan:  1. GDMT once more stable.  2. Rest as per ICU.  3. Eventual repeat echo when improved to reassess EF.  4. Eventual ischemic eval, likely with stress testing when more stable.

## 2025-05-26 NOTE — PROGRESS NOTE ADULT - SUBJECTIVE AND OBJECTIVE BOX
Patient is a 75y old  Male who presents with a chief complaint of Acute respiratory failure, shock (25 May 2025 14:32)    BRIEF HOSPITAL COURSE:   76 yo male with hx of HTN, HLD, CAD, TIA, remote pericarditis in 2015, presented to the ED with complaints of abdominal pain, nausea, vomiting for since the day prior.  Upon arrival to the ED patient was lethargic and tachypneic, with abdominal distention and mottled skin.    He was noted to be hypotensive despite fluid resuscitation and placed on escalating doses of pressors.    Started on Bipap initially for tachypnea and metabolic acidosis, eventually intubated in the ED. Labs revealed severe metabolic acidosis. OLEKSANDR, transaminitis. Admitted to MICU for mixed shock, cardiogenic vs septic, requiring quadruple pressors. Over 7+ day MICU stay the patient was slowly weaned off of pressors. He is s/p PCT. Bile and blood cultures grew GNR. His repeat Bcx have been NGTD > 48 hours. He is now off pressors, off dobutamine and milrinone for inotropic support. He was de-lined 5/25. Did not pass SLP so will be getting MBS for further evaluation of ability to swallow.    OVN:   No ovn events.    Interval HPI:  Patient seen and examined at bedside. Alert, oriented, interacting well. Slightly hypophonic as he is s/p extubation. Otherwise, doing well overall, endorses appetite.      PAST MEDICAL & SURGICAL HISTORY:  AMI (Acute Myocardial Infarction)  2001      Transient ischemic attack (TIA)      No Past Surgical History        Review of Systems:  All other review of systems negative except as noted in HPI    MEDICATIONS  (STANDING):  chlorhexidine 2% Cloths 1 Application(s) Topical <User Schedule>  dextrose 5%. 1000 milliLiter(s) (100 mL/Hr) IV Continuous <Continuous>  dextrose 5%. 1000 milliLiter(s) (50 mL/Hr) IV Continuous <Continuous>  dextrose 5%. 1000 milliLiter(s) (150 mL/Hr) IV Continuous <Continuous>  dextrose 50% Injectable 25 Gram(s) IV Push once  dextrose 50% Injectable 12.5 Gram(s) IV Push once  dextrose 50% Injectable 25 Gram(s) IV Push once  enoxaparin Injectable 40 milliGRAM(s) SubCutaneous every 24 hours  glucagon  Injectable 1 milliGRAM(s) IntraMuscular once  hydrocortisone sodium succinate Injectable 50 milliGRAM(s) IV Push every 12 hours  insulin glargine Injectable (LANTUS) 12 Unit(s) SubCutaneous every morning  insulin lispro (ADMELOG) corrective regimen sliding scale   SubCutaneous every 6 hours  insulin lispro Injectable (ADMELOG) 7 Unit(s) SubCutaneous every 6 hours  meropenem  IVPB 1000 milliGRAM(s) IV Intermittent every 8 hours  pantoprazole  Injectable 40 milliGRAM(s) IV Push daily  potassium chloride  10 mEq/100 mL IVPB 10 milliEquivalent(s) IV Intermittent every 1 hour  rifAXIMin 550 milliGRAM(s) Oral two times a day    MEDICATIONS  (PRN):  bacitracin   Ointment 1 Application(s) Topical two times a day PRN wound care  dextrose Oral Gel 15 Gram(s) Oral once PRN Blood Glucose LESS THAN 70 milliGRAM(s)/deciliter        ICU Vital Signs Last 24 Hrs  T(C): 36.6 (26 May 2025 07:00), Max: 37.2 (26 May 2025 04:00)  T(F): 97.9 (26 May 2025 07:00), Max: 99 (26 May 2025 04:00)  HR: 69 (26 May 2025 07:00) (66 - 84)  BP: 114/66 (26 May 2025 07:00) (108/60 - 141/73)  BP(mean): 81 (26 May 2025 07:00) (74 - 94)  ABP: 106/48 (25 May 2025 18:00) (94/40 - 119/53)  ABP(mean): -5 (25 May 2025 18:00) (-23 - 80)  RR: 20 (26 May 2025 07:00) (20 - 28)  SpO2: 96% (26 May 2025 07:00) (90% - 98%)    O2 Parameters below as of 26 May 2025 08:00  Patient On (Oxygen Delivery Method): nasal cannula  O2 Flow (L/min): 2        ABG - ( 24 May 2025 09:54 )  pH, Arterial: 7.490 pH, Blood: x     /  pCO2: 42    /  pO2: 92    / HCO3: 32    / Base Excess: 8.7   /  SaO2: 98.1              I&O's Detail    25 May 2025 07:01  -  26 May 2025 07:00  --------------------------------------------------------  IN:    Albumin 25%  -  50 mL: 100 mL    dextrose 5%: 100 mL    dextrose 5%: 350 mL    dextrose 5%: 1200 mL    IV PiggyBack: 100 mL    IV PiggyBack: 100 mL    IV PiggyBack: 250 mL    Milrinone: 8.7 mL    Vasopressin: 18 mL  Total IN: 2226.7 mL    OUT:    Bulb (mL): 150 mL    Indwelling Catheter - Urethral (mL): 1495 mL    Norepinephrine: 0 mL    Rectal Tube (mL): 50 mL  Total OUT: 1695 mL    Total NET: 531.7 mL          LABS:                        12.0   16.42 )-----------( 118      ( 26 May 2025 02:30 )             35.4     05-26    149[H]  |  113[H]  |  42.3[H]  ----------------------------<  198[H]  3.7   |  21.0[L]  |  0.75    Ca    7.9[L]      26 May 2025 02:30  Phos  3.5     05-26  Mg     2.1     05-26    TPro  5.3[L]  /  Alb  3.2[L]  /  TBili  2.3[H]  /  DBili  x   /  AST  41[H]  /  ALT  54[H]  /  AlkPhos  99  05-26          CAPILLARY BLOOD GLUCOSE      POCT Blood Glucose.: 219 mg/dL (26 May 2025 05:16)    PT/INR - ( 25 May 2025 05:00 )   PT: 19.7 sec;   INR: 1.71 ratio         PTT - ( 25 May 2025 05:00 )  PTT:23.5 sec  Urinalysis Basic - ( 26 May 2025 02:30 )    Color: x / Appearance: x / SG: x / pH: x  Gluc: 198 mg/dL / Ketone: x  / Bili: x / Urobili: x   Blood: x / Protein: x / Nitrite: x   Leuk Esterase: x / RBC: x / WBC x   Sq Epi: x / Non Sq Epi: x / Bacteria: x      CULTURES:  Culture Results:   Few Mold Like Fungus Referred to Mycology  Commensal yair consistent with body site (05-24 @ 05:15)  Culture Results:   No growth at 4 days (05-21 @ 02:50)  Culture Results:   No growth at 5 days (05-20 @ 11:15)  Culture Results:   Numerous Escherichia coli ESBL  Numerous Citrobacter freundii complex  Numerous Enterococcus gallinarum  Numerous Enterococcus faecalis (05-19 @ 17:15)  Culture Results:   Growth in aerobic and anaerobic bottles: Escherichia coli ESBL  See previous culture [id number] (05-19 @ 10:15)      Physical Examination:    General: No acute distress.    HEENT: Pupils equal, reactive to light.  Symmetric.  PULM: Clear to auscultation bilaterally, no significant sputum production, no wheezing, crackles, or rhonchi  NECK: Supple, no lymphadenopathy, trachea midline  CVS: Regular rate and rhythm, no murmurs, rubs, or gallops  ABD: Soft, nondistended, nontender, normoactive bowel sounds, no masses  EXT: Nontender, no clubbing, cyanosis, or edema  SKIN: Warm and well perfused, no rashes noted.  NEURO: Alert, oriented, interactive, nonfocal    DEVICES:   P IV    RADIOLOGY:

## 2025-05-26 NOTE — PROGRESS NOTE ADULT - ASSESSMENT
MICU Transfer / Hospital course:   76 y/o M w/ PMH of HTN, HLD, CAD, TIA, remote hx of pericarditis (2015) presented 5/18 to ED c/o abd pain, N/V.  In the ED pt was found to be hypotensive and failed fluid resuscitation and started on pressor support.  Pt also hypoxic and initially placed on BIPAP but eventually required intubation and admitted to MICU for septic shock 2/2 cholangitis, UTI and acute pancreatitis.  Pt also profoundly acidotic w/ ATN from septic shock, transaminitis from shock liver/cholangitis, lipase >3K, , LA>16, procal >100 and abd US w/ distended GB w/ stones and CT a/p w/ cholelithiasis but pancreas reported to be normal.  GI and surgery consulted but pt deemed too unstable to undergo any procedures and ultimately had IR guided primitivo tube placed 5/19 and dark malodorous bile drained and sent for cultures which grew ESBL ecoli, citrobacter freundii, Enterococcus gallinarum and enterococcus faecalis.  Bcx 5/18 gew ESBL ecoli, Citerobacter freundii and strep gallalyticus.  Ucx 5/18 grew ESBL ecoli.  Repeat Bcx 5/19 grew only ESBL Ecoli and BCxs 5/20 NGTD.  Sputum cx 5/24 growing mold like fungus on prelim report and refered to mycology.  ID consulted and pt currently on merrem.  Pt required quadruple pressor support and given methylene blue 5/19 for vasoplegia and was on course of solucortef 5/19-5/25 to help wean off pressors in setting of septic shock.   Hospital course complicated by SVT 5/19 that required emergent cardioversion.  Pt also found to have high ammonia and placed on rifaxamin and lactulose but no known hx of cirrhosis and have since been d/c'd.  Pt successfully weaned off pressors 5/23.  During hospital course pt also found to have new rEF likely stress cardiomyopathy and required dobutamine (5/20-5/23) and milrinone gtts (5/23-5/25).  Hospital course further complicated by AF RVR requiring emergent cardioversion 5/21 and was amio loaded but now in NSR and not on amio.  Full dose AC was held despite AF w/ chadsvasc 6 bc severe thrombocytopenia likely 2/2 sepsis.  Pt extubated 5/24 and OGT removed.  Pt failed SLP eval and family declined NGT for now.  Pt will go for MBS tomorrow 5/27 to better assess his ability to swallow.  Pt medically stable for transfer to medicine for continued management.          Septic shock 2/2 cholangitis, UTI and bacteremia   - s/p quadruple pressor support, methylene blue for vasoplegia and was on course of solucortef to finally wean off pressors 5/23  - Was on solucortef 5/19-5/25 to help wean off pressors   - Pt has up trending WBC which likely from recent steroid course   - US w/ distended GB w/ stones and CT a/p w/ cholelithiasis but pancreas reported to be normal  - IR guided primitivo tube placed 5/19 and drained dark malodorous bilious fluid drained and sent for Cxs which grew ESBL ecoli, citrobacter freundii, Enterococcus gallinarum and enterococcus faecalis  - Bcx 5/18 gew ESBL ecoli, Citerobacter freundii and strep gallalyticus, Bcx 5/19 grew only ESBL Ecoli and BCxs 5/20 NGTD  - Urine cx 5/18 grew ESBL ecoli  - Sputum cx 5/24 prelim reporting mold like fungus and referred to mycology; f/u final results   - c/w merrem as per ID recs   - Trend CBC and monitor temperature       Acute hypoxic respiratory failure possibly 2/2 aspiration   - Intubated 5/18 and extubated 5/24 but continues requiring supplemental O2   - Currently on 3L NC and satting well   - Sputum cx 5/24 growing mold like fungus on prelim report and referred to mycology; f/u final results  - Duonebs as needed   - Encourage incentive spirometer and OOB to chair   - Continue to wean off O2 as tolerated       Diarrhea   - Rectal tube in place   - Started having watery diarrhea shortly after admission that was for Initially suspected to be due to tube feeds   - Has been off tube feeds for days and continues to have diarrhea   - Could also be adverse effect of antibiotics   - Given up trending WBC, diffuse abd pain w/ distended abd and persistent diarrhea will order stat abd xray to assess for evidence of toxic megacolon   - If findings on Xray concerning for toxic megacolon would get CT a/p and check for Cdiff   - Monitor stool output and remove rectal tube once diarrhea resolved   - If leukocytosis continues to worsen despite being off steroids check Cdiff and GI PCR       Mild AGMA etiology unclear but could have component of starvation ketosis as pt remains NPO    - Metabolic acidosis was severe on admission from hypoxia and profound lactic acidosis >16  - Currently corrected AG 17 and serum HCO3 21  - Delta delta consistent w/ pure AGMA which would fit current suspected etiology   - LA normal yesterday but will repeat lactate, check urica acid, UA and acetone level   - If AG continues to rise will need ABG to better assess acid base disturbance        Hypovolemic  hypernatremic hyperchloremia   - Likely multifactorial from ongoing diarrhea   - Was also on short course of bumex gtt during MICU course   - Will start on plasmalyte for now but only short course given pt has rEF  - Monitor I/O's and lytes closely       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 shock   - Required dobutamine (5/20-5/23) and milronone gtts (5/23-5/25)  - TTE 5/20 w/ LVEF 30-35% and remained unchanged on repeat TTE 5/22  - Clinically hypovolemic at this time likely from ongoing diarrhea and is NPO  - Giving short course of gentle IVFs but monitor vol status closely   - GDMT on hold in light of recent shock but once BP can tolerate should start   - Monitor daily weights and strict I/O's   - Monitor on telemetry   - Cardio following       pAF   - s/p emergent cardioversion in MICU   - Now in sinus rhythm  - CHADSVASC = 6  - AC initially held bc of severe thrombocytopenia   - Plts significantly improved and will start full dose AC        Dysphagia   - Pt failed SLP eval and family declined NGT for now  - Will go for MBS tomorrow 5/27 to better assess his ability to swallow  - Keep NPO for now w/ strict aspiration precautions       RUE edema   - No signs of cellulitis   - Suspect thrombophlebitis   - Will order RUE US to r/o DVT   - c/w supportive care including RUE elevation       Small area of Rt toe ischemia   - Likely 2/2 levophed vs vasoplegia   - Monitor for resolution       ATN from septic shock  - Resolved   - Caution w/ hyperchloremia as can cause OLEKSANDR   - Monitor renal function      Transaminitis from shock liver and cholangitis   - Resolving but Tbili remains elevated   - Trend LFTs and coags        Acute gallstone pancreatitis   - Epigastric abd pain w/ lipase >3K-->48  - -->492  - CT a/p reported normal pancrease  - Would start on fibrate prior to d/c for TG  - Will likely eventually need GB removed as well as ERCP        Normocytic anemia   Thrombocytopenia, resolving, 2/2 sepsis   - H/H mildly low and stable   - Plts improving and >50K therefore starting full dose AC for AF   - Remains hemodynamically stable   - No active bleeding reported   - BUN elevated but likely 2/2 recent steroids and down trending since steroids stopped   - c/w protonix for GI ppx  - Monitor CBC and transfuse for Hb<8       Hyperammonemia, resolved    - Suspect 2/2 infection w/ e.coli as it is a ureas producing organisms which hydrolyzes urea to ammonium and ultimately converts to ammonia    - Started on rifaxamin in ICU but now d/c'd as pt has no hx of cirrhosis  - Ammonia normalized yesterday         VTE ppx: lovenox     Dispo: Acute.  Anticipate prolonged hospital course.  Will likely also need PEDRO on d/c.  PT consulted.

## 2025-05-26 NOTE — PROGRESS NOTE ADULT - ATTENDING COMMENTS
74 yo male with HTN, CAD, HLD, TIA, remote pericarditis, admitted with septic shock, multiorgan failure, ESBL gram negative bacteremia, biliary sepsis/ acute cholecystitis requiring percutaneous drainage, OLEKSANDR, severe metabolic acidosis, acute respiratory failure requiring mechanical ventilation, stress cardiomyopathy/ cardiogenic shock requiring inotropic support.    Patient now weaned off pressors/inotropes, extubated, improved renal function.    D/c stress dose steroids.  BP stable, can start introducing GDMT when he passes a swallow eval.  Needs aggressive PT  Transfer out of ICU.

## 2025-05-26 NOTE — PROGRESS NOTE ADULT - ASSESSMENT
Mr. Bateman is a 75-year-old male with a history of CAD s/p MI, TIA, HTN, and HLD, who was admitted to the MICU on 18-May-2025 (now MICU Day +7) for acute respiratory failure and shock secondary to cholecystitis/cholangitis. Over the past 24 hours, he was successfully extubated on 24-May and currently remains on 2L nasal cannula. He underwent DCCV for atrial fibrillation on 19 and 21-May and is now in sinus rhythm. He is off pressors and inotropes as of this AM. Polymicrobial bacteremia (including ESBL E. coli and Enterococcus) and biliary infection are being treated with meropenem. He is alert and interactive. Code status reverted to Full Code.    #Neuro:    -Status: Alert, oriented, and interactive post-extubation (24-May). No active sedation.  -Monitoring: Neuro checks qShift and PRN. Monitor for delirium. Aspiration precautions. HOB elevated.    #CV:    -Status: Mixed shock (septic/cardiogenic) requiring ongoing vasopressor/inotropic support. S/p DCCV (24-May) for A-fib, currently in SR.   -Hx of CAD, EF 30-35% on TTE, bedside POCUS shows improving function  -Pressors/Inotropes: Dc'd milrinone and levophed today  -Steroids: Hydrocortisone sodium succinate 50 mg IV Push q12h.  -Consults: Cardiology following. Plan for GDMT once more stable and eventual repeat echo.    #Resp:    -Status: Acute respiratory failure, successfully extubated on 24-May.  -Oxygen: 2L nasal cannula, SpO2 96% (12:15). RR 23/min.  -Pulmonary Toilet: Encourage cough/deep breathing, incentive spirometry. Monitor for respiratory distress.    #Renal/FEN:    -Status: OLEKSANDR significantly improved. Metabolic alkalosis.  -Albumin human 25% 50 mL IV q6h.  Electrolytes:  Will replete K  Nutrition: NPO. Failed SLP, plan for MBS    #GI/Abdomen:    -Acute cholecystitis/cholangitis s/p cholecystostomy tube (19-May). Pancreatitis.  -Pantoprazole 40 mg IV Push daily.  -Rifaximin 550 mg PO BID (Lactulose discontinued).  -Cholecystostomy tube: Output 150 mL/24h.    #Heme/Onc:    -Anticoagulation: Enoxaparin 40 mg SubCutaneous q24h.  -Monitor Hgb. Transfuse PRBCs if Hgb <7 g/dL or symptomatic. Monitor platelets.    ID:    -Septic shock from cholecystitis/cholangitis. Polymicrobial bacteremia. Tmax 37.2°C (24-May), current T 36.8°C (25-May 11:00).  -Antibiotics: Meropenem 1000 mg IV q8h.  Cultures:  Blood Cx (18-May): E. coli ESBL, Citrobacter freundii, Strep gallolyticus, GPR.  Cholecystostomy bile cx (19-May): E. coli ESBL, Citrobacter freundii complex, Enterococcus gallinarum, Enterococcus faecalis.  Urine cx (18-May): E.coli ESBL.  Wound cx (24-May): Commensal yair.  Monitor for signs of infection. Continue ID recommendation.    Endo:    Status: Hyperglycemia.  Glucose control:  Serum Glucose (25-May 12:00): 279 mg/dL (H).  POCT Glucose (25-May 11:52): 279 mg/dL (H).  Management: Insulin glargine (LANTUS) 12 Units SubCutaneous qAM. Insulin lispro (ADMELOG) 7 Units SubCutaneous q6h + corrective regimen sliding scale. D5W infusions ongoing.  Steroids: Hydrocortisone 50 mg IV q12h.    Prophylaxis:    VTE: Enoxaparin 40 mg SQ q24h.  Stress Ulcer: Pantoprazole 40 mg IV Push daily.  Skin: Chlorhexidine 2% cloths topical daily. Bacitracin ointment 1 Application(s) Topical BID PRN wound care.    Disp/GOC:    Location: MICU.  Primary Reason for ICU: Management of shock (septic/cardiogenic) and respiratory failure, post-extubation monitoring.  Prognosis: Guarded, though showing some improvement with extubation, resolving OLEKSANDR, and recent DCCV.  Code Status: Full Code (DNR rescinded by family on 24-May).  Family Communication: Update family on progress, current hemodynamic support, and ongoing guarded prognosis. Mr. Bateman is a 75-year-old male with a history of CAD s/p MI, TIA, HTN, and HLD, who was admitted to the MICU on 18-May-2025 (now MICU Day +8) for acute respiratory failure and shock secondary to cholecystitis/cholangitis. Over the past 24 hours, he was successfully extubated on 24-May and currently remains on 2L nasal cannula. He underwent DCCV for atrial fibrillation on 19 and 21-May and is now in sinus rhythm. He is off pressors and inotropes as of this AM. Polymicrobial bacteremia (including ESBL E. coli and Enterococcus) and biliary infection are being treated with meropenem. He is alert and interactive. Code status reverted to Full Code.    #Neuro:    -Status: Alert, oriented, and interactive post-extubation (24-May). No active sedation.  -Monitoring: Neuro checks qShift and PRN. Monitor for delirium. Aspiration precautions. HOB elevated.    #CV:    -Status: Mixed shock (septic/cardiogenic) requiring ongoing vasopressor/inotropic support. S/p DCCV (24-May) for A-fib, currently in SR.   -Hx of CAD, EF 30-35% on TTE, bedside POCUS shows improving function  -Pressors/Inotropes: Dc'd milrinone and levophed today  -Steroids: Hydrocortisone sodium succinate 50 mg IV Push q12h.  -Consults: Cardiology following. Plan for GDMT once more stable and eventual repeat echo.    #Resp:    -Status: Acute respiratory failure, successfully extubated on 24-May.  -Oxygen: 2L nasal cannula, SpO2 96% (12:15). RR 23/min.  -Pulmonary Toilet: Encourage cough/deep breathing, incentive spirometry. Monitor for respiratory distress.    #Renal/FEN:    -Status: OLEKSANDR significantly improved. Metabolic alkalosis.  -Albumin human 25% 50 mL IV q6h.  Electrolytes:  Will replete K  Nutrition: NPO. Failed SLP, plan for MBS    #GI/Abdomen:    -Acute cholecystitis/cholangitis s/p cholecystostomy tube (19-May). Pancreatitis.  -Pantoprazole 40 mg IV Push daily.  -Rifaximin 550 mg PO BID (Lactulose discontinued).  -Cholecystostomy tube: Output 150 mL/24h.    #Heme/Onc:    -Anticoagulation: Enoxaparin 40 mg SubCutaneous q24h.  -Monitor Hgb. Transfuse PRBCs if Hgb <7 g/dL or symptomatic. Monitor platelets.    ID:    -Septic shock from cholecystitis/cholangitis. Polymicrobial bacteremia. Tmax 37.2°C (24-May), current T 36.8°C (25-May 11:00).  -Antibiotics: Meropenem 1000 mg IV q8h.  Cultures:  Blood Cx (18-May): E. coli ESBL, Citrobacter freundii, Strep gallolyticus, GPR.  Cholecystostomy bile cx (19-May): E. coli ESBL, Citrobacter freundii complex, Enterococcus gallinarum, Enterococcus faecalis.  Urine cx (18-May): E.coli ESBL.  Wound cx (24-May): Commensal yair.  Monitor for signs of infection. Continue ID recommendation.    Endo:    Status: Hyperglycemia.  Glucose control:  Serum Glucose (25-May 12:00): 279 mg/dL (H).  POCT Glucose (25-May 11:52): 279 mg/dL (H).  Management: Insulin glargine (LANTUS) 12 Units SubCutaneous qAM. Insulin lispro (ADMELOG) 7 Units SubCutaneous q6h + corrective regimen sliding scale. D5W infusions ongoing.  Steroids: Hydrocortisone 50 mg IV q12h.    Prophylaxis:    VTE: Enoxaparin 40 mg SQ q24h.  Stress Ulcer: Pantoprazole 40 mg IV Push daily.  Skin: Chlorhexidine 2% cloths topical daily. Bacitracin ointment 1 Application(s) Topical BID PRN wound care.    Disp/GOC:    Location: MICU.  Primary Reason for ICU: Management of shock (septic/cardiogenic) and respiratory failure, post-extubation monitoring.  Prognosis: Guarded, though showing some improvement with extubation, resolving OLEKSANDR, and recent DCCV.  Code Status: Full Code (DNR rescinded by family on 24-May).  Family Communication: Update family on progress, current hemodynamic support, and ongoing guarded prognosis. Mr. Bateman is a 75-year-old male with a history of CAD s/p MI, TIA, HTN, and HLD, who was admitted to the MICU on 18-May-2025 (now MICU Day +8) for acute respiratory failure and shock secondary to cholecystitis/cholangitis. Over the past 24 hours, he was successfully extubated on 24-May and currently remains on 2L nasal cannula. He underwent DCCV for atrial fibrillation on 19 and 21-May and is now in sinus rhythm. He is off pressors and inotropes as of this AM. Polymicrobial bacteremia (including ESBL E. coli and Enterococcus) and biliary infection are being treated with meropenem. He is alert and interactive. Code status reverted to Full Code. Stable for downgrade today.    #Neuro:    -Status: Alert, oriented, and interactive post-extubation (24-May). No active sedation.  -Monitoring: Neuro checks qShift and PRN. Monitor for delirium. Aspiration precautions. HOB elevated.    #CV:    -Status: Mixed shock (septic/cardiogenic) requiring ongoing vasopressor/inotropic support. S/p DCCV (24-May) for A-fib, currently in SR.   -Hx of CAD, EF 30-35% on TTE, bedside POCUS shows improving function  -Pressors/Inotropes: Dc'd milrinone and levophed today  -Steroids: Hydrocortisone sodium succinate 50 mg IV Push q12h.  -Consults: Cardiology following. Plan for GDMT once more stable and eventual repeat echo.    #Resp:    -Status: Acute respiratory failure, successfully extubated on 24-May.  -Oxygen: 2L nasal cannula, SpO2 96% (12:15). RR 23/min.  -Pulmonary Toilet: Encourage cough/deep breathing, incentive spirometry. Monitor for respiratory distress.    #Renal/FEN:    -Status: OLEKSANDR significantly improved. Metabolic alkalosis.  -Albumin human 25% 50 mL IV q6h.  Electrolytes:  Will replete K  Nutrition: NPO. Failed SLP, plan for MBS    #GI/Abdomen:    -Acute cholecystitis/cholangitis s/p cholecystostomy tube (19-May). Pancreatitis.  -Pantoprazole 40 mg IV Push daily.  -Rifaximin 550 mg PO BID (Lactulose discontinued).  -Cholecystostomy tube: Output 150 mL/24h.    #Heme/Onc:    -Anticoagulation: Enoxaparin 40 mg SubCutaneous q24h.  -Monitor Hgb. Transfuse PRBCs if Hgb <7 g/dL or symptomatic. Monitor platelets.    ID:    -Septic shock from cholecystitis/cholangitis. Polymicrobial bacteremia. Tmax 37.2°C (24-May), current T 36.8°C (25-May 11:00).  -Antibiotics: Meropenem 1000 mg IV q8h.  Cultures:  Blood Cx (18-May): E. coli ESBL, Citrobacter freundii, Strep gallolyticus, GPR.  Cholecystostomy bile cx (19-May): E. coli ESBL, Citrobacter freundii complex, Enterococcus gallinarum, Enterococcus faecalis.  Urine cx (18-May): E.coli ESBL.  Wound cx (24-May): Commensal yair.  Monitor for signs of infection. Continue ID recommendation.    Endo:    Status: Hyperglycemia.  Glucose control:  Serum Glucose (25-May 12:00): 279 mg/dL (H).  POCT Glucose (25-May 11:52): 279 mg/dL (H).  Management: Insulin glargine (LANTUS) 12 Units SubCutaneous qAM. Insulin lispro (ADMELOG) 7 Units SubCutaneous q6h + corrective regimen sliding scale. D5W infusions ongoing.  Steroids: Hydrocortisone 50 mg IV q12h.    Prophylaxis:    VTE: Enoxaparin 40 mg SQ q24h.  Stress Ulcer: Pantoprazole 40 mg IV Push daily.  Skin: Chlorhexidine 2% cloths topical daily. Bacitracin ointment 1 Application(s) Topical BID PRN wound care.    Disp/GOC:    Location: MICU. Stable for downgrade today.  Primary Reason for ICU: Management of shock (septic/cardiogenic) and respiratory failure, post-extubation monitoring.  Prognosis: Guarded, though showing some improvement with extubation, resolving OLEKSANDR, and recent DCCV.  Code Status: Full Code (DNR rescinded by family on 24-May).  Family Communication: Update family on progress, current hemodynamic support, and ongoing guarded prognosis.

## 2025-05-27 ENCOUNTER — RESULT REVIEW (OUTPATIENT)
Age: 75
End: 2025-05-27

## 2025-05-27 LAB
ALBUMIN SERPL ELPH-MCNC: 2.8 G/DL — LOW (ref 3.3–5.2)
ALBUMIN SERPL ELPH-MCNC: 3.1 G/DL — LOW (ref 3.3–5.2)
ALP SERPL-CCNC: 109 U/L — SIGNIFICANT CHANGE UP (ref 40–120)
ALP SERPL-CCNC: 118 U/L — SIGNIFICANT CHANGE UP (ref 40–120)
ALT FLD-CCNC: 34 U/L — SIGNIFICANT CHANGE UP
ALT FLD-CCNC: 42 U/L — HIGH
ANION GAP SERPL CALC-SCNC: 14 MMOL/L — SIGNIFICANT CHANGE UP (ref 5–17)
ANION GAP SERPL CALC-SCNC: 15 MMOL/L — SIGNIFICANT CHANGE UP (ref 5–17)
AST SERPL-CCNC: 24 U/L — SIGNIFICANT CHANGE UP
AST SERPL-CCNC: 39 U/L — SIGNIFICANT CHANGE UP
BASOPHILS # BLD AUTO: 0.04 K/UL — SIGNIFICANT CHANGE UP (ref 0–0.2)
BASOPHILS NFR BLD AUTO: 0.3 % — SIGNIFICANT CHANGE UP (ref 0–2)
BILIRUB SERPL-MCNC: 2.4 MG/DL — HIGH (ref 0.4–2)
BILIRUB SERPL-MCNC: 2.8 MG/DL — HIGH (ref 0.4–2)
BUN SERPL-MCNC: 27.4 MG/DL — HIGH (ref 8–20)
BUN SERPL-MCNC: 28.9 MG/DL — HIGH (ref 8–20)
CALCIUM SERPL-MCNC: 6.9 MG/DL — LOW (ref 8.4–10.5)
CALCIUM SERPL-MCNC: 7.1 MG/DL — LOW (ref 8.4–10.5)
CHLORIDE SERPL-SCNC: 111 MMOL/L — HIGH (ref 96–108)
CHLORIDE SERPL-SCNC: 112 MMOL/L — HIGH (ref 96–108)
CO2 SERPL-SCNC: 22 MMOL/L — SIGNIFICANT CHANGE UP (ref 22–29)
CO2 SERPL-SCNC: 23 MMOL/L — SIGNIFICANT CHANGE UP (ref 22–29)
CREAT SERPL-MCNC: 0.56 MG/DL — SIGNIFICANT CHANGE UP (ref 0.5–1.3)
CREAT SERPL-MCNC: 0.71 MG/DL — SIGNIFICANT CHANGE UP (ref 0.5–1.3)
EGFR: 103 ML/MIN/1.73M2 — SIGNIFICANT CHANGE UP
EGFR: 103 ML/MIN/1.73M2 — SIGNIFICANT CHANGE UP
EGFR: 96 ML/MIN/1.73M2 — SIGNIFICANT CHANGE UP
EGFR: 96 ML/MIN/1.73M2 — SIGNIFICANT CHANGE UP
EOSINOPHIL # BLD AUTO: 0.04 K/UL — SIGNIFICANT CHANGE UP (ref 0–0.5)
EOSINOPHIL NFR BLD AUTO: 0.3 % — SIGNIFICANT CHANGE UP (ref 0–6)
GLUCOSE BLDC GLUCOMTR-MCNC: 122 MG/DL — HIGH (ref 70–99)
GLUCOSE BLDC GLUCOMTR-MCNC: 135 MG/DL — HIGH (ref 70–99)
GLUCOSE BLDC GLUCOMTR-MCNC: 135 MG/DL — HIGH (ref 70–99)
GLUCOSE BLDC GLUCOMTR-MCNC: 180 MG/DL — HIGH (ref 70–99)
GLUCOSE BLDC GLUCOMTR-MCNC: 194 MG/DL — HIGH (ref 70–99)
GLUCOSE SERPL-MCNC: 136 MG/DL — HIGH (ref 70–99)
GLUCOSE SERPL-MCNC: 231 MG/DL — HIGH (ref 70–99)
HCT VFR BLD CALC: 39.8 % — SIGNIFICANT CHANGE UP (ref 39–50)
HCT VFR BLD CALC: 43.2 % — SIGNIFICANT CHANGE UP (ref 39–50)
HGB BLD-MCNC: 13.2 G/DL — SIGNIFICANT CHANGE UP (ref 13–17)
HGB BLD-MCNC: 14 G/DL — SIGNIFICANT CHANGE UP (ref 13–17)
IMM GRANULOCYTES # BLD AUTO: 0.22 K/UL — HIGH (ref 0–0.07)
IMM GRANULOCYTES NFR BLD AUTO: 1.4 % — HIGH (ref 0–0.9)
LYMPHOCYTES # BLD AUTO: 1.31 K/UL — SIGNIFICANT CHANGE UP (ref 1–3.3)
LYMPHOCYTES NFR BLD AUTO: 8.6 % — LOW (ref 13–44)
MAGNESIUM SERPL-MCNC: 1.6 MG/DL — SIGNIFICANT CHANGE UP (ref 1.6–2.6)
MAGNESIUM SERPL-MCNC: 2 MG/DL — SIGNIFICANT CHANGE UP (ref 1.6–2.6)
MCHC RBC-ENTMCNC: 28.6 PG — SIGNIFICANT CHANGE UP (ref 27–34)
MCHC RBC-ENTMCNC: 29.2 PG — SIGNIFICANT CHANGE UP (ref 27–34)
MCHC RBC-ENTMCNC: 32.4 G/DL — SIGNIFICANT CHANGE UP (ref 32–36)
MCHC RBC-ENTMCNC: 33.2 G/DL — SIGNIFICANT CHANGE UP (ref 32–36)
MCV RBC AUTO: 88.1 FL — SIGNIFICANT CHANGE UP (ref 80–100)
MCV RBC AUTO: 88.3 FL — SIGNIFICANT CHANGE UP (ref 80–100)
MONOCYTES # BLD AUTO: 0.51 K/UL — SIGNIFICANT CHANGE UP (ref 0–0.9)
MONOCYTES NFR BLD AUTO: 3.3 % — SIGNIFICANT CHANGE UP (ref 2–14)
NEUTROPHILS # BLD AUTO: 13.17 K/UL — HIGH (ref 1.8–7.4)
NEUTROPHILS NFR BLD AUTO: 86.1 % — HIGH (ref 43–77)
NRBC # BLD AUTO: 0 K/UL — SIGNIFICANT CHANGE UP (ref 0–0)
NRBC # BLD AUTO: 0 K/UL — SIGNIFICANT CHANGE UP (ref 0–0)
NRBC # FLD: 0 K/UL — SIGNIFICANT CHANGE UP (ref 0–0)
NRBC # FLD: 0 K/UL — SIGNIFICANT CHANGE UP (ref 0–0)
NRBC BLD AUTO-RTO: 0 /100 WBCS — SIGNIFICANT CHANGE UP (ref 0–0)
NRBC BLD AUTO-RTO: 0 /100 WBCS — SIGNIFICANT CHANGE UP (ref 0–0)
PHOSPHATE SERPL-MCNC: 2.3 MG/DL — LOW (ref 2.4–4.7)
PHOSPHATE SERPL-MCNC: 2.6 MG/DL — SIGNIFICANT CHANGE UP (ref 2.4–4.7)
PLATELET # BLD AUTO: 157 K/UL — SIGNIFICANT CHANGE UP (ref 150–400)
PLATELET # BLD AUTO: 170 K/UL — SIGNIFICANT CHANGE UP (ref 150–400)
PMV BLD: 11.4 FL — SIGNIFICANT CHANGE UP (ref 7–13)
PMV BLD: 11.7 FL — SIGNIFICANT CHANGE UP (ref 7–13)
POTASSIUM SERPL-MCNC: 3.1 MMOL/L — LOW (ref 3.5–5.3)
POTASSIUM SERPL-MCNC: 3.2 MMOL/L — LOW (ref 3.5–5.3)
POTASSIUM SERPL-SCNC: 3.1 MMOL/L — LOW (ref 3.5–5.3)
POTASSIUM SERPL-SCNC: 3.2 MMOL/L — LOW (ref 3.5–5.3)
PROT SERPL-MCNC: 4.9 G/DL — LOW (ref 6.6–8.7)
PROT SERPL-MCNC: 5.4 G/DL — LOW (ref 6.6–8.7)
RBC # BLD: 4.52 M/UL — SIGNIFICANT CHANGE UP (ref 4.2–5.8)
RBC # BLD: 4.89 M/UL — SIGNIFICANT CHANGE UP (ref 4.2–5.8)
RBC # FLD: 15.9 % — HIGH (ref 10.3–14.5)
RBC # FLD: 16.4 % — HIGH (ref 10.3–14.5)
SODIUM SERPL-SCNC: 148 MMOL/L — HIGH (ref 135–145)
SODIUM SERPL-SCNC: 149 MMOL/L — HIGH (ref 135–145)
WBC # BLD: 13.53 K/UL — HIGH (ref 3.8–10.5)
WBC # BLD: 15.29 K/UL — HIGH (ref 3.8–10.5)
WBC # FLD AUTO: 13.53 K/UL — HIGH (ref 3.8–10.5)
WBC # FLD AUTO: 15.29 K/UL — HIGH (ref 3.8–10.5)

## 2025-05-27 PROCEDURE — 93321 DOPPLER ECHO F-UP/LMTD STD: CPT | Mod: 26

## 2025-05-27 PROCEDURE — 99233 SBSQ HOSP IP/OBS HIGH 50: CPT

## 2025-05-27 PROCEDURE — 99232 SBSQ HOSP IP/OBS MODERATE 35: CPT

## 2025-05-27 PROCEDURE — G0545: CPT

## 2025-05-27 PROCEDURE — 93308 TTE F-UP OR LMTD: CPT | Mod: 26

## 2025-05-27 RX ORDER — MAGNESIUM SULFATE 500 MG/ML
2 SYRINGE (ML) INJECTION ONCE
Refills: 0 | Status: COMPLETED | OUTPATIENT
Start: 2025-05-27 | End: 2025-05-27

## 2025-05-27 RX ORDER — POTASSIUM PHOSPHATE, MONOBASIC POTASSIUM PHOSPHATE, DIBASIC INJECTION, 236; 224 MG/ML; MG/ML
15 SOLUTION, CONCENTRATE INTRAVENOUS ONCE
Refills: 0 | Status: COMPLETED | OUTPATIENT
Start: 2025-05-27 | End: 2025-05-27

## 2025-05-27 RX ORDER — CARVEDILOL 3.12 MG/1
6.25 TABLET, FILM COATED ORAL EVERY 12 HOURS
Refills: 0 | Status: DISCONTINUED | OUTPATIENT
Start: 2025-05-27 | End: 2025-06-03

## 2025-05-27 RX ADMIN — Medication 100 MILLIEQUIVALENT(S): at 12:51

## 2025-05-27 RX ADMIN — Medication 75 MILLILITER(S): at 11:07

## 2025-05-27 RX ADMIN — ENOXAPARIN SODIUM 80 MILLIGRAM(S): 100 INJECTION SUBCUTANEOUS at 17:56

## 2025-05-27 RX ADMIN — MEROPENEM 100 MILLIGRAM(S): 1 INJECTION INTRAVENOUS at 22:16

## 2025-05-27 RX ADMIN — Medication 100 MILLIEQUIVALENT(S): at 13:50

## 2025-05-27 RX ADMIN — Medication 40 MILLIGRAM(S): at 11:07

## 2025-05-27 RX ADMIN — INSULIN LISPRO 1: 100 INJECTION, SOLUTION INTRAVENOUS; SUBCUTANEOUS at 17:56

## 2025-05-27 RX ADMIN — Medication 1 APPLICATION(S): at 05:20

## 2025-05-27 RX ADMIN — ENOXAPARIN SODIUM 80 MILLIGRAM(S): 100 INJECTION SUBCUTANEOUS at 05:16

## 2025-05-27 RX ADMIN — MEROPENEM 100 MILLIGRAM(S): 1 INJECTION INTRAVENOUS at 05:17

## 2025-05-27 RX ADMIN — Medication 25 GRAM(S): at 15:13

## 2025-05-27 RX ADMIN — Medication 75 MILLILITER(S): at 22:17

## 2025-05-27 RX ADMIN — Medication 500 MILLILITER(S): at 20:45

## 2025-05-27 RX ADMIN — CARVEDILOL 6.25 MILLIGRAM(S): 3.12 TABLET, FILM COATED ORAL at 14:57

## 2025-05-27 RX ADMIN — POTASSIUM PHOSPHATE, MONOBASIC POTASSIUM PHOSPHATE, DIBASIC INJECTION, 62.5 MILLIMOLE(S): 236; 224 SOLUTION, CONCENTRATE INTRAVENOUS at 23:05

## 2025-05-27 RX ADMIN — Medication 100 MILLIEQUIVALENT(S): at 11:06

## 2025-05-27 RX ADMIN — MEROPENEM 100 MILLIGRAM(S): 1 INJECTION INTRAVENOUS at 17:56

## 2025-05-27 NOTE — PROGRESS NOTE ADULT - ASSESSMENT
· Assessment	  75yr man hx CAD, TIA, HTN, HLD admitted with shock, septic vs cardiogenic completed by respiratory failure in the setting of pancreatitis    Shock  Cardiogenic vs Sepsis  on pressors - wean per micu    Acute Hypoxic  Respiratory Failure  extubated  cont nc   monitor sats    Cholangitis/ Cholecystitis  Bacteremia  Surgery and GI consulted  s/p PCT  cont IV abx,    CM - EF   Afib  s/p cardioversion 5/21  s/p ionoptropes    Dysphagia  Aspiration precautions  MBS pending    Encounter for Palliative Care  Palliative Care consulted to assist with goals of care.   Patient DNR  Extubated on 5/24  NPO plan for MBS   · Assessment	  75yr man hx CAD, TIA, HTN, HLD admitted with shock, septic vs cardiogenic completed by respiratory failure in the setting of pancreatitis    Shock  Cardiogenic vs Sepsis  s/p pressors    Acute Hypoxic  Respiratory Failure  extubated  cont nc   monitor sats    Cholangitis/ Cholecystitis  Bacteremia  Surgery and GI consulted  s/p PCT  cont IV abx,    CM - EF   Afib  s/p cardioversion 5/21  s/p inotropes    Dysphagia  Aspiration precautions  MBS pending    Encounter for Palliative Care  Palliative Care consulted to assist with goals of care.   Patient DNR  Extubated on 5/24  NPO plan for MBS

## 2025-05-27 NOTE — PROGRESS NOTE ADULT - SUBJECTIVE AND OBJECTIVE BOX
CC:  Follow up GOC , Symptoms    OVERNIGHT EVENTS:  events reviewed -extubated  transferred from MICU    Present Symptoms:   Dyspnea:  No    Nausea/Vomiting:  No    Anxiety/ Agitation No     Depression:  No    Fatigue:  Yes   Loss of appetite:  r  Unable to assess NPO  Constipation: Not Reported  Pain: No               Location            Duration            Character            Severity            Factors            Effect    Pain AD Score:  [email or web address] (press ctrl + left click to view)    Review of Systems: Reviewed as above  All others negative      MEDICATIONS  (STANDING):  chlorhexidine 2% Cloths 1 Application(s) Topical <User Schedule>  dextrose 50% Injectable 25 Gram(s) IV Push once  dextrose 50% Injectable 12.5 Gram(s) IV Push once  dextrose 50% Injectable 25 Gram(s) IV Push once  enoxaparin Injectable 80 milliGRAM(s) SubCutaneous every 12 hours  insulin lispro (ADMELOG) corrective regimen sliding scale   SubCutaneous three times a day before meals  magnesium sulfate  IVPB 2 Gram(s) IV Intermittent once  meropenem  IVPB 1000 milliGRAM(s) IV Intermittent every 8 hours  multiple electrolytes Injection Type 1 1000 milliLiter(s) (55 mL/Hr) IV Continuous <Continuous>  pantoprazole  Injectable 40 milliGRAM(s) IV Push daily  potassium chloride  10 mEq/100 mL IVPB 10 milliEquivalent(s) IV Intermittent every 1 hour  sodium chloride 0.9%. 1000 milliLiter(s) (75 mL/Hr) IV Continuous <Continuous>    MEDICATIONS  (PRN):  bacitracin   Ointment 1 Application(s) Topical two times a day PRN wound care  morphine  - Injectable 2 milliGRAM(s) IV Push every 6 hours PRN Severe Pain (7 - 10)  morphine  - Injectable 1 milliGRAM(s) IV Push every 6 hours PRN Moderate Pain (4 - 6)    PHYSICAL EXAM:  Vital Signs Last 24 Hrs  T(C): 37.1 (27 May 2025 11:00), Max: 37.1 (27 May 2025 11:00)  T(F): 98.7 (27 May 2025 11:00), Max: 98.7 (27 May 2025 11:00)  HR: 90 (27 May 2025 11:00) (71 - 90)  BP: 157/80 (27 May 2025 11:00) (123/69 - 157/80)  BP(mean): --  RR: 19 (27 May 2025 11:00) (16 - 20)  SpO2: 96% (27 May 2025 11:00) (95% - 97%)    Parameters below as of 27 May 2025 11:00  Patient On (Oxygen Delivery Method): nasal cannula  O2 Flow (L/min): 3    Karnofsky: 40 %  General:  Elder man        HEENT:  NCAT    Lungs: comfortable  non labored  CV:  RR  GI:   soft NTND  MSK: weak   Skin:  warm/dry  Neuro  no focal deficits  Psych  mood / affect stable     LABS:                          14.0   15.29 )-----------( 157      ( 27 May 2025 06:17 )             43.2     05-27    149[H]  |  111[H]  |  28.9[H]  ----------------------------<  136[H]  3.1[L]   |  23.0  |  0.56    Ca    7.1[L]      27 May 2025 06:17  Phos  2.6     05-27  Mg     1.6     05-27    TPro  5.4[L]  /  Alb  3.1[L]  /  TBili  2.8[H]  /  DBili  x   /  AST  39  /  ALT  42[H]  /  AlkPhos  118  05-27      Urinalysis Basic - ( 27 May 2025 06:17 )    Color: x / Appearance: x / SG: x / pH: x  Gluc: 136 mg/dL / Ketone: x  / Bili: x / Urobili: x   Blood: x / Protein: x / Nitrite: x   Leuk Esterase: x / RBC: x / WBC x   Sq Epi: x / Non Sq Epi: x / Bacteria: x      I&O's Summary    26 May 2025 07:01  -  27 May 2025 07:00  --------------------------------------------------------  IN: 0 mL / OUT: 1385 mL / NET: -1385 mL        RADIOLOGY & ADDITIONAL STUDIES:  Imaging Reviewed  ( x  )   < from: Xray Abdomen 2 View PORTABLE -Urgent (Xray Abdomen 2 View PORTABLE -Urgent .) (05.26.25 @ 15:16) >    ACC: [id number] EXAM:  XR ABDOMEN PORTABLE URGENT 2V   ORDERED BY: FLORY JACOBSEN     PROCEDURE DATE:  05/26/2025          INTERPRETATION:  Clinical history: 75-year-old male, rule out toxic   megacolon.    Portable supine/upright views of abdomen are correlated with the CT of   5/18/2025.    FINDINGS: Nonobstructive bowel gas pattern with no pneumoperitoneum or   acute osseous findings.    Right-sided drain remains in place.    IMPRESSION: Nonobstructive bowel gas pattern with normal caliber colon    --- End of Report ---            LYN RODRÍGUEZ DO; Attending Radiologist  This document has been electronically signed. May 26 2025  3:51PM    < end of copied text >      < from: Xray Chest 1 View-PORTABLE IMMEDIATE (Xray Chest 1 View-PORTABLE IMMEDIATE .) (05.21.25 @ 22:11) >    ACC: [id number] EXAM:  XR CHEST PORTABLE IMMED 1V   ORDERED BY: ALEXANDRA M GOLDMANN     PROCEDURE DATE:  05/21/2025          INTERPRETATION:  TIME OF EXAM: May 21, 2025 at 9:54 PM.    CLINICAL INFORMATION: Intubated.    COMPARISON:  May 20, 2025.    TECHNIQUE:   AP Portable chest x-ray.    INTERPRETATION:    Heart size and the mediastinum cannot be accurately evaluated on this   projection.  ET tube tip is approximately 2.5 cm above the gayla.  Enteric tube tip is either in the distal stomachor adjacent proximal   duodenum.  Right IJ line with tip in the right atrial/IVC junction.  There is increased right basilar opacity.  Previous left basilar/retrocardiac opacity is not significantly changed.  No pneumothorax is seen.  There is osteoarthritic degenerative change of the spine.  A pigtail catheter projects over the right upper quadrant of the abdomen.    IMPRESSION:  Enteric tube tip is either in the distal stomach or adjacent   proximal duodenum.    ET tube and right IJ line as above.    Increased right basilar opacity. Previous left basilar/retrocardiac   opacity, not significantly changed. Atelectasis, pneumonia, and/or   pleural effusions with associated passive atelectasis is possible.    --- End of Report ---            SOREN HERNÁNDEZ MD; Attending Radiologist  This document has been electronically signed. May 22 2025  8:42AM    < end of copied text >      < from: TTE Limited W or WO Ultrasound Enhancing Agent (05.22.25 @ 09:56) >  CONCLUSIONS:    This was a limited study to assess biventricular systolic function.      1. Technically difficult image quality.   2. Left ventricular systolic function is moderately decreased with an ejection fraction visually estimated at 30 to 35 %. Global left ventricular hypokinesis.   3. There is no evidence of a left ventricular thrombus.   4. Enlarged right ventricular cavity size and moderately reduced right ventricular systolic function.   5. No pericardial effusion seen.   6. Compared to the transthoracic echocardiogram performed on 5/20/2025, there have been no significant interval changes.    ________________________________________________________________________________________    < end of copied text >

## 2025-05-27 NOTE — PROGRESS NOTE ADULT - SUBJECTIVE AND OBJECTIVE BOX
DOMINIC ALAMO  [id number]      Chief Complaint:   follow up sepsis/PAF/ CMP    Subjective:  Patient awake and alert in chairs, offers no c/o.    24 hour Tele:   SR, Brief runs of afib and NSVT    bacitracin   Ointment 1 Application(s) Topical two times a day PRN  carvedilol 6.25 milliGRAM(s) Oral every 12 hours  chlorhexidine 2% Cloths 1 Application(s) Topical <User Schedule>  dextrose 50% Injectable 25 Gram(s) IV Push once  dextrose 50% Injectable 12.5 Gram(s) IV Push once  dextrose 50% Injectable 25 Gram(s) IV Push once  enoxaparin Injectable 80 milliGRAM(s) SubCutaneous every 12 hours  insulin lispro (ADMELOG) corrective regimen sliding scale   SubCutaneous three times a day before meals  magnesium sulfate  IVPB 2 Gram(s) IV Intermittent once  meropenem  IVPB 1000 milliGRAM(s) IV Intermittent every 8 hours  morphine  - Injectable 2 milliGRAM(s) IV Push every 6 hours PRN  morphine  - Injectable 1 milliGRAM(s) IV Push every 6 hours PRN  multiple electrolytes Injection Type 1 1000 milliLiter(s) IV Continuous <Continuous>  pantoprazole  Injectable 40 milliGRAM(s) IV Push daily  potassium chloride  10 mEq/100 mL IVPB 10 milliEquivalent(s) IV Intermittent every 1 hour  sodium chloride 0.9%. 1000 milliLiter(s) IV Continuous <Continuous>          Physical Exam:  T(C): 37.1 (25 @ 11:00), Max: 37.1 (25 @ 11:00)  HR: 90 (25 @ 11:00) (71 - 90)  BP: 157/80 (25 @ 11:00) (123/69 - 157/80)  RR: 19 (25 @ 11:00) (16 - 20)  SpO2: 96% (25 @ 11:00) (95% - 97%)  Wt(kg): --  General: Comfortable in NAD  Neck: No JVD  CVS: nl s1s2, no s3  Pulm: CTA b/l  Abd: soft, non-tender  Ext: No c/c/e  Neuro A&O x3  Psych: Normal affect      Labs:   27 May 2025 06:17    149    |  111    |  28.9   ----------------------------<  136    3.1     |  23.0   |  0.56     Ca    7.1        27 May 2025 06:17  Phos  2.6       27 May 2025 06:17  Mg     1.6       27 May 2025 06:17    TPro  5.4    /  Alb  3.1    /  TBili  2.8    /  DBili  x      /  AST  39     /  ALT  42     /  AlkPhos  118    27 May 2025 06:17                          14.0   15.29 )-----------( 157      ( 27 May 2025 06:17 )             43.2       Echo: 2025:    1. Left ventricular systolic function is severely decreased with an ejection fraction visually estimated at 30 to 35 %. Global left ventricular hypokinesis.   2. Enlarged right ventricular cavity size and moderately reduced right ventricular systolic function.   3. Left atrium is normal in size.   4. There is mild calcification of the mitral valve annulus.   5. Moderate to severe mitral regurgitation.   6. Moderate tricuspid regurgitation.   7. Compared to the transthoracic echocardiogram performed on 2025, the LV and RV are now better visualized.    Echo 2025:   1. Technically difficult image quality.   2. Left ventricular systolic function is moderately decreased with an ejection fraction visually estimated at 30 to 35 %. Global left ventricular hypokinesis.   3. There is no evidence of a left ventricular thrombus.   4. Enlarged right ventricular cavity size and moderately reduced right ventricular systolic function.   5. No pericardial effusion seen.   6. Compared to the transthoracic echocardiogram performed on 2025, there have been no significant interval changes.      CXR:  Endotracheal tube tip 5 cm above the gayla. Enteric tube tip below the   edge of the image, and rightIJ catheter tip in the right atrium. No   pneumothorax. No focal consolidation or pleural effusion.    CTA C/A/P:  No pulmonary embolus.  Airspace consolidations at the lung bases may represent atelectasis   and/or pneumonia.  No bowel obstruction.  Cholelithiasis.  Trace ascites.        Assessment:  76 yo male PMHx HTN, HLD, CAD based on abnormal nuke, TIA, remote pericarditis in 2015 presented to the ED with complaints of abdominal pain, nausea, vomiting for since the day prior.  Upon arrival to the ED patient was lethargic and tachypneic, with abdominal distention and mottled skin.  He was noted to be hypotensive despite fluid resuscitation and placed on escalating doses of pressors.  He was started on BIPAP initially for tachypnea and metabolic acidosis, eventually intubated in the ED.  Patient remains intubated and history taken from chart and d/w RN.  Patient was on escalating doses of pressors and , but in last day has been weaned down.  Echo with severe LV dysfxn which appears new and likely stress myopathy.  CI on low end of normal on .  Likely sepsis from GNR septicemia from cholangitis as cause now s/p perc tube.  Noted AF with some RVR due to shock, , etc.  -DCCV yesterday for rapid AF, reviewed tele and no VT.  -Remains in SR.  -Patient extubated and doing well.  Now off pressors/ inotropic support with normal CO.  -Still with runs of Afib, at time NSVT  -Repeated echo today with persistent drop on LVEF    Plan:  1. Start coreg at 3.125 bid   2. Start entresto tomorrow   3. Start AC with lovenox, transition to DOAC if no plans for surgical intervention   4. Eventual ischemic eval, likely with stress testing, tentatively   5. Rest as per primary team and other consultants.

## 2025-05-27 NOTE — PROGRESS NOTE ADULT - ASSESSMENT
76 yo male with hx of HTN, HLD, CAD, TIA, remote pericarditis in 2015, presented to the ED with complaints of abdominal pain, nausea, vomiting for since the day prior.  Upon arrival to the ED patient was lethargic and tachypneic, with abdominal distention and mottled skin.    He was noted to be hypotensive despite fluid resuscitation and placed on escalating doses of pressors.    Started on Bipap initially for tachypnea and metabolic acidosis, eventually intubated in the ED.  Labs revealed severe metabolic acidosis. OLEKSANDR, transaminitis   AS ABOVE ADMITTED WITH ABD PAIN HYPOTENSION BLOOD CX POLYMICROBIAL   ULTIMATELY HAD CHOLECYSTOTOMY TUBE PLACED ALSO POLYMICROBIAL  BLOOD CX WITH ESBL ECOLI AND CITROBACTER  BILE CX E-COLI ENTEROCOCCAL FAECALIS AND E GALLINARUM   PT CURRENTLY AWAKE ALERT OOB TO CHAIR   PT ON [facility] WHICH  WILL COVER ESBL AND STREP  [facility] HAS SOME COVERAGE FOR ENTEROCOCCUS   ALTHOUGH HE DID NOT HAVE ENTEROCOCCAL BACTEREMIAADN THE  SIGNIFICANCE IN BILE FLUID  UNCLEAR WOULD CONTINUE CURRENT IV MRERRM   AS HE CLINICALLY APPEARS TO BE IMRPOVING DESPIT NO TC   SPUTUM WITH POSTIVE MOLD  WILL AWAIT ID AND Sensitivities  ? ASPERGILLUS     WILL FOLLOW UP WITH FURTHER RECOMMENDATIONS

## 2025-05-27 NOTE — PROGRESS NOTE ADULT - SUBJECTIVE AND OBJECTIVE BOX
[employer] [facility]    pt seen and examined at bedside  feeling hungry otherwise feels okay  no ha, dizziness, cp, sob, abd pain, nausea, vomiting    MEDICATIONS  (STANDING):  chlorhexidine 2% Cloths 1 Application(s) Topical <User Schedule>  dextrose 50% Injectable 25 Gram(s) IV Push once  dextrose 50% Injectable 12.5 Gram(s) IV Push once  dextrose 50% Injectable 25 Gram(s) IV Push once  enoxaparin Injectable 80 milliGRAM(s) SubCutaneous every 12 hours  insulin lispro (ADMELOG) corrective regimen sliding scale   SubCutaneous three times a day before meals  magnesium sulfate  IVPB 2 Gram(s) IV Intermittent once  meropenem  IVPB 1000 milliGRAM(s) IV Intermittent every 8 hours  multiple electrolytes Injection Type 1 1000 milliLiter(s) (55 mL/Hr) IV Continuous <Continuous>  pantoprazole  Injectable 40 milliGRAM(s) IV Push daily  potassium chloride  10 mEq/100 mL IVPB 10 milliEquivalent(s) IV Intermittent every 1 hour  sodium chloride 0.9%. 1000 milliLiter(s) (75 mL/Hr) IV Continuous <Continuous>    MEDICATIONS  (PRN):  bacitracin   Ointment 1 Application(s) Topical two times a day PRN wound care  morphine  - Injectable 2 milliGRAM(s) IV Push every 6 hours PRN Severe Pain (7 - 10)  morphine  - Injectable 1 milliGRAM(s) IV Push every 6 hours PRN Moderate Pain (4 - 6)        I&O's Summary    26 May 2025 07:01  -  27 May 2025 07:00  --------------------------------------------------------  IN: 0 mL / OUT: 1385 mL / NET: -1385 mL        PHYSICAL EXAM:  Vital Signs Last 24 Hrs  T(C): 37.1 (27 May 2025 11:00), Max: 37.1 (27 May 2025 11:00)  T(F): 98.7 (27 May 2025 11:00), Max: 98.7 (27 May 2025 11:00)  HR: 90 (27 May 2025 11:00) (71 - 90)  BP: 157/80 (27 May 2025 11:00) (121/78 - 157/80)  BP(mean): --  RR: 19 (27 May 2025 11:00) (16 - 22)  SpO2: 96% (27 May 2025 11:00) (95% - 97%)    Parameters below as of 27 May 2025 11:00  Patient On (Oxygen Delivery Method): nasal cannula  O2 Flow (L/min): 3      GENERAL: NAD, lying in bed comfortably  HEAD:  Atraumatic, normocephalic  EYES: EOMI, PERRL  NECK: Supple, trachea midline, no JVD  HEART: Regular rate and rhythm  LUNGS: Unlabored respirations.  Clear to auscultation bilaterally, no crackles, wheezing, or rhonchi  ABDOMEN: BRITTNEY drain in place in RUQ   EXTREMITIES: 2+ peripheral pulses bilaterally. No clubbing, cyanosis, or edema  NERVOUS SYSTEM:  A&Ox3, moving all extremities, no focal deficits       LABS:                        14.0   15.29 )-----------( 157      ( 27 May 2025 06:17 )             43.2     05-27    149[H]  |  111[H]  |  28.9[H]  ----------------------------<  136[H]  3.1[L]   |  23.0  |  0.56    Ca    7.1[L]      27 May 2025 06:17  Phos  2.6     05-27  Mg     1.6     05-27    TPro  5.4[L]  /  Alb  3.1[L]  /  TBili  2.8[H]  /  DBili  x   /  AST  39  /  ALT  42[H]  /  AlkPhos  118  05-27          Urinalysis Basic - ( 27 May 2025 06:17 )    Color: x / Appearance: x / SG: x / pH: x  Gluc: 136 mg/dL / Ketone: x  / Bili: x / Urobili: x   Blood: x / Protein: x / Nitrite: x   Leuk Esterase: x / RBC: x / WBC x   Sq Epi: x / Non Sq Epi: x / Bacteria: x        CAPILLARY BLOOD GLUCOSE      POCT Blood Glucose.: 135 mg/dL (27 May 2025 11:04)  POCT Blood Glucose.: 135 mg/dL (27 May 2025 08:50)  POCT Blood Glucose.: 122 mg/dL (27 May 2025 05:16)  POCT Blood Glucose.: 120 mg/dL (26 May 2025 23:26)  POCT Blood Glucose.: 128 mg/dL (26 May 2025 17:51)        RADIOLOGY & ADDITIONAL TESTS:  Results Reviewed:   Imaging Personally Reviewed:  Electrocardiogram Personally Reviewed:

## 2025-05-27 NOTE — PROGRESS NOTE ADULT - SUBJECTIVE AND OBJECTIVE BOX
INFECTIOUS DISEASES AND INTERNAL MEDICINE at [place]  =======================================================  Chip Meade MD  Diplomates American Board of Internal Medicine and Infectious Diseases  Telephone [phone]  Fax            [phone]  =======================================================    DOMINIC ALAMO [id number]    Follow up: BACTEREMIA    Allergies:  No Known Drug Allergies  SHRIMP (Unknown)  iv dye (Unknown)      Medications:  bacitracin   Ointment 1 Application(s) Topical two times a day PRN  chlorhexidine 2% Cloths 1 Application(s) Topical <User Schedule>  dextrose 5%. 1000 milliLiter(s) IV Continuous <Continuous>  dextrose 5%. 1000 milliLiter(s) IV Continuous <Continuous>  dextrose 5%. 1000 milliLiter(s) IV Continuous <Continuous>  enoxaparin Injectable 40 milliGRAM(s) SubCutaneous every 24 hours  glucagon  Injectable 1 milliGRAM(s) IntraMuscular once  hydrocortisone sodium succinate Injectable 50 milliGRAM(s) IV Push every 12 hours  meropenem  IVPB 1000 milliGRAM(s) IV Intermittent every 8 hours  pantoprazole  Injectable 40 milliGRAM(s) IV Push daily  rifAXIMin 550 milliGRAM(s) Oral two times a day    SOCIAL       FAMILY   FAMILY HISTORY:  FH: CAD (coronary artery disease)      REVIEW OF SYSTEMS:  CONSTITUTIONAL:  No Fever or chills  HEENT:   No diplopia or blurred vision.  No earache, sore throat or runny nose.  CARDIOVASCULAR:  No pressure, squeezing, strangling, tightness, heaviness or aching about the chest, neck, axilla or epigastrium.  RESPIRATORY:  No cough, shortness of breath, PND or orthopnea.  GASTROINTESTINAL:  No nausea, vomiting or diarrhea.  GENITOURINARY:  No dysuria, frequency or urgency. No Blood in urine  MUSCULOSKELETAL:   moves all joints  SKIN:  No change in skin, hair or nails.  NEUROLOGIC:  No paresthesias, fasciculations, seizures or weakness.  PSYCHIATRIC:  No disorder of thought or mood.  ENDOCRINE:  No heat or cold intolerance, polyuria or polydipsia.  HEMATOLOGICAL:  No easy bruising or bleeding.            Physical Exam:  I Vital Signs Last 24 Hrs  T(C): 37.1 (27 May 2025 11:00), Max: 37.1 (27 May 2025 11:00)  T(F): 98.7 (27 May 2025 11:00), Max: 98.7 (27 May 2025 11:00)  HR: 90 (27 May 2025 11:00) (71 - 90)  BP: 157/80 (27 May 2025 11:00) (123/69 - 157/80)  BP(mean): --  RR: 19 (27 May 2025 11:00) (16 - 20)  SpO2: 96% (27 May 2025 11:00) (95% - 97%)    Parameters below as of 27 May 2025 11:00  Patient On (Oxygen Delivery Method): nasal cannula  O2 Flow (L/min): 3          GEN: NAD,  OOB TO CHAIR  HEENT: nor mocephalic and atraumatic. EOMI. TROY.    NECK: Supple. No carotid bruits.  No lymphadenopathy or thyromegaly.  LUNGS: Clear to auscultation.  HEART: Regular rate and rhythm without murmur.  ABDOMEN: Soft, nontender, and nondistended.  Positive bowel sounds.    : No CVA tenderness  EXTREMITIES: Without any cyanosis, clubbing, rash, lesions or edema.  MSK: no joint swelling  NEUROLOGIC: Cranial nerves II through XII are grossly intact.  PSYCHIATRIC: Appropriate affect .  SKIN: ULCERATION ON UPPER ;O[[ ADM IN D LIP                                                                                                                                                                                                                              Labs:  Vitals:  ============  T(F): 98.1 (2   =======================================================  Current Antibiotics:  meropenem  IVPB 1000 milliGRAM(s) IV Intermittent every 8 hours  rifAXIMin 550 milliGRAM(s) Oral two times a day    Other medications:  chlorhexidine 2% Cloths 1 Application(s) Topical <User Schedule>  dextrose 5%. 1000 milliLiter(s) IV Continuous <Continuous>  dextrose 5%. 1000 milliLiter(s) IV Continuous <Continuous>  dextrose 5%. 1000 milliLiter(s) IV Continuous <Continuous>  enoxaparin Injectable 40 milliGRAM(s) SubCutaneous every 24 hours  glucagon  Injectable 1 milliGRAM(s) IntraMuscular once  hydrocortisone sodium succinate Injectable 50 milliGRAM(s) IV Push every 12 hours  pantoprazole  Injectable 40 milliGRAM(s) IV Push daily      =======================================================  Labs:                                           14.0   15.29 )-----------( 157      ( 27 May 2025 06:17 )             43.2       Culture - Sputum (collected 05-24-25 @ 05:15)  Source: Sputum Sputum  Gram Stain (05-24-25 @ 14:11):    Few polymorphonuclear leukocytes per low power field    No Squamous epithelial cells per low power field    Moderate Yeast like cells per oil power field  Preliminary Report (05-25-25 @ 19:00):    Few Mold Like Fungus Referred to Mycology    Commensal yair consistent with body site    Culture - Blood (collected 05-21-25 @ 02:50)  Source: Blood Blood  Preliminary Report (05-25-25 @ 17:00):    No growth at 4 days    Culture - Blood (collected 05-20-25 @ 11:15)  Source: Blood Blood  Final Report (05-25-25 @ 17:00):    No growth at 5 days    Culture - Body Fluid with Gram Stain (collected 05-19-25 @ 17:15)  Source: Bile Bile Fluid  Gram Stain (05-20-25 @ 08:03):    polymorphonuclear leukocytes seen    Gram Negative Rods seen    by cytocentrifuge  Final Report (05-25-25 @ 08:14):    Numerous Escherichia coli ESBL    Numerous Citrobacter freundii complex    Numerous Enterococcus gallinarum    Numerous Enterococcus faecalis  Organism: Escherichia coli ESBL  Citrobacter freundii complex  Enterococcus gallinarum  Enterococcus faecalis (05-25-25 @ 08:14)  Organism: Enterococcus faecalis (05-25-25 @ 08:14)    Sensitivities:      -  Vancomycin: S 1      -  Ampicillin: S <=2 Predicts results to ampicillin/sulbactam, amoxacillin-clavulanate and  piperacillin-tazobactam.      Method Type: CURTIS  Organism: Escherichia coli ESBL (05-25-25 @ 08:14)    Sensitivities:      -  Levofloxacin: R >4      -  Tobramycin: R >8      -  Aztreonam: R >16      -  Gentamicin: R >8      -  Ceftriaxone: R >32      -  Cefazolin: R >16      -  Cefepime: R >16      -  Piperacillin/Tazobactam: R <=8      -  Ciprofloxacin: R >2      -  Imipenem: S <=1      -  Ampicillin: R >16 These ampicillin results predict results for amoxicillin      Method Type: CURTIS      -  Meropenem: S <=1      -  Ampicillin/Sulbactam: R 16/8      -  Trimethoprim/Sulfamethoxazole: S <=0.5/9.5      -  Ertapenem: S <=0.5      -  Tigecycline: S <=2 Interpretations based on FDA breakpoints  Organism: Citrobacter freundii complex (05-25-25 @ 08:14)    Sensitivities:      -  Levofloxacin: S <=0.5      -  Tobramycin: R >8      -  Aztreonam: S <=4      -  Gentamicin: R >8      -  Cefepime: S <=2      -  Cefazolin: R >16      -  Piperacillin/Tazobactam: S <=8 Treatment with Pipercillin/Tazobactam is not recommended in severe infections casued by Klebsiella aerogenes, Enterobacter cloacae complex, and Citrobacter freundii complex.      -  Ciprofloxacin: S <=0.25      -  Imipenem: S <=1      -  Ceftriaxone: S <=1 Enterobacter cloacae, Klebsiella aerogenes, and Citrobacter freundii may develop resistance during prolonged therapy.      -  Ampicillin: R >16 These ampicillin results predict results for amoxicillin      Method Type: CURTIS      -  Meropenem: S <=1      -  Ampicillin/Sulbactam: R 16/8      -  Cefoxitin: R >16      -  Amoxicillin/Clavulanic Acid: R >16/8      -  Trimethoprim/Sulfamethoxazole: R >2/38      -  Tigecycline: S <=2 Interpretations based on FDA breakpoints      -  Ertapenem: S <=0.5  Organism: Enterococcus gallinarum (05-25-25 @ 08:14)    Sensitivities:      -  Vancomycin: R 2 E. casseliflavus/gallinarum have intrinsic, low level resistance to vancomycin. They are not considered to be VRE.      -  Ampicillin: S <=2 Predicts results to ampicillin/sulbactam, amoxacillin-clavulanate and  piperacillin-tazobactam.      Method Type: CURTIS    Culture - Blood (collected 05-19-25 @ 10:15)  Source: Blood Blood  Gram Stain (05-20-25 @ 06:31):    Growth in anaerobic bottle: Gram Negative Rods    Growth in aerobic bottle: Gram Negative Rods  Final Report (05-22-25 @ 19:19):    Growth in aerobic and anaerobic bottles: Escherichia coli ESBL    See previous culture [id number]    Urinalysis with Rflx Culture (collected 05-18-25 @ 22:08)    Culture - Urine (collected 05-18-25 @ 22:08)  Source: Clean Catch  Final Report (05-21-25 @ 09:50):    >100,000 CFU/ml Escherichia coli ESBL  Organism: Escherichia coli ESBL (05-21-25 @ 09:50)  Organism: Escherichia coli ESBL (05-21-25 @ 09:50)    Sensitivities:      -  Levofloxacin: R >4      -  Tobramycin: R >8      -  Nitrofurantoin: S <=32 Should not be used to treat pyelonephritis      -  Aztreonam: R >16      -  Gentamicin: R >8      -  Cefazolin: R >16 For uncomplicated UTI with K. pneumoniae, E. coli, or P. mirablis: CURTIS <=16 is sensitive and CURTIS >=32 is resistant. This also predicts results for oral agents cefaclor, cefdinir, cefpodoxime, cefprozil, cefuroxime axetil, cephalexin and locarbef for uncomplicated UTI. Note that some isolates may be susceptible to these agents while testing resistant to cefazolin.      -  Cefepime: R >16      -  Piperacillin/Tazobactam: S <=8      -  Ciprofloxacin: R >2      -  Imipenem: S <=1      -  Ceftriaxone: R >32      -  Ampicillin: R >16 These ampicillin results predict results for amoxicillin      Method Type: CURTIS      -  Meropenem: S <=1      -  Ampicillin/Sulbactam: I 16/8      -  Cefuroxime: R >16      -  Trimethoprim/Sulfamethoxazole: S <=0.5/9.5      -  Ertapenem: S <=0.5      -  Tigecycline: S <=2 Interpretations based on FDA breakpoints    Culture - Blood (collected 05-18-25 @ 20:05)  Source: Blood Blood  Gram Stain (05-19-25 @ 12:41):    Growth in aerobic and anaerobic bottles: Gram Negative Rods and Gram    Positive Cocci in Pairs and Chains    Growth in anaerobic bottle: Gram Positive Rods  Final Report (05-25-25 @ 09:54):    Growth in aerobic and anaerobic bottles: Escherichia coli ESBL    Growth in aerobic and anaerobic bottles: Citrobacter freundii    Growth in aerobic and anaerobic bottles: Streptococcus gallolyticus    Growth in anaerobic bottle: Gram Positive Rods Organism seen in Gram    stain is non-viable after prolonged    incubation and repeated subculture.    Direct identification is available within approximately 3-5    hours either by Blood Panel Multiplexed PCR or Direct    MALDI-TOF. Details: [email or web address]  Organism: Blood Culture PCR  Streptococcus gallolyticus  Escherichia coli ESBL  Citrobacter freundii (05-25-25 @ 09:54)  Organism: Escherichia coli ESBL (05-25-25 @ 09:54)    Sensitivities:      -  Levofloxacin: R >4      -  Tobramycin: R >8      -  Aztreonam: R >16      -  Gentamicin: R >8      -  Imipenem: S <=1      -  Cefepime: R >16      -  Cefazolin: R >16      -  Piperacillin/Tazobactam: R <=8      -  Ciprofloxacin: R >2      -  Ceftriaxone: R >32      -  Ampicillin: R >16 These ampicillin results predict results for amoxicillin      Method Type: CURTIS      -  Meropenem: S <=1      -  Ampicillin/Sulbactam: R 8/4      -  Trimethoprim/Sulfamethoxazole: S <=0.5/9.5      -  Tigecycline: S <=2 Interpretations based on FDA breakpoints      -  Ertapenem: S <=0.5  Organism: Citrobacter freundii (05-25-25 @ 09:54)    Sensitivities:      -  Levofloxacin: S <=0.5      -  Tobramycin: R 8      -  Aztreonam: S <=4      -  Gentamicin: R >8      -  Cefepime: S <=2      -  Cefazolin: R >16      -  Piperacillin/Tazobactam: S <=8 Treatment with Pipercillin/Tazobactam is not recommended in severe infections casued by Klebsiella aerogenes, Enterobacter cloacae complex, and Citrobacter freundii complex.      -  Ciprofloxacin: S <=0.25      -  Imipenem: S <=1      -  Ceftriaxone: S <=1 Enterobacter cloacae, Klebsiella aerogenes, and Citrobacter freundii may develop resistance during prolonged therapy.      -  Ampicillin: R >16 These ampicillin results predict results for amoxicillin      Method Type: CURTIS      -  Meropenem: S <=1      -  Ampicillin/Sulbactam: R >16/8      -  Cefoxitin: R >16      -  Trimethoprim/Sulfamethoxazole: R >2/38      -  Tigecycline: S <=2 Interpretations based on FDA breakpoints      -  Ertapenem: S <=0.5  Organism: Blood Culture PCR (05-25-25 @ 09:54)    Sensitivities:      -  CTX-M Resistance Marker: Detec      -  Citrobacter species: Detec      -  Escherichia coli: Detec      Method Type: PCR      -  Streptococcus sp. (Not Grp A, B or S pneumoniae): Detec      -  ESBL: Detec  Organism: Streptococcus gallolyticus (05-25-25 @ 09:54)    Sensitivities:      -  Vancomycin: S 0.5      -  Ceftriaxone: S <=0.25      Method Type: CURTIS      -  Penicillin: S 0.06      Creatinine: 0.75 mg/dL (05-26-25 @ 02:30)  Creatinine: 0.79 mg/dL (05-25-25 @ 17:35)  Creatinine: 0.86 mg/dL (05-25-25 @ 12:00)  Creatinine: 0.95 mg/dL (05-25-25 @ 05:00)  Creatinine: 1.02 mg/dL (05-24-25 @ 23:39)  Creatinine: 1.16 mg/dL (05-24-25 @ 17:20)  Creatinine: 1.14 mg/dL (05-24-25 @ 09:40)  Creatinine: 1.29 mg/dL (05-24-25 @ 02:15)  Creatinine: 1.45 mg/dL (05-23-25 @ 20:15)  Creatinine: 1.54 mg/dL (05-23-25 @ 14:15)  Creatinine: 1.72 mg/dL (05-23-25 @ 08:35)  Creatinine: 1.82 mg/dL (05-23-25 @ 02:40)  Creatinine: 2.05 mg/dL (05-22-25 @ 22:10)  Creatinine: 2.09 mg/dL (05-22-25 @ 20:15)  Creatinine: 1.98 mg/dL (05-22-25 @ 14:00)  Creatinine: 2.01 mg/dL (05-22-25 @ 08:45)  Creatinine: 2.18 mg/dL (05-22-25 @ 03:05)  Creatinine: 2.20 mg/dL (05-21-25 @ 21:00)  Creatinine: 1.97 mg/dL (05-21-25 @ 14:39)    Procalcitonin: 83.01 ng/mL (05-21-25 @ 14:39)  Procalcitonin: >100.00 ng/mL (05-19-25 @ 21:20)  Procalcitonin: >100.00 ng/mL (05-19-25 @ 00:45)          WBC Count: 16.42 K/uL (05-26-25 @ 02:30)  WBC Count: 15.35 K/uL (05-25-25 @ 17:35)  WBC Count: 13.93 K/uL (05-25-25 @ 12:00)  WBC Count: 12.51 K/uL (05-25-25 @ 05:00)  WBC Count: 14.85 K/uL (05-24-25 @ 23:39)  WBC Count: 16.79 K/uL (05-24-25 @ 17:20)  WBC Count: 14.34 K/uL (05-24-25 @ 09:40)  WBC Count: 13.36 K/uL (05-24-25 @ 02:15)  WBC Count: 13.77 K/uL (05-23-25 @ 20:15)  WBC Count: 14.24 K/uL (05-23-25 @ 14:15)  WBC Count: 11.64 K/uL (05-23-25 @ 08:35)  WBC Count: 12.82 K/uL (05-23-25 @ 02:40)  WBC Count: 13.38 K/uL (05-22-25 @ 20:15)  WBC Count: 13.42 K/uL (05-22-25 @ 14:00)  WBC Count: 9.91 K/uL (05-22-25 @ 08:45)  WBC Count: 11.13 K/uL (05-22-25 @ 03:05)  WBC Count: 13.83 K/uL (05-21-25 @ 21:00)  WBC Count: 13.92 K/uL (05-21-25 @ 14:39)    SARS-CoV-2 Result: NotDetec (05-18-25 @ 22:11)    Lactate Dehydrogenase, Serum: 421 U/L (05-19-25 @ 04:10)    Alkaline Phosphatase: 99 U/L (05-26-25 @ 02:30)  Alkaline Phosphatase: 103 U/L (05-25-25 @ 17:35)  Alkaline Phosphatase: 105 U/L (05-25-25 @ 12:00)  Alkaline Phosphatase: 98 U/L (05-25-25 @ 05:00)  Alkaline Phosphatase: 104 U/L (05-24-25 @ 23:39)  Alkaline Phosphatase: 113 U/L (05-24-25 @ 17:20)  Alkaline Phosphatase: 121 U/L (05-24-25 @ 09:40)  Alkaline Phosphatase: 121 U/L (05-24-25 @ 02:15)  Alkaline Phosphatase: 148 U/L (05-23-25 @ 20:15)  Alkaline Phosphatase: 155 U/L (05-23-25 @ 14:15)  Alkaline Phosphatase: 143 U/L (05-23-25 @ 08:35)  Alkaline Phosphatase: 149 U/L (05-23-25 @ 02:40)  Alkaline Phosphatase: 157 U/L (05-22-25 @ 22:10)  Alkaline Phosphatase: 160 U/L (05-22-25 @ 20:15)  Alkaline Phosphatase: 144 U/L (05-22-25 @ 14:00)  Alanine Aminotransferase (ALT/SGPT): 54 U/L (05-26-25 @ 02:30)  Alanine Aminotransferase (ALT/SGPT): 66 U/L (05-25-25 @ 17:35)  Alanine Aminotransferase (ALT/SGPT): 72 U/L (05-25-25 @ 12:00)  Alanine Aminotransferase (ALT/SGPT): 75 U/L (05-25-25 @ 05:00)  Alanine Aminotransferase (ALT/SGPT): 83 U/L (05-24-25 @ 23:39)  Alanine Aminotransferase (ALT/SGPT): 95 U/L (05-24-25 @ 17:20)  Alanine Aminotransferase (ALT/SGPT): 108 U/L (05-24-25 @ 09:40)  Alanine Aminotransferase (ALT/SGPT): 114 U/L (05-24-25 @ 02:15)  Alanine Aminotransferase (ALT/SGPT): 135 U/L (05-23-25 @ 20:15)  Alanine Aminotransferase (ALT/SGPT): 154 U/L (05-23-25 @ 14:15)  Alanine Aminotransferase (ALT/SGPT): 169 U/L (05-23-25 @ 08:35)  Alanine Aminotransferase (ALT/SGPT): 195 U/L (05-23-25 @ 02:40)  Alanine Aminotransferase (ALT/SGPT): 208 U/L (05-22-25 @ 22:10)  Alanine Aminotransferase (ALT/SGPT): 195 U/L (05-22-25 @ 20:15)  Alanine Aminotransferase (ALT/SGPT): 141 U/L (05-22-25 @ 14:00)  Aspartate Aminotransferase (AST/SGOT): 41 U/L (05-26-25 @ 02:30)  Aspartate Aminotransferase (AST/SGOT): 51 U/L (05-25-25 @ 17:35)  Aspartate Aminotransferase (AST/SGOT): 61 U/L (05-25-25 @ 12:00)  Aspartate Aminotransferase (AST/SGOT): 64 U/L (05-25-25 @ 05:00)  Aspartate Aminotransferase (AST/SGOT): 77 U/L (05-24-25 @ 23:39)  Aspartate Aminotransferase (AST/SGOT): 91 U/L (05-24-25 @ 17:20)  Aspartate Aminotransferase (AST/SGOT): 104 U/L (05-24-25 @ 09:40)  Aspartate Aminotransferase (AST/SGOT): 124 U/L (05-24-25 @ 02:15)  Aspartate Aminotransferase (AST/SGOT): 168 U/L (05-23-25 @ 20:15)  Aspartate Aminotransferase (AST/SGOT): 233 U/L (05-23-25 @ 14:15)  Aspartate Aminotransferase (AST/SGOT): 331 U/L (05-23-25 @ 08:35)  Aspartate Aminotransferase (AST/SGOT): 498 U/L (05-23-25 @ 02:40)  Aspartate Aminotransferase (AST/SGOT): 499 U/L (05-22-25 @ 22:10)  Aspartate Aminotransferase (AST/SGOT): 438 U/L (05-22-25 @ 20:15)  Aspartate Aminotransferase (AST/SGOT): 258 U/L (05-22-25 @ 14:00)  Bilirubin Total: 2.3 mg/dL (05-26-25 @ 02:30)  Bilirubin Total: 2.3 mg/dL (05-25-25 @ 17:35)  Bilirubin Total: 2.4 mg/dL (05-25-25 @ 12:00)  Bilirubin Total: 2.0 mg/dL (05-25-25 @ 05:00)  Bilirubin Total: 2.1 mg/dL (05-24-25 @ 23:39)  Bilirubin Total: 2.0 mg/dL (05-24-25 @ 17:20)  Bilirubin Total: 1.8 mg/dL (05-24-25 @ 09:40)  Bilirubin Total: 1.8 mg/dL (05-24-25 @ 02:15)  Bilirubin Total: 2.1 mg/dL (05-23-25 @ 20:15)  Bilirubin Total: 2.3 mg/dL (05-23-25 @ 14:15)  Bilirubin Total: 2.3 mg/dL (05-23-25 @ 08:35)  Bilirubin Total: 2.4 mg/dL (05-23-25 @ 02:40)  Bilirubin Total: 2.6 mg/dL (05-22-25 @ 22:10)  Bilirubin Total: 2.6 mg/dL (05-22-25 @ 20:15)  Bilirubin Total: 2.7 mg/dL (05-22-25 @ 14:00)

## 2025-05-28 LAB
ALBUMIN SERPL ELPH-MCNC: 2.8 G/DL — LOW (ref 3.3–5.2)
ALP SERPL-CCNC: 110 U/L — SIGNIFICANT CHANGE UP (ref 40–120)
ALT FLD-CCNC: 33 U/L — SIGNIFICANT CHANGE UP
ANION GAP SERPL CALC-SCNC: 13 MMOL/L — SIGNIFICANT CHANGE UP (ref 5–17)
AST SERPL-CCNC: 28 U/L — SIGNIFICANT CHANGE UP
BILIRUB SERPL-MCNC: 2.5 MG/DL — HIGH (ref 0.4–2)
BUN SERPL-MCNC: 26.5 MG/DL — HIGH (ref 8–20)
CALCIUM SERPL-MCNC: 7.3 MG/DL — LOW (ref 8.4–10.5)
CHLORIDE SERPL-SCNC: 116 MMOL/L — HIGH (ref 96–108)
CO2 SERPL-SCNC: 23 MMOL/L — SIGNIFICANT CHANGE UP (ref 22–29)
CREAT SERPL-MCNC: 0.61 MG/DL — SIGNIFICANT CHANGE UP (ref 0.5–1.3)
EGFR: 100 ML/MIN/1.73M2 — SIGNIFICANT CHANGE UP
EGFR: 100 ML/MIN/1.73M2 — SIGNIFICANT CHANGE UP
GLUCOSE BLDC GLUCOMTR-MCNC: 155 MG/DL — HIGH (ref 70–99)
GLUCOSE BLDC GLUCOMTR-MCNC: 161 MG/DL — HIGH (ref 70–99)
GLUCOSE BLDC GLUCOMTR-MCNC: 180 MG/DL — HIGH (ref 70–99)
GLUCOSE BLDC GLUCOMTR-MCNC: 188 MG/DL — HIGH (ref 70–99)
GLUCOSE SERPL-MCNC: 153 MG/DL — HIGH (ref 70–99)
HCT VFR BLD CALC: 39 % — SIGNIFICANT CHANGE UP (ref 39–50)
HGB BLD-MCNC: 12.9 G/DL — LOW (ref 13–17)
MCHC RBC-ENTMCNC: 28.9 PG — SIGNIFICANT CHANGE UP (ref 27–34)
MCHC RBC-ENTMCNC: 33.1 G/DL — SIGNIFICANT CHANGE UP (ref 32–36)
MCV RBC AUTO: 87.2 FL — SIGNIFICANT CHANGE UP (ref 80–100)
NRBC # BLD AUTO: 0 K/UL — SIGNIFICANT CHANGE UP (ref 0–0)
NRBC # FLD: 0 K/UL — SIGNIFICANT CHANGE UP (ref 0–0)
NRBC BLD AUTO-RTO: 0 /100 WBCS — SIGNIFICANT CHANGE UP (ref 0–0)
PHOSPHATE SERPL-MCNC: 2.6 MG/DL — SIGNIFICANT CHANGE UP (ref 2.4–4.7)
PLATELET # BLD AUTO: 175 K/UL — SIGNIFICANT CHANGE UP (ref 150–400)
PMV BLD: 11.7 FL — SIGNIFICANT CHANGE UP (ref 7–13)
POTASSIUM SERPL-MCNC: 3.9 MMOL/L — SIGNIFICANT CHANGE UP (ref 3.5–5.3)
POTASSIUM SERPL-SCNC: 3.9 MMOL/L — SIGNIFICANT CHANGE UP (ref 3.5–5.3)
PROT SERPL-MCNC: 5.4 G/DL — LOW (ref 6.6–8.7)
RBC # BLD: 4.47 M/UL — SIGNIFICANT CHANGE UP (ref 4.2–5.8)
RBC # FLD: 15.4 % — HIGH (ref 10.3–14.5)
SODIUM SERPL-SCNC: 152 MMOL/L — HIGH (ref 135–145)
WBC # BLD: 12.85 K/UL — HIGH (ref 3.8–10.5)
WBC # FLD AUTO: 12.85 K/UL — HIGH (ref 3.8–10.5)

## 2025-05-28 PROCEDURE — 71045 X-RAY EXAM CHEST 1 VIEW: CPT | Mod: 26

## 2025-05-28 PROCEDURE — 74230 X-RAY XM SWLNG FUNCJ C+: CPT | Mod: 26

## 2025-05-28 PROCEDURE — 99233 SBSQ HOSP IP/OBS HIGH 50: CPT

## 2025-05-28 RX ORDER — SACUBITRIL AND VALSARTAN 49; 51 MG/1; MG/1
1 TABLET, FILM COATED ORAL
Refills: 0 | Status: DISCONTINUED | OUTPATIENT
Start: 2025-05-28 | End: 2025-06-05

## 2025-05-28 RX ORDER — ENOXAPARIN SODIUM 100 MG/ML
80 INJECTION SUBCUTANEOUS ONCE
Refills: 0 | Status: DISCONTINUED | OUTPATIENT
Start: 2025-05-28 | End: 2025-05-30

## 2025-05-28 RX ORDER — SODIUM CHLORIDE 9 G/1000ML
1000 INJECTION, SOLUTION INTRAVENOUS
Refills: 0 | Status: DISCONTINUED | OUTPATIENT
Start: 2025-05-28 | End: 2025-06-03

## 2025-05-28 RX ADMIN — Medication 100 MILLIEQUIVALENT(S): at 07:54

## 2025-05-28 RX ADMIN — MEROPENEM 100 MILLIGRAM(S): 1 INJECTION INTRAVENOUS at 05:53

## 2025-05-28 RX ADMIN — Medication 100 MILLIEQUIVALENT(S): at 03:13

## 2025-05-28 RX ADMIN — CARVEDILOL 6.25 MILLIGRAM(S): 3.12 TABLET, FILM COATED ORAL at 05:50

## 2025-05-28 RX ADMIN — Medication 100 MILLIEQUIVALENT(S): at 05:52

## 2025-05-28 RX ADMIN — INSULIN LISPRO 1: 100 INJECTION, SOLUTION INTRAVENOUS; SUBCUTANEOUS at 07:57

## 2025-05-28 RX ADMIN — Medication 1 APPLICATION(S): at 05:53

## 2025-05-28 RX ADMIN — Medication 40 MILLIGRAM(S): at 11:20

## 2025-05-28 RX ADMIN — Medication 1 APPLICATION(S): at 21:48

## 2025-05-28 RX ADMIN — ENOXAPARIN SODIUM 80 MILLIGRAM(S): 100 INJECTION SUBCUTANEOUS at 05:50

## 2025-05-28 RX ADMIN — MEROPENEM 100 MILLIGRAM(S): 1 INJECTION INTRAVENOUS at 21:47

## 2025-05-28 RX ADMIN — CARVEDILOL 6.25 MILLIGRAM(S): 3.12 TABLET, FILM COATED ORAL at 17:20

## 2025-05-28 RX ADMIN — SACUBITRIL AND VALSARTAN 1 TABLET(S): 49; 51 TABLET, FILM COATED ORAL at 17:20

## 2025-05-28 RX ADMIN — MEROPENEM 100 MILLIGRAM(S): 1 INJECTION INTRAVENOUS at 13:23

## 2025-05-28 RX ADMIN — Medication 40 MILLIEQUIVALENT(S): at 03:13

## 2025-05-28 RX ADMIN — SODIUM CHLORIDE 75 MILLILITER(S): 9 INJECTION, SOLUTION INTRAVENOUS at 10:11

## 2025-05-28 RX ADMIN — INSULIN LISPRO 1: 100 INJECTION, SOLUTION INTRAVENOUS; SUBCUTANEOUS at 17:21

## 2025-05-28 RX ADMIN — INSULIN LISPRO 1: 100 INJECTION, SOLUTION INTRAVENOUS; SUBCUTANEOUS at 11:20

## 2025-05-28 NOTE — PROVIDER CONTACT NOTE (CRITICAL VALUE NOTIFICATION) - NAME OF MD/NP/PA/DO NOTIFIED:
Dr. Phillip Encinas
[facility] Resident Dr. Moser
[facility]
Dimitris STARK
Dr. Moser
FELIPE Rangel
RUBEN Moser
Ruby Patino NP
Ruby Patino, NP
Charlette Rangel
MD JUSTIN Moser
Dr. Shanti MIRANDA attending
MD JUSTIN Moser
Ruby Blake NP
Dr. Fernandez
MD Moser
[facility]
Satinder Rangel

## 2025-05-28 NOTE — SWALLOW VFSS/MBS ASSESSMENT ADULT - DIAGNOSTIC IMPRESSIONS
Mild to moderate oral dysphagia for assessed trials with reduced lingual strength & coordination. Reduced mastication for regular solids impacted by incomplete dentition  Mild to moderate pharyngeal dysphagia for assessed trials. Reduced tongue base retraction, decreased hyolaryngeal excursion/laryngeal elevation noted. Pt with valleculae stasis across trials (mild to moderate overall except regular solids which was moderate), with one episode of trace penetration for mildly thick fluids via TSPN & trace penetration above level of vocal folds during the swallow for thin fluids. Study negative for aspiration

## 2025-05-28 NOTE — PROGRESS NOTE ADULT - ASSESSMENT
76 y/o M w/ PMH of HTN, HLD, CAD, TIA, remote hx of pericarditis (2015) presented 5/18 to ED c/o abd pain, N/V.  In the ED pt was found to be hypotensive and failed fluid resuscitation and started on pressor support.  Pt also hypoxic and initially placed on BIPAP but eventually required intubation and admitted to MICU for septic shock 2/2 cholangitis, UTI and acute pancreatitis.  Pt also profoundly acidotic w/ ATN from septic shock, transaminitis from shock liver/cholangitis, lipase >3K, , LA>16, procal >100 and abd US w/ distended GB w/ stones and CT a/p w/ cholelithiasis but pancreas reported to be normal.  GI and surgery consulted but pt deemed too unstable to undergo any procedures and ultimately had IR guided primitivo tube placed 5/19 and dark malodorous bile drained and sent for cultures which grew ESBL ecoli, citrobacter freundii, Enterococcus gallinarum and enterococcus faecalis.  Bcx 5/18 gew ESBL ecoli, Citerobacter freundii and strep gallalyticus.  Ucx 5/18 grew ESBL ecoli.  Repeat Bcx 5/19 grew only ESBL Ecoli and BCxs 5/20 NGTD.  Sputum cx 5/24 growing mold like fungus on prelim report and refered to mycology.  ID consulted and pt currently on merrem.  Pt required quadruple pressor support and given methylene blue 5/19 for vasoplegia and was on course of solucortef 5/19-5/25 to help wean off pressors in setting of septic shock.   Hospital course complicated by SVT 5/19 that required emergent cardioversion.  Pt also found to have high ammonia and placed on rifaxamin and lactulose but no known hx of cirrhosis and have since been d/c'd.  Pt successfully weaned off pressors 5/23.  During hospital course pt also found to have new rEF likely stress cardiomyopathy and required dobutamine (5/20-5/23) and milrinone gtts (5/23-5/25).  Hospital course further complicated by AF RVR requiring emergent cardioversion 5/21 and was amio loaded but now in NSR and not on amio.  Full dose AC was held despite AF w/ chadsvasc 6 bc severe thrombocytopenia likely 2/2 sepsis.  Pt extubated 5/24 and OGT removed.  Pt failed SLP eval and family declined NGT for now.  Pt will go for MBS tomorrow 5/27 to better assess his ability to swallow.  Pt medically stable for transfer to medicine for continued management.          Septic shock 2/2 cholangitis, UTI and bacteremia   - s/p quadruple pressor support, methylene blue for vasoplegia and was on course of solucortef to finally wean off pressors 5/23  - Was on solucortef 5/19-5/25 to help wean off pressors   - Pt has up trending WBC which likely from recent steroid course   - US w/ distended GB w/ stones and CT a/p w/ cholelithiasis but pancreas reported to be normal  - IR guided primitivo tube placed 5/19 and drained dark malodorous bilious fluid drained and sent for Cxs which grew ESBL ecoli, citrobacter freundii, Enterococcus gallinarum and enterococcus faecalis  - Bcx 5/18 gew ESBL ecoli, Citerobacter freundii and strep gallalyticus, Bcx 5/19 grew only ESBL Ecoli and BCxs 5/20 NGTD  - Urine cx 5/18 grew ESBL ecoli  - Sputum cx 5/24 prelim reporting mold like fungus and referred to mycology; f/u final results   - c/w merrem as per ID recs   - Trend CBC and monitor temperature   -will need surgery and GI f/u after improvement in symptoms for ercp +- cholecystectomy       Acute hypoxic respiratory failure possibly 2/2 aspiration   - Intubated 5/18 and extubated 5/24 but continues requiring supplemental O2   -  now on RA w good sats  - Sputum cx 5/24 growing mold like fungus on prelim report and referred to mycology; f/u final results (pending pending recs)  - Duonebs as needed   - Encourage incentive spirometer and OOB to chair       Diarrhea (resolving)  - Rectal tube dc  - Started having watery diarrhea shortly after admission that was for Initially suspected to be due to tube feeds   - Has been off tube feeds for days and continues to have diarrhea   - defer stool studies iso improvement        hypernatremia  - Likely multifactorial from ongoing diarrhea   - Was also on short course of bumex gtt during MICU course   - will start d5 1/2ns   - Monitor I/O's and lytes closely       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 shock   - Required dobutamine (5/20-5/23) and milronone gtts (5/23-5/25)  - TTE 5/20 w/ LVEF 30-35% and remained unchanged on repeat TTE 5/22  - Clinically hypovolemic at this time likely from ongoing diarrhea and is NPO  - Giving short course of gentle IVFs but monitor vol status closely   - will gradually start gdmt as bp tolerates  - cw coreg  - add entresto  - Monitor daily weights and strict I/O's   - Monitor on telemetry   - Cardio following       pAF   - s/p emergent cardioversion in MICU   - Now in sinus rhythm  - CHADSVASC = 6  -will ad coreg today  -will start full dose lovenox      Dysphagia   - Pt failed SLP eval and family declined NGT for now  -for slp re-eval      Superficial thrombophlebitis right cephalic vein.  - No signs of cellulitis   - c/w supportive care including RUE elevation       Small area of Rt toe ischemia   - Likely 2/2 levophed vs vasoplegia   - Monitor for resolution       ATN from septic shock  - Resolved   - Caution w/ hyperchloremia as can cause OLEKSANDR   - Monitor renal function      Transaminitis from shock liver and cholangitis   - Resolving but Tbili remains elevated   - Trend LFTs and coags        Acute gallstone pancreatitis   - Epigastric abd pain w/ lipase >3K-->48  - -->492  - CT a/p reported normal pancrease  - Would start on fibrate prior to d/c for TG  - Will likely eventually need GB removed as well as ERCP        Normocytic anemia   Thrombocytopenia, resolving, 2/2 sepsis   - H/H mildly low and stable   - Plts improving and >50K therefore starting full dose AC for AF   - Remains hemodynamically stable   - No active bleeding reported   - BUN elevated but likely 2/2 recent steroids and down trending since steroids stopped   - c/w protonix for GI ppx  - Monitor CBC and transfuse for Hb<8       Hyperammonemia, resolved    - Suspect 2/2 infection w/ e.coli as it is a ureas producing organisms which hydrolyzes urea to ammonium and ultimately converts to ammonia    - Started on rifaxamin in ICU but now d/c'd as pt has no hx of cirrhosis  - Ammonia normalized yesterday         VTE ppx: lovenox     Dispo: medically active. unclear michael at this time

## 2025-05-28 NOTE — SWALLOW VFSS/MBS ASSESSMENT ADULT - ORAL PHASE
Uncontrolled bolus / spillover in raj-pharynx Uncontrolled bolus / spillover in raj-pharynx/Uncontrolled bolus / spillover in hypopharynx trace lingual stasis/Delayed oral transit time/Uncontrolled bolus / spillover in raj-pharynx Delayed oral transit time/Reduced anterior - posterior transport/Uncontrolled bolus / spillover in raj-pharynx/Uncontrolled bolus / spillover in hypopharynx

## 2025-05-28 NOTE — SWALLOW VFSS/MBS ASSESSMENT ADULT - ROSENBEK'S PENETRATION ASPIRATION SCALE
(1) no aspiration, contrast does not enter airway (3) contrast remains above the vocal cords, visible residue remains (penetration) via tspn; NO penetration/aspiration viewed for cup sips/(3) contrast remains above the vocal cords, visible residue remains (penetration)

## 2025-05-28 NOTE — SWALLOW VFSS/MBS ASSESSMENT ADULT - COMMENTS
As per MD note: "75yr man hx CAD, TIA, HTN, HLD admitted with shock, septic vs cardiogenic completed by respiratory failure in the setting of pancreatitis"

## 2025-05-28 NOTE — PROGRESS NOTE ADULT - SUBJECTIVE AND OBJECTIVE BOX
DOMINIC ALAMO  [id number]        Chief Complaint:   follow up sepsis/PAF/ CMP    Subjective:  Patient awake and alert in chairs, offers no c/o.    24 hour Tele:   SR, Brief runs of afib and NSVT          bacitracin   Ointment 1 Application(s) Topical two times a day PRN  carvedilol 6.25 milliGRAM(s) Oral every 12 hours  chlorhexidine 2% Cloths 1 Application(s) Topical <User Schedule>  dextrose 5% + sodium chloride 0.45%. 1000 milliLiter(s) IV Continuous <Continuous>  dextrose 50% Injectable 25 Gram(s) IV Push once  dextrose 50% Injectable 12.5 Gram(s) IV Push once  dextrose 50% Injectable 25 Gram(s) IV Push once  enoxaparin Injectable 80 milliGRAM(s) SubCutaneous every 12 hours  insulin lispro (ADMELOG) corrective regimen sliding scale   SubCutaneous three times a day before meals  meropenem  IVPB 1000 milliGRAM(s) IV Intermittent every 8 hours  morphine  - Injectable 2 milliGRAM(s) IV Push every 6 hours PRN  morphine  - Injectable 1 milliGRAM(s) IV Push every 6 hours PRN  pantoprazole  Injectable 40 milliGRAM(s) IV Push daily          Physical Exam:  T(C): 36.7 (25 @ 04:49), Max: 37.1 (25 @ 11:00)  HR: 73 (25 @ 04:49) (73 - 125)  BP: 130/76 (25 @ 04:49) (88/46 - 157/80)  RR: 19 (25 @ 06:05) (19 - 20)  SpO2: 93% (25 @ 06:05) (93% - 96%)  Wt(kg): --  General: Comfortable in NAD  HEENT: MMM, sclera anicteric  Resp: CTA bilaterally  CVS: nl s1s2, rrr, no s3/JVD  Abd: soft, NT, ND, BS+  Ext: No c/c/e  Neuro A&O x3  Psych: Normal affect    I&O's Summary    27 May 2025 07:01  -  28 May 2025 07:00  --------------------------------------------------------  IN: 1875 mL / OUT: 2085 mL / NET: -210 mL          Labs:   28 May 2025 05:15    152    |  116    |  26.5   ----------------------------<  153    3.9     |  23.0   |  0.61     Ca    7.3        28 May 2025 05:15  Phos  2.6       28 May 2025 05:15  Mg     2.0       27 May 2025 21:45    TPro  5.4    /  Alb  2.8    /  TBili  2.5    /  DBili  x      /  AST  28     /  ALT  33     /  AlkPhos  110    28 May 2025 05:15                          12.9   12.85 )-----------( 175      ( 28 May 2025 05:15 )             39.0         Echo: 2025:    1. Left ventricular systolic function is severely decreased with an ejection fraction visually estimated at 30 to 35 %. Global left ventricular hypokinesis.   2. Enlarged right ventricular cavity size and moderately reduced right ventricular systolic function.   3. Left atrium is normal in size.   4. There is mild calcification of the mitral valve annulus.   5. Moderate to severe mitral regurgitation.   6. Moderate tricuspid regurgitation.   7. Compared to the transthoracic echocardiogram performed on 2025, the LV and RV are now better visualized.    Echo 2025:   1. Technically difficult image quality.   2. Left ventricular systolic function is moderately decreased with an ejection fraction visually estimated at 30 to 35 %. Global left ventricular hypokinesis.   3. There is no evidence of a left ventricular thrombus.   4. Enlarged right ventricular cavity size and moderately reduced right ventricular systolic function.   5. No pericardial effusion seen.   6. Compared to the transthoracic echocardiogram performed on 2025, there have been no significant interval changes.      CXR:  Endotracheal tube tip 5 cm above the gayla. Enteric tube tip below the   edge of the image, and rightIJ catheter tip in the right atrium. No   pneumothorax. No focal consolidation or pleural effusion.    CTA C/A/P:  No pulmonary embolus.  Airspace consolidations at the lung bases may represent atelectasis   and/or pneumonia.  No bowel obstruction.  Cholelithiasis.  Trace ascites.        Assessment:  74 yo male PMHx HTN, HLD, CAD based on abnormal nuke, TIA, remote pericarditis in  presented to the ED with complaints of abdominal pain, nausea, vomiting for since the day prior.  Upon arrival to the ED patient was lethargic and tachypneic, with abdominal distention and mottled skin.  He was noted to be hypotensive despite fluid resuscitation and placed on escalating doses of pressors.  He was started on BIPAP initially for tachypnea and metabolic acidosis, eventually intubated in the ED.  Patient remains intubated and history taken from chart and d/w RN.  Patient was on escalating doses of pressors and , but in last day has been weaned down.  Echo with severe LV dysfxn which appears new and likely stress myopathy.  CI on low end of normal on .  Likely sepsis from GNR septicemia from cholangitis as cause now s/p perc tube.  Noted AF with some RVR due to shock, , etc.  -DCCV for rapid AF while in MICU, reviewed tele and no VT.  -Patient extubated and doing well.  Now off pressors/ inotropic support with normal CO.  -Still with runs of Afib, at time NSVT-Remains in SR now  -Repeated echo with persistent drop on LVEF    Plan:  1. continue carvedilol, start low dose entresto    2. Start AC with lovenox, transition to DOAC if no plans for surgical intervention   3. Eventual ischemic eval, likely with stress testing, tentatively   4. Rest as per primary team and other consultants. \  5.       Gallo Black MD DOMINIC ALAMO  [id number]        Chief Complaint:   follow up sepsis/PAF/ CMP    Subjective:  Patient awake and alert in chairs, offers no c/o.    24 hour Tele:   SR        bacitracin   Ointment 1 Application(s) Topical two times a day PRN  carvedilol 6.25 milliGRAM(s) Oral every 12 hours  chlorhexidine 2% Cloths 1 Application(s) Topical <User Schedule>  dextrose 5% + sodium chloride 0.45%. 1000 milliLiter(s) IV Continuous <Continuous>  dextrose 50% Injectable 25 Gram(s) IV Push once  dextrose 50% Injectable 12.5 Gram(s) IV Push once  dextrose 50% Injectable 25 Gram(s) IV Push once  enoxaparin Injectable 80 milliGRAM(s) SubCutaneous every 12 hours  insulin lispro (ADMELOG) corrective regimen sliding scale   SubCutaneous three times a day before meals  meropenem  IVPB 1000 milliGRAM(s) IV Intermittent every 8 hours  morphine  - Injectable 2 milliGRAM(s) IV Push every 6 hours PRN  morphine  - Injectable 1 milliGRAM(s) IV Push every 6 hours PRN  pantoprazole  Injectable 40 milliGRAM(s) IV Push daily          Physical Exam:  T(C): 36.7 (25 @ 04:49), Max: 37.1 (25 @ 11:00)  HR: 73 (25 @ 04:49) (73 - 125)  BP: 130/76 (25 @ 04:49) (88/46 - 157/80)  RR: 19 (25 @ 06:05) (19 - 20)  SpO2: 93% (25 @ 06:05) (93% - 96%)  Wt(kg): --  General: Comfortable in NAD  HEENT: MMM, sclera anicteric  Resp: CTA bilaterally  CVS: nl s1s2, rrr, no s3/JVD  Abd: soft, NT, ND, BS+  Ext: No c/c/e  Neuro A&O x3  Psych: Normal affect    I&O's Summary    27 May 2025 07:01  -  28 May 2025 07:00  --------------------------------------------------------  IN: 1875 mL / OUT: 2085 mL / NET: -210 mL          Labs:   28 May 2025 05:15    152    |  116    |  26.5   ----------------------------<  153    3.9     |  23.0   |  0.61     Ca    7.3        28 May 2025 05:15  Phos  2.6       28 May 2025 05:15  Mg     2.0       27 May 2025 21:45    TPro  5.4    /  Alb  2.8    /  TBili  2.5    /  DBili  x      /  AST  28     /  ALT  33     /  AlkPhos  110    28 May 2025 05:15                          12.9   12.85 )-----------( 175      ( 28 May 2025 05:15 )             39.0         Echo: 2025:    1. Left ventricular systolic function is severely decreased with an ejection fraction visually estimated at 30 to 35 %. Global left ventricular hypokinesis.   2. Enlarged right ventricular cavity size and moderately reduced right ventricular systolic function.   3. Left atrium is normal in size.   4. There is mild calcification of the mitral valve annulus.   5. Moderate to severe mitral regurgitation.   6. Moderate tricuspid regurgitation.   7. Compared to the transthoracic echocardiogram performed on 2025, the LV and RV are now better visualized.    Echo 2025:   1. Technically difficult image quality.   2. Left ventricular systolic function is moderately decreased with an ejection fraction visually estimated at 30 to 35 %. Global left ventricular hypokinesis.   3. There is no evidence of a left ventricular thrombus.   4. Enlarged right ventricular cavity size and moderately reduced right ventricular systolic function.   5. No pericardial effusion seen.   6. Compared to the transthoracic echocardiogram performed on 2025, there have been no significant interval changes.      CXR:  Endotracheal tube tip 5 cm above the gayla. Enteric tube tip below the   edge of the image, and rightIJ catheter tip in the right atrium. No   pneumothorax. No focal consolidation or pleural effusion.    CTA C/A/P:  No pulmonary embolus.  Airspace consolidations at the lung bases may represent atelectasis   and/or pneumonia.  No bowel obstruction.  Cholelithiasis.  Trace ascites.        Assessment:  76 yo male PMHx HTN, HLD, CAD based on abnormal nuke, TIA, remote pericarditis in  presented to the ED with complaints of abdominal pain, nausea, vomiting for since the day prior.  Upon arrival to the ED patient was lethargic and tachypneic, with abdominal distention and mottled skin.  He was noted to be hypotensive despite fluid resuscitation and placed on escalating doses of pressors.  He was started on BIPAP initially for tachypnea and metabolic acidosis, eventually intubated in the ED.  Patient remains intubated and history taken from chart and d/w RN.  Patient was on escalating doses of pressors and , but in last day has been weaned down.  Echo with severe LV dysfxn which appears new and likely stress myopathy.  CI on low end of normal on .  Likely sepsis from GNR septicemia from cholangitis as cause now s/p perc tube.  Noted AF with some RVR due to shock, , etc.  -DCCV for rapid AF while in MICU, reviewed tele and no VT.  -Patient extubated and doing well.  Now off pressors/ inotropic support with normal CO.  -Still with runs of Afib, at time NSVT-Remains in SR now  -Repeated echo with persistent drop on LVEF    Plan:  1. continue carvedilol, start low dose entresto    2. Start AC with lovenox, transition to DOAC if no plans for surgical intervention   3. will plan ischemic evaluation, recommend [facility]. Hold lovenox in am tomorrow  4. Rest as per primary team and other consultants.       Gallo Black MD

## 2025-05-28 NOTE — SWALLOW VFSS/MBS ASSESSMENT ADULT - RESIDUE IN VALLECULAE
Mild/Moderate mild to moderate initial trial only, stasis reduced with each additional trial (mild with 2nd trial, trace to mild with 3rd)/Trace/Mild/Moderate Moderate Mild

## 2025-05-28 NOTE — PROVIDER CONTACT NOTE (CRITICAL VALUE NOTIFICATION) - TEST AND RESULT REPORTED:
PLT 20
Whole Blood Potassium 2.9
Final blood culture growth in aerobic and anaerobic bottle echoli esbl. growth in aerobic and anaerobic bottles citrobacter freundii. growth in aerobic and anaerobic bottle streptococcus gallolyticus. growth in anaerobic bottle gram + rods
ABG = Lactate = RN Assessment: Patient resting comfortably, 1:1 within arms reach. Maintain 1:1. Will continue to monitor..97
CO2 10.0
Lactate 7.8
PLT 18
venous blood gas lactate 4.0
Lactate 5.1
Troponin 54  Lactate 18.1
body fluid cx 5/19: Gram stain-polymorphic Leukocyte seen gram neg. seen by cytocentrifuge
lactate 5.80 from VBG
platelet 19
Body fluid (bile) culture- numerous gram negative rods
may 18th   anaeorobi and aeorobic gram negative rods and gram positive cocci in chains  anaeorobic gram positive rods
urine culture from 5/18 postive for >100,000 ecoli ESBL
Blood Cx 5/19 growth in aerobic +anaerobic bottle E.Coli ESBL
Lactate = 9.4

## 2025-05-28 NOTE — SWALLOW VFSS/MBS ASSESSMENT ADULT - SLP PERTINENT HISTORY OF CURRENT PROBLEM
Pt seen at bedside this admission for dysphagia. Last seen 5/27 with RX for puree, no fluids via PO & MBS

## 2025-05-28 NOTE — SWALLOW VFSS/MBS ASSESSMENT ADULT - RECOMMENDED FEEDING/EATING TECHNIQUES
allow for swallow between intakes/crush medication (when feasible)/maintain upright posture during/after eating for 30 mins/oral hygiene/position upright (90 degrees)/provide rest periods between swallows/small sips/bites

## 2025-05-28 NOTE — SWALLOW VFSS/MBS ASSESSMENT ADULT - SLP GENERAL OBSERVATIONS
Pt received & seen seated upright via stretcher, awake/alert. dysarthric, on room air, 0/10 pain pre/post

## 2025-05-28 NOTE — PROVIDER CONTACT NOTE (CRITICAL VALUE NOTIFICATION) - PERSON GIVING RESULT:
Andre Choi
Chip, Misa
Ewa Francis
Kassy Lew
Whit, Lab
Emiliana Lab: Lynette Rider
[facility]
Bee Rhodes
[facility]
Uyen Wade
Lab
Tray Bynum
Hemalatha Dalal in the lab
Hemant Dyson
Bee Rhodes in the lab
Denver Ford in the lab
Patrice Bynum

## 2025-05-28 NOTE — CHART NOTE - NSCHARTNOTEFT_GEN_A_CORE
Source: Patient, family member at bedside and chart review     Current Diet:   Easy to Chew (05-28-25 @ 14:10) [Active]    Patient reports UBW of 156 lbs. Reports good PO intake/appetite. Tolerating current diet. Pt is allergic to shrimp.    PO intake: 50-75%  [X]      Source for PO intake: pt's family, pt and flowsheet records     Current Weight:   (5/28) 170.1 lbs  (5/25) 179.4 lbs  (5/22) 195.3 lbs    Fluid Accumulation: 1+ generalized edema     Pertinent Medications:   MEDICATIONS  (STANDING):  carvedilol 6.25 milliGRAM(s) Oral every 12 hours  dextrose 5% + sodium chloride 0.45%. 1000 milliLiter(s) (75 mL/Hr) IV Continuous <Continuous>  enoxaparin Injectable 80 milliGRAM(s) SubCutaneous once  insulin lispro (ADMELOG) corrective regimen sliding scale   SubCutaneous three times a day before meals  meropenem  IVPB 1000 milliGRAM(s) IV Intermittent every 8 hours  pantoprazole  Injectable 40 milliGRAM(s) IV Push daily  sacubitril 24 mG/valsartan 26 mG 1 Tablet(s) Oral two times a day    MEDICATIONS  (PRN):  bacitracin   Ointment 1 Application(s) Topical two times a day PRN wound care    Pertinent Labs:   WBC Count : 12.85 K/uL  RBC Count : 4.47 M/uL  Hemoglobin : 12.9 g/dL  Hematocrit : 39.0 %  Albumin: 2.8g/dL [L]  Sodium: 152mmol/L  [H]  Alkaline Phosphate: 2.5 [H]    Skin: DTI upper lip    Nutrition focused physical exam conducted 5/23 with findings of Moderate malnutrition    Estimated Needs:   [X] no change since previous assessment  ~0687-9611 kcals/day (based on 25-30 kcal/kg BW 78.1 kg)  ~78-94 g pro/day (based on 1.0-1.2 g/kg BW 78.1 kg)    76 yo Male admitted for septic shock secondary to cholangitis, UTI and bacteriemia, Acute hypoxic respiratory failure possibly 2/2 aspiration, 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     Hospital Course:   Per chat " In the ED pt was found to be hypotensive and failed fluid resuscitation and started on pressor support.  Pt also hypoxic and initially placed on BIPAP but eventually required intubation and admitted to MICU for septic shock 2/2 cholangitis, UTI and acute pancreatitis.  Pt also profoundly acidotic w/ ATN from septic shock, transaminitis from shock liver/cholangitis, lipase >3K, , LA>16, procal >100 and abd US w/ distended GB w/ stones and CT a/p w/ cholelithiasis but pancreas reported to be normal.  GI and surgery consulted but pt deemed too unstable to undergo any procedures and ultimately had IR guided primitivo tube placed 5/19 and dark malodorous bile drained and sent for cultures which grew ESBL ecoli, citrobacter freundii, Enterococcus gallinarum and enterococcus faecalis.  Bcx 5/18 gew ESBL ecoli, Citerobacter freundii and strep gallalyticus.  Ucx 5/18 grew ESBL ecoli.  Repeat Bcx 5/19 grew only ESBL Ecoli and BCxs 5/20 NGTD.  Sputum cx 5/24 growing mold like fungus on prelim report and refered to mycology.  ID consulted and pt currently on merrem.  Pt required quadruple pressor support and given methylene blue 5/19 for vasoplegia and was on course of solucortef 5/19-5/25 to help wean off pressors in setting of septic shock.   Hospital course complicated by SVT 5/19 that required emergent cardioversion.  Pt also found to have high ammonia and placed on rifaxamin and lactulose but no known hx of cirrhosis and have since been d/c'd.  Pt successfully weaned off pressors 5/23.  During hospital course pt also found to have new rEF likely stress cardiomyopathy and required dobutamine (5/20-5/23) and milrinone gtts (5/23-5/25).  Hospital course further complicated by AF RVR requiring emergent cardioversion 5/21 and was amio loaded but now in NSR and not on amio.  Full dose AC was held despite AF w/ chadsvasc 6 bc severe thrombocytopenia likely 2/2 sepsis.  Pt extubated 5/24 and OGT removed.  Pt failed SLP eval and family declined NGT for now.  Pt will go for MBS tomorrow 5/27 to better assess his ability to swallow".    Current Nutrition Diagnosis: Acute-moderate protein-calorie malnutrition related to inability to consume adequate kcals/protein in setting of acute respiratory failure and acute illness pta as evidenced by meeting < 75% est needs > 7 days, +1 edema, mild muscle/fat wasting.     ** MBS completed today with recommendations for easy to chew with thin liquids.     Recommendations:   1. Recommend Regular diet with easy to chew and thin liquids consistencies.  2. Pt at risk of refeeding syndrome, recommend adding Thiamine daily.  3. Recommend MVI daily, vitamin C (500mg daily), and zinc sulfate (220mg daily x 10 days) to aid in wound healing.   4. Monitor diet tolerance & PO intake, weight/hydration status, nutrition-related labs, and skin.     Monitoring and Evaluation:   [X] PO intake [X] Tolerance to diet prescription [X] Weights  [X] Follow up per protocol [X] Labs: Chem 8 Source: Patient, family member at bedside and chart review     Current Diet:   Easy to Chew (05-28-25 @ 14:10) [Active]    Patient reports UBW of 156 lbs. Reports good PO intake/appetite. Tolerating current diet. Pt is allergic to shrimp.    PO intake: 50-75%  [X]      Source for PO intake: pt's family, pt and flowsheet records     Current Weight:   (5/28) 170.1 lbs  (5/25) 179.4 lbs  (5/22) 195.3 lbs    Fluid Accumulation: 1+ generalized edema     Pertinent Medications:   MEDICATIONS  (STANDING):  carvedilol 6.25 milliGRAM(s) Oral every 12 hours  dextrose 5% + sodium chloride 0.45%. 1000 milliLiter(s) (75 mL/Hr) IV Continuous <Continuous>  enoxaparin Injectable 80 milliGRAM(s) SubCutaneous once  insulin lispro (ADMELOG) corrective regimen sliding scale   SubCutaneous three times a day before meals  meropenem  IVPB 1000 milliGRAM(s) IV Intermittent every 8 hours  pantoprazole  Injectable 40 milliGRAM(s) IV Push daily  sacubitril 24 mG/valsartan 26 mG 1 Tablet(s) Oral two times a day    MEDICATIONS  (PRN):  bacitracin   Ointment 1 Application(s) Topical two times a day PRN wound care    Pertinent Labs:   WBC Count : 12.85 K/uL  RBC Count : 4.47 M/uL  Hemoglobin : 12.9 g/dL  Hematocrit : 39.0 %  Albumin: 2.8g/dL [L]  Sodium: 152mmol/L  [H]  Alkaline Phosphate: 2.5 [H]    Skin: DTI upper lip    Nutrition focused physical exam conducted 5/23 with findings of Moderate malnutrition    Estimated Needs:   [X] no change since previous assessment  ~0244-7651 kcals/day (based on 25-30 kcal/kg BW 78.1 kg)  ~78-94 g pro/day (based on 1.0-1.2 g/kg BW 78.1 kg)    74 yo Male admitted for septic shock secondary to cholangitis, UTI and bacteriemia, Acute hypoxic respiratory failure possibly 2/2 aspiration, 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    Hospital Course:   Per chat " In the ED pt was found to be hypotensive and failed fluid resuscitation and started on pressor support.  Pt also hypoxic and initially placed on BIPAP but eventually required intubation and admitted to MICU for septic shock 2/2 cholangitis, UTI and acute pancreatitis.  Pt also profoundly acidotic w/ ATN from septic shock, transaminitis from shock liver/cholangitis, lipase >3K, , LA>16, procal >100 and abd US w/ distended GB w/ stones and CT a/p w/ cholelithiasis but pancreas reported to be normal.  GI and surgery consulted but pt deemed too unstable to undergo any procedures and ultimately had IR guided primitivo tube placed 5/19 and dark malodorous bile drained and sent for cultures which grew ESBL ecoli, citrobacter freundii, Enterococcus gallinarum and enterococcus faecalis.  Bcx 5/18 gew ESBL ecoli, Citerobacter freundii and strep gallalyticus.  Ucx 5/18 grew ESBL ecoli.  Repeat Bcx 5/19 grew only ESBL Ecoli and BCxs 5/20 NGTD.  Sputum cx 5/24 growing mold like fungus on prelim report and refered to mycology.  ID consulted and pt currently on merrem.  Pt required quadruple pressor support and given methylene blue 5/19 for vasoplegia and was on course of solucortef 5/19-5/25 to help wean off pressors in setting of septic shock.   Hospital course complicated by SVT 5/19 that required emergent cardioversion.  Pt also found to have high ammonia and placed on rifaxamin and lactulose but no known hx of cirrhosis and have since been d/c'd.  Pt successfully weaned off pressors 5/23.  During hospital course pt also found to have new rEF likely stress cardiomyopathy and required dobutamine (5/20-5/23) and milrinone gtts (5/23-5/25).  Hospital course further complicated by AF RVR requiring emergent cardioversion 5/21 and was amio loaded but now in NSR and not on amio.  Full dose AC was held despite AF w/ chadsvasc 6 bc severe thrombocytopenia likely 2/2 sepsis.  Pt extubated 5/24 and OGT removed.  Pt failed SLP eval and family declined NGT for now.  Pt will go for MBS tomorrow 5/27 to better assess his ability to swallow".    Current Nutrition Diagnosis: Acute-moderate protein-calorie malnutrition related to inability to consume adequate kcals/protein in setting of acute respiratory failure and acute illness pta as evidenced by meeting < 75% est needs > 7 days, +1 edema, mild muscle/fat wasting.     ** MBS completed today with recommendations for easy to chew with thin liquids.     Recommendations:   1. Recommend Regular diet with easy to chew and thin liquids consistencies.  2. Pt at risk of refeeding syndrome, recommend adding Thiamine daily.  3. Recommend MVI daily, vitamin C (500mg daily), and zinc sulfate (220mg daily x 10 days) to aid in wound healing.   4. Monitor diet tolerance & PO intake, weight/hydration status, nutrition-related labs, and skin.     Monitoring and Evaluation:   [X] PO intake [X] Tolerance to diet prescription [X] Weights  [X] Follow up per protocol [X] Labs: Chem 8

## 2025-05-28 NOTE — PROGRESS NOTE ADULT - SUBJECTIVE AND OBJECTIVE BOX
[facility] Division of Hospital Medicine    pt seen and examined at bedside  abd pain much better  mbs done today - can be adv to easy to chew diet  stools more formed, rectal tube dc  no ha, dizziness, cp, sob, abd pain, nausea, vomiting, chills, fevers    MEDICATIONS  (STANDING):  carvedilol 6.25 milliGRAM(s) Oral every 12 hours  chlorhexidine 2% Cloths 1 Application(s) Topical <User Schedule>  chlorhexidine 2% Cloths 1 Application(s) Topical <User Schedule>  dextrose 5% + sodium chloride 0.45%. 1000 milliLiter(s) (75 mL/Hr) IV Continuous <Continuous>  dextrose 50% Injectable 25 Gram(s) IV Push once  dextrose 50% Injectable 12.5 Gram(s) IV Push once  dextrose 50% Injectable 25 Gram(s) IV Push once  enoxaparin Injectable 80 milliGRAM(s) SubCutaneous once  insulin lispro (ADMELOG) corrective regimen sliding scale   SubCutaneous three times a day before meals  meropenem  IVPB 1000 milliGRAM(s) IV Intermittent every 8 hours  pantoprazole  Injectable 40 milliGRAM(s) IV Push daily  sacubitril 24 mG/valsartan 26 mG 1 Tablet(s) Oral two times a day    MEDICATIONS  (PRN):  bacitracin   Ointment 1 Application(s) Topical two times a day PRN wound care  morphine  - Injectable 2 milliGRAM(s) IV Push every 6 hours PRN Severe Pain (7 - 10)  morphine  - Injectable 1 milliGRAM(s) IV Push every 6 hours PRN Moderate Pain (4 - 6)        I&O's Summary    27 May 2025 07:01  -  28 May 2025 07:00  --------------------------------------------------------  IN: 1875 mL / OUT: 2085 mL / NET: -210 mL        PHYSICAL EXAM:  Vital Signs Last 24 Hrs  T(C): 36.6 (28 May 2025 10:25), Max: 37 (27 May 2025 16:54)  T(F): 97.9 (28 May 2025 10:25), Max: 98.6 (27 May 2025 16:54)  HR: 74 (28 May 2025 10:25) (73 - 125)  BP: 120/76 (28 May 2025 10:25) (88/46 - 130/76)  BP(mean): --  RR: 15 (28 May 2025 10:25) (15 - 20)  SpO2: 92% (28 May 2025 10:25) (92% - 95%)    Parameters below as of 28 May 2025 10:25  Patient On (Oxygen Delivery Method): room air        GENERAL: NAD, lying in bed comfortably  HEAD:  Atraumatic, normocephalic  EYES: EOMI, PERRL  NECK: Supple, trachea midline, no JVD  HEART: Regular rate and rhythm  LUNGS: Unlabored respirations.  Clear to auscultation bilaterally, no crackles, wheezing, or rhonchi  ABDOMEN: BRITTNEY drain in place in RUQ   EXTREMITIES: 2+ peripheral pulses bilaterally. No clubbing, cyanosis, or edema  NERVOUS SYSTEM:  A&Ox3, moving all extremities, no focal deficits         LABS:                        12.9   12.85 )-----------( 175      ( 28 May 2025 05:15 )             39.0     05-28    152[H]  |  116[H]  |  26.5[H]  ----------------------------<  153[H]  3.9   |  23.0  |  0.61    Ca    7.3[L]      28 May 2025 05:15  Phos  2.6     05-28  Mg     2.0     05-27    TPro  5.4[L]  /  Alb  2.8[L]  /  TBili  2.5[H]  /  DBili  x   /  AST  28  /  ALT  33  /  AlkPhos  110  05-28          Urinalysis Basic - ( 28 May 2025 05:15 )    Color: x / Appearance: x / SG: x / pH: x  Gluc: 153 mg/dL / Ketone: x  / Bili: x / Urobili: x   Blood: x / Protein: x / Nitrite: x   Leuk Esterase: x / RBC: x / WBC x   Sq Epi: x / Non Sq Epi: x / Bacteria: x        CAPILLARY BLOOD GLUCOSE      POCT Blood Glucose.: 180 mg/dL (28 May 2025 11:19)  POCT Blood Glucose.: 161 mg/dL (28 May 2025 07:56)  POCT Blood Glucose.: 194 mg/dL (27 May 2025 22:19)  POCT Blood Glucose.: 180 mg/dL (27 May 2025 17:53)        RADIOLOGY & ADDITIONAL TESTS:  Results Reviewed:   Imaging Personally Reviewed:  Electrocardiogram Personally Reviewed:

## 2025-05-29 ENCOUNTER — TRANSCRIPTION ENCOUNTER (OUTPATIENT)
Age: 75
End: 2025-05-29

## 2025-05-29 DIAGNOSIS — I25.10 ATHEROSCLEROTIC HEART DISEASE OF NATIVE CORONARY ARTERY WITHOUT ANGINA PECTORIS: ICD-10-CM

## 2025-05-29 DIAGNOSIS — R78.81 BACTEREMIA: ICD-10-CM

## 2025-05-29 DIAGNOSIS — I34.0 NONRHEUMATIC MITRAL (VALVE) INSUFFICIENCY: ICD-10-CM

## 2025-05-29 DIAGNOSIS — A49.9 BACTERIAL INFECTION, UNSPECIFIED: ICD-10-CM

## 2025-05-29 DIAGNOSIS — I50.21 ACUTE SYSTOLIC (CONGESTIVE) HEART FAILURE: ICD-10-CM

## 2025-05-29 LAB
ALBUMIN SERPL ELPH-MCNC: 2.5 G/DL — LOW (ref 3.3–5.2)
ALP SERPL-CCNC: 122 U/L — HIGH (ref 40–120)
ALT FLD-CCNC: 29 U/L — SIGNIFICANT CHANGE UP
ANION GAP SERPL CALC-SCNC: 11 MMOL/L — SIGNIFICANT CHANGE UP (ref 5–17)
AST SERPL-CCNC: 29 U/L — SIGNIFICANT CHANGE UP
BILIRUB SERPL-MCNC: 2 MG/DL — SIGNIFICANT CHANGE UP (ref 0.4–2)
BUN SERPL-MCNC: 18.7 MG/DL — SIGNIFICANT CHANGE UP (ref 8–20)
CALCIUM SERPL-MCNC: 6.8 MG/DL — LOW (ref 8.4–10.5)
CHLORIDE SERPL-SCNC: 112 MMOL/L — HIGH (ref 96–108)
CO2 SERPL-SCNC: 22 MMOL/L — SIGNIFICANT CHANGE UP (ref 22–29)
CREAT SERPL-MCNC: 0.52 MG/DL — SIGNIFICANT CHANGE UP (ref 0.5–1.3)
CULTURE RESULTS: ABNORMAL
EGFR: 105 ML/MIN/1.73M2 — SIGNIFICANT CHANGE UP
EGFR: 105 ML/MIN/1.73M2 — SIGNIFICANT CHANGE UP
GLUCOSE BLDC GLUCOMTR-MCNC: 140 MG/DL — HIGH (ref 70–99)
GLUCOSE BLDC GLUCOMTR-MCNC: 155 MG/DL — HIGH (ref 70–99)
GLUCOSE BLDC GLUCOMTR-MCNC: 186 MG/DL — HIGH (ref 70–99)
GLUCOSE SERPL-MCNC: 148 MG/DL — HIGH (ref 70–99)
HCT VFR BLD CALC: 37.8 % — LOW (ref 39–50)
HGB BLD-MCNC: 12.4 G/DL — LOW (ref 13–17)
MCHC RBC-ENTMCNC: 29 PG — SIGNIFICANT CHANGE UP (ref 27–34)
MCHC RBC-ENTMCNC: 32.8 G/DL — SIGNIFICANT CHANGE UP (ref 32–36)
MCV RBC AUTO: 88.3 FL — SIGNIFICANT CHANGE UP (ref 80–100)
NRBC # BLD AUTO: 0 K/UL — SIGNIFICANT CHANGE UP (ref 0–0)
NRBC # FLD: 0 K/UL — SIGNIFICANT CHANGE UP (ref 0–0)
NRBC BLD AUTO-RTO: 0 /100 WBCS — SIGNIFICANT CHANGE UP (ref 0–0)
PLATELET # BLD AUTO: 185 K/UL — SIGNIFICANT CHANGE UP (ref 150–400)
PMV BLD: 11.2 FL — SIGNIFICANT CHANGE UP (ref 7–13)
POTASSIUM SERPL-MCNC: 3.3 MMOL/L — LOW (ref 3.5–5.3)
POTASSIUM SERPL-SCNC: 3.3 MMOL/L — LOW (ref 3.5–5.3)
PROT SERPL-MCNC: 5 G/DL — LOW (ref 6.6–8.7)
RBC # BLD: 4.28 M/UL — SIGNIFICANT CHANGE UP (ref 4.2–5.8)
RBC # FLD: 15.6 % — HIGH (ref 10.3–14.5)
SODIUM SERPL-SCNC: 144 MMOL/L — SIGNIFICANT CHANGE UP (ref 135–145)
SPECIMEN SOURCE: SIGNIFICANT CHANGE UP
WBC # BLD: 11.5 K/UL — HIGH (ref 3.8–10.5)
WBC # FLD AUTO: 11.5 K/UL — HIGH (ref 3.8–10.5)

## 2025-05-29 PROCEDURE — 93458 L HRT ARTERY/VENTRICLE ANGIO: CPT | Mod: 26

## 2025-05-29 PROCEDURE — 99232 SBSQ HOSP IP/OBS MODERATE 35: CPT

## 2025-05-29 PROCEDURE — 99233 SBSQ HOSP IP/OBS HIGH 50: CPT

## 2025-05-29 PROCEDURE — 99222 1ST HOSP IP/OBS MODERATE 55: CPT

## 2025-05-29 RX ORDER — ASPIRIN 325 MG
81 TABLET ORAL ONCE
Refills: 0 | Status: COMPLETED | OUTPATIENT
Start: 2025-05-29 | End: 2025-05-29

## 2025-05-29 RX ADMIN — INSULIN LISPRO 1: 100 INJECTION, SOLUTION INTRAVENOUS; SUBCUTANEOUS at 12:26

## 2025-05-29 RX ADMIN — Medication 75 MILLILITER(S): at 18:03

## 2025-05-29 RX ADMIN — INSULIN LISPRO 1: 100 INJECTION, SOLUTION INTRAVENOUS; SUBCUTANEOUS at 09:14

## 2025-05-29 RX ADMIN — Medication 81 MILLIGRAM(S): at 13:09

## 2025-05-29 RX ADMIN — Medication 1 APPLICATION(S): at 06:13

## 2025-05-29 RX ADMIN — MEROPENEM 100 MILLIGRAM(S): 1 INJECTION INTRAVENOUS at 22:01

## 2025-05-29 RX ADMIN — MEROPENEM 100 MILLIGRAM(S): 1 INJECTION INTRAVENOUS at 16:31

## 2025-05-29 RX ADMIN — Medication 40 MILLIGRAM(S): at 18:03

## 2025-05-29 RX ADMIN — Medication 1 APPLICATION(S): at 06:19

## 2025-05-29 RX ADMIN — CARVEDILOL 6.25 MILLIGRAM(S): 3.12 TABLET, FILM COATED ORAL at 18:03

## 2025-05-29 RX ADMIN — SACUBITRIL AND VALSARTAN 1 TABLET(S): 49; 51 TABLET, FILM COATED ORAL at 06:08

## 2025-05-29 RX ADMIN — MEROPENEM 100 MILLIGRAM(S): 1 INJECTION INTRAVENOUS at 06:09

## 2025-05-29 RX ADMIN — Medication 250 MILLILITER(S): at 16:00

## 2025-05-29 RX ADMIN — CARVEDILOL 6.25 MILLIGRAM(S): 3.12 TABLET, FILM COATED ORAL at 06:08

## 2025-05-29 RX ADMIN — SACUBITRIL AND VALSARTAN 1 TABLET(S): 49; 51 TABLET, FILM COATED ORAL at 18:03

## 2025-05-29 NOTE — DISCHARGE NOTE PROVIDER - DETAILS OF MALNUTRITION DIAGNOSIS/DIAGNOSES
This patient has been assessed with a concern for Malnutrition and was treated during this hospitalization for the following Nutrition diagnosis/diagnoses:     -  05/23/2025: Moderate protein-calorie malnutrition

## 2025-05-29 NOTE — DISCHARGE NOTE PROVIDER - CARE PROVIDERS DIRECT ADDRESSES
[email or web address] [place] [facility]\""[employer].[email or web address] [place] [facility]\""[email or web address] [place] [facility]\""[employer].[facility]

## 2025-05-29 NOTE — DISCHARGE NOTE PROVIDER - ATTENDING DISCHARGE PHYSICAL EXAMINATION:
T(C): 36.7 (06-05-25 @ 10:53), Max: 36.8 (06-04-25 @ 16:33)  HR: 82 (06-05-25 @ 10:53) (73 - 86)  BP: 96/51 (06-05-25 @ 10:53) (94/55 - 108/67)  RR: 18 (06-05-25 @ 10:53) (18 - 92)  SpO2: 96% (06-05-25 @ 10:53) (95% - 96%)    CONSTITUTIONAL: No apparent distress  HEENT: Normocephalic, Atraumatic  RESPIRATORY:  lungs are clear to auscultation bilaterally, No crackles, rhonchi, wheezes  CARDIOVASCULAR: Regular rate and rhythm, no lower extremity edema  ABDOMEN: Soft, non-distended, nontender to palpation, +BS, BRITTNEY drain w/ some tenderness around insertion site   PSYCH: thoughts linear, affect appropriate  NEUROLOGY: Alert, Oriented x3

## 2025-05-29 NOTE — PROGRESS NOTE ADULT - SUBJECTIVE AND OBJECTIVE BOX
Department of Cardiology                                                                  [facility]/[place] [address] [address] [facility]                                                            Telephone: [phone]. Fax:[phone]                                                                                      Cardiac Cath NP Note       76 y/o M w/ PMH of HTN, HLD, CAD, TIA, remote hx of pericarditis () presented  to ED c/o abd pain, N/V.  In the ED pt was found to be hypotensive and failed fluid resuscitation and started on pressor support.    Pt also hypoxic and initially placed on BIPAP but eventually required intubation and admitted to MICU for septic shock 2/2 cholangitis, UTI and acute pancreatitis.  Pt also profoundly acidotic w/ ATN from septic shock, transaminitis from shock liver/cholangitis  Bcx positive for  ESBL ecoli, citrobacter, , Enterococcus \enterococcus faecalis.  Ucx  positivbe for  ESBL ecoli.   Sputum cx  growing mold like fungus    Hospital course complicated by SVT  that required emergent cardioversion.      Pt successfully weaned off pressors .    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 shock, required dobutamine (-) and milrinone gtts (-).  Hospital course further complicated by AF RVR requiring emergent cardioversion  and was amio loaded but now in NSR and not on amio.    Full dose AC was held despite AF w/ chadsvasc 6 bc severe thrombocytopenia likely 2/2 sepsis.  Pt extubated   Ptaient s/p Peggy tube for cholangitis  MICAH on 25   revealed . Left ventricular systolic function is moderately to severely decreased with an ejection fraction visually estimated at 30 to 35 %. mild (grade 1) left ventricular diastolic dysfunction. Small right pleural effusion noted.  TTE on 25 revealed  Left ventricular systolic function is moderately decreased with an ejection fraction visually estimated at 30 to 35 %. Global left ventricular hypokinesis.  There is no evidence of a left ventricular thrombus. Enlarged right ventricular cavity size and moderately reduced right ventricular systolic function.  trop 34on , 54 on 25  12 LEAD ekg admission, revealed inferior and lateral T wave changes  Patient now presents to [facility] for left heart cath to evaluate for CAD.    MEDICATIONS:  carvedilol 6.25 milliGRAM(s) Oral every 12 hours  sacubitril 24 mG/valsartan 26 mG 1 Tablet(s) Oral two times a day    meropenem  IVPB 1000 milliGRAM(s) IV Intermittent every 8 hours      morphine  - Injectable 2 milliGRAM(s) IV Push every 6 hours PRN  morphine  - Injectable 1 milliGRAM(s) IV Push every 6 hours PRN    pantoprazole  Injectable 40 milliGRAM(s) IV Push daily    dextrose 50% Injectable 25 Gram(s) IV Push once  dextrose 50% Injectable 12.5 Gram(s) IV Push once  dextrose 50% Injectable 25 Gram(s) IV Push once  insulin lispro (ADMELOG) corrective regimen sliding scale   SubCutaneous three times a day before meals    bacitracin   Ointment 1 Application(s) Topical two times a day PRN  chlorhexidine 2% Cloths 1 Application(s) Topical <User Schedule>  chlorhexidine 2% Cloths 1 Application(s) Topical <User Schedule>  dextrose 5% + sodium chloride 0.45%. 1000 milliLiter(s) IV Continuous <Continuous>  enoxaparin Injectable 80 milliGRAM(s) SubCutaneous once        PHYSICAL EXAM:    T(C): 36.4 (25 @ 10:55), Max: 37.9 (25 @ 21:13)  HR: 80 (25 @ 10:55) (73 - 82)  BP: 100/67 (25 @ 10:55) (100/67 - 145/79)  RR: 18 (25 @ 10:55) (18 - 20)  SpO2: 95% (25 @ 10:55) (87% - 97%)  Wt(kg): --    I&O's Summary    28 May 2025 07:01  -  29 May 2025 07:00  --------------------------------------------------------  IN: 1150 mL / OUT: 10 mL / NET: 1140 mL        Daily     Daily Weight in k.7 (29 May 2025 05:00)    Appearance: Normal	  HEENT:   Normal oral mucosa, PERRL, EOMI	  Lymphatic: No lymphadenopathy  Cardiovascular: Normal S1 S2, No JVD, No murmurs, No edema  Respiratory: Lungs clear to auscultation	  Psychiatry: A & O x 3, Mood & affect appropriate  Gastrointestinal:  Soft, Non-tender, + BS	  Skin: No rashes, No ecchymoses, No cyanosis  Neurologic: Non-focal  Extremities: Normal range of motion, No clubbing, cyanosis or edema  Vascular: Peripheral pulses palpable 2+ bilaterally    TELEMETRY: 	    ECG:  	  RADIOLOGY:   DIAGNOSTIC TESTING:  [ ] Echocardiogram:  [ ]  Catheterization:  [ ] Stress Test:    OTHER: 	    LABS:	 	    CARDIAC MARKERS:                                  12.4   11.50 )-----------( 185      ( 29 May 2025 08:20 )             37.8         144  |  112[H]  |  18.7  ----------------------------<  148[H]  3.3[L]   |  22.0  |  0.52    Ca    6.8[L]      29 May 2025 05:58  Phos  2.6       Mg     2.0         TPro  5.0[L]  /  Alb  2.5[L]  /  TBili  2.0  /  DBili  x   /  AST  29  /  ALT  29  /  AlkPhos  122[H]      proBNP:   Lipid Profile:   HgA1c:   TSH:                                                                                              Department of Cardiology                                                                  [facility]/[place] [address] [address] [facility]                                                            Telephone: [phone]. Fax:[phone]                                                                                 interventional Cardiology progress note     CC: New cardiomyopathy    HPI    74 y/o M w/ PMH of HTN, HLD, CAD, TIA, remote hx of pericarditis () presented  to ED c/o abd pain, N/V.  In the ED pt was found to be hypotensive and failed fluid resuscitation and started on pressor support.    Pt also hypoxic and initially placed on BIPAP but eventually required intubation and admitted to MICU for septic shock 2/2 cholangitis, UTI and acute pancreatitis.  Pt also profoundly acidotic w/ ATN from septic shock, transaminitis from shock liver/cholangitis  Bcx positive for  ESBL ecoli, citrobacter, , Enterococcus \enterococcus faecalis.  Ucx  positivbe for  ESBL ecoli.   Sputum cx  growing mold like fungus    Hospital course complicated by SVT  that required emergent cardioversion.      Pt successfully weaned off pressors .    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 shock, required dobutamine (-) and milrinone gtts (-).  Hospital course further complicated by AF RVR requiring emergent cardioversion  and was amio loaded but now in NSR and not on amio.    Full dose AC was held despite AF w/ chadsvasc 6 bc severe thrombocytopenia likely 2/2 sepsis.  Pt extubated   Ptaient s/p Peggy tube for cholangitis  MICAH on 25   revealed . Left ventricular systolic function is moderately to severely decreased with an ejection fraction visually estimated at 30 to 35 %. mild (grade 1) left ventricular diastolic dysfunction. Small right pleural effusion noted.  TTE on 25 revealed  Left ventricular systolic function is moderately decreased with an ejection fraction visually estimated at 30 to 35 %. Global left ventricular hypokinesis.  There is no evidence of a left ventricular thrombus. Enlarged right ventricular cavity size and moderately reduced right ventricular systolic function.  trop 34on , 54 on 25  12 LEAD ekg admission, revealed inferior and lateral T wave changes  Patient now presents to [facility] for left heart cath to evaluate for CAD.    MEDICATIONS:  carvedilol 6.25 milliGRAM(s) Oral every 12 hours  sacubitril 24 mG/valsartan 26 mG 1 Tablet(s) Oral two times a day    meropenem  IVPB 1000 milliGRAM(s) IV Intermittent every 8 hours      morphine  - Injectable 2 milliGRAM(s) IV Push every 6 hours PRN  morphine  - Injectable 1 milliGRAM(s) IV Push every 6 hours PRN    pantoprazole  Injectable 40 milliGRAM(s) IV Push daily    dextrose 50% Injectable 25 Gram(s) IV Push once  dextrose 50% Injectable 12.5 Gram(s) IV Push once  dextrose 50% Injectable 25 Gram(s) IV Push once  insulin lispro (ADMELOG) corrective regimen sliding scale   SubCutaneous three times a day before meals    bacitracin   Ointment 1 Application(s) Topical two times a day PRN  chlorhexidine 2% Cloths 1 Application(s) Topical <User Schedule>  chlorhexidine 2% Cloths 1 Application(s) Topical <User Schedule>  dextrose 5% + sodium chloride 0.45%. 1000 milliLiter(s) IV Continuous <Continuous>  enoxaparin Injectable 80 milliGRAM(s) SubCutaneous once        PHYSICAL EXAM:    T(C): 36.4 (25 @ 10:55), Max: 37.9 (25 @ 21:13)  HR: 80 (25 @ 10:55) (73 - 82)  BP: 100/67 (25 @ 10:55) (100/67 - 145/79)  RR: 18 (25 @ 10:55) (18 - 20)  SpO2: 95% (25 @ 10:55) (87% - 97%)  Wt(kg): --    I&O's Summary    28 May 2025 07:01  -  29 May 2025 07:00  --------------------------------------------------------  IN: 1150 mL / OUT: 10 mL / NET: 1140 mL        Daily     Daily Weight in k.7 (29 May 2025 05:00)    Appearance: Normal	  HEENT:   Normal oral mucosa, PERRL, EOMI	  Lymphatic: No lymphadenopathy  Cardiovascular: Normal S1 S2, No JVD, No murmurs, No edema  Respiratory: Lungs clear to auscultation	  Psychiatry: A & O x 3, Mood & affect appropriate  Gastrointestinal:  Soft, Non-tender, + BS	  Skin: No rashes, No ecchymoses, No cyanosis  Neurologic: Non-focal  Extremities: Normal range of motion, No clubbing, cyanosis or edema  Vascular: Peripheral pulses palpable 2+ bilaterally    TELEMETRY: 	    ECG: SR, inferor and lateral ST abnormalities    < from: TTE Limited W or WO Ultrasound Enhancing Agent (25 @ 11:54) >   1. Left ventricular systolic function is moderately to severely decreased with an ejection fraction visually estimated at 30 to 35 %.   2. There is mild (grade 1) left ventricular diastolic dysfunction.   3. No pericardial effusion seen.   4. Small right pleural effusion noted.    ________________________________________________________________________________________    < end of copied text >    LABS:	 	    CARDIAC MARKERS:                         12.4   11.50 )-----------( 185      ( 29 May 2025 08:20 )             37.8         144  |  112[H]  |  18.7  ----------------------------<  148[H]  3.3[L]   |  22.0  |  0.52    Ca    6.8[L]      29 May 2025 05:58  Phos  2.6       Mg     2.0         TPro  5.0[L]  /  Alb  2.5[L]  /  TBili  2.0  /  DBili  x   /  AST    /  ALT    /  AlkPhos  122[H]

## 2025-05-29 NOTE — DISCHARGE NOTE PROVIDER - DISCHARGE SERVICE FOR PATIENT
Detail Level: Detailed
General Sunscreen Counseling: I recommended a broad spectrum sunscreen with a SPF of 30 or higher.  I explained that SPF 30 sunscreens block approximately 97 percent of the sun's harmful rays.  Sunscreens should be applied at least 15 minutes prior to expected sun exposure and then every 2 hours after that as long as sun exposure continues. If swimming or exercising sunscreen should be reapplied every 45 minutes to an hour after getting wet or sweating.  One ounce, or the equivalent of a shot glass full of sunscreen, is adequate to protect the skin not covered by a bathing suit. I also recommended a lip balm with a sunscreen as well. Sun protective clothing can be used in lieu of sunscreen but must be worn the entire time you are exposed to the sun's rays.
on the discharge service for the patient. I have reviewed and made amendments to the documentation where necessary.

## 2025-05-29 NOTE — CONSULT NOTE ADULT - ASSESSMENT
76 yo male with hx of HTN, HLD, CAD, TIA, remote pericarditis in 2015, presented to the ED with complaints of abdominal pain, nausea, vomiting for since the day prior. Admitted with severe shock. GI consulted after elevated liver enzymes, elevated lipase.     Elevated lipase  Elevated liver enzymes  CT abd shows cholelithiasis, no evidence of cholecystitis normal liver, no evidence of pancreatitis.  US abd performed gall bladder stones with gall bladder wall thickening, pericholecystic fluid, trace perihepatic ascites. CBD measuring 8.5 mm.  No stone is seen in the visualized segment.  Distal segment of the common bile duct could not be visualized as it is obscured by bowel gas.  Liver enzymes this morning T bili 4.3, AST/ /193, . Elevated lact >16, Elevated procalcitonin >100. Ph 7.12,   Lipase >3000 (down trending this morning). Triglyceride 567    -Patient currently, intubated, requiring multiple vasopressors  -Trend liver enzymes, renal function, electrolytes, CBC  -IV fluids, sodium bicarb infusion   -No endoscopic evaluation planned at this time. Patient seen and evaluated at bedside, reporting no complaints,                                                      .atient is critically ill  -Further care per primary team  Plan d/w the patient's wife and son (Harshal Bateman) at the bedside. D/w MICU team    
1.  Acute kidney injury: From ATN/shock.  He is on multiple pressors.  But he is making urine.  100 cc- 200 cc/h.  Continue fluid resuscitation with bicarb.  Creatinine 1.79.  No indication for renal replacement therapy at this time.  #2 metabolic acidosis from severe lactic acidosis.  Continue fluid resuscitation.  3.  Septic shock: From cholecystitis.  Continue supportive care.  He is too unstable for any surgical intervention.  Per ICU team  4.  Respiratory failure he is intubated    d/w ICU team      Thank you for the consultation    Joel Mendoza MD, NERIS  
75yr man hx CAD, TIA, HTN, HLD admitted with shock, septic vs cardiogenic completed by respiratory failure in the setting of pancreatitis    Shock  Cardiogenic vs Sepsis  on pressors - wean per micu    Acute Respiratory Failure  on vent - mgmt per micu    Elevated Lipase, Transaminitis  Surgery and GI consulted  no planned procedures  cont IV abx, fluids    OLEKSANDR  monitor renal fxs  avoid nephrotoxic agents    Encounter for Palliative Care  Palliative Care consulted to assist with goals of care.   Family meeting  Family made aware of patient's very guarded prognosis.  Will need to see how he does in the next  24- 48hrs.    Prepared family possibility of HD  Family needs time to process things.   Recommended placement of DNR directive in the interim.       
76 y/o M w/ PMH of HTN, HLD, CAD, TIA, remote hx of pericarditis (2015) presented 5/18 to ED c/o abd pain, N/V.  In the ED pt was found to be hypotensive and failed fluid resuscitation and started on pressor support.  Pt also hypoxic and initially placed on BIPAP but eventually required intubation and admitted to MICU for septic shock 2/2 cholangitis, UTI and acute pancreatitis.  Pt also profoundly acidotic w/ ATN from septic shock, transaminitis from shock liver/cholangitis, lipase >3K, , LA>16, procal >100 and abd US w/ distended GB w/ stones and CT a/p w/ cholelithiasis but pancreas reported to be normal.  GI and surgery consulted but pt deemed too unstable to undergo any procedures and ultimately had IR guided primitivo tube placed 5/19 and dark malodorous bile drained and sent for cultures which grew ESBL ecoli, citrobacter freundii, Enterococcus gallinarum and enterococcus faecalis.  Bcx 5/18 gew ESBL ecoli, Citerobacter freundii and strep gallalyticus.  Ucx 5/18 grew ESBL ecoli.  Repeat Bcx 5/19 grew only ESBL Ecoli and BCxs 5/20 NGTD.  Sputum cx 5/24 growing few aspergillus, referred to mycology. ID consulted and pt currently on merrem.  Pt required quadruple pressor support and given methylene blue 5/19 for vasoplegia and was on course of solucortef 5/19-5/25 to help wean off pressors in setting of septic shock.   Hospital course complicated by SVT 5/19 that required emergent cardioversion.  Pt also found to have high ammonia and placed on rifaxamin and lactulose but no known hx of cirrhosis and have since been d/c'd.  Pt successfully weaned off pressors 5/23.  During hospital course pt also found to have new rEF likely stress cardiomyopathy and required dobutamine (5/20-5/23) and milrinone gtts (5/23-5/25).  Hospital course further complicated by AF RVR requiring emergent cardioversion 5/21 and was amio loaded but now in NSR and not on amio.  Full dose AC was held despite AF w/ chadsvasc 6 bc severe thrombocytopenia likely 2/2 sepsis.  Pt extubated 5/24 and OGT removed.  Pt failed SLP eval and family declined NGT for now.  s/p MBS since tolerating thin liquids and easy to chew diet. Pt was medically stable for transfer to M/S floor. Now s/p diagnostic C 5/29. CTS consulted for CABG eval.    A/P:  CAD, multivessel  HFrEF, cardiomyopathy, mod/severe MR  multiorganism bacteremia, UTI  Cholangitis  New onset AF    Recommend ongoing medical management for above issues per primary team and consulting services  [facility] reviewed, likely chronic disease, pt will need to recover from current medical issues, can f/u with Dr. Alberto as outpatient  Recommend c/w PT/ambulate as tolerated, OOB to chair, pulm toileting  Recommend ASA/statin, beta blocker as tolerated by HR/BP  TTE as above, mod/severe MR  GDMT per cardiology for HFrEF, no longer in shock state, appears close to euvolemia, warm extremities, well perfused  AFib RVR now NSR post emergent cardioversion, eventual bridge to NOAC - no plans for inpatient CABG  GI/Gen Surg following for cholangitis, possible pancreatitis  Plans for eventual MRCP, likely needs cholecystectomy (at present plans are for o/p follow up with surgery, IR perc drain to remain in place)  ID following for abx, cleared BCx on 5/20 - on Merrem  few aspergillus on sputum cx ?clinically significant ?

## 2025-05-29 NOTE — DISCHARGE NOTE PROVIDER - NSDCCPTREATMENT_GEN_ALL_CORE_FT
PRINCIPAL PROCEDURE  Procedure: Left heart cardiac cath  Findings and Treatment: Patient underwent LHC on 05/29/25, revealed severe CAD  Restricted use with no heavy lifting of affected arm for 48 hours.  No submerging the arm in water for 48 hours.  You may start showering today.  Call your doctor for any bleeding, swelling, loss of sensation in the hand or fingers, or fingers turning blue.  If heavy bleeding or large lumps form, hold pressure at the spot and come to the Emergency Room.  \     PRINCIPAL PROCEDURE  Procedure: Left heart cardiac cath  Findings and Treatment: Patient underwent LHC on 05/29/25, revealed severe CAD  Restricted use with no heavy lifting of affected arm for 48 hours.  No submerging the arm in water for 48 hours.  You may start showering today.  Call your doctor for any bleeding, swelling, loss of sensation in the hand or fingers, or fingers turning blue.  If heavy bleeding or large lumps form, hold pressure at the spot and come to the Emergency Room.

## 2025-05-29 NOTE — PROGRESS NOTE ADULT - SUBJECTIVE AND OBJECTIVE BOX
CC:  Follow up GOC , Symptoms    OVERNIGHT EVENTS:  no new complaints    Present Symptoms:   Dyspnea:  No    Nausea/Vomiting:  No    Anxiety/ Agitation No    Depression:  No    Fatigue:  Yes     Loss of appetite:  No   Constipation: Not Reported  Pain: No              Location            Duration            Character            Severity            Factors            Effect    Pain AD Score:  [email or web address] (press ctrl + left click to view)    Review of Systems: Reviewed as above  All others negative      MEDICATIONS  (STANDING):  carvedilol 6.25 milliGRAM(s) Oral every 12 hours  chlorhexidine 2% Cloths 1 Application(s) Topical <User Schedule>  chlorhexidine 2% Cloths 1 Application(s) Topical <User Schedule>  dextrose 5% + sodium chloride 0.45%. 1000 milliLiter(s) (75 mL/Hr) IV Continuous <Continuous>  dextrose 50% Injectable 25 Gram(s) IV Push once  dextrose 50% Injectable 12.5 Gram(s) IV Push once  dextrose 50% Injectable 25 Gram(s) IV Push once  enoxaparin Injectable 80 milliGRAM(s) SubCutaneous once  insulin lispro (ADMELOG) corrective regimen sliding scale   SubCutaneous three times a day before meals  meropenem  IVPB 1000 milliGRAM(s) IV Intermittent every 8 hours  pantoprazole  Injectable 40 milliGRAM(s) IV Push daily  sacubitril 24 mG/valsartan 26 mG 1 Tablet(s) Oral two times a day    MEDICATIONS  (PRN):  bacitracin   Ointment 1 Application(s) Topical two times a day PRN wound care  morphine  - Injectable 2 milliGRAM(s) IV Push every 6 hours PRN Severe Pain (7 - 10)  morphine  - Injectable 1 milliGRAM(s) IV Push every 6 hours PRN Moderate Pain (4 - 6)    PHYSICAL EXAM:  Vital Signs Last 24 Hrs  T(C): 36.6 (29 May 2025 12:45), Max: 37.9 (28 May 2025 21:13)  T(F): 97.8 (29 May 2025 12:45), Max: 100.3 (28 May 2025 21:13)  HR: 81 (29 May 2025 12:45) (73 - 82)  BP: 93/66 (29 May 2025 12:45) (93/66 - 145/79)  BP(mean): --  RR: 16 (29 May 2025 12:45) (16 - 20)  SpO2: 97% (29 May 2025 12:45) (87% - 97%)    Parameters below as of 29 May 2025 12:45  Patient On (Oxygen Delivery Method): room air      Karnofsky: 50 %  General:    Elder man awake NAD     HEENT:  NCAT       Lungs: comfortable  non labored  CV:  RR  GI:    soft NTND  MSK: no contractures  Skin:  warm/dry  Neuro  awake no focal deficits  Psych  mood affect stable    LABS:                          12.4   11.50 )-----------( 185      ( 29 May 2025 08:20 )             37.8     05-29    144  |  112[H]  |  18.7  ----------------------------<  148[H]  3.3[L]   |  22.0  |  0.52    Ca    6.8[L]      29 May 2025 05:58  Phos  2.6     05-28  Mg     2.0     05-27    TPro  5.0[L]  /  Alb  2.5[L]  /  TBili  2.0  /  DBili  x   /  AST  29  /  ALT  29  /  AlkPhos  122[H]  05-29      Urinalysis Basic - ( 29 May 2025 05:58 )    Color: x / Appearance: x / SG: x / pH: x  Gluc: 148 mg/dL / Ketone: x  / Bili: x / Urobili: x   Blood: x / Protein: x / Nitrite: x   Leuk Esterase: x / RBC: x / WBC x   Sq Epi: x / Non Sq Epi: x / Bacteria: x      I&O's Summary    28 May 2025 07:01  -  29 May 2025 07:00  --------------------------------------------------------  IN: 1150 mL / OUT: 10 mL / NET: 1140 mL

## 2025-05-29 NOTE — PROGRESS NOTE ADULT - ASSESSMENT
Assessment     76 y/o M w/ PMH of HTN, HLD, CAD, TIA, remote hx of pericarditis (2015) presented 5/18 to ED c/o abd pain, N/V.  In the ED pt was found to be hypotensive and failed fluid resuscitation and started on pressor support.    Pt also hypoxic and initially placed on BIPAP but eventually required intubation and admitted to MICU for septic shock 2/2 cholangitis, UTI and acute pancreatitis.  Pt also profoundly acidotic w/ ATN from septic shock, transaminitis from shock liver/cholangitis  Bcx positive for  ESBL ecoli, citrobacter, , Enterococcus \enterococcus faecalis.  Ucx 5/18 positivbe for  ESBL ecoli.   Sputum cx 5/24 growing mold like fungus    Hospital course complicated by SVT 5/19 that required emergent cardioversion.      Pt successfully weaned off pressors 5/23.    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 shock, required dobutamine (5/20-5/23) and milrinone gtts (5/23-5/25).  Hospital course further complicated by AF RVR requiring emergent cardioversion 5/21 and was amio loaded but now in NSR and not on amio.    Full dose AC was held despite AF w/ chadsvasc 6 bc severe thrombocytopenia likely 2/2 sepsis.  Pt extubated 5/24  Ptaient s/p Peggy tube for cholangitis  MICAH on 5/27/25   revealed . Left ventricular systolic function is moderately to severely decreased with an ejection fraction visually estimated at 30 to 35 %. mild (grade 1) left ventricular diastolic dysfunction. Small right pleural effusion noted.  TTE on 05/22/25 revealed  Left ventricular systolic function is moderately decreased with an ejection fraction visually estimated at 30 to 35 %. Global left ventricular hypokinesis.  There is no evidence of a left ventricular thrombus. Enlarged right ventricular cavity size and moderately reduced right ventricular systolic function.  trop 34on 5/18, 54 on 05/19/25  12 LEAD ekg admission, revealed inferior and lateral T wave changes  Patient endorses   pt denies   Patient now presents to [facility] for left heart cath to evaluate for CAD.    Risk assessment:  ASA: 3  Mallampati:   GFR:   BRA:     pt. assessed, appropriate for sedation, pt.  educated regarding the plan for Versed/fentanyl as needed    PLAN  -For LHC via RRA/ RFA   -ECG and labs reviewed  - Aspirin 81mg po pre cath  - Normal saline 250 ml iv bolus x1   - Procedural risks and benefits explained to the pt and he verbalized understanding  - Consent obtained by DR Owen

## 2025-05-29 NOTE — CONSULT NOTE ADULT - CONSULT REASON
CABG eval
Pancreatitis, elevated liver enzymes
Shock
BACTEREMIA
Acute cholecystitis
OLEKSANDR
MOF - GOC

## 2025-05-29 NOTE — DISCHARGE NOTE PROVIDER - NSDCMRMEDTOKEN_GEN_ALL_CORE_FT
aspirin 81 mg oral tablet, chewable: 1 tab(s) orally once a day  empagliflozin-metformin 5 mg-1000 mg oral tablet: 1 tab(s) orally once a day  Flomax 0.4 mg oral capsule: 1 cap(s) orally once a day (at bedtime)  nitrofurantoin macrocrystals 50 mg oral capsule: 1 cap(s) orally once a day  rosuvastatin 20 mg oral tablet: 1 tab(s) orally once a day (at bedtime)   aspirin 81 mg oral tablet, chewable: 1 tab(s) orally once a day  carvedilol 6.25 mg oral tablet: 1 tab(s) orally every 12 hours  Diflucan 200 mg oral tablet: 1 tab(s) orally once a day  empagliflozin-metformin 5 mg-1000 mg oral tablet: 1 tab(s) orally once a day  rosuvastatin 20 mg oral tablet: 1 tab(s) orally once a day (at bedtime)  sacubitril-valsartan 24 mg-26 mg oral tablet: 1 tab(s) orally 2 times a day

## 2025-05-29 NOTE — CONSULT NOTE ADULT - SUBJECTIVE AND OBJECTIVE BOX
HPI  76 y/o M w/ PMH of HTN, HLD, CAD, TIA, remote hx of pericarditis (2015) presented 5/18 to ED c/o abd pain, N/V.  In the ED pt was found to be hypotensive and failed fluid resuscitation and started on pressor support.  Pt also hypoxic and initially placed on BIPAP but eventually required intubation and admitted to MICU for septic shock 2/2 cholangitis, UTI and acute pancreatitis.  Pt also profoundly acidotic w/ ATN from septic shock, transaminitis from shock liver/cholangitis, lipase >3K, , LA>16, procal >100 and abd US w/ distended GB w/ stones and CT a/p w/ cholelithiasis but pancreas reported to be normal.  GI and surgery consulted but pt deemed too unstable to undergo any procedures and ultimately had IR guided primitivo tube placed 5/19 and dark malodorous bile drained and sent for cultures which grew ESBL ecoli, citrobacter freundii, Enterococcus gallinarum and enterococcus faecalis.  Bcx 5/18 gew ESBL ecoli, Citerobacter freundii and strep gallalyticus.  Ucx 5/18 grew ESBL ecoli.  Repeat Bcx 5/19 grew only ESBL Ecoli and BCxs 5/20 NGTD.  Sputum cx 5/24 growing mold like fungus on prelim report and refered to mycology.  ID consulted and pt currently on merrem.  Pt required quadruple pressor support and given methylene blue 5/19 for vasoplegia and was on course of solucortef 5/19-5/25 to help wean off pressors in setting of septic shock.   Hospital course complicated by SVT 5/19 that required emergent cardioversion.  Pt also found to have high ammonia and placed on rifaxamin and lactulose but no known hx of cirrhosis and have since been d/c'd.  Pt successfully weaned off pressors 5/23.  During hospital course pt also found to have new rEF likely stress cardiomyopathy and required dobutamine (5/20-5/23) and milrinone gtts (5/23-5/25).  Hospital course further complicated by AF RVR requiring emergent cardioversion 5/21 and was amio loaded but now in NSR and not on amio.  Full dose AC was held despite AF w/ chadsvasc 6 bc severe thrombocytopenia likely 2/2 sepsis.  Pt extubated 5/24 and OGT removed.  Pt failed SLP eval and family declined NGT for now.  s/p MBS since tolerating thin liquids and easy to chew diet. Pt was medically stable for transfer to M/S floor. Now s/p diagnostic LHC 5/29. CTS consulted for CABG eval.    Pt seen and examined in cath holding.  services used via language line phone. Pt in NAD, conversing comfortably. Pt states that PTA was independent of ADLs, overall in normal state of health prior to developing acute symptoms which prompting hospital visit. Pt states prior MI in 2001, no interventions, had a follow up Dr. Roca @ [facility] x1, had not returned (poor historian on events). States mom had MI in 50s. Denies any toxic habits. Retired . Denies any recent anginal symptoms. No chest pain, SOB, palpitations, orthopnea, dizziness, syncope, HA.     REVIEW OF SYSTEMS  GENERAL:  Fevers[] chills[] sweats[] fatigue[] weight loss[] weight gain []                                        NEURO:  parathesias[] seizures []  syncope []  confusion []                                                                                  EYES: glasses[]  blurry vision[]  discharge[] pain[] glaucoma []                                                                            ENMT:  difficulty hearing []  vertigo[]  dysphagia[] epistaxis[] recent dental work []                                      CV:  chest pain[] palpitations[] CHRISTIAN [] diaphoresis [] edema[]                                                                                             RESPIRATORY:  wheezing[] SOB[] cough [] sputum[] hemoptysis[]                                                                    GI:  nausea[]  vomiting []  diarrhea[] constipation [] melena []                                                                        : hematuria[ ]  dysuria[ ] urgency[] incontinence[]                                                                                              MUSKULOSKELETAL:  arthritis[ ]  joint swelling [ ] muscle weakness [ ]                                                                  SKIN/BREAST:  rash[ ] itching [ ]  hair loss[ ] masses[ ]                                                                                                PSYCH:  dementia [ ] depresion [ ] anxiety[ ]                                                                                                                  HEME/LYMPH:  bruises easily[ ] enlarged lymph nodes[ ] tender lymph nodes[ ]                                                 ENDOCRINE:  cold intolerance[ ] heat intolerance[ ] polydipsia[ ]         PAST MEDICAL & SURGICAL HISTORY:  AMI (Acute Myocardial Infarction)  2001  Transient ischemic attack (TIA)  No Past Surgical History    Home Medications:  aspirin 81 mg oral tablet, chewable: 1 tab(s) orally once a day (19 May 2025 12:33)  empagliflozin-metformin 5 mg-1000 mg oral tablet: 1 tab(s) orally once a day (19 May 2025 12:31)  Flomax 0.4 mg oral capsule: 1 cap(s) orally once a day (at bedtime) (19 May 2025 12:33)  nitrofurantoin macrocrystals 50 mg oral capsule: 1 cap(s) orally once a day (19 May 2025 12:33)  rosuvastatin 20 mg oral tablet: 1 tab(s) orally once a day (at bedtime) (19 May 2025 12:33)    MEDICATIONS  (STANDING):  carvedilol 6.25 milliGRAM(s) Oral every 12 hours  chlorhexidine 2% Cloths 1 Application(s) Topical <User Schedule>  chlorhexidine 2% Cloths 1 Application(s) Topical <User Schedule>  dextrose 5% + sodium chloride 0.45%. 1000 milliLiter(s) (75 mL/Hr) IV Continuous <Continuous>  dextrose 50% Injectable 25 Gram(s) IV Push once  dextrose 50% Injectable 12.5 Gram(s) IV Push once  dextrose 50% Injectable 25 Gram(s) IV Push once  enoxaparin Injectable 80 milliGRAM(s) SubCutaneous once  insulin lispro (ADMELOG) corrective regimen sliding scale   SubCutaneous three times a day before meals  meropenem  IVPB 1000 milliGRAM(s) IV Intermittent every 8 hours  pantoprazole  Injectable 40 milliGRAM(s) IV Push daily  sacubitril 24 mG/valsartan 26 mG 1 Tablet(s) Oral two times a day  sodium chloride 0.9%. 1000 milliLiter(s) (75 mL/Hr) IV Continuous <Continuous>    MEDICATIONS  (PRN):  bacitracin   Ointment 1 Application(s) Topical two times a day PRN wound care  morphine  - Injectable 2 milliGRAM(s) IV Push every 6 hours PRN Severe Pain (7 - 10)  morphine  - Injectable 1 milliGRAM(s) IV Push every 6 hours PRN Moderate Pain (4 - 6)                                                                 ALLERGIES  No Known Drug Allergies  SHRIMP (Unknown)  iv dye (Unknown)    SOCIAL HISTORY  Smoking/Vaping/Smokeless - denies  ETOH/Illicit Substances - denies  Lives in private home with spouse, has adult children  ADLs - independent PTA  Assist Devices - denies  Retired     FAMILY HISTORY  Mother- [_] DM   [_] CAD   [x_] MI < 55  Father- [_] DM    [_] CAD   [_] MI < 55    VITAL SIGNS  T(F): 97.8 (05-29-25 @ 12:45)  HR: 80 (05-29-25 @ 15:50)  BP: 102/89 (05-29-25 @ 15:50)  RR: 16 (05-29-25 @ 15:50)  SpO2: 96% (05-29-25 @ 15:50)    LABS                        12.4   11.50 )-----------( 185      ( 29 May 2025 08:20 )             37.8     05-29    144  |  112[H]  |  18.7  ----------------------------<  148[H]  3.3[L]   |  22.0  |  0.52    Ca    6.8[L]      29 May 2025 05:58  Phos  2.6     05-28  Mg     2.0     05-27    TPro  5.0[L]  /  Alb  2.5[L]  /  TBili  2.0  /  DBili  x   /  AST  29  /  ALT  29  /  AlkPhos  122[H]  05-29    Urinalysis Basic - ( 29 May 2025 05:58 )    Color: x / Appearance: x / SG: x / pH: x  Gluc: 148 mg/dL / Ketone: x  / Bili: x / Urobili: x   Blood: x / Protein: x / Nitrite: x   Leuk Esterase: x / RBC: x / WBC x   Sq Epi: x / Non Sq Epi: x / Bacteria: x    Culture - Sputum (collected 05-24-25 @ 05:15)  Source: Sputum Sputum  Gram Stain (05-24-25 @ 14:11):    Few polymorphonuclear leukocytes per low power field    No Squamous epithelial cells per low power field    Moderate Yeast like cells per oil power field  Final Report (05-29-25 @ 16:01):    Few Aspergillus fumigatus complex    Commensal yair consistent with body site    Ins/Outs  I&O's Summary    28 May 2025 07:01  -  29 May 2025 07:00  --------------------------------------------------------  IN: 1150 mL / OUT: 10 mL / NET: 1140 mL    DIAGNOSTICS  All relevant and available laboratory results, radiology and medications reviewed.  US Duplex Venous Upper Ext Ltd, Right (05.26.25 @ 18:21)  IMPRESSION:  No evidence of right upper extremity deep venous thrombosis.    Superficial thrombophlebitis right cephalic vein.    Xray Abdomen 2 View PORTABLE -Urgent (Xray Abdomen 2 View PORTABLE -Urgent .) (05.26.25 @ 15:16)  FINDINGS: Nonobstructive bowel gas pattern with no pneumoperitoneum or   acute osseous findings.    Right-sided drain remains in place.    IMPRESSION: Nonobstructive bowel gas pattern with normal caliber colon    CT Abdomen and Pelvis w/ IV Cont (05.19.25 @ 00:03)  FINDINGS:  CHEST:  LUNGS, PLEURA AND LARGE AIRWAYS: Endotracheal tube with its distal tip   above the level of the gayla. Airspace consolidations at the lung bases   may represent atelectasis and/or pneumonia.  VESSELS: No pulmonary embolus. Normal aortic caliber.Right-sided central   venous catheter with its distal tip in the SVC.  HEART: Heart size is normal. No pericardial effusion. Coronary artery   calcification.  MEDIASTINUM AND ALMA DELIA: No lymphadenopathy.  CHEST WALL AND LOWER NECK: Within normal limits.    ABDOMEN AND PELVIS:  LIVER: Within normal limits.  BILE DUCTS: Normal caliber.  GALLBLADDER: Cholelithiasis.  SPLEEN: Within normal limits.  PANCREAS: Within normal limits.  ADRENALS: Within normal limits.  KIDNEYS/URETERS: No hydronephrosis.    BLADDER: Petersen.  REPRODUCTIVE ORGANS: Enlarged prostate.    BOWEL: Enteric tube with its distal tip in the stomach. No bowel   obstruction. Normal appendix.  PERITONEUM: Trace ascites.  VESSELS:  Normal aortic caliber.  RETROPERITONEUM: No lymphadenopathy.  ABDOMINAL WALL: Small fat-containing umbilical and inguinal hernias.  BONES: Degenerative changes of the spine. Nonspecific small sclerotic   foci in the pelvic bones.    IMPRESSION:  No pulmonary embolus.    Airspace consolidations at the lung bases may represent atelectasis   and/or pneumonia.    No bowel obstruction.    Cholelithiasis.    Trace ascites.    12 Lead ECG (05.23.25 @ 10:23)  Ventricular Rate 90 BPM  QRS Duration 104 ms  Q-T Interval 388 ms  QTC Calculation(Bazett) 474 ms  R Axis 52 degrees  T Axis 225 degrees  Diagnosis Line *** Poor data quality, interpretation may be adversely affected  Normal sinus rhythm with a run of atrial fibrillation  T wave abnormality, consider anterolateral ischemia  Abnormal ECG  V1 disconnected    TTE Limited W or WO Ultrasound Enhancing Agent (05.22.25 @ 09:56)  CONCLUSIONS:  This was a limited study to assess biventricular systolic function.     1. Technically difficult image quality.   2. Left ventricular systolic function is moderately decreased with an ejection fraction visually estimated at 30 to 35 %. Global left ventricular hypokinesis.   3. There is no evidence of a left ventricular thrombus.   4. Enlarged right ventricular cavity size and moderately reduced right ventricular systolic function.   5. No pericardial effusion seen.   6. Compared to the transthoracic echocardiogram performed on 5/20/2025, there have been no significant interval changes.    TTE Echo Complete w/Doppler (02.16.15 @ 16:20)   IMPRESSION:  Summary:   1. STAT portable echo done in the ER.   2.Left ventricular ejection fraction, by visual estimation, is 55 to   60%.   3. Normal global left ventricular systolic function.   4. There is no evidence of pericardial effusion.   5. Adequate TR velocity was not obtained to accurately assess RVSP.    Cardiac Catheterization (05.29.25 @ 14:06)  Diagnostic Conclusions:   LM: Luminal irregularities    LAD: Prox  with left to left collaterals    LCX: Mild diffuse disease, OM with 40% mid stenosis    RCA: Severe calcium from prox to mid with mild disease, distal RCA  with long segment severe 90% stenosis into the PDA.  LV EDP 7     PHYSICAL EXAM  Constitutional:  NAD, pleasant, Bahraini speaking  Neck: supple, trachea midline. No JVD   Respiratory: Breath sounds diminished b/l lower fields to auscultation, no accessory muscle use noted. no wheeze, fine rales b/l bases  Cardiovascular: Regular rate, regular rhythm, normal S1, S2; no  obvious murmurs or rub   Gastrointestinal: Soft, no TTP, RUQ choley drain bilious ouput  Extremities: THOMAS x 4, generalized peripheral edema, no cyanosis    Vascular: Equal and normal pulses: 2+ peripheral pulses throughout  Neurological: A+O x 3; speech clear and intact; no gross sensory/motor deficits  Psychiatric: calm, normal mood, normal affect   Skin: warm, dry, well perfused, no rashes

## 2025-05-29 NOTE — CONSULT NOTE ADULT - REASON FOR ADMISSION
Acute respiratory failure, shock

## 2025-05-29 NOTE — DISCHARGE NOTE PROVIDER - NSDCACTIVITY_GEN_ALL_CORE
Do not drive or operate machinery/No heavy lifting/straining Do not drive or operate machinery/No heavy lifting/straining/Follow Instructions Provided by your Surgical Team

## 2025-05-29 NOTE — DISCHARGE NOTE PROVIDER - NSDCFUADDINST_GEN_ALL_CORE_FT
cholecystostomy management:  - The tube will need to remain in place for 4-6 weeks to allow for a tract from the skin to the GB to form.  - Tube may be flush with 10cc NS daily  - Change dressing every 3 days or when soiled.   - Patient to follow with surgery as an outpatient to determine cholecystectomy candidacy

## 2025-05-29 NOTE — CHART NOTE - NSCHARTNOTEFT_GEN_A_CORE
Department of Cardiology                                                                  [facility]/[place] [address] [id number]                                                            Telephone: [phone]. Fax:[phone]                                                    Post- Procedure Note: Left Heart Cardiac Catheterization       Narrative:   Patient  now s/p left heart catheterization via RRA (RRB in place)  approach with DR Owen,  tolerated procedure well without complications. Arrived to recovery room NAD and hemodynamically stable, distal pulse +, neurovascular intact      Prelim cath Findings:   S/P Diagnostic LHC VIA RRA   of proximal LAD  90% Distal RCA , Heavily calcified  Circ: Mild disease  Left main: patent   Official cath report pending     Objective:  Vital Signs Last 24 Hrs  T(C): 36.6 (29 May 2025 12:45), Max: 37.9 (28 May 2025 21:13)  T(F): 97.8 (29 May 2025 12:45), Max: 100.3 (28 May 2025 21:13)  HR: 78 (29 May 2025 15:10) (73 - 82)  BP: 102/72 (29 May 2025 15:10) (93/66 - 145/79)  BP(mean): --  RR: 16 (29 May 2025 15:10) (16 - 20)  SpO2: 96% (29 May 2025 15:10) (87% - 97%)    Parameters below as of 29 May 2025 15:10  Patient On (Oxygen Delivery Method): room air        GENERAL: Pt lying comfortably, NAD.  ENMT: PERRL, +EOMI.  NECK: soft, Supple, No JVD,   CHEST/LUNG: Clear to auscultatation bilaterally; No wheezing.  HEART: S1S2+, Regular rate and rhythm; No murmurs.  ABDOMEN: Soft, Nontender, Nondistended; Bowel sounds present.  MUSCULOSKELETAL: Normal range of motion.  SKIN: No rashes or lesions.  NEURO: AAOX3, no focal deficits, no motor r sensory loss.  PSYCH: normal mood.  Procedure site: RRA , no signs of bleeding or hematoma, neurovascular intact   VASCULAR:   Radial +2 R/+2 L  Femoral +2 R/+2 L  PT +2 R/+2 L  DP +2 R/+2 L                          12.4   11.50 )-----------( 185      ( 29 May 2025 08:20 )             37.8     05-29    144  |  112[H]  |  18.7  ----------------------------<  148[H]  3.3[L]   |  22.0  |  0.52    Ca    6.8[L]      29 May 2025 05:58  Phos  2.6     05-28  Mg     2.0     05-27    TPro  5.0[L]  /  Alb  2.5[L]  /  TBili  2.0  /  DBili  x   /  AST  29  /  ALT  29  /  AlkPhos  122[H]  05-29      Intraprocedurally:   MCG IA   Heparin: 5,000 units    Prelim cath Findings:     S/P Diagnostic LHC VIA RRA   of proximal LAD  90% Distal RCA , Heavily calcified  Circ: Mild disease  Left main: patent   Official cath report pending       -ADMIT due to: / Pt is already in-patient sec to septic shock  -post cardiac cath management per protocol  - Right groin precautions  -bedrest x 1 hour post procedure while RRB in place  -NS 0.9% 250ml/hr x 1 bolus: post procedure RUPERT ppx , followed by NS 75 ml/hr x3 hours   -CT Surgery consult with DR Alberot called, in setting of his severe CAD, recommendations pending  -continue current medical therapy including  -C/W aspirin  -statin therapy: start Statin when  LFts stable  -beta blocker: mC/W coreg  -C/W Entresto  -follow up outpt in 2 weeks with Cardiologist  -[facility] following during hospitalization  -Lifestyle modifications discussed to reduce cardiovascular risk factors including weight reduction, smoking cessation, medication compliance, and routine follow up with Cardiologist to track your BMI, cholesterol, and glucose levels.   - Patient is not a candidate for for cardiac cath sec to diagnostic cath   -further Management per Hospitalist / Cradiology  -Transfer pt to Tele unit once criteria met  Discussed with DR Owen

## 2025-05-29 NOTE — PROGRESS NOTE ADULT - ASSESSMENT
74 y/o M w/ PMH of HTN, HLD, CAD, TIA, remote hx of pericarditis (2015) presented 5/18 to ED c/o abd pain, N/V.  In the ED pt was found to be hypotensive and failed fluid resuscitation and started on pressor support.  Pt also hypoxic and initially placed on BIPAP but eventually required intubation and admitted to MICU for septic shock 2/2 cholangitis, UTI and acute pancreatitis.  Pt also profoundly acidotic w/ ATN from septic shock, transaminitis from shock liver/cholangitis, lipase >3K, , LA>16, procal >100 and abd US w/ distended GB w/ stones and CT a/p w/ cholelithiasis but pancreas reported to be normal.  GI and surgery consulted but pt deemed too unstable to undergo any procedures and ultimately had IR guided primitivo tube placed 5/19 and dark malodorous bile drained and sent for cultures which grew ESBL ecoli, citrobacter freundii, Enterococcus gallinarum and enterococcus faecalis.  Bcx 5/18 gew ESBL ecoli, Citerobacter freundii and strep gallalyticus.  Ucx 5/18 grew ESBL ecoli.  Repeat Bcx 5/19 grew only ESBL Ecoli and BCxs 5/20 NGTD.  Sputum cx 5/24 growing mold like fungus on prelim report and refered to mycology.  ID consulted and pt currently on merrem.  Pt required quadruple pressor support and given methylene blue 5/19 for vasoplegia and was on course of solucortef 5/19-5/25 to help wean off pressors in setting of septic shock.   Hospital course complicated by SVT 5/19 that required emergent cardioversion.  Pt also found to have high ammonia and placed on rifaxamin and lactulose but no known hx of cirrhosis and have since been d/c'd.  Pt successfully weaned off pressors 5/23.  During hospital course pt also found to have new rEF likely stress cardiomyopathy and required dobutamine (5/20-5/23) and milrinone gtts (5/23-5/25).  Hospital course further complicated by AF RVR requiring emergent cardioversion 5/21 and was amio loaded but now in NSR and not on amio.  Full dose AC was held despite AF w/ chadsvasc 6 bc severe thrombocytopenia likely 2/2 sepsis.  Pt extubated 5/24 and OGT removed.  Pt failed SLP eval and family declined NGT for now.  Pt will go for MBS tomorrow 5/27 to better assess his ability to swallow.  Pt medically stable for transfer to medicine for continued management.          Septic shock 2/2 cholangitis, UTI and bacteremia   - s/p quadruple pressor support, methylene blue for vasoplegia and was on course of solucortef to finally wean off pressors 5/23  - Was on solucortef 5/19-5/25 to help wean off pressors   - Pt has up trending WBC which likely from recent steroid course   - US w/ distended GB w/ stones and CT a/p w/ cholelithiasis but pancreas reported to be normal  - IR guided primitivo tube placed 5/19 and drained dark malodorous bilious fluid drained and sent for Cxs which grew ESBL ecoli, citrobacter freundii, Enterococcus gallinarum and enterococcus faecalis  - Bcx 5/18 gew ESBL ecoli, Citerobacter freundii and strep gallalyticus, Bcx 5/19 grew only ESBL Ecoli and BCxs 5/20 NGTD  - Urine cx 5/18 grew ESBL ecoli  - Sputum cx 5/24 prelim reporting mold like fungus and referred to mycology; f/u final results   - c/w merrem as per ID recs   - Trend CBC and monitor temperature   -will need surgery and GI f/u after improvement in symptoms for ercp +- cholecystectomy       Acute hypoxic respiratory failure possibly 2/2 aspiration   - Intubated 5/18 and extubated 5/24 but continues requiring supplemental O2   -  now on RA w good sats  - Sputum cx 5/24 growing mold like fungus on prelim report and referred to mycology; f/u final results (pending pending recs)  - Duonebs as needed   - Encourage incentive spirometer and OOB to chair       Diarrhea (resolved)  - Rectal tube dc  - Started having watery diarrhea shortly after admission that was for Initially suspected to be due to tube feeds   - Has been off tube feeds for days and continues to have diarrhea         hypernatremia (improved)  - Likely multifactorial from ongoing diarrhea   - Was also on short course of bumex gtt during MICU course   - Monitor I/O's and lytes closely       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 shock   - Required dobutamine (5/20-5/23) and milronone gtts (5/23-5/25)  - TTE 5/20 w/ LVEF 30-35% and remained unchanged on repeat TTE 5/22  - Clinically hypovolemic at this time likely from ongoing diarrhea and is NPO  - Giving short course of gentle IVFs but monitor vol status closely   - will gradually start gdmt as bp tolerates  - cw coreg  - cw entresto  -for [facility] 05/29  - Monitor daily weights and strict I/O's   - Monitor on telemetry   - Cardio following       pAF   - s/p emergent cardioversion in MICU   - Now in sinus rhythm  - CHADSVASC = 6  - cw coreg  - full dose lovenox      Dysphagia (improving)  - on easy to chew w thin liquids    Superficial thrombophlebitis right cephalic vein.  - No signs of cellulitis   - c/w supportive care including RUE elevation       Small area of Rt toe ischemia   - Likely 2/2 levophed vs vasoplegia   - Monitor for resolution       ATN from septic shock  - Resolved   - Caution w/ hyperchloremia as can cause OLEKSANDR   - Monitor renal function      Transaminitis from shock liver and cholangitis   - Trend LFTs and coags        Acute gallstone pancreatitis   - Epigastric abd pain w/ lipase >3K-->48  - -->492  - CT a/p reported normal pancrease  - Would start on fibrate prior to d/c for TG  - Will likely eventually need GB removed as well as ERCP        Normocytic anemia   Thrombocytopenia, resolving, 2/2 sepsis   - H/H mildly low and stable   - Plts improving and >50K therefore starting full dose AC for AF   - Remains hemodynamically stable   - No active bleeding reported   - BUN elevated but likely 2/2 recent steroids and down trending since steroids stopped   - c/w protonix for GI ppx  - Monitor CBC and transfuse for Hb<8       Hyperammonemia, resolved    - Suspect 2/2 infection w/ e.coli as it is a ureas producing organisms which hydrolyzes urea to ammonium and ultimately converts to ammonia    - Started on rifaxamin in ICU but now d/c'd as pt has no hx of cirrhosis        VTE ppx: lovenox     Dispo: medically active. need surgery and GI recs re: need for surgery, ercp during this hospital stay

## 2025-05-29 NOTE — CONSULT NOTE ADULT - CONSULT REQUESTED DATE/TIME
23-May-2025 00:00
19-May-2025 09:55
19-May-2025 13:42
19-May-2025 14:35
21-May-2025 12:32
19-May-2025 15:34
29-May-2025 16:14

## 2025-05-29 NOTE — DISCHARGE NOTE PROVIDER - HOSPITAL COURSE
76 y/o M w/ PMH of HTN, HLD, CAD, TIA, remote hx of pericarditis (2015) presented 5/18 to ED c/o abd pain, N/V.  In the ED pt was found to be hypotensive and failed fluid resuscitation and started on pressor support.  Pt also hypoxic and initially placed on BIPAP but eventually required intubation and admitted to MICU for pressure support and intubation for septic shock 2/2 cholangitis, UTI and acute pancreatitis. Noted to be in A-Fib and underwent cardioversion. Found to be bacteremic. US w/ distended GB w/ stones and CT a/p w/ cholelithiasis, normal pancreas. IR guided primitivo tube placed 5/19 and drained dark malodorous bilious fluid drained and sent for Cxs which grew ESBL ecoli, citrobacter freundii, Enterococcus gallinarum and enterococcus faecalis, and BCx grew ESBL ecoli, Citerobacter freundii and strep gallalyticus. Repeat BCx NGTD. UCx with ESBL E Coli. Sputum cultures reviewed by ID, treatment deferred. Continued on Merrem. MRCP with Biliary duct dilation. Filling defect in the common bile duct measuring 11 mm, concerning for choledocholithiasis, cholelithiasis. S/p ERCP, surgery consulted, outpatient follow up for primitivo. Noted to have an episode of hematuria, improved. Hgb remained stable. Patient with new HFrEF in the setting of septic shock, TTE with EF 30 - 35%, followed by Cardiology. Underwent LHC and found to have MVD. Outpatient CTSx follow up recommended. Continued on GDMT as tolerated. Renal function and Liver function monitored closely and improved s/p shock state. At this time, patient is medically stable for discharge with close outpatient follow up. 74 y/o M w/ PMH of HTN, HLD, CAD, TIA, remote hx of pericarditis (2015) presented 5/18 to ED c/o abd pain, N/V.  In the ED pt was found to be hypotensive and failed fluid resuscitation and started on pressor support.  Pt also hypoxic and initially placed on BIPAP but eventually required intubation and admitted to MICU for pressure support and intubation for septic shock 2/2 cholangitis, UTI and acute pancreatitis. Noted to be in A-Fib and underwent cardioversion. Found to be bacteremic w/ E. Coli. US w/ distended GB w/ stones and CT a/p w/ cholelithiasis, normal pancreas. IR guided primitivo tube placed 5/19 and drained dark malodorous bilious fluid drained and sent for Cxs which grew ESBL ecoli, citrobacter freundii, Enterococcus gallinarum and enterococcus faecalis, and BCx grew ESBL ecoli, Citerobacter freundii and strep gallalyticus. UCx with ESBL E Coli. Sputum cultures reviewed by ID, treatment deferred. Continued on Merrem. Repeat BCx NGTD. MRCP with Biliary duct dilation. Filling defect in the common bile duct measuring 11 mm, concerning for choledocholithiasis, cholelithiasis. S/p EDG w/ ERCP, found to have esophageal candidiasis and with sphincterotomy and bile duct clearance. GI recommending surgery consult for cholecystectomy, surgery recommending outpatient follow up for primitivo in 3 weeks. Noted to have an episode of hematuria x1, resolved with any intervention. Hgb remained stable. Patient with new HFrEF in the setting of septic shock, TTE with EF 30 - 35%, followed by Cardiology. Underwent LHC and found to have MVD. Outpatient CTSx follow up recommended. Continued on GDMT as tolerated. Renal function and Liver function monitored closely and improved s/p shock state. At this time, patient is medically stable for discharge with close outpatient follow up w/ PCP, cardiology, GI, CTS, and gen surgery.

## 2025-05-29 NOTE — PROGRESS NOTE ADULT - ASSESSMENT
· Assessment	  75yr man hx CAD, TIA, HTN, HLD admitted with shock, septic vs cardiogenic completed by respiratory failure in the setting of pancreatitis    Acute Hypoxic  Respiratory Failure  extubated  monitor sats    Cholangitis/ Cholecystitis  Bacteremia  Surgery and GI consulted  s/p PCT  cont IV abx,    CM - EF   Afib  s/p cardioversion 5/21  s/p inotropes  for [facility] today    Dysphagia  Aspiration precautions  passed MBS to modified diet    Encounter for Palliative Care  Palliative Care consulted to assist with goals of care.   DNR rescinded.  Full Code  Passed MBS  for [facility] today  cont care per primary team  Palliative Care to sign off

## 2025-05-29 NOTE — DISCHARGE NOTE PROVIDER - NSDCCPCAREPLAN_GEN_ALL_CORE_FT
PRINCIPAL DISCHARGE DIAGNOSIS  Diagnosis: Acute respiratory failure with hypoxia  Assessment and Plan of Treatment: - S/P intubation/extubation, improved. Multifactorial secondary to below      SECONDARY DISCHARGE DIAGNOSES  Diagnosis: Septic shock  Assessment and Plan of Treatment: - Followed by ID, treated and improving with antibiotics. Follow up outpatient for monitoring.    Diagnosis: Cholangitis  Assessment and Plan of Treatment: - Underwent IR guided cholostomy tube placement, follow up outpatient with surgery for primitivo planning.    Diagnosis: Bacteremia  Assessment and Plan of Treatment: - Followed by ID, treated and improving with antibiotics. Follow up outpatient for monitoring.    Diagnosis: Acute UTI  Assessment and Plan of Treatment: - Followed by ID, treated and improving with antibiotics. Follow up outpatient for monitoring.    Diagnosis: Afib  Assessment and Plan of Treatment: s/p cardioversion, followed by cardiology.   - Continue medications as directed and follow up with your PCP and cardiology.    Diagnosis: Cardiomyopathy  Assessment and Plan of Treatment: - Continue medications as directed and follow up with your PCP and cardiology.    Diagnosis: 3-vessel CAD  Assessment and Plan of Treatment: - Continue medications as directed and follow up with your PCP and cardiology. Follow up with CTSx for CABG planning.     PRINCIPAL DISCHARGE DIAGNOSIS  Diagnosis: Septic shock  Assessment and Plan of Treatment: - Followed by ID, treated and improving with antibiotics. Follow up outpatient for monitoring.      SECONDARY DISCHARGE DIAGNOSES  Diagnosis: Acute respiratory failure with hypoxia  Assessment and Plan of Treatment: - S/P intubation/extubation, improved. Multifactorial secondary to below    Diagnosis: Cholangitis  Assessment and Plan of Treatment: - Underwent IR guided cholostomy tube placement, follow up outpatient with surgery for primitivo planning.  - follow up with surgery in 3 week for cholecystectomy evaluation    Diagnosis: Bacteremia  Assessment and Plan of Treatment: - Followed by ID, treated and improving with antibiotics. Follow up outpatient for monitoring.    Diagnosis: Acute UTI  Assessment and Plan of Treatment: - Followed by ID, treated and improving with antibiotics. Follow up outpatient for monitoring.    Diagnosis: Afib  Assessment and Plan of Treatment: s/p cardioversion, followed by cardiology.   - Continue medications as directed and follow up with your PCP and cardiology.    Diagnosis: Cardiomyopathy  Assessment and Plan of Treatment: - Continue medications as directed and follow up with your PCP and cardiology.    Diagnosis: 3-vessel CAD  Assessment and Plan of Treatment: - Continue medications as directed and follow up with your PCP and cardiology. Follow up with CTSx for CABG planning.    Diagnosis: Esophageal candidiasis  Assessment and Plan of Treatment: - found on EGD, treat with fluconazole  - patient to continue fluconazole 200mg once daily x 14 days    Diagnosis: Septic shock  Assessment and Plan of Treatment: - Followed by ID, treated and improving with antibiotics. Follow up outpatient for monitoring.

## 2025-05-29 NOTE — PHYSICAL THERAPY INITIAL EVALUATION ADULT - ADDITIONAL COMMENTS
Pt lives in a house with 4 KAYLENE and 5 steps inside with RW. Lives with spouse and daughter's family. Other daughter is from out of state but is staying until patient is situated. no DME.

## 2025-05-29 NOTE — DISCHARGE NOTE PROVIDER - CARE PROVIDER_API CALL
Shakeel Flood  Cardiology  [address] [place] [address]  Phone: [phone]  Fax: [phone]  Follow Up Time: 2 weeks   Shakeel Flood  Cardiology  [address] [place] [address]  Phone: [phone]  Fax: [phone]  Follow Up Time: 2 weeks    Rubin Ybarra  Infectious Disease  [address] [facility] [address]  [place] [address]  Phone: [phone]  Fax: [phone]  Follow Up Time: 1 week    Gilmer Mancini  Gastroenterology  [address], [address]  [place] [address]  Phone: [phone]  Fax: [phone]  Follow Up Time: 1 week    Chalo Alberto Jr.  Thoracic and Cardiac Surgery  [address] [place] [address]  Phone: [phone]  Fax: [phone]  Follow Up Time: 1 month    PCP,   Phone: (   )    -  Fax: (   )    -  Follow Up Time: 1-3 days    Devyn Aguirre  Surgery  [address]  [place] [address]  Phone: [phone]  Fax: [phone]  Follow Up Time: 1 week

## 2025-05-29 NOTE — PROGRESS NOTE ADULT - SUBJECTIVE AND OBJECTIVE BOX
[facility] Division of Hospital Medicine    pt seen and examined at bedside   pt feeling well, no complaints this am  no ha, dizziness, cp, sob, abd pain, nausea, vomiting, chills, fevers    pending [facility] today    MEDICATIONS  (STANDING):  carvedilol 6.25 milliGRAM(s) Oral every 12 hours  chlorhexidine 2% Cloths 1 Application(s) Topical <User Schedule>  chlorhexidine 2% Cloths 1 Application(s) Topical <User Schedule>  dextrose 5% + sodium chloride 0.45%. 1000 milliLiter(s) (75 mL/Hr) IV Continuous <Continuous>  dextrose 50% Injectable 25 Gram(s) IV Push once  dextrose 50% Injectable 12.5 Gram(s) IV Push once  dextrose 50% Injectable 25 Gram(s) IV Push once  enoxaparin Injectable 80 milliGRAM(s) SubCutaneous once  insulin lispro (ADMELOG) corrective regimen sliding scale   SubCutaneous three times a day before meals  meropenem  IVPB 1000 milliGRAM(s) IV Intermittent every 8 hours  pantoprazole  Injectable 40 milliGRAM(s) IV Push daily  sacubitril 24 mG/valsartan 26 mG 1 Tablet(s) Oral two times a day    MEDICATIONS  (PRN):  bacitracin   Ointment 1 Application(s) Topical two times a day PRN wound care  morphine  - Injectable 2 milliGRAM(s) IV Push every 6 hours PRN Severe Pain (7 - 10)  morphine  - Injectable 1 milliGRAM(s) IV Push every 6 hours PRN Moderate Pain (4 - 6)        I&O's Summary    28 May 2025 07:01  -  29 May 2025 07:00  --------------------------------------------------------  IN: 1150 mL / OUT: 10 mL / NET: 1140 mL        PHYSICAL EXAM:  Vital Signs Last 24 Hrs  T(C): 36.8 (29 May 2025 04:26), Max: 37.9 (28 May 2025 21:13)  T(F): 98.2 (29 May 2025 04:26), Max: 100.3 (28 May 2025 21:13)  HR: 73 (29 May 2025 04:26) (73 - 82)  BP: 107/67 (29 May 2025 04:26) (104/67 - 145/79)  BP(mean): --  RR: 20 (29 May 2025 04:26) (18 - 20)  SpO2: 94% (29 May 2025 04:26) (87% - 97%)    Parameters below as of 29 May 2025 04:26  Patient On (Oxygen Delivery Method): nasal cannula  O2 Flow (L/min): 4        GENERAL: NAD, lying in bed comfortably  HEAD:  Atraumatic, normocephalic  EYES: EOMI, PERRL  NECK: Supple, trachea midline, no JVD  HEART: Regular rate and rhythm  LUNGS: Unlabored respirations.  Clear to auscultation bilaterally, no crackles, wheezing, or rhonchi  ABDOMEN: BRITTNEY drain with minimal output (ruq)  EXTREMITIES: 2+ peripheral pulses bilaterally. No clubbing, cyanosis, or edema  NERVOUS SYSTEM:  A&Ox3, moving all extremities, no focal deficits   : mild skin irritation on foreskin from garcia tube        LABS:                        12.4   11.50 )-----------( 185      ( 29 May 2025 08:20 )             37.8     05-29    144  |  112[H]  |  18.7  ----------------------------<  148[H]  3.3[L]   |  22.0  |  0.52    Ca    6.8[L]      29 May 2025 05:58  Phos  2.6     05-28  Mg     2.0     05-27    TPro  5.0[L]  /  Alb  2.5[L]  /  TBili  2.0  /  DBili  x   /  AST  29  /  ALT  29  /  AlkPhos  122[H]  05-29          Urinalysis Basic - ( 29 May 2025 05:58 )    Color: x / Appearance: x / SG: x / pH: x  Gluc: 148 mg/dL / Ketone: x  / Bili: x / Urobili: x   Blood: x / Protein: x / Nitrite: x   Leuk Esterase: x / RBC: x / WBC x   Sq Epi: x / Non Sq Epi: x / Bacteria: x        CAPILLARY BLOOD GLUCOSE      POCT Blood Glucose.: 155 mg/dL (29 May 2025 09:04)  POCT Blood Glucose.: 188 mg/dL (28 May 2025 21:50)  POCT Blood Glucose.: 155 mg/dL (28 May 2025 16:44)  POCT Blood Glucose.: 180 mg/dL (28 May 2025 11:19)        RADIOLOGY & ADDITIONAL TESTS:  Results Reviewed:   Imaging Personally Reviewed:  Electrocardiogram Personally Reviewed:

## 2025-05-29 NOTE — DISCHARGE NOTE PROVIDER - PROVIDER TOKENS
PROVIDER:[TOKEN:[[id number]],FOLLOWUP:[2 weeks]] PROVIDER:[TOKEN:[[id number]],FOLLOWUP:[2 weeks]],PROVIDER:[TOKEN:[[id number]],FOLLOWUP:[1 week]],PROVIDER:[TOKEN:[[id number]],FOLLOWUP:[1 week]],PROVIDER:[TOKEN:[[id number]],FOLLOWUP:[1 month]],FREE:[LAST:[PCP],PHONE:[(   )    -],FAX:[(   )    -],FOLLOWUP:[1-3 days]],PROVIDER:[TOKEN:[[id number]:[1 week]]

## 2025-05-29 NOTE — PROGRESS NOTE ADULT - SUBJECTIVE AND OBJECTIVE BOX
DOMINIC ALAMO  [id number]      Chief Complaint:   follow up sepsis/PAF/ CMP    Subjective:  Patient awake and alert in chair, offers no c/o.  Denies CP, SOB, palps.    24 hour Tele:   SR        bacitracin   Ointment 1 Application(s) Topical two times a day PRN  carvedilol 6.25 milliGRAM(s) Oral every 12 hours  chlorhexidine 2% Cloths 1 Application(s) Topical <User Schedule>  chlorhexidine 2% Cloths 1 Application(s) Topical <User Schedule>  dextrose 5% + sodium chloride 0.45%. 1000 milliLiter(s) IV Continuous <Continuous>  dextrose 50% Injectable 25 Gram(s) IV Push once  dextrose 50% Injectable 12.5 Gram(s) IV Push once  dextrose 50% Injectable 25 Gram(s) IV Push once  enoxaparin Injectable 80 milliGRAM(s) SubCutaneous once  insulin lispro (ADMELOG) corrective regimen sliding scale   SubCutaneous three times a day before meals  meropenem  IVPB 1000 milliGRAM(s) IV Intermittent every 8 hours  morphine  - Injectable 2 milliGRAM(s) IV Push every 6 hours PRN  morphine  - Injectable 1 milliGRAM(s) IV Push every 6 hours PRN  pantoprazole  Injectable 40 milliGRAM(s) IV Push daily  sacubitril 24 mG/valsartan 26 mG 1 Tablet(s) Oral two times a day          Physical Exam:  T(C): 36.8 (25 @ 04:26), Max: 37.9 (25 @ 21:13)  HR: 73 (25 @ 04:26) (73 - 82)  BP: 107/67 (25 @ 04:26) (104/67 - 145/79)  RR: 20 (25 @ 04:26) (18 - 20)  SpO2: 94% (25 @ 04:26) (87% - 97%)  General: Comfortable in NAD  Neck: No JVD  CVS: nl s1s2, no s3  Pulm: CTA b/l  Abd: soft, non-tender  Ext: No c/c/e  Neuro A&O x3  Psych: Normal affect      Labs:   29 May 2025 05:58    144    |  112    |  18.7   ----------------------------<  148    3.3     |  22.0   |  0.52     Ca    6.8        29 May 2025 05:58  Phos  2.6       28 May 2025 05:15  Mg     2.0       27 May 2025 21:45    TPro  5.0    /  Alb  2.5    /  TBili  2.0    /  DBili  x      /  AST  29     /  ALT  29     /  AlkPhos  122    29 May 2025 05:58                          12.4   11.50 )-----------( 185      ( 29 May 2025 08:20 )             37.8           Echo: 2025:    1. Left ventricular systolic function is severely decreased with an ejection fraction visually estimated at 30 to 35 %. Global left ventricular hypokinesis.   2. Enlarged right ventricular cavity size and moderately reduced right ventricular systolic function.   3. Left atrium is normal in size.   4. There is mild calcification of the mitral valve annulus.   5. Moderate to severe mitral regurgitation.   6. Moderate tricuspid regurgitation.   7. Compared to the transthoracic echocardiogram performed on 2025, the LV and RV are now better visualized.    Echo 2025:   1. Technically difficult image quality.   2. Left ventricular systolic function is moderately decreased with an ejection fraction visually estimated at 30 to 35 %. Global left ventricular hypokinesis.   3. There is no evidence of a left ventricular thrombus.   4. Enlarged right ventricular cavity size and moderately reduced right ventricular systolic function.   5. No pericardial effusion seen.   6. Compared to the transthoracic echocardiogram performed on 2025, there have been no significant interval changes.      CXR:  Endotracheal tube tip 5 cm above the gayla. Enteric tube tip below the   edge of the image, and rightIJ catheter tip in the right atrium. No   pneumothorax. No focal consolidation or pleural effusion.    CTA C/A/P:  No pulmonary embolus.  Airspace consolidations at the lung bases may represent atelectasis   and/or pneumonia.  No bowel obstruction.  Cholelithiasis.  Trace ascites.        Assessment:  74 yo male PMHx HTN, HLD, CAD based on abnormal nuke, TIA, remote pericarditis in 2015 presented to the ED with complaints of abdominal pain, nausea, vomiting for since the day prior.  Upon arrival to the ED patient was lethargic and tachypneic, with abdominal distention and mottled skin.  He was noted to be hypotensive despite fluid resuscitation and placed on escalating doses of pressors.  He was started on BIPAP initially for tachypnea and metabolic acidosis, eventually intubated in the ED.  Patient remains intubated and history taken from chart and d/w RN.  Patient was on escalating doses of pressors and , but in last day has been weaned down.  Echo with severe LV dysfxn which appears new and likely stress myopathy.  CI on low end of normal on .  Likely sepsis from GNR septicemia from cholangitis as cause now s/p perc tube.  Noted AF with some RVR due to shock, , etc.  -DCCV for rapid AF while in MICU, reviewed tele and no VT.  -Patient extubated and doing well.  Now off pressors/ inotropic support with normal CO.  -Still with runs of Afib, at time NSVT-Remains in SR now  -Repeated echo with persistent drop on LVEF    Plan:  1. Continue carvedilol and entresto.  Consider MRA.  Eventual SGLT2i after GB issues resolved.  2. Start AC with lovenox, transition to DOAC if no plans for surgical intervention.  F/u surgery and GI.  3. LHC today.  4. Rest as per primary team and other consultants.   5. If LHC without severe CAD no CV contraindication at mod-high CV risk for primitivo if decided to be done.    D/w Dr. Fernandez.

## 2025-05-30 LAB
ALBUMIN SERPL ELPH-MCNC: 2.5 G/DL — LOW (ref 3.3–5.2)
ALP SERPL-CCNC: 454 U/L — HIGH (ref 40–120)
ALT FLD-CCNC: 78 U/L — HIGH
ANION GAP SERPL CALC-SCNC: 13 MMOL/L — SIGNIFICANT CHANGE UP (ref 5–17)
AST SERPL-CCNC: 133 U/L — HIGH
BILIRUB SERPL-MCNC: 2 MG/DL — SIGNIFICANT CHANGE UP (ref 0.4–2)
BUN SERPL-MCNC: 18.3 MG/DL — SIGNIFICANT CHANGE UP (ref 8–20)
CALCIUM SERPL-MCNC: 7.2 MG/DL — LOW (ref 8.4–10.5)
CHLORIDE SERPL-SCNC: 110 MMOL/L — HIGH (ref 96–108)
CO2 SERPL-SCNC: 22 MMOL/L — SIGNIFICANT CHANGE UP (ref 22–29)
CREAT SERPL-MCNC: 0.53 MG/DL — SIGNIFICANT CHANGE UP (ref 0.5–1.3)
EGFR: 105 ML/MIN/1.73M2 — SIGNIFICANT CHANGE UP
EGFR: 105 ML/MIN/1.73M2 — SIGNIFICANT CHANGE UP
GLUCOSE BLDC GLUCOMTR-MCNC: 146 MG/DL — HIGH (ref 70–99)
GLUCOSE BLDC GLUCOMTR-MCNC: 148 MG/DL — HIGH (ref 70–99)
GLUCOSE BLDC GLUCOMTR-MCNC: 172 MG/DL — HIGH (ref 70–99)
GLUCOSE BLDC GLUCOMTR-MCNC: 220 MG/DL — HIGH (ref 70–99)
GLUCOSE SERPL-MCNC: 176 MG/DL — HIGH (ref 70–99)
HCT VFR BLD CALC: 37.2 % — LOW (ref 39–50)
HGB BLD-MCNC: 12.2 G/DL — LOW (ref 13–17)
MCHC RBC-ENTMCNC: 28.6 PG — SIGNIFICANT CHANGE UP (ref 27–34)
MCHC RBC-ENTMCNC: 32.8 G/DL — SIGNIFICANT CHANGE UP (ref 32–36)
MCV RBC AUTO: 87.1 FL — SIGNIFICANT CHANGE UP (ref 80–100)
MRSA PCR RESULT.: SIGNIFICANT CHANGE UP
NRBC # BLD AUTO: 0 K/UL — SIGNIFICANT CHANGE UP (ref 0–0)
NRBC # FLD: 0 K/UL — SIGNIFICANT CHANGE UP (ref 0–0)
NRBC BLD AUTO-RTO: 0 /100 WBCS — SIGNIFICANT CHANGE UP (ref 0–0)
PLATELET # BLD AUTO: 206 K/UL — SIGNIFICANT CHANGE UP (ref 150–400)
PMV BLD: 11.8 FL — SIGNIFICANT CHANGE UP (ref 7–13)
POTASSIUM SERPL-MCNC: 3.3 MMOL/L — LOW (ref 3.5–5.3)
POTASSIUM SERPL-SCNC: 3.3 MMOL/L — LOW (ref 3.5–5.3)
PROT SERPL-MCNC: 4.8 G/DL — LOW (ref 6.6–8.7)
RBC # BLD: 4.27 M/UL — SIGNIFICANT CHANGE UP (ref 4.2–5.8)
RBC # FLD: 15.1 % — HIGH (ref 10.3–14.5)
S AUREUS DNA NOSE QL NAA+PROBE: DETECTED
SODIUM SERPL-SCNC: 145 MMOL/L — SIGNIFICANT CHANGE UP (ref 135–145)
WBC # BLD: 12.18 K/UL — HIGH (ref 3.8–10.5)
WBC # FLD AUTO: 12.18 K/UL — HIGH (ref 3.8–10.5)

## 2025-05-30 PROCEDURE — 99233 SBSQ HOSP IP/OBS HIGH 50: CPT

## 2025-05-30 PROCEDURE — G0545: CPT

## 2025-05-30 PROCEDURE — 99232 SBSQ HOSP IP/OBS MODERATE 35: CPT

## 2025-05-30 RX ORDER — ENOXAPARIN SODIUM 100 MG/ML
80 INJECTION SUBCUTANEOUS EVERY 12 HOURS
Refills: 0 | Status: DISCONTINUED | OUTPATIENT
Start: 2025-05-30 | End: 2025-06-02

## 2025-05-30 RX ADMIN — CARVEDILOL 6.25 MILLIGRAM(S): 3.12 TABLET, FILM COATED ORAL at 05:52

## 2025-05-30 RX ADMIN — Medication 40 MILLIGRAM(S): at 10:56

## 2025-05-30 RX ADMIN — Medication 1 APPLICATION(S): at 05:53

## 2025-05-30 RX ADMIN — Medication 40 MILLIEQUIVALENT(S): at 12:30

## 2025-05-30 RX ADMIN — CARVEDILOL 6.25 MILLIGRAM(S): 3.12 TABLET, FILM COATED ORAL at 17:52

## 2025-05-30 RX ADMIN — INSULIN LISPRO 2: 100 INJECTION, SOLUTION INTRAVENOUS; SUBCUTANEOUS at 12:08

## 2025-05-30 RX ADMIN — SACUBITRIL AND VALSARTAN 1 TABLET(S): 49; 51 TABLET, FILM COATED ORAL at 17:52

## 2025-05-30 RX ADMIN — MEROPENEM 100 MILLIGRAM(S): 1 INJECTION INTRAVENOUS at 13:19

## 2025-05-30 RX ADMIN — MEROPENEM 100 MILLIGRAM(S): 1 INJECTION INTRAVENOUS at 05:53

## 2025-05-30 RX ADMIN — ENOXAPARIN SODIUM 80 MILLIGRAM(S): 100 INJECTION SUBCUTANEOUS at 17:53

## 2025-05-30 RX ADMIN — SACUBITRIL AND VALSARTAN 1 TABLET(S): 49; 51 TABLET, FILM COATED ORAL at 05:52

## 2025-05-30 RX ADMIN — MEROPENEM 100 MILLIGRAM(S): 1 INJECTION INTRAVENOUS at 21:58

## 2025-05-30 NOTE — CHART NOTE - NSCHARTNOTEFT_GEN_A_CORE
Interventional Cardiology Post Cath Progress Note:                Subjective:   Patient feels well- no current complaints- Denies chest pain, shortness of breath. Denies pain, numbness, tingling or swelling around (groin/wrist) access site          Objective:  Vital Signs Last 24 Hrs  T(C): 36.4 (30 May 2025 10:37), Max: 36.8 (29 May 2025 17:52)  T(F): 97.5 (30 May 2025 10:37), Max: 98.3 (29 May 2025 17:52)  HR: 65 (30 May 2025 10:37) (65 - 83)  BP: 112/74 (30 May 2025 10:37) (96/67 - 112/74)  BP(mean): 85 (29 May 2025 17:52) (85 - 85)  RR: 18 (30 May 2025 10:37) (16 - 19)  SpO2: 94% (30 May 2025 10:37) (92% - 96%)    Parameters below as of 30 May 2025 10:37  Patient On (Oxygen Delivery Method): room air        05-29-25 @ 07:01  -  05-30-25 @ 07:00  --------------------------------------------------------  IN: 120 mL / OUT: 5 mL / NET: 115 mL                              12.2   12.18 )-----------( 206      ( 30 May 2025 04:35 )             37.2     05-30    145  |  110[H]  |  18.3  ----------------------------<  176[H]  3.3[L]   |  22.0  |  0.53    Ca    7.2[L]      30 May 2025 04:35    TPro  4.8[L]  /  Alb  2.5[L]  /  TBili  2.0  /  DBili  x   /  AST  133[H]  /  ALT  78[H]  /  AlkPhos  454[H]  05-30      Urinalysis Basic - ( 30 May 2025 04:35 )    Color: x / Appearance: x / SG: x / pH: x  Gluc: 176 mg/dL / Ketone: x  / Bili: x / Urobili: x   Blood: x / Protein: x / Nitrite: x   Leuk Esterase: x / RBC: x / WBC x   Sq Epi: x / Non Sq Epi: x / Bacteria: x        CATH RESULTS:   Diagnostic Conclusions:   LM: Luminal irregularities    LAD: Prox  with left to left collaterals    LCX: Mild diffuse disease, OM with 40% mid stenosis    RCA: Severe calcium from prox to mid with mild disease, distal RCA  with long segment severe 90% stenosis into the PDA.  LV EDP 7   Recommendations:   CABG referral    Acute complication:    No complications       Physical Exam:  No apparent distress, alert and oriented times three, appropriate affect  Right  Radial site soft, non tender, no bleeding or hematoma; +2 palpable radial pulse      Assessment/Plan:    76 yo male with hx of HTN, HLD, CAD, TIA, remote pericarditis in 2015, Post Cath severe diffuse CAD CTS consulted.     - Procedure site stable.   - Continue atorvastatin  - Recommend a heart healthy diet which includes a variety of fruits and vegetables, whole grains, low fat dairy products, legumes and skinless poulty and fish; food prepared with little or no salt and minimize processed foods  - Avoid using NSAIDs  (Aleve, Motrin, ibuprofen, naproxen) while on DAPT, please utilize Tylenol for pain control (not to exceed 4gm in 24 hours)  -Follow up with primary cardiologist in 1-2 weeks  -Keep K>4 Mg>2  -For all general cardiology questions please contact patient's primary cards team   - Care per primary CTS team

## 2025-05-30 NOTE — PROGRESS NOTE ADULT - ASSESSMENT
74 y/o M w/ PMH of HTN, HLD, CAD, TIA, remote hx of pericarditis (2015) presented 5/18 to ED c/o abd pain, N/V.  In the ED pt was found to be hypotensive and failed fluid resuscitation and started on pressor support.  Pt also hypoxic and initially placed on BIPAP but eventually required intubation and admitted to MICU for septic shock 2/2 cholangitis, UTI and acute pancreatitis.  Pt also profoundly acidotic w/ ATN from septic shock, transaminitis from shock liver/cholangitis, lipase >3K, , LA>16, procal >100 and abd US w/ distended GB w/ stones and CT a/p w/ cholelithiasis but pancreas reported to be normal.  GI and surgery consulted but pt deemed too unstable to undergo any procedures and ultimately had IR guided primitivo tube placed 5/19 and dark malodorous bile drained and sent for cultures which grew ESBL ecoli, citrobacter freundii, Enterococcus gallinarum and enterococcus faecalis.  Bcx 5/18 gew ESBL ecoli, Citerobacter freundii and strep gallalyticus.  Ucx 5/18 grew ESBL ecoli.  Repeat Bcx 5/19 grew only ESBL Ecoli and BCxs 5/20 NGTD.  Sputum cx 5/24 growing mold like fungus on prelim report and refered to mycology.  ID consulted and pt currently on merrem.  Pt required quadruple pressor support and given methylene blue 5/19 for vasoplegia and was on course of solucortef 5/19-5/25 to help wean off pressors in setting of septic shock.   Hospital course complicated by SVT 5/19 that required emergent cardioversion.  Pt also found to have high ammonia and placed on rifaxamin and lactulose but no known hx of cirrhosis and have since been d/c'd.  Pt successfully weaned off pressors 5/23.  During hospital course pt also found to have new rEF likely stress cardiomyopathy and required dobutamine (5/20-5/23) and milrinone gtts (5/23-5/25).  Hospital course further complicated by AF RVR requiring emergent cardioversion 5/21 and was amio loaded but now in NSR and not on amio.  Full dose AC was held despite AF w/ chadsvasc 6 bc severe thrombocytopenia likely 2/2 sepsis.  Pt extubated 5/24 and OGT removed.  Pt failed SLP eval and family declined NGT for now.  Pt will go for MBS tomorrow 5/27 to better assess his ability to swallow.  Pt medically stable for transfer to medicine for continued management.      #Septic shock 2/2 cholangitis, UTI and bacteremia   - s/p quadruple pressor support, methylene blue for vasoplegia and was on course of solucortef to finally wean off pressors 5/23  - Was on solucortef 5/19-5/25 to help wean off pressors   - Pt has up trending WBC which likely from recent steroid course   - US w/ distended GB w/ stones and CT a/p w/ cholelithiasis but pancreas reported to be normal  - IR guided primitivo tube placed 5/19 and drained dark malodorous bilious fluid drained and sent for Cxs which grew ESBL ecoli, citrobacter freundii, Enterococcus gallinarum and enterococcus faecalis  - Bcx 5/18 gew ESBL ecoli, Citerobacter freundii and strep gallalyticus, Bcx 5/19 grew only ESBL Ecoli and BCxs 5/20 NGTD  - Urine cx 5/18 grew ESBL ecoli  - Sputum cx 5/24 prelim reporting mold like fungus and referred to mycology; f/u final results - Per ID - to defer treatment  - c/w merrem as per ID recs   - Trend CBC and monitor temperature   - will need surgery and GI f/u after improvement in symptoms for ercp +- cholecystectomy   - MRCP ordered  - May need ERCP on Monday pending MRCP     #Acute hypoxic respiratory failure possibly 2/2 aspiration   - Intubated 5/18 and extubated 5/24 but continues requiring supplemental O2   - now on RA w good sats  - Sputum cx 5/24 growing mold like fungus on prelim report and referred to mycology; f/u final results (pending pending recs)  - Duonebs as needed   - Encourage incentive spirometer and OOB to chair     #Diarrhea (resolved)  - Rectal tube dc  - Started having watery diarrhea shortly after admission that was for Initially suspected to be due to tube feeds   - Has been off tube feeds for days and continues to have diarrhea     #hypernatremia (improved)  - Likely multifactorial from ongoing diarrhea   - Was also on short course of bumex gtt during MICU course   - Monitor I/O's and lytes closely     #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 shock   - Required dobutamine (5/20-5/23) and milronone gtts (5/23-5/25)  - TTE 5/20 w/ LVEF 30-35% and remained unchanged on repeat TTE 5/22  - Clinically hypovolemic at this time likely from ongoing diarrhea and is NPO  - Giving short course of gentle IVFs but monitor vol status closely   - will gradually start gdmt as bp tolerates  - cw coreg  - cw entresto  - S/P LHC and appears to have multivessel disease  - Cardiac surgery recs appreciated  - CABG work up outpatient  - Monitor daily weights and strict I/O's   - Monitor on telemetry   - Cardio following     pAF   - s/p emergent cardioversion in MICU   - Now in sinus rhythm  - CHADSVASC = 6  - cw coreg  - full dose lovenox    #Dysphagia (improving)  - on easy to chew w thin liquids    #Superficial thrombophlebitis right cephalic vein.  - No signs of cellulitis   - c/w supportive care including RUE elevation     #Small area of Rt toe ischemia   - Likely 2/2 levophed vs vasoplegia   - Monitor for resolution     #ATN from septic shock  - Resolved   - Caution w/ hyperchloremia as can cause OLEKSANDR   - Monitor renal function    #Transaminitis from shock liver and cholangitis   - Trend LFTs and coags      #Normocytic anemia   #Thrombocytopenia, resolving, 2/2 sepsis   - H/H mildly low and stable   - Plts improving and >50K therefore starting full dose AC for AF   - Remains hemodynamically stable   - No active bleeding reported   - BUN elevated but likely 2/2 recent steroids and down trending since steroids stopped   - c/w protonix for GI ppx  - Monitor CBC and transfuse for Hb<8     #Hyperammonemia, resolved    - Suspect 2/2 infection w/ e.coli as it is a ureas producing organisms which hydrolyzes urea to ammonium and ultimately converts to ammonia    - Started on rifaxamin in ICU but now d/c'd as pt has no hx of cirrhosis    VTE ppx: lovenox     Dispo: medically active. Needs MRCP and then ERCP +/- Surgery re-eval    Spoke with daughter and wife at bedside

## 2025-05-30 NOTE — PROGRESS NOTE ADULT - SUBJECTIVE AND OBJECTIVE BOX
INFECTIOUS DISEASES AND INTERNAL MEDICINE at [facility]  =======================================================  Chip Meade MD  Diplomates American Board of Internal Medicine and Infectious Diseases  Telephone [phone]  Fax            [phone]  =======================================================    DOMINIC ALAMO [id number]    Follow up: BACTEREMIA    Allergies:  No Known Drug Allergies  SHRIMP (Unknown)  iv dye (Unknown)      Medications:  bacitracin   Ointment 1 Application(s) Topical two times a day PRN  chlorhexidine 2% Cloths 1 Application(s) Topical <User Schedule>  dextrose 5%. 1000 milliLiter(s) IV Continuous <Continuous>  dextrose 5%. 1000 milliLiter(s) IV Continuous <Continuous>  dextrose 5%. 1000 milliLiter(s) IV Continuous <Continuous>  enoxaparin Injectable 40 milliGRAM(s) SubCutaneous every 24 hours  glucagon  Injectable 1 milliGRAM(s) IntraMuscular once  hydrocortisone sodium succinate Injectable 50 milliGRAM(s) IV Push every 12 hours  meropenem  IVPB 1000 milliGRAM(s) IV Intermittent every 8 hours  pantoprazole  Injectable 40 milliGRAM(s) IV Push daily  rifAXIMin 550 milliGRAM(s) Oral two times a day    SOCIAL       FAMILY   FAMILY HISTORY:  FH: CAD (coronary artery disease)      REVIEW OF SYSTEMS:  CONSTITUTIONAL:  No Fever or chills  HEENT:   No diplopia or blurred vision.  No earache, sore throat or runny nose.  CARDIOVASCULAR:  No pressure, squeezing, strangling, tightness, heaviness or aching about the chest, neck, axilla or epigastrium.  RESPIRATORY:  No cough, shortness of breath, PND or orthopnea.  GASTROINTESTINAL:  No nausea, vomiting or diarrhea.  GENITOURINARY:  No dysuria, frequency or urgency. No Blood in urine  MUSCULOSKELETAL:   moves all joints  SKIN:  No change in skin, hair or nails.  NEUROLOGIC:  No paresthesias, fasciculations, seizures or weakness.  PSYCHIATRIC:  No disorder of thought or mood.  ENDOCRINE:  No heat or cold intolerance, polyuria or polydipsia.  HEMATOLOGICAL:  No easy bruising or bleeding.            Physical Exam:  I Vital Signs Last 24 Hrs  T(C): 37.1 (27 May 2025 11:00), Max: 37.1 (27 May 2025 11:00)  T(F): 98.7 (27 May 2025 11:00), Max: 98.7 (27 May 2025 11:00)  HR: 90 (27 May 2025 11:00) (71 - 90)  BP: 157/80 (27 May 2025 11:00) (123/69 - 157/80)  BP(mean): --  RR: 19 (27 May 2025 11:00) (16 - 20)  SpO2: 96% (27 May 2025 11:00) (95% - 97%)    Parameters below as of 27 May 2025 11:00  Patient On (Oxygen Delivery Method): nasal cannula  O2 Flow (L/min): 3          GEN: NAD,  OOB TO CHAIR  HEENT: nor mocephalic and atraumatic. EOMI. TROY.    NECK: Supple. No carotid bruits.  No lymphadenopathy or thyromegaly.  LUNGS: Clear to auscultation.  HEART: Regular rate and rhythm without murmur.  ABDOMEN: Soft, nontender, and nondistended.  Positive bowel sounds.    : No CVA tenderness  EXTREMITIES: Without any cyanosis, clubbing, rash, lesions or edema.  MSK: no joint swelling  NEUROLOGIC: Cranial nerves II through XII are grossly intact.  PSYCHIATRIC: Appropriate affect .  SKIN: ULCERATION ON UPPER ;O[[ ADM IN D LIP                                                                                                                                                                                                                              Labs:  Vitals:  ============  T(F): 98.1 (2   =======================================================  Current Antibiotics:  meropenem  IVPB 1000 milliGRAM(s) IV Intermittent every 8 hours  rifAXIMin 550 milliGRAM(s) Oral two times a day    Other medications:  chlorhexidine 2% Cloths 1 Application(s) Topical <User Schedule>  dextrose 5%. 1000 milliLiter(s) IV Continuous <Continuous>  dextrose 5%. 1000 milliLiter(s) IV Continuous <Continuous>  dextrose 5%. 1000 milliLiter(s) IV Continuous <Continuous>  enoxaparin Injectable 40 milliGRAM(s) SubCutaneous every 24 hours  glucagon  Injectable 1 milliGRAM(s) IntraMuscular once  hydrocortisone sodium succinate Injectable 50 milliGRAM(s) IV Push every 12 hours  pantoprazole  Injectable 40 milliGRAM(s) IV Push daily      =======================================================  Labs:                                           14.0   15.29 )-----------( 157      ( 27 May 2025 06:17 )             43.2       Culture - Sputum (collected 05-24-25 @ 05:15)  Source: Sputum Sputum  Gram Stain (05-24-25 @ 14:11):    Few polymorphonuclear leukocytes per low power field    No Squamous epithelial cells per low power field    Moderate Yeast like cells per oil power field  Preliminary Report (05-25-25 @ 19:00):    Few Mold Like Fungus Referred to Mycology    Commensal yair consistent with body site    Culture - Blood (collected 05-21-25 @ 02:50)  Source: Blood Blood  Preliminary Report (05-25-25 @ 17:00):    No growth at 4 days    Culture - Blood (collected 05-20-25 @ 11:15)  Source: Blood Blood  Final Report (05-25-25 @ 17:00):    No growth at 5 days    Culture - Body Fluid with Gram Stain (collected 05-19-25 @ 17:15)  Source: Bile Bile Fluid  Gram Stain (05-20-25 @ 08:03):    polymorphonuclear leukocytes seen    Gram Negative Rods seen    by cytocentrifuge  Final Report (05-25-25 @ 08:14):    Numerous Escherichia coli ESBL    Numerous Citrobacter freundii complex    Numerous Enterococcus gallinarum    Numerous Enterococcus faecalis  Organism: Escherichia coli ESBL  Citrobacter freundii complex  Enterococcus gallinarum  Enterococcus faecalis (05-25-25 @ 08:14)  Organism: Enterococcus faecalis (05-25-25 @ 08:14)    Sensitivities:      -  Vancomycin: S 1      -  Ampicillin: S <=2 Predicts results to ampicillin/sulbactam, amoxacillin-clavulanate and  piperacillin-tazobactam.      Method Type: CURTIS  Organism: Escherichia coli ESBL (05-25-25 @ 08:14)    Sensitivities:      -  Levofloxacin: R >4      -  Tobramycin: R >8      -  Aztreonam: R >16      -  Gentamicin: R >8      -  Ceftriaxone: R >32      -  Cefazolin: R >16      -  Cefepime: R >16      -  Piperacillin/Tazobactam: R <=8      -  Ciprofloxacin: R >2      -  Imipenem: S <=1      -  Ampicillin: R >16 These ampicillin results predict results for amoxicillin      Method Type: CURTIS      -  Meropenem: S <=1      -  Ampicillin/Sulbactam: R 16/8      -  Trimethoprim/Sulfamethoxazole: S <=0.5/9.5      -  Ertapenem: S <=0.5      -  Tigecycline: S <=2 Interpretations based on FDA breakpoints  Organism: Citrobacter freundii complex (05-25-25 @ 08:14)    Sensitivities:      -  Levofloxacin: S <=0.5      -  Tobramycin: R >8      -  Aztreonam: S <=4      -  Gentamicin: R >8      -  Cefepime: S <=2      -  Cefazolin: R >16      -  Piperacillin/Tazobactam: S <=8 Treatment with Pipercillin/Tazobactam is not recommended in severe infections casued by Klebsiella aerogenes, Enterobacter cloacae complex, and Citrobacter freundii complex.      -  Ciprofloxacin: S <=0.25      -  Imipenem: S <=1      -  Ceftriaxone: S <=1 Enterobacter cloacae, Klebsiella aerogenes, and Citrobacter freundii may develop resistance during prolonged therapy.      -  Ampicillin: R >16 These ampicillin results predict results for amoxicillin      Method Type: CURTIS      -  Meropenem: S <=1      -  Ampicillin/Sulbactam: R 16/8      -  Cefoxitin: R >16      -  Amoxicillin/Clavulanic Acid: R >16/8      -  Trimethoprim/Sulfamethoxazole: R >2/38      -  Tigecycline: S <=2 Interpretations based on FDA breakpoints      -  Ertapenem: S <=0.5  Organism: Enterococcus gallinarum (05-25-25 @ 08:14)    Sensitivities:      -  Vancomycin: R 2 E. casseliflavus/gallinarum have intrinsic, low level resistance to vancomycin. They are not considered to be VRE.      -  Ampicillin: S <=2 Predicts results to ampicillin/sulbactam, amoxacillin-clavulanate and  piperacillin-tazobactam.      Method Type: CURTIS    Culture - Blood (collected 05-19-25 @ 10:15)  Source: Blood Blood  Gram Stain (05-20-25 @ 06:31):    Growth in anaerobic bottle: Gram Negative Rods    Growth in aerobic bottle: Gram Negative Rods  Final Report (05-22-25 @ 19:19):    Growth in aerobic and anaerobic bottles: Escherichia coli ESBL    See previous culture [id number]    Urinalysis with Rflx Culture (collected 05-18-25 @ 22:08)    Culture - Urine (collected 05-18-25 @ 22:08)  Source: Clean Catch  Final Report (05-21-25 @ 09:50):    >100,000 CFU/ml Escherichia coli ESBL  Organism: Escherichia coli ESBL (05-21-25 @ 09:50)  Organism: Escherichia coli ESBL (05-21-25 @ 09:50)    Sensitivities:      -  Levofloxacin: R >4      -  Tobramycin: R >8      -  Nitrofurantoin: S <=32 Should not be used to treat pyelonephritis      -  Aztreonam: R >16      -  Gentamicin: R >8      -  Cefazolin: R >16 For uncomplicated UTI with K. pneumoniae, E. coli, or P. mirablis: CURTIS <=16 is sensitive and CURTIS >=32 is resistant. This also predicts results for oral agents cefaclor, cefdinir, cefpodoxime, cefprozil, cefuroxime axetil, cephalexin and locarbef for uncomplicated UTI. Note that some isolates may be susceptible to these agents while testing resistant to cefazolin.      -  Cefepime: R >16      -  Piperacillin/Tazobactam: S <=8      -  Ciprofloxacin: R >2      -  Imipenem: S <=1      -  Ceftriaxone: R >32      -  Ampicillin: R >16 These ampicillin results predict results for amoxicillin      Method Type: CURTIS      -  Meropenem: S <=1      -  Ampicillin/Sulbactam: I 16/8      -  Cefuroxime: R >16      -  Trimethoprim/Sulfamethoxazole: S <=0.5/9.5      -  Ertapenem: S <=0.5      -  Tigecycline: S <=2 Interpretations based on FDA breakpoints    Culture - Blood (collected 05-18-25 @ 20:05)  Source: Blood Blood  Gram Stain (05-19-25 @ 12:41):    Growth in aerobic and anaerobic bottles: Gram Negative Rods and Gram    Positive Cocci in Pairs and Chains    Growth in anaerobic bottle: Gram Positive Rods  Final Report (05-25-25 @ 09:54):    Growth in aerobic and anaerobic bottles: Escherichia coli ESBL    Growth in aerobic and anaerobic bottles: Citrobacter freundii    Growth in aerobic and anaerobic bottles: Streptococcus gallolyticus    Growth in anaerobic bottle: Gram Positive Rods Organism seen in Gram    stain is non-viable after prolonged    incubation and repeated subculture.    Direct identification is available within approximately 3-5    hours either by Blood Panel Multiplexed PCR or Direct    MALDI-TOF. Details: [email or web address]  Organism: Blood Culture PCR  Streptococcus gallolyticus  Escherichia coli ESBL  Citrobacter freundii (05-25-25 @ 09:54)  Organism: Escherichia coli ESBL (05-25-25 @ 09:54)    Sensitivities:      -  Levofloxacin: R >4      -  Tobramycin: R >8      -  Aztreonam: R >16      -  Gentamicin: R >8      -  Imipenem: S <=1      -  Cefepime: R >16      -  Cefazolin: R >16      -  Piperacillin/Tazobactam: R <=8      -  Ciprofloxacin: R >2      -  Ceftriaxone: R >32      -  Ampicillin: R >16 These ampicillin results predict results for amoxicillin      Method Type: CURTIS      -  Meropenem: S <=1      -  Ampicillin/Sulbactam: R 8/4      -  Trimethoprim/Sulfamethoxazole: S <=0.5/9.5      -  Tigecycline: S <=2 Interpretations based on FDA breakpoints      -  Ertapenem: S <=0.5  Organism: Citrobacter freundii (05-25-25 @ 09:54)    Sensitivities:      -  Levofloxacin: S <=0.5      -  Tobramycin: R 8      -  Aztreonam: S <=4      -  Gentamicin: R >8      -  Cefepime: S <=2      -  Cefazolin: R >16      -  Piperacillin/Tazobactam: S <=8 Treatment with Pipercillin/Tazobactam is not recommended in severe infections casued by Klebsiella aerogenes, Enterobacter cloacae complex, and Citrobacter freundii complex.      -  Ciprofloxacin: S <=0.25      -  Imipenem: S <=1      -  Ceftriaxone: S <=1 Enterobacter cloacae, Klebsiella aerogenes, and Citrobacter freundii may develop resistance during prolonged therapy.      -  Ampicillin: R >16 These ampicillin results predict results for amoxicillin      Method Type: CURTIS      -  Meropenem: S <=1      -  Ampicillin/Sulbactam: R >16/8      -  Cefoxitin: R >16      -  Trimethoprim/Sulfamethoxazole: R >2/38      -  Tigecycline: S <=2 Interpretations based on FDA breakpoints      -  Ertapenem: S <=0.5  Organism: Blood Culture PCR (05-25-25 @ 09:54)    Sensitivities:      -  CTX-M Resistance Marker: Detec      -  Citrobacter species: Detec      -  Escherichia coli: Detec      Method Type: PCR      -  Streptococcus sp. (Not Grp A, B or S pneumoniae): Detec      -  ESBL: Detec  Organism: Streptococcus gallolyticus (05-25-25 @ 09:54)    Sensitivities:      -  Vancomycin: S 0.5      -  Ceftriaxone: S <=0.25      Method Type: CURTIS      -  Penicillin: S 0.06      Creatinine: 0.75 mg/dL (05-26-25 @ 02:30)  Creatinine: 0.79 mg/dL (05-25-25 @ 17:35)  Creatinine: 0.86 mg/dL (05-25-25 @ 12:00)  Creatinine: 0.95 mg/dL (05-25-25 @ 05:00)  Creatinine: 1.02 mg/dL (05-24-25 @ 23:39)  Creatinine: 1.16 mg/dL (05-24-25 @ 17:20)  Creatinine: 1.14 mg/dL (05-24-25 @ 09:40)  Creatinine: 1.29 mg/dL (05-24-25 @ 02:15)  Creatinine: 1.45 mg/dL (05-23-25 @ 20:15)  Creatinine: 1.54 mg/dL (05-23-25 @ 14:15)  Creatinine: 1.72 mg/dL (05-23-25 @ 08:35)  Creatinine: 1.82 mg/dL (05-23-25 @ 02:40)  Creatinine: 2.05 mg/dL (05-22-25 @ 22:10)  Creatinine: 2.09 mg/dL (05-22-25 @ 20:15)  Creatinine: 1.98 mg/dL (05-22-25 @ 14:00)  Creatinine: 2.01 mg/dL (05-22-25 @ 08:45)  Creatinine: 2.18 mg/dL (05-22-25 @ 03:05)  Creatinine: 2.20 mg/dL (05-21-25 @ 21:00)  Creatinine: 1.97 mg/dL (05-21-25 @ 14:39)    Procalcitonin: 83.01 ng/mL (05-21-25 @ 14:39)  Procalcitonin: >100.00 ng/mL (05-19-25 @ 21:20)  Procalcitonin: >100.00 ng/mL (05-19-25 @ 00:45)          WBC Count: 16.42 K/uL (05-26-25 @ 02:30)  WBC Count: 15.35 K/uL (05-25-25 @ 17:35)  WBC Count: 13.93 K/uL (05-25-25 @ 12:00)  WBC Count: 12.51 K/uL (05-25-25 @ 05:00)  WBC Count: 14.85 K/uL (05-24-25 @ 23:39)  WBC Count: 16.79 K/uL (05-24-25 @ 17:20)  WBC Count: 14.34 K/uL (05-24-25 @ 09:40)  WBC Count: 13.36 K/uL (05-24-25 @ 02:15)  WBC Count: 13.77 K/uL (05-23-25 @ 20:15)  WBC Count: 14.24 K/uL (05-23-25 @ 14:15)  WBC Count: 11.64 K/uL (05-23-25 @ 08:35)  WBC Count: 12.82 K/uL (05-23-25 @ 02:40)  WBC Count: 13.38 K/uL (05-22-25 @ 20:15)  WBC Count: 13.42 K/uL (05-22-25 @ 14:00)  WBC Count: 9.91 K/uL (05-22-25 @ 08:45)  WBC Count: 11.13 K/uL (05-22-25 @ 03:05)  WBC Count: 13.83 K/uL (05-21-25 @ 21:00)  WBC Count: 13.92 K/uL (05-21-25 @ 14:39)    SARS-CoV-2 Result: NotDetec (05-18-25 @ 22:11)    Lactate Dehydrogenase, Serum: 421 U/L (05-19-25 @ 04:10)    Alkaline Phosphatase: 99 U/L (05-26-25 @ 02:30)  Alkaline Phosphatase: 103 U/L (05-25-25 @ 17:35)  Alkaline Phosphatase: 105 U/L (05-25-25 @ 12:00)  Alkaline Phosphatase: 98 U/L (05-25-25 @ 05:00)  Alkaline Phosphatase: 104 U/L (05-24-25 @ 23:39)  Alkaline Phosphatase: 113 U/L (05-24-25 @ 17:20)  Alkaline Phosphatase: 121 U/L (05-24-25 @ 09:40)  Alkaline Phosphatase: 121 U/L (05-24-25 @ 02:15)  Alkaline Phosphatase: 148 U/L (05-23-25 @ 20:15)  Alkaline Phosphatase: 155 U/L (05-23-25 @ 14:15)  Alkaline Phosphatase: 143 U/L (05-23-25 @ 08:35)  Alkaline Phosphatase: 149 U/L (05-23-25 @ 02:40)  Alkaline Phosphatase: 157 U/L (05-22-25 @ 22:10)  Alkaline Phosphatase: 160 U/L (05-22-25 @ 20:15)  Alkaline Phosphatase: 144 U/L (05-22-25 @ 14:00)  Alanine Aminotransferase (ALT/SGPT): 54 U/L (05-26-25 @ 02:30)  Alanine Aminotransferase (ALT/SGPT): 66 U/L (05-25-25 @ 17:35)  Alanine Aminotransferase (ALT/SGPT): 72 U/L (05-25-25 @ 12:00)  Alanine Aminotransferase (ALT/SGPT): 75 U/L (05-25-25 @ 05:00)  Alanine Aminotransferase (ALT/SGPT): 83 U/L (05-24-25 @ 23:39)  Alanine Aminotransferase (ALT/SGPT): 95 U/L (05-24-25 @ 17:20)  Alanine Aminotransferase (ALT/SGPT): 108 U/L (05-24-25 @ 09:40)  Alanine Aminotransferase (ALT/SGPT): 114 U/L (05-24-25 @ 02:15)  Alanine Aminotransferase (ALT/SGPT): 135 U/L (05-23-25 @ 20:15)  Alanine Aminotransferase (ALT/SGPT): 154 U/L (05-23-25 @ 14:15)  Alanine Aminotransferase (ALT/SGPT): 169 U/L (05-23-25 @ 08:35)  Alanine Aminotransferase (ALT/SGPT): 195 U/L (05-23-25 @ 02:40)  Alanine Aminotransferase (ALT/SGPT): 208 U/L (05-22-25 @ 22:10)  Alanine Aminotransferase (ALT/SGPT): 195 U/L (05-22-25 @ 20:15)  Alanine Aminotransferase (ALT/SGPT): 141 U/L (05-22-25 @ 14:00)  Aspartate Aminotransferase (AST/SGOT): 41 U/L (05-26-25 @ 02:30)  Aspartate Aminotransferase (AST/SGOT): 51 U/L (05-25-25 @ 17:35)  Aspartate Aminotransferase (AST/SGOT): 61 U/L (05-25-25 @ 12:00)  Aspartate Aminotransferase (AST/SGOT): 64 U/L (05-25-25 @ 05:00)  Aspartate Aminotransferase (AST/SGOT): 77 U/L (05-24-25 @ 23:39)  Aspartate Aminotransferase (AST/SGOT): 91 U/L (05-24-25 @ 17:20)  Aspartate Aminotransferase (AST/SGOT): 104 U/L (05-24-25 @ 09:40)  Aspartate Aminotransferase (AST/SGOT): 124 U/L (05-24-25 @ 02:15)  Aspartate Aminotransferase (AST/SGOT): 168 U/L (05-23-25 @ 20:15)  Aspartate Aminotransferase (AST/SGOT): 233 U/L (05-23-25 @ 14:15)  Aspartate Aminotransferase (AST/SGOT): 331 U/L (05-23-25 @ 08:35)  Aspartate Aminotransferase (AST/SGOT): 498 U/L (05-23-25 @ 02:40)  Aspartate Aminotransferase (AST/SGOT): 499 U/L (05-22-25 @ 22:10)  Aspartate Aminotransferase (AST/SGOT): 438 U/L (05-22-25 @ 20:15)  Aspartate Aminotransferase (AST/SGOT): 258 U/L (05-22-25 @ 14:00)  Bilirubin Total: 2.3 mg/dL (05-26-25 @ 02:30)  Bilirubin Total: 2.3 mg/dL (05-25-25 @ 17:35)  Bilirubin Total: 2.4 mg/dL (05-25-25 @ 12:00)  Bilirubin Total: 2.0 mg/dL (05-25-25 @ 05:00)  Bilirubin Total: 2.1 mg/dL (05-24-25 @ 23:39)  Bilirubin Total: 2.0 mg/dL (05-24-25 @ 17:20)  Bilirubin Total: 1.8 mg/dL (05-24-25 @ 09:40)  Bilirubin Total: 1.8 mg/dL (05-24-25 @ 02:15)  Bilirubin Total: 2.1 mg/dL (05-23-25 @ 20:15)  Bilirubin Total: 2.3 mg/dL (05-23-25 @ 14:15)  Bilirubin Total: 2.3 mg/dL (05-23-25 @ 08:35)  Bilirubin Total: 2.4 mg/dL (05-23-25 @ 02:40)  Bilirubin Total: 2.6 mg/dL (05-22-25 @ 22:10)  Bilirubin Total: 2.6 mg/dL (05-22-25 @ 20:15)  Bilirubin Total: 2.7 mg/dL (05-22-25 @ 14:00)

## 2025-05-30 NOTE — PROGRESS NOTE ADULT - ASSESSMENT
76 yo male with hx of HTN, HLD, CAD, TIA, remote pericarditis in 2015, presented to the ED with complaints of abdominal pain, nausea, vomiting for since the day prior. Admitted with severe shock. GI consulted after elevated liver enzymes, elevated lipase.     #Elevated lipase  #Elevated liver enzymes  CT abd shows cholelithiasis, no evidence of cholecystitis normal liver, no evidence of pancreatitis.  US abd performed gall bladder stones with gall bladder wall thickening, pericholecystic fluid, trace perihepatic ascites. CBD measuring 8.5 mm.  No stone is seen in the visualized segment.  Distal segment of the common bile duct could not be visualized as it is obscured by bowel gas.  s/p Perc primitivo drain placed by IR 5/19  clinically patient is doing much better   -Trend liver enzymes, renal function, electrolytes, CBC  -Regular diet as tolerated  -Check MRCP to r/o choledocholithiasis   -If positive will plan for tentative ERCP Monday, 6/2/25  -Will need cardiac clearance prior to procedure  -Further care per primary team  _________________________________________________________________  Assessment and recommendations are final when note is signed by the attending physician.

## 2025-05-30 NOTE — PROGRESS NOTE ADULT - SUBJECTIVE AND OBJECTIVE BOX
Hospitalist Daily Progress Note    Chief Complaint:  Patient is a 75y old  Male who presents with a chief complaint of Acute respiratory failure, shock (30 May 2025 14:51)      SUBJECTIVE / OVERNIGHT EVENTS:  Patient was seen and examined at bedside.   States to be feeling better.   Patient denies chest pain, SOB, abd pain, N/V, fever, chills, dysuria or any other complaints. All remainder ROS negative.     MEDICATIONS  (STANDING):  carvedilol 6.25 milliGRAM(s) Oral every 12 hours  chlorhexidine 2% Cloths 1 Application(s) Topical <User Schedule>  chlorhexidine 2% Cloths 1 Application(s) Topical <User Schedule>  dextrose 5% + sodium chloride 0.45%. 1000 milliLiter(s) (75 mL/Hr) IV Continuous <Continuous>  dextrose 50% Injectable 25 Gram(s) IV Push once  dextrose 50% Injectable 12.5 Gram(s) IV Push once  dextrose 50% Injectable 25 Gram(s) IV Push once  enoxaparin Injectable 80 milliGRAM(s) SubCutaneous every 12 hours  insulin lispro (ADMELOG) corrective regimen sliding scale   SubCutaneous three times a day before meals  meropenem  IVPB 1000 milliGRAM(s) IV Intermittent every 8 hours  pantoprazole  Injectable 40 milliGRAM(s) IV Push daily  sacubitril 24 mG/valsartan 26 mG 1 Tablet(s) Oral two times a day  sodium chloride 0.9%. 1000 milliLiter(s) (75 mL/Hr) IV Continuous <Continuous>    MEDICATIONS  (PRN):  bacitracin   Ointment 1 Application(s) Topical two times a day PRN wound care  morphine  - Injectable 2 milliGRAM(s) IV Push every 6 hours PRN Severe Pain (7 - 10)  morphine  - Injectable 1 milliGRAM(s) IV Push every 6 hours PRN Moderate Pain (4 - 6)        I&O's Summary    29 May 2025 07:01  -  30 May 2025 07:00  --------------------------------------------------------  IN: 120 mL / OUT: 5 mL / NET: 115 mL        PHYSICAL EXAM:  Vital Signs Last 24 Hrs  T(C): 36.4 (30 May 2025 10:37), Max: 36.8 (29 May 2025 17:52)  T(F): 97.5 (30 May 2025 10:37), Max: 98.3 (29 May 2025 17:52)  HR: 65 (30 May 2025 10:37) (65 - 83)  BP: 112/74 (30 May 2025 10:37) (97/60 - 112/74)  BP(mean): 85 (29 May 2025 17:52) (85 - 85)  RR: 18 (30 May 2025 10:37) (16 - 19)  SpO2: 94% (30 May 2025 10:37) (92% - 96%)    Parameters below as of 30 May 2025 10:37  Patient On (Oxygen Delivery Method): room air          Constitutional: NAD, Resting  ENT: Supple, No JVD  Lungs: CTA B/L, Non-labored breathing  Cardio: RRR, S1/S2, No murmur  Abdomen: Soft, Nontender, Nondistended; Bowel sounds present, Peggy tube in place  Extremities: No calf tenderness, No pitting edema  Musculoskeletal:   No joint swelling  Psych: Calm, cooperative affect appropriate  Neuro: Awake and alert, oriented to name, location and time   Skin: No rashes; no palpable lesions    LABS:                        12.2   12.18 )-----------( 206      ( 30 May 2025 04:35 )             37.2     05-30    145  |  110[H]  |  18.3  ----------------------------<  176[H]  3.3[L]   |  22.0  |  0.53    Ca    7.2[L]      30 May 2025 04:35    TPro  4.8[L]  /  Alb  2.5[L]  /  TBili  2.0  /  DBili  x   /  AST  133[H]  /  ALT  78[H]  /  AlkPhos  454[H]  05-30          Urinalysis Basic - ( 30 May 2025 04:35 )    Color: x / Appearance: x / SG: x / pH: x  Gluc: 176 mg/dL / Ketone: x  / Bili: x / Urobili: x   Blood: x / Protein: x / Nitrite: x   Leuk Esterase: x / RBC: x / WBC x   Sq Epi: x / Non Sq Epi: x / Bacteria: x        CAPILLARY BLOOD GLUCOSE      POCT Blood Glucose.: 220 mg/dL (30 May 2025 11:58)  POCT Blood Glucose.: 146 mg/dL (30 May 2025 08:24)  POCT Blood Glucose.: 140 mg/dL (29 May 2025 17:56)        RADIOLOGY REVIEWED

## 2025-05-30 NOTE — PROGRESS NOTE ADULT - ASSESSMENT
76 yo male with hx of HTN, HLD, CAD, TIA, remote pericarditis in 2015, presented to the ED with complaints of abdominal pain, nausea, vomiting for since the day prior.  Upon arrival to the ED patient was lethargic and tachypneic, with abdominal distention and mottled skin.    He was noted to be hypotensive despite fluid resuscitation and placed on escalating doses of pressors.    Started on Bipap initially for tachypnea and metabolic acidosis, eventually intubated in the ED.  Labs revealed severe metabolic acidosis. OLEKSANDR, transaminitis   AS ABOVE ADMITTED WITH ABD PAIN HYPOTENSION BLOOD CX POLYMICROBIAL   ULTIMATELY HAD CHOLECYSTOTOMY TUBE PLACED ALSO POLYMICROBIAL  BLOOD CX WITH ESBL ECOLI AND CITROBACTER  BILE CX E-COLI ENTEROCOCCAL FAECALIS AND E GALLINARUM   PT CURRENTLY AWAKE ALERT OOB TO CHAIR   PT ON [facility] WHICH  WILL COVER ESBL AND STREP  [facility] HAS SOME COVERAGE FOR ENTEROCOCCUS   ALTHOUGH HE DID NOT HAVE ENTEROCOCCAL BACTEREMIA and  THE  SIGNIFICANCE IN BILE FLUID  UNCLEAR WOULD CONTINUE CURRENT IV MeRERRM   AS HE CLINICALLY APPEARS TO BE IMPROVING    SPUTUM WITH POSTIVE MOLD IDENTIFIES AS FWE ASPERGILLUS FUMIGATAS  MAYBE COLONIZATOON WILL DEFER TX FOR THIS  WILL BE OF TILL JUNE 4 PLEASE CALL ID COVERAGE IF ANY QUESTIONS

## 2025-05-30 NOTE — PROGRESS NOTE ADULT - SUBJECTIVE AND OBJECTIVE BOX
Chief Complaint:  Patient is a 75y old  Male who presents with a chief complaint of Acute respiratory failure, shock (29 May 2025 16:14)      HPI/ 24 hr events: Patient seen and examined at bedside using language line . Pt feeling well this morning. Tolerating diet. Denies nausea, vomiting, diarrhea, abdominal pain. Vitals are overall stable, no leukocytosis, LFTs up trending to Alk Phos: 454, AST/ALT: 133/78.      REVIEW OF SYSTEMS:   General: Negative  HEENT: Negative  CV: Negative  Respiratory: Negative  GI: See HPI  : Negative  MSK: Negative  Hematologic: Negative  Skin: Negative    MEDICATIONS:   MEDICATIONS  (STANDING):  carvedilol 6.25 milliGRAM(s) Oral every 12 hours  chlorhexidine 2% Cloths 1 Application(s) Topical <User Schedule>  chlorhexidine 2% Cloths 1 Application(s) Topical <User Schedule>  dextrose 5% + sodium chloride 0.45%. 1000 milliLiter(s) (75 mL/Hr) IV Continuous <Continuous>  dextrose 50% Injectable 25 Gram(s) IV Push once  dextrose 50% Injectable 12.5 Gram(s) IV Push once  dextrose 50% Injectable 25 Gram(s) IV Push once  enoxaparin Injectable 80 milliGRAM(s) SubCutaneous once  insulin lispro (ADMELOG) corrective regimen sliding scale   SubCutaneous three times a day before meals  meropenem  IVPB 1000 milliGRAM(s) IV Intermittent every 8 hours  pantoprazole  Injectable 40 milliGRAM(s) IV Push daily  sacubitril 24 mG/valsartan 26 mG 1 Tablet(s) Oral two times a day  sodium chloride 0.9%. 1000 milliLiter(s) (75 mL/Hr) IV Continuous <Continuous>    MEDICATIONS  (PRN):  bacitracin   Ointment 1 Application(s) Topical two times a day PRN wound care  morphine  - Injectable 2 milliGRAM(s) IV Push every 6 hours PRN Severe Pain (7 - 10)  morphine  - Injectable 1 milliGRAM(s) IV Push every 6 hours PRN Moderate Pain (4 - 6)      DIET:  Diet, Easy to Chew (05-28-25 @ 14:10) [Active]          ALLERGIES:   Allergies    No Known Drug Allergies  SHRIMP (Unknown)  iv dye (Unknown)    Intolerances        VITAL SIGNS:   Vital Signs Last 24 Hrs  T(C): 36.4 (30 May 2025 10:37), Max: 36.8 (29 May 2025 17:52)  T(F): 97.5 (30 May 2025 10:37), Max: 98.3 (29 May 2025 17:52)  HR: 65 (30 May 2025 10:37) (65 - 83)  BP: 112/74 (30 May 2025 10:37) (96/67 - 112/74)  BP(mean): 85 (29 May 2025 17:52) (85 - 85)  RR: 18 (30 May 2025 10:37) (16 - 19)  SpO2: 94% (30 May 2025 10:37) (92% - 96%)    Parameters below as of 30 May 2025 10:37  Patient On (Oxygen Delivery Method): room air      I&O's Summary    29 May 2025 07:01  -  30 May 2025 07:00  --------------------------------------------------------  IN: 120 mL / OUT: 5 mL / NET: 115 mL        PHYSICAL EXAM:   GENERAL:  No acute distress  HEENT:  NC/AT, conjunctiva clear, sclera anicteric  ABDOMEN:  Soft, non-tender, non-distended, normoactive bowel sounds, no rebound or guarding, +IR drain   EXTREMITIES: No edema  SKIN:  Warm, dry  NEURO:  Calm, cooperative    LABS:                        12.2   12.18 )-----------( 206      ( 30 May 2025 04:35 )             37.2     Hemoglobin: 12.2 g/dL (05-30-25 @ 04:35)  Hemoglobin: 12.4 g/dL (05-29-25 @ 08:20)  Hemoglobin: 12.9 g/dL (05-28-25 @ 05:15)  Hemoglobin: 13.2 g/dL (05-27-25 @ 21:45)    05-30    145  |  110[H]  |  18.3  ----------------------------<  176[H]  3.3[L]   |  22.0  |  0.53    Ca    7.2[L]      30 May 2025 04:35    TPro  4.8[L]  /  Alb  2.5[L]  /  TBili  2.0  /  DBili  x   /  AST  133[H]  /  ALT  78[H]  /  AlkPhos  454[H]  05-30    LIVER FUNCTIONS - ( 30 May 2025 04:35 )  Alb: 2.5 g/dL / Pro: 4.8 g/dL / ALK PHOS: 454 U/L / ALT: 78 U/L / AST: 133 U/L / GGT: x             RADIOLOGY & ADDITIONAL STUDIES:

## 2025-05-30 NOTE — PROGRESS NOTE ADULT - SUBJECTIVE AND OBJECTIVE BOX
DOMINIC ALAMO  [id number]      Chief Complaint:   follow up sepsis/PAF/ CMP    Subjective:  Patient without c/o.  Denies CP, SOB, palps.    24 hour Tele:   SR        bacitracin   Ointment 1 Application(s) Topical two times a day PRN  carvedilol 6.25 milliGRAM(s) Oral every 12 hours  chlorhexidine 2% Cloths 1 Application(s) Topical <User Schedule>  chlorhexidine 2% Cloths 1 Application(s) Topical <User Schedule>  dextrose 5% + sodium chloride 0.45%. 1000 milliLiter(s) IV Continuous <Continuous>  dextrose 50% Injectable 25 Gram(s) IV Push once  dextrose 50% Injectable 12.5 Gram(s) IV Push once  dextrose 50% Injectable 25 Gram(s) IV Push once  enoxaparin Injectable 80 milliGRAM(s) SubCutaneous once  insulin lispro (ADMELOG) corrective regimen sliding scale   SubCutaneous three times a day before meals  meropenem  IVPB 1000 milliGRAM(s) IV Intermittent every 8 hours  morphine  - Injectable 2 milliGRAM(s) IV Push every 6 hours PRN  morphine  - Injectable 1 milliGRAM(s) IV Push every 6 hours PRN  pantoprazole  Injectable 40 milliGRAM(s) IV Push daily  sacubitril 24 mG/valsartan 26 mG 1 Tablet(s) Oral two times a day  sodium chloride 0.9%. 1000 milliLiter(s) IV Continuous <Continuous>          Physical Exam:  T(C): 36.4 (25 @ 10:37), Max: 36.8 (25 @ 17:52)  HR: 65 (25 @ 10:37) (65 - 83)  BP: 112/74 (25 @ 10:37) (93/66 - 112/74)  RR: 18 (25 @ 10:37) (16 - 19)  SpO2: 94% (25 @ 10:37) (92% - 97%)  General: Comfortable in NAD  Neck: No JVD  CVS: nl s1s2, no s3  Pulm: CTA b/l  Abd: soft, non-tender  Ext: No c/c/e  Neuro A&O x3  Psych: Normal affect      Labs:   30 May 2025 04:35    145    |  110    |  18.3   ----------------------------<  176    3.3     |  22.0   |  0.53     Ca    7.2        30 May 2025 04:35    TPro  4.8    /  Alb  2.5    /  TBili  2.0    /  DBili  x      /  AST  133    /  ALT  78     /  AlkPhos  454    30 May 2025 04:35                          12.2   12.18 )-----------( 206      ( 30 May 2025 04:35 )             37.2                 Echo: 2025:    1. Left ventricular systolic function is severely decreased with an ejection fraction visually estimated at 30 to 35 %. Global left ventricular hypokinesis.   2. Enlarged right ventricular cavity size and moderately reduced right ventricular systolic function.   3. Left atrium is normal in size.   4. There is mild calcification of the mitral valve annulus.   5. Moderate to severe mitral regurgitation.   6. Moderate tricuspid regurgitation.   7. Compared to the transthoracic echocardiogram performed on 2025, the LV and RV are now better visualized.    Echo 2025:   1. Technically difficult image quality.   2. Left ventricular systolic function is moderately decreased with an ejection fraction visually estimated at 30 to 35 %. Global left ventricular hypokinesis.   3. There is no evidence of a left ventricular thrombus.   4. Enlarged right ventricular cavity size and moderately reduced right ventricular systolic function.   5. No pericardial effusion seen.   6. Compared to the transthoracic echocardiogram performed on 2025, there have been no significant interval changes.      CXR:  Endotracheal tube tip 5 cm above the gayla. Enteric tube tip below the   edge of the image, and rightIJ catheter tip in the right atrium. No   pneumothorax. No focal consolidation or pleural effusion.    CTA C/A/P:  No pulmonary embolus.  Airspace consolidations at the lung bases may represent atelectasis   and/or pneumonia.  No bowel obstruction.  Cholelithiasis.  Trace ascites.    Cath:  LM: Luminal irregularities    LAD: Prox  with left to left collaterals    LCX: Mild diffuse disease, OM with 40% mid stenosis    RCA: Severe calcium from prox to mid with mild disease, distal RCA with long segment severe 90% stenosis into the PDA.  LV EDP 7       Assessment:  76 yo male PMHx HTN, HLD, CAD based on abnormal nuke, TIA, remote pericarditis in 2015 presented to the ED with complaints of abdominal pain, nausea, vomiting for since the day prior.  Upon arrival to the ED patient was lethargic and tachypneic, with abdominal distention and mottled skin.  He was noted to be hypotensive despite fluid resuscitation and placed on escalating doses of pressors.  He was started on BIPAP initially for tachypnea and metabolic acidosis, eventually intubated in the ED.  Patient remains intubated and history taken from chart and d/w RN.  Patient was on escalating doses of pressors and , but in last day has been weaned down.  Echo with severe LV dysfxn which appears new and likely stress myopathy.  CI on low end of normal on .  Likely sepsis from GNR septicemia from cholangitis as cause now s/p perc tube.  Noted AF with some RVR due to shock, , etc.  -DCCV for rapid AF while in MICU, reviewed tele and no VT.  -Patient extubated and doing well.  Now off pressors/ inotropic support with normal CO.  -Still with runs of Afib, at time NSVT-Remains in SR now  -Repeated echo with persistent drop on LVEF  -[facility] with severe RCA dz and LAD , CABG recommended.  Plan to be done as OP when GB issues resolved.    Plan:  1. Continue carvedilol and entresto.  Will add MRA as OP.  Eventual SGLT2i after GB issues resolved.  2. AC with lovenox, transition to DOAC if no plans for surgical intervention.  F/u surgery and GI.  3. Rest as per primary team and other consultants.   4. Patient high risk for primitivo if and when decided but no clear CV contraindication.  5. No objection to d/c from CV perspective.  CABG to be as OP.  OP f/u with CTS.

## 2025-05-31 LAB
ALBUMIN SERPL ELPH-MCNC: 2.3 G/DL — LOW (ref 3.3–5.2)
ALP SERPL-CCNC: 285 U/L — HIGH (ref 40–120)
ALT FLD-CCNC: 42 U/L — HIGH
ANION GAP SERPL CALC-SCNC: 10 MMOL/L — SIGNIFICANT CHANGE UP (ref 5–17)
AST SERPL-CCNC: 34 U/L — SIGNIFICANT CHANGE UP
BASOPHILS # BLD AUTO: 0.03 K/UL — SIGNIFICANT CHANGE UP (ref 0–0.2)
BASOPHILS NFR BLD AUTO: 0.3 % — SIGNIFICANT CHANGE UP (ref 0–2)
BILIRUB DIRECT SERPL-MCNC: 0.5 MG/DL — HIGH (ref 0–0.3)
BILIRUB INDIRECT FLD-MCNC: 1 MG/DL — SIGNIFICANT CHANGE UP (ref 0.2–1)
BILIRUB SERPL-MCNC: 1.5 MG/DL — SIGNIFICANT CHANGE UP (ref 0.4–2)
BUN SERPL-MCNC: 16.1 MG/DL — SIGNIFICANT CHANGE UP (ref 8–20)
CALCIUM SERPL-MCNC: 6.8 MG/DL — LOW (ref 8.4–10.5)
CHLORIDE SERPL-SCNC: 106 MMOL/L — SIGNIFICANT CHANGE UP (ref 96–108)
CO2 SERPL-SCNC: 22 MMOL/L — SIGNIFICANT CHANGE UP (ref 22–29)
CREAT SERPL-MCNC: 0.52 MG/DL — SIGNIFICANT CHANGE UP (ref 0.5–1.3)
EGFR: 105 ML/MIN/1.73M2 — SIGNIFICANT CHANGE UP
EGFR: 105 ML/MIN/1.73M2 — SIGNIFICANT CHANGE UP
EOSINOPHIL # BLD AUTO: 0.19 K/UL — SIGNIFICANT CHANGE UP (ref 0–0.5)
EOSINOPHIL NFR BLD AUTO: 2.1 % — SIGNIFICANT CHANGE UP (ref 0–6)
GLUCOSE BLDC GLUCOMTR-MCNC: 115 MG/DL — HIGH (ref 70–99)
GLUCOSE BLDC GLUCOMTR-MCNC: 152 MG/DL — HIGH (ref 70–99)
GLUCOSE BLDC GLUCOMTR-MCNC: 202 MG/DL — HIGH (ref 70–99)
GLUCOSE BLDC GLUCOMTR-MCNC: 207 MG/DL — HIGH (ref 70–99)
GLUCOSE SERPL-MCNC: 146 MG/DL — HIGH (ref 70–99)
HCT VFR BLD CALC: 35 % — LOW (ref 39–50)
HCT VFR BLD CALC: 37 % — LOW (ref 39–50)
HGB BLD-MCNC: 11.5 G/DL — LOW (ref 13–17)
HGB BLD-MCNC: 12.1 G/DL — LOW (ref 13–17)
IMM GRANULOCYTES # BLD AUTO: 0.05 K/UL — SIGNIFICANT CHANGE UP (ref 0–0.07)
IMM GRANULOCYTES NFR BLD AUTO: 0.6 % — SIGNIFICANT CHANGE UP (ref 0–0.9)
LYMPHOCYTES # BLD AUTO: 1.57 K/UL — SIGNIFICANT CHANGE UP (ref 1–3.3)
LYMPHOCYTES NFR BLD AUTO: 17.3 % — SIGNIFICANT CHANGE UP (ref 13–44)
MCHC RBC-ENTMCNC: 28.6 PG — SIGNIFICANT CHANGE UP (ref 27–34)
MCHC RBC-ENTMCNC: 28.8 PG — SIGNIFICANT CHANGE UP (ref 27–34)
MCHC RBC-ENTMCNC: 32.7 G/DL — SIGNIFICANT CHANGE UP (ref 32–36)
MCHC RBC-ENTMCNC: 32.9 G/DL — SIGNIFICANT CHANGE UP (ref 32–36)
MCV RBC AUTO: 87.1 FL — SIGNIFICANT CHANGE UP (ref 80–100)
MCV RBC AUTO: 88.1 FL — SIGNIFICANT CHANGE UP (ref 80–100)
MONOCYTES # BLD AUTO: 0.77 K/UL — SIGNIFICANT CHANGE UP (ref 0–0.9)
MONOCYTES NFR BLD AUTO: 8.5 % — SIGNIFICANT CHANGE UP (ref 2–14)
NEUTROPHILS # BLD AUTO: 6.47 K/UL — SIGNIFICANT CHANGE UP (ref 1.8–7.4)
NEUTROPHILS NFR BLD AUTO: 71.2 % — SIGNIFICANT CHANGE UP (ref 43–77)
NRBC # BLD AUTO: 0 K/UL — SIGNIFICANT CHANGE UP (ref 0–0)
NRBC # BLD AUTO: 0 K/UL — SIGNIFICANT CHANGE UP (ref 0–0)
NRBC # FLD: 0 K/UL — SIGNIFICANT CHANGE UP (ref 0–0)
NRBC # FLD: 0 K/UL — SIGNIFICANT CHANGE UP (ref 0–0)
NRBC BLD AUTO-RTO: 0 /100 WBCS — SIGNIFICANT CHANGE UP (ref 0–0)
NRBC BLD AUTO-RTO: 0 /100 WBCS — SIGNIFICANT CHANGE UP (ref 0–0)
PLATELET # BLD AUTO: 218 K/UL — SIGNIFICANT CHANGE UP (ref 150–400)
PLATELET # BLD AUTO: 265 K/UL — SIGNIFICANT CHANGE UP (ref 150–400)
PMV BLD: 11.5 FL — SIGNIFICANT CHANGE UP (ref 7–13)
PMV BLD: 12.1 FL — SIGNIFICANT CHANGE UP (ref 7–13)
POTASSIUM SERPL-MCNC: 3.3 MMOL/L — LOW (ref 3.5–5.3)
POTASSIUM SERPL-SCNC: 3.3 MMOL/L — LOW (ref 3.5–5.3)
PROT SERPL-MCNC: 4.8 G/DL — LOW (ref 6.6–8.7)
RBC # BLD: 4.02 M/UL — LOW (ref 4.2–5.8)
RBC # BLD: 4.2 M/UL — SIGNIFICANT CHANGE UP (ref 4.2–5.8)
RBC # FLD: 14.8 % — HIGH (ref 10.3–14.5)
RBC # FLD: 14.9 % — HIGH (ref 10.3–14.5)
SODIUM SERPL-SCNC: 138 MMOL/L — SIGNIFICANT CHANGE UP (ref 135–145)
WBC # BLD: 10.35 K/UL — SIGNIFICANT CHANGE UP (ref 3.8–10.5)
WBC # BLD: 9.08 K/UL — SIGNIFICANT CHANGE UP (ref 3.8–10.5)
WBC # FLD AUTO: 10.35 K/UL — SIGNIFICANT CHANGE UP (ref 3.8–10.5)
WBC # FLD AUTO: 9.08 K/UL — SIGNIFICANT CHANGE UP (ref 3.8–10.5)

## 2025-05-31 PROCEDURE — 74181 MRI ABDOMEN W/O CONTRAST: CPT | Mod: 26

## 2025-05-31 PROCEDURE — 99232 SBSQ HOSP IP/OBS MODERATE 35: CPT

## 2025-05-31 PROCEDURE — 99233 SBSQ HOSP IP/OBS HIGH 50: CPT

## 2025-05-31 RX ADMIN — Medication 40 MILLIGRAM(S): at 11:50

## 2025-05-31 RX ADMIN — Medication 1 APPLICATION(S): at 18:07

## 2025-05-31 RX ADMIN — Medication 1 APPLICATION(S): at 05:46

## 2025-05-31 RX ADMIN — MEROPENEM 100 MILLIGRAM(S): 1 INJECTION INTRAVENOUS at 05:45

## 2025-05-31 RX ADMIN — Medication 40 MILLIEQUIVALENT(S): at 13:16

## 2025-05-31 RX ADMIN — SACUBITRIL AND VALSARTAN 1 TABLET(S): 49; 51 TABLET, FILM COATED ORAL at 05:45

## 2025-05-31 RX ADMIN — INSULIN LISPRO 2: 100 INJECTION, SOLUTION INTRAVENOUS; SUBCUTANEOUS at 11:51

## 2025-05-31 RX ADMIN — CARVEDILOL 6.25 MILLIGRAM(S): 3.12 TABLET, FILM COATED ORAL at 05:45

## 2025-05-31 RX ADMIN — Medication 40 MILLIEQUIVALENT(S): at 11:50

## 2025-05-31 RX ADMIN — INSULIN LISPRO 1: 100 INJECTION, SOLUTION INTRAVENOUS; SUBCUTANEOUS at 08:25

## 2025-05-31 RX ADMIN — ENOXAPARIN SODIUM 80 MILLIGRAM(S): 100 INJECTION SUBCUTANEOUS at 05:45

## 2025-05-31 RX ADMIN — ENOXAPARIN SODIUM 80 MILLIGRAM(S): 100 INJECTION SUBCUTANEOUS at 17:18

## 2025-05-31 NOTE — PROVIDER CONTACT NOTE (OTHER) - SITUATION
Pt BP 88/46 manually, asymptomatic, no c/o dizziness or lightheadedness
new onset hematuria
HR down to 55 sustained, stopped precedex

## 2025-05-31 NOTE — PROGRESS NOTE ADULT - ASSESSMENT
74 yo male with hx of HTN, HLD, CAD, TIA, remote pericarditis in 2015, presented to the ED with complaints of abdominal pain, nausea, vomiting for since the day prior. Admitted with severe shock. GI consulted after elevated liver enzymes, elevated lipase.     #Elevated lipase  #Elevated liver enzymes  CT abd shows cholelithiasis, no evidence of cholecystitis normal liver, no evidence of pancreatitis.  US abd performed gall bladder stones with gall bladder wall thickening, pericholecystic fluid, trace perihepatic ascites. CBD measuring 8.5 mm.  No stone is seen in the visualized segment.  Distal segment of the common bile duct could not be visualized as it is obscured by bowel gas.  s/p Perc primitivo drain placed by IR 5/19  clinically patient is doing much better   -Trend liver enzymes, renal function, electrolytes, CBC  -Regular diet as tolerated  - MRCP this am> w/ choledocholithiasis   - will plan for tentative ERCP Monday, 6/2/25  -Appreciate  cardiac clearance   -Further care per primary team

## 2025-05-31 NOTE — PROGRESS NOTE ADULT - ASSESSMENT
74 y/o M w/ PMH of HTN, HLD, CAD, TIA, remote hx of pericarditis (2015) presented 5/18 to ED c/o abd pain, N/V.  In the ED pt was found to be hypotensive and failed fluid resuscitation and started on pressor support.  Pt also hypoxic and initially placed on BIPAP but eventually required intubation and admitted to MICU for septic shock 2/2 cholangitis, UTI and acute pancreatitis.  Pt also profoundly acidotic w/ ATN from septic shock, transaminitis from shock liver/cholangitis, lipase >3K, , LA>16, procal >100 and abd US w/ distended GB w/ stones and CT a/p w/ cholelithiasis but pancreas reported to be normal.  GI and surgery consulted but pt deemed too unstable to undergo any procedures and ultimately had IR guided primitivo tube placed 5/19 and dark malodorous bile drained and sent for cultures which grew ESBL ecoli, citrobacter freundii, Enterococcus gallinarum and enterococcus faecalis.  Bcx 5/18 gew ESBL ecoli, Citerobacter freundii and strep gallalyticus.  Ucx 5/18 grew ESBL ecoli.  Repeat Bcx 5/19 grew only ESBL Ecoli and BCxs 5/20 NGTD.  Sputum cx 5/24 growing mold like fungus on prelim report and refered to mycology.  ID consulted and pt currently on merrem.  Pt required quadruple pressor support and given methylene blue 5/19 for vasoplegia and was on course of solucortef 5/19-5/25 to help wean off pressors in setting of septic shock.   Hospital course complicated by SVT 5/19 that required emergent cardioversion.  Pt also found to have high ammonia and placed on rifaxamin and lactulose but no known hx of cirrhosis and have since been d/c'd.  Pt successfully weaned off pressors 5/23.  During hospital course pt also found to have new rEF likely stress cardiomyopathy and required dobutamine (5/20-5/23) and milrinone gtts (5/23-5/25).  Hospital course further complicated by AF RVR requiring emergent cardioversion 5/21 and was amio loaded but now in NSR and not on amio.  Full dose AC was held despite AF w/ chadsvasc 6 bc severe thrombocytopenia likely 2/2 sepsis.  Pt extubated 5/24 and OGT removed.  Pt failed SLP eval and family declined NGT for now.  Pt will go for MBS tomorrow 5/27 to better assess his ability to swallow.  Pt medically stable for transfer to medicine for continued management.      #Septic shock 2/2 cholangitis, UTI and bacteremia   - s/p quadruple pressor support, methylene blue for vasoplegia and was on course of solucortef to finally wean off pressors 5/23  - Was on solucortef 5/19-5/25 to help wean off pressors   - Pt has up trending WBC which likely from recent steroid course   - US w/ distended GB w/ stones and CT a/p w/ cholelithiasis but pancreas reported to be normal  - IR guided primitivo tube placed 5/19 and drained dark malodorous bilious fluid drained and sent for Cxs which grew ESBL ecoli, citrobacter freundii, Enterococcus gallinarum and enterococcus faecalis  - Bcx 5/18 gew ESBL ecoli, Citerobacter freundii and strep gallalyticus, Bcx 5/19 grew only ESBL Ecoli and BCxs 5/20 NGTD  - Urine cx 5/18 grew ESBL ecoli  - Sputum cx 5/24 prelim reporting mold like fungus and referred to mycology; f/u final results - Per ID - to defer treatment  - c/w merrem as per ID recs   - Trend CBC and monitor temperature   - will need surgery and GI f/u after improvement in symptoms for ercp +- cholecystectomy   - MRCP done this am: Biliary duct dilation. Filling defect in the common bile duct   measuring 11 mm, concerning for choledocholithiasis. Cholelithiasis. Mild gallbladder wall thickening and pericholecystic edema. Percutaneous cholecystostomy tube. Bilateral small pleural effusions. Trace ascites.  - GI planning for ERCP on Monday    #Acute hypoxic respiratory failure possibly 2/2 aspiration   - Intubated 5/18 and extubated 5/24 but continues requiring supplemental O2   - now on RA w good sats  - Sputum cx 5/24 growing mold like fungus on prelim report and referred to mycology; f/u final results (pending pending recs)  - Duonebs as needed   - Encourage incentive spirometer and OOB to chair     #Diarrhea (resolved)  - Rectal tube dc  - Started having watery diarrhea shortly after admission that was for Initially suspected to be due to tube feeds   - Has been off tube feeds for days and continues to have diarrhea     #hypernatremia (improved)  - Likely multifactorial from ongoing diarrhea   - Was also on short course of bumex gtt during MICU course   - Monitor I/O's and lytes closely     #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 shock   - Required dobutamine (5/20-5/23) and milronone gtts (5/23-5/25)  - TTE 5/20 w/ LVEF 30-35% and remained unchanged on repeat TTE 5/22  - Clinically hypovolemic at this time likely from ongoing diarrhea and is NPO  - Giving short course of gentle IVFs but monitor vol status closely   - will gradually start gdmt as bp tolerates  - cw coreg  - cw entresto  - S/P LHC and 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  - No signs of cellulitis   - c/w supportive care including RUE elevation     #Small area of Rt toe ischemia   - Likely 2/2 levophed vs vasoplegia   - Monitor for resolution     #ATN from septic shock  - Resolved   - Caution w/ hyperchloremia as can cause OLEKSANDR   - Monitor renal function    #Transaminitis from shock liver and cholangitis   - Trend LFTs and coags      #Normocytic anemia   #Thrombocytopenia, resolving, 2/2 sepsis   - H/H mildly low and stable   - Plts improving and >50K therefore starting full dose AC for AF   - Remains hemodynamically stable   - No active bleeding reported   - BUN elevated but likely 2/2 recent steroids and down trending since steroids stopped   - c/w protonix for GI ppx  - Monitor CBC and transfuse for Hb<8     #Hyperammonemia, resolved    - Suspect 2/2 infection w/ e.coli as it is a ureas producing organisms which hydrolyzes urea to ammonium and ultimately converts to ammonia    - Started on rifaxamin in ICU but now d/c'd as pt has no hx of cirrhosis    VTE ppx: lovenox     Dispo: medically active. Pending ERCP +/- Surgery re-eval    Spoke with daughter at bedside

## 2025-05-31 NOTE — PROVIDER CONTACT NOTE (OTHER) - ASSESSMENT
pt in NAD. pt denies dysuria, itchiness or discomfort when urinating. no clots in urine. BP 92/60 manually, HR 86. pt on 80mg Lovenox.

## 2025-05-31 NOTE — PROGRESS NOTE ADULT - SUBJECTIVE AND OBJECTIVE BOX
Patient is a 75y old  Male who presents with a chief complaint of Acute respiratory failure, shock (30 May 2025 15:03)      HPI:  74 yo male with hx of HTN, HLD, CAD, TIA, remote pericarditis in 2015, presented to the ED with complaints of abdominal pain, nausea, vomiting for since the day prior.  Upon arrival to the ED patient was lethargic and tachypneic, with abdominal distention and mottled skin.    He was noted to be hypotensive despite fluid resuscitation and placed on escalating doses of pressors.    Started on Bipap initially for tachypnea and metabolic acidosis, eventually intubated in the ED.  Labs revealed severe metabolic acidosis. OLEKSANDR, transaminitis      (18 May 2025 23:45)      REVIEW OF SYSTEMS:  Constitutional: No fever, weight loss or fatigue  ENMT:  No difficulty hearing, tinnitus, vertigo; No sinus or throat pain  Respiratory: No cough, wheezing, chills or hemoptysis  Cardiovascular: No chest pain, palpitations, dizziness or leg swelling  Gastrointestinal: No abdominal or epigastric pain. No nausea, vomiting or hematemesis;   No diarrhea or constipation. No melena or hematochezia.  Skin: No itching, burning, rashes or lesions   Musculoskeletal: No joint pain or swelling; No muscle, back or extremity pain    PAST MEDICAL & SURGICAL HISTORY:  AMI (Acute Myocardial Infarction)  2001      Transient ischemic attack (TIA)      No Past Surgical History          FAMILY HISTORY:  FH: CAD (coronary artery disease)          MEDICATIONS:  MEDICATIONS  (STANDING):  carvedilol 6.25 milliGRAM(s) Oral every 12 hours  chlorhexidine 2% Cloths 1 Application(s) Topical <User Schedule>  chlorhexidine 2% Cloths 1 Application(s) Topical <User Schedule>  dextrose 5% + sodium chloride 0.45%. 1000 milliLiter(s) (75 mL/Hr) IV Continuous <Continuous>  dextrose 50% Injectable 25 Gram(s) IV Push once  dextrose 50% Injectable 12.5 Gram(s) IV Push once  dextrose 50% Injectable 25 Gram(s) IV Push once  enoxaparin Injectable 80 milliGRAM(s) SubCutaneous every 12 hours  insulin lispro (ADMELOG) corrective regimen sliding scale   SubCutaneous three times a day before meals  pantoprazole  Injectable 40 milliGRAM(s) IV Push daily  potassium chloride    Tablet ER 40 milliEquivalent(s) Oral every 4 hours  sacubitril 24 mG/valsartan 26 mG 1 Tablet(s) Oral two times a day  sodium chloride 0.9%. 1000 milliLiter(s) (75 mL/Hr) IV Continuous <Continuous>    MEDICATIONS  (PRN):  bacitracin   Ointment 1 Application(s) Topical two times a day PRN wound care  morphine  - Injectable 2 milliGRAM(s) IV Push every 6 hours PRN Severe Pain (7 - 10)  morphine  - Injectable 1 milliGRAM(s) IV Push every 6 hours PRN Moderate Pain (4 - 6)      Allergies    No Known Drug Allergies  SHRIMP (Unknown)  iv dye (Unknown)    Intolerances        Vital Signs Last 24 Hrs  T(C): 36.4 (31 May 2025 11:10), Max: 36.8 (30 May 2025 20:44)  T(F): 97.5 (31 May 2025 11:10), Max: 98.3 (30 May 2025 23:21)  HR: 76 (31 May 2025 11:10) (72 - 95)  BP: 100/63 (31 May 2025 11:10) (100/61 - 133/86)  BP(mean): --  RR: 19 (31 May 2025 11:10) (17 - 19)  SpO2: 94% (31 May 2025 11:10) (94% - 98%)    Parameters below as of 31 May 2025 11:10  Patient On (Oxygen Delivery Method): room air        05-30 @ 07:01  -  05-31 @ 07:00  --------------------------------------------------------  IN: 458 mL / OUT: 901 mL / NET: -443 mL          PHYSICAL EXAM:    Constitutional: No acute distress.   Neuro: Awake alert, oriented  HEENT: anicteric sclerae  CV: regular rate, regular rhythm  Pulm/chest: lung sounds diminished bilaterally,   Abd: soft, nontender, nondistended +bowel sounds.   No rigidity, rebound tenderness, or guarding  Rectal exam: light brown stool. Hemorrhoids  Ext: no  edema  Skin: warm, no jaundice   Psych: calm, cooperative          LABS:                        11.5   9.08  )-----------( 218      ( 31 May 2025 06:28 )             35.0     31 May 2025 06:28    138    |  106    |  16.1   ----------------------------<  146    3.3     |  22.0   |  0.52     Ca    6.8        31 May 2025 06:28    TPro  4.8    /  Alb  2.3    /  TBili  1.5    /  DBili  0.5    /  AST  34     /  ALT  42     /  AlkPhos  285    / Amylase x      /Lipase x      31 May 2025 06:28              RADIOLOGY & ADDITIONAL STUDIES:    Patient is a 75y old  Male who presents with a chief complaint of Acute respiratory failure, shock (30 May 2025 15:03)      HPI:  76 yo male with hx of HTN, HLD, CAD, TIA, remote pericarditis in 2015, presented to the ED with complaints of abdominal pain, nausea, vomiting for since the day prior.  Upon arrival to the ED patient was lethargic and tachypneic, with abdominal distention and mottled skin.    He was noted to be hypotensive despite fluid resuscitation and placed on escalating doses of pressors.    Started on Bipap initially for tachypnea and metabolic acidosis, eventually intubated in the ED.  Labs revealed severe metabolic acidosis. OLEKSANDR, transaminitis       REVIEW OF SYSTEMS:   General: Negative  HEENT: Negative  CV: Negative  Respiratory: Negative  GI: See HPI  : Negative  MSK: Negative  Hematologic: Negative  Skin: Negative    PAST MEDICAL & SURGICAL HISTORY:  AMI (Acute Myocardial Infarction)  2001      Transient ischemic attack (TIA)      No Past Surgical History          FAMILY HISTORY:  FH: CAD (coronary artery disease)          MEDICATIONS:  MEDICATIONS  (STANDING):  carvedilol 6.25 milliGRAM(s) Oral every 12 hours  chlorhexidine 2% Cloths 1 Application(s) Topical <User Schedule>  chlorhexidine 2% Cloths 1 Application(s) Topical <User Schedule>  dextrose 5% + sodium chloride 0.45%. 1000 milliLiter(s) (75 mL/Hr) IV Continuous <Continuous>  dextrose 50% Injectable 25 Gram(s) IV Push once  dextrose 50% Injectable 12.5 Gram(s) IV Push once  dextrose 50% Injectable 25 Gram(s) IV Push once  enoxaparin Injectable 80 milliGRAM(s) SubCutaneous every 12 hours  insulin lispro (ADMELOG) corrective regimen sliding scale   SubCutaneous three times a day before meals  pantoprazole  Injectable 40 milliGRAM(s) IV Push daily  potassium chloride    Tablet ER 40 milliEquivalent(s) Oral every 4 hours  sacubitril 24 mG/valsartan 26 mG 1 Tablet(s) Oral two times a day  sodium chloride 0.9%. 1000 milliLiter(s) (75 mL/Hr) IV Continuous <Continuous>    MEDICATIONS  (PRN):  bacitracin   Ointment 1 Application(s) Topical two times a day PRN wound care  morphine  - Injectable 2 milliGRAM(s) IV Push every 6 hours PRN Severe Pain (7 - 10)  morphine  - Injectable 1 milliGRAM(s) IV Push every 6 hours PRN Moderate Pain (4 - 6)      Allergies    No Known Drug Allergies  SHRIMP (Unknown)  iv dye (Unknown)    Intolerances        Vital Signs Last 24 Hrs  T(C): 36.4 (31 May 2025 11:10), Max: 36.8 (30 May 2025 20:44)  T(F): 97.5 (31 May 2025 11:10), Max: 98.3 (30 May 2025 23:21)  HR: 76 (31 May 2025 11:10) (72 - 95)  BP: 100/63 (31 May 2025 11:10) (100/61 - 133/86)  BP(mean): --  RR: 19 (31 May 2025 11:10) (17 - 19)  SpO2: 94% (31 May 2025 11:10) (94% - 98%)    Parameters below as of 31 May 2025 11:10  Patient On (Oxygen Delivery Method): room air        05-30 @ 07:01  -  05-31 @ 07:00  --------------------------------------------------------  IN: 458 mL / OUT: 901 mL / NET: -443 mL          PHYSICAL EXAM:    GENERAL:  No acute distress  HEENT:  NC/AT, conjunctiva clear, sclera anicteric  ABDOMEN:  Soft, non-tender, non-distended, normoactive bowel sounds,   no rebound or guarding,   +IR drain   EXTREMITIES: No edema  SKIN:  Warm, dry  NEURO:  Calm, cooperative          LABS:                        11.5   9.08  )-----------( 218      ( 31 May 2025 06:28 )             35.0     31 May 2025 06:28    138    |  106    |  16.1   ----------------------------<  146    3.3     |  22.0   |  0.52     Ca    6.8        31 May 2025 06:28    TPro  4.8    /  Alb  2.3    /  TBili  1.5    /  DBili  0.5    /  AST  34     /  ALT  42     /  AlkPhos  285    / Amylase x      /Lipase x      31 May 2025 06:28              RADIOLOGY & ADDITIONAL STUDIES:     < from: MR MRCP No Cont (05.31.25 @ 09:54) >  ACC: [id number] EXAM:  MR MRCP   ORDERED BY: LUCINDA ROBISON     PROCEDURE DATE:  05/31/2025          INTERPRETATION:  CLINICAL INFORMATION: Elevated liver function tests.   Evaluate for choledocholithiasis.    COMPARISON: Right upper quadrant ultrasound 5/19/2025. CT abdomen and   pelvis 5/18/2025.    CONTRAST/COMPLICATIONS:  IV Contrast: NONE  Oral Contrast: NONE    PROCEDURE:  MRI of the abdomen was performed.  MRCP was performed.    FINDINGS:  Sequences are motion degraded.    LOWER CHEST: Bilateral small pleural effusions and bibasilar atelectasis.    LIVER: Within normal limits.  BILE DUCTS: Extrahepatic and mild intrahepatic biliary duct dilation, the   common bile duct measuring up to 11 mm in caliber. There is a T2   hypointense fillingdefect in the distal common bile duct measuring 10   mm, concerning for choledocholithiasis.  GALLBLADDER: Cholelithiasis. Mild wall thickening and pericholecystic   edema. Percutaneous cholecystostomy tube.  SPLEEN: Within normal limits.  PANCREAS: Within normal limits.  ADRENALS: Within normal limits.  KIDNEYS/URETERS: No hydronephrosis. There is a 1.5 cm T2 hyperintense   lesion in the lower pole of the left kidney, probably representing a cyst.    VISUALIZED PORTIONS:  BOWEL: Within normal limits.  PERITONEUM: Trace ascites.  VESSELS: Within normal limits.  RETROPERITONEUM/LYMPH NODES: No lymphadenopathy.  ABDOMINAL WALL: Small fat-containing umbilical hernia.  BONES: Within normal limits.    IMPRESSION:  *  Biliary duct dilation. Fillingdefect in the common bile duct   measuring 11 mm, concerning for choledocholithiasis.  *  Cholelithiasis. Mild gallbladder wall thickening and pericholecystic   edema. Percutaneous cholecystostomy tube.  *  Bilateral small pleural effusions.  *  Trace ascites.    < end of copied text >

## 2025-05-31 NOTE — CHART NOTE - NSCHARTNOTEFT_GEN_A_CORE
Pt noted to have hematuria  Pt on Full AC  about 200cc in urinal  No clots  Pt denies any dysuria or difficulty urinating  Visitor at bedside  VSS B/P 92/60(71) P 86 R 18 PO 95% T afebrile  NAD  Pt sitting in chair eating Dinner  CBC  Continue to monitor Pt noted to have hematuria  Pt on Full AC  about 200cc in urinal  No clots  Pt denies any dysuria or difficulty urinating  Visitor at bedside  VSS B/P 92/60(71) P 86 R 18 PO 95% T afebrile  NAD  Pt sitting in chair eating Dinner  CBC H/H 12.1/37  Continue to monitor Pt noted to have hematuria  Pt on Full AC  about 200cc in urinal  No clots  Pt denies any dysuria or difficulty urinating  Visitor at bedside  VSS B/P 92/60(71) P 86 R 18 PO 95% T afebrile  NAD  Pt sitting in chair eating Dinner  CBC H/H 12.1/37  UA  Continue to monitor

## 2025-05-31 NOTE — PROVIDER CONTACT NOTE (OTHER) - BACKGROUND
pt came in for septic shock 2/2 UTI
75 YOM presented to the ED with complaints of abdominal pain, nausea, vomiting, resp failure shock

## 2025-05-31 NOTE — PROGRESS NOTE ADULT - SUBJECTIVE AND OBJECTIVE BOX
DOMINIC ALAMO  [id number]      Chief Complaint:   follow up sepsis/PAF/ CMP    Subjective:  Patient offers no c/o.    24 hour Tele:   No events          bacitracin   Ointment 1 Application(s) Topical two times a day PRN  carvedilol 6.25 milliGRAM(s) Oral every 12 hours  chlorhexidine 2% Cloths 1 Application(s) Topical <User Schedule>  chlorhexidine 2% Cloths 1 Application(s) Topical <User Schedule>  dextrose 5% + sodium chloride 0.45%. 1000 milliLiter(s) IV Continuous <Continuous>  dextrose 50% Injectable 25 Gram(s) IV Push once  dextrose 50% Injectable 12.5 Gram(s) IV Push once  dextrose 50% Injectable 25 Gram(s) IV Push once  enoxaparin Injectable 80 milliGRAM(s) SubCutaneous every 12 hours  insulin lispro (ADMELOG) corrective regimen sliding scale   SubCutaneous three times a day before meals  morphine  - Injectable 2 milliGRAM(s) IV Push every 6 hours PRN  morphine  - Injectable 1 milliGRAM(s) IV Push every 6 hours PRN  pantoprazole  Injectable 40 milliGRAM(s) IV Push daily  potassium chloride    Tablet ER 40 milliEquivalent(s) Oral every 4 hours  sacubitril 24 mG/valsartan 26 mG 1 Tablet(s) Oral two times a day  sodium chloride 0.9%. 1000 milliLiter(s) IV Continuous <Continuous>          Physical Exam:  T(C): 36.4 (25 @ 04:57), Max: 36.8 (25 @ 20:44)  HR: 72 (25 @ 04:57) (65 - 95)  BP: 112/71 (25 @ 04:57) (100/61 - 133/86)  RR: 19 (25 @ 04:57) (17 - 19)  SpO2: 97% (25 @ 04:57) (94% - 98%)  General: Comfortable in NAD  Neck: No JVD  CVS: nl s1s2, no s3  Pulm: CTA b/l  Abd: soft, non-tender  Ext: No c/c/e  Neuro A&O x3  Psych: Normal affect        Labs:   31 May 2025 06:28    138    |  106    |  16.1   ----------------------------<  146    3.3     |  22.0   |  0.52     Ca    6.8        31 May 2025 06:28    TPro  4.8    /  Alb  2.3    /  TBili  1.5    /  DBili  0.5    /  AST  34     /  ALT  42     /  AlkPhos  285    31 May 2025 06:28                          11.5   9.08  )-----------( 218      ( 31 May 2025 06:28 )             35.0                 Echo: 2025:    1. Left ventricular systolic function is severely decreased with an ejection fraction visually estimated at 30 to 35 %. Global left ventricular hypokinesis.   2. Enlarged right ventricular cavity size and moderately reduced right ventricular systolic function.   3. Left atrium is normal in size.   4. There is mild calcification of the mitral valve annulus.   5. Moderate to severe mitral regurgitation.   6. Moderate tricuspid regurgitation.   7. Compared to the transthoracic echocardiogram performed on 2025, the LV and RV are now better visualized.    Echo 2025:   1. Technically difficult image quality.   2. Left ventricular systolic function is moderately decreased with an ejection fraction visually estimated at 30 to 35 %. Global left ventricular hypokinesis.   3. There is no evidence of a left ventricular thrombus.   4. Enlarged right ventricular cavity size and moderately reduced right ventricular systolic function.   5. No pericardial effusion seen.   6. Compared to the transthoracic echocardiogram performed on 2025, there have been no significant interval changes.      CXR:  Endotracheal tube tip 5 cm above the gayla. Enteric tube tip below the   edge of the image, and rightIJ catheter tip in the right atrium. No   pneumothorax. No focal consolidation or pleural effusion.    CTA C/A/P:  No pulmonary embolus.  Airspace consolidations at the lung bases may represent atelectasis   and/or pneumonia.  No bowel obstruction.  Cholelithiasis.  Trace ascites.    Cath:  LM: Luminal irregularities    LAD: Prox  with left to left collaterals    LCX: Mild diffuse disease, OM with 40% mid stenosis    RCA: Severe calcium from prox to mid with mild disease, distal RCA with long segment severe 90% stenosis into the PDA.  LV EDP 7       Assessment:  76 yo male PMHx HTN, HLD, CAD based on abnormal nuke, TIA, remote pericarditis in  presented to the ED with complaints of abdominal pain, nausea, vomiting for since the day prior.  Upon arrival to the ED patient was lethargic and tachypneic, with abdominal distention and mottled skin.  He was noted to be hypotensive despite fluid resuscitation and placed on escalating doses of pressors.  He was started on BIPAP initially for tachypnea and metabolic acidosis, eventually intubated in the ED.  Patient remains intubated and history taken from chart and d/w RN.  Patient was on escalating doses of pressors and , but in last day has been weaned down.  Echo with severe LV dysfxn which appears new and likely stress myopathy.  CI on low end of normal on .  Likely sepsis from GNR septicemia from cholangitis as cause now s/p perc tube.  Noted AF with some RVR due to shock, , etc.  -DCCV for rapid AF while in MICU, reviewed tele and no VT.  -Patient extubated and doing well.  Now off pressors/ inotropic support with normal CO.  -Still with runs of Afib, at time NSVT-Remains in SR now  -Repeated echo with persistent drop on LVEF  -[facility] with severe RCA dz and LAD , CABG recommended.  Plan to be done as OP when GB issues resolved.  -Per GI plan now for MRCP and then possible ERCP and then re-evaluation.    Plan:  1. Continue carvedilol and entresto.  Will add MRA as OP.  Eventual SGLT2i after GB issues resolved.  2. AC with lovenox, transition to DOAC if no plans for surgical intervention.  F/u surgery and GI.  3. Rest as per primary team and other consultants.   4. Patient high risk for ERCP but no clear CV contraindication.  Same for cholecystectomy if planned.  Ultimately needs to resolved prior to CABG.  5. No objection to d/c from CV perspective.  CABG to be as OP.  OP f/u with CTS.

## 2025-05-31 NOTE — PROGRESS NOTE ADULT - NS ATTEND AMEND GEN_ALL_CORE FT
Patient with history of acute cholecystitis and now with concern with possible choledocholithiasis with elevated LFTs.  Also has history of coronary artery disease.  Will recommend to obtain MRCP.  If the MRCP revealed choledocholithiasis, will plan for ERCP based on MRCP findings.  Most likely will need cardiology clearance.  Will follow-up.
75-year-old gentleman with a past medical history of CAD, TIA presenting with nausea vomiting abdominal pain.  GI consulted for abdominal liver labs.  Imaging reviewed and MRCP notable for choledocholithiasis.   Bilirubin normal, liver labs downtrending.  Patient without leukocytosis, fevers today so less concern for ascending cholangitis.  Considering above we will plan for ERCP early next week, timing to follow.

## 2025-05-31 NOTE — PROGRESS NOTE ADULT - SUBJECTIVE AND OBJECTIVE BOX
[facility] Division of Hospital Medicine    INTERVAL HISTORY:  Overnight, no acute events.     Patient seen and examined at bedside this morning. Daughter at bedside. Patient denies chest pain, SOB, abd pain, N/V, fever, chills, dysuria or any other complaints.    MEDICATIONS  (STANDING):  carvedilol 6.25 milliGRAM(s) Oral every 12 hours  chlorhexidine 2% Cloths 1 Application(s) Topical <User Schedule>  chlorhexidine 2% Cloths 1 Application(s) Topical <User Schedule>  dextrose 5% + sodium chloride 0.45%. 1000 milliLiter(s) (75 mL/Hr) IV Continuous <Continuous>  dextrose 50% Injectable 25 Gram(s) IV Push once  dextrose 50% Injectable 12.5 Gram(s) IV Push once  dextrose 50% Injectable 25 Gram(s) IV Push once  enoxaparin Injectable 80 milliGRAM(s) SubCutaneous every 12 hours  insulin lispro (ADMELOG) corrective regimen sliding scale   SubCutaneous three times a day before meals  pantoprazole  Injectable 40 milliGRAM(s) IV Push daily  sacubitril 24 mG/valsartan 26 mG 1 Tablet(s) Oral two times a day  sodium chloride 0.9%. 1000 milliLiter(s) (75 mL/Hr) IV Continuous <Continuous>    MEDICATIONS  (PRN):  bacitracin   Ointment 1 Application(s) Topical two times a day PRN wound care  morphine  - Injectable 2 milliGRAM(s) IV Push every 6 hours PRN Severe Pain (7 - 10)  morphine  - Injectable 1 milliGRAM(s) IV Push every 6 hours PRN Moderate Pain (4 - 6)        I&O's Summary    30 May 2025 07:01  -  31 May 2025 07:00  --------------------------------------------------------  IN: 458 mL / OUT: 901 mL / NET: -443 mL        PHYSICAL EXAM:  Vital Signs Last 24 Hrs  T(C): 36.8 (31 May 2025 16:56), Max: 36.8 (30 May 2025 20:44)  T(F): 98.3 (31 May 2025 16:56), Max: 98.3 (30 May 2025 23:21)  HR: 82 (31 May 2025 16:56) (72 - 82)  BP: 100/66 (31 May 2025 16:56) (100/61 - 112/71)  BP(mean): --  RR: 19 (31 May 2025 16:56) (17 - 19)  SpO2: 98% (31 May 2025 16:56) (94% - 98%)    Parameters below as of 31 May 2025 16:56  Patient On (Oxygen Delivery Method): room air          CONSTITUTIONAL: No apparent distress  HEENT: Normocephalic, Atraumatic  RESPIRATORY:  lungs are clear to auscultation bilaterally, No crackles, rhonchi, wheezes  CARDIOVASCULAR: Regular rate and rhythm, no lower extremity edema  ABDOMEN: Soft, non-distended, nontender to palpation, +BS  PSYCH: thoughts linear, affect appropriate  NEUROLOGY: Alert, Oriented x3    LABS:                        11.5   9.08  )-----------( 218      ( 31 May 2025 06:28 )             35.0     05-31    138  |  106  |  16.1  ----------------------------<  146[H]  3.3[L]   |  22.0  |  0.52    Ca    6.8[L]      31 May 2025 06:28    TPro  4.8[L]  /  Alb  2.3[L]  /  TBili  1.5  /  DBili  0.5[H]  /  AST  34  /  ALT  42[H]  /  AlkPhos  285[H]  05-31          Urinalysis Basic - ( 31 May 2025 06:28 )    Color: x / Appearance: x / SG: x / pH: x  Gluc: 146 mg/dL / Ketone: x  / Bili: x / Urobili: x   Blood: x / Protein: x / Nitrite: x   Leuk Esterase: x / RBC: x / WBC x   Sq Epi: x / Non Sq Epi: x / Bacteria: x        CAPILLARY BLOOD GLUCOSE      POCT Blood Glucose.: 115 mg/dL (31 May 2025 17:16)  POCT Blood Glucose.: 202 mg/dL (31 May 2025 11:48)  POCT Blood Glucose.: 152 mg/dL (31 May 2025 08:02)  POCT Blood Glucose.: 172 mg/dL (30 May 2025 22:01)        RADIOLOGY & ADDITIONAL TESTS:  Results Reviewed

## 2025-06-01 LAB
ALBUMIN SERPL ELPH-MCNC: 2.5 G/DL — LOW (ref 3.3–5.2)
ALP SERPL-CCNC: 257 U/L — HIGH (ref 40–120)
ALT FLD-CCNC: 35 U/L — SIGNIFICANT CHANGE UP
ANION GAP SERPL CALC-SCNC: 14 MMOL/L — SIGNIFICANT CHANGE UP (ref 5–17)
APPEARANCE UR: CLEAR — SIGNIFICANT CHANGE UP
APTT BLD: 37.8 SEC — HIGH (ref 26.1–36.8)
AST SERPL-CCNC: 27 U/L — SIGNIFICANT CHANGE UP
BACTERIA # UR AUTO: NEGATIVE /HPF — SIGNIFICANT CHANGE UP
BILIRUB SERPL-MCNC: 1.3 MG/DL — SIGNIFICANT CHANGE UP (ref 0.4–2)
BILIRUB UR-MCNC: NEGATIVE — SIGNIFICANT CHANGE UP
BLD GP AB SCN SERPL QL: SIGNIFICANT CHANGE UP
BUN SERPL-MCNC: 15.2 MG/DL — SIGNIFICANT CHANGE UP (ref 8–20)
CALCIUM SERPL-MCNC: 7.6 MG/DL — LOW (ref 8.4–10.5)
CAST: 0 /LPF — SIGNIFICANT CHANGE UP (ref 0–4)
CHLORIDE SERPL-SCNC: 104 MMOL/L — SIGNIFICANT CHANGE UP (ref 96–108)
CO2 SERPL-SCNC: 21 MMOL/L — LOW (ref 22–29)
COLOR SPEC: YELLOW — SIGNIFICANT CHANGE UP
CREAT SERPL-MCNC: 0.55 MG/DL — SIGNIFICANT CHANGE UP (ref 0.5–1.3)
DIFF PNL FLD: ABNORMAL
EGFR: 103 ML/MIN/1.73M2 — SIGNIFICANT CHANGE UP
EGFR: 103 ML/MIN/1.73M2 — SIGNIFICANT CHANGE UP
GLUCOSE BLDC GLUCOMTR-MCNC: 162 MG/DL — HIGH (ref 70–99)
GLUCOSE BLDC GLUCOMTR-MCNC: 162 MG/DL — HIGH (ref 70–99)
GLUCOSE BLDC GLUCOMTR-MCNC: 168 MG/DL — HIGH (ref 70–99)
GLUCOSE SERPL-MCNC: 153 MG/DL — HIGH (ref 70–99)
GLUCOSE UR QL: 100 MG/DL
HCT VFR BLD CALC: 33.9 % — LOW (ref 39–50)
HGB BLD-MCNC: 11.3 G/DL — LOW (ref 13–17)
INR BLD: 1.57 RATIO — HIGH (ref 0.85–1.16)
KETONES UR QL: NEGATIVE MG/DL — SIGNIFICANT CHANGE UP
LEUKOCYTE ESTERASE UR-ACNC: NEGATIVE — SIGNIFICANT CHANGE UP
MAGNESIUM SERPL-MCNC: 1.2 MG/DL — LOW (ref 1.6–2.6)
MCHC RBC-ENTMCNC: 28.9 PG — SIGNIFICANT CHANGE UP (ref 27–34)
MCHC RBC-ENTMCNC: 33.3 G/DL — SIGNIFICANT CHANGE UP (ref 32–36)
MCV RBC AUTO: 86.7 FL — SIGNIFICANT CHANGE UP (ref 80–100)
NITRITE UR-MCNC: NEGATIVE — SIGNIFICANT CHANGE UP
NRBC # BLD AUTO: 0 K/UL — SIGNIFICANT CHANGE UP (ref 0–0)
NRBC # FLD: 0 K/UL — SIGNIFICANT CHANGE UP (ref 0–0)
NRBC BLD AUTO-RTO: 0 /100 WBCS — SIGNIFICANT CHANGE UP (ref 0–0)
PH UR: 6.5 — SIGNIFICANT CHANGE UP (ref 5–8)
PHOSPHATE SERPL-MCNC: 2.3 MG/DL — LOW (ref 2.4–4.7)
PLATELET # BLD AUTO: 236 K/UL — SIGNIFICANT CHANGE UP (ref 150–400)
PMV BLD: 11.8 FL — SIGNIFICANT CHANGE UP (ref 7–13)
POTASSIUM SERPL-MCNC: 4.1 MMOL/L — SIGNIFICANT CHANGE UP (ref 3.5–5.3)
POTASSIUM SERPL-SCNC: 4.1 MMOL/L — SIGNIFICANT CHANGE UP (ref 3.5–5.3)
PROT SERPL-MCNC: 5.3 G/DL — LOW (ref 6.6–8.7)
PROT UR-MCNC: SIGNIFICANT CHANGE UP MG/DL
PROTHROM AB SERPL-ACNC: 17.7 SEC — HIGH (ref 9.9–13.4)
RBC # BLD: 3.91 M/UL — LOW (ref 4.2–5.8)
RBC # FLD: 14.7 % — HIGH (ref 10.3–14.5)
RBC CASTS # UR COMP ASSIST: 12 /HPF — HIGH (ref 0–4)
SODIUM SERPL-SCNC: 138 MMOL/L — SIGNIFICANT CHANGE UP (ref 135–145)
SP GR SPEC: 1.01 — SIGNIFICANT CHANGE UP (ref 1–1.03)
SQUAMOUS # UR AUTO: 1 /HPF — SIGNIFICANT CHANGE UP (ref 0–5)
UROBILINOGEN FLD QL: 1 MG/DL — SIGNIFICANT CHANGE UP (ref 0.2–1)
WBC # BLD: 9.3 K/UL — SIGNIFICANT CHANGE UP (ref 3.8–10.5)
WBC # FLD AUTO: 9.3 K/UL — SIGNIFICANT CHANGE UP (ref 3.8–10.5)
WBC UR QL: 1 /HPF — SIGNIFICANT CHANGE UP (ref 0–5)

## 2025-06-01 PROCEDURE — 99232 SBSQ HOSP IP/OBS MODERATE 35: CPT

## 2025-06-01 PROCEDURE — 99231 SBSQ HOSP IP/OBS SF/LOW 25: CPT

## 2025-06-01 PROCEDURE — 99233 SBSQ HOSP IP/OBS HIGH 50: CPT

## 2025-06-01 RX ORDER — SODIUM PHOSPHATE,DIBASIC DIHYD
15 POWDER (GRAM) MISCELLANEOUS ONCE
Refills: 0 | Status: COMPLETED | OUTPATIENT
Start: 2025-06-01 | End: 2025-06-01

## 2025-06-01 RX ORDER — MEROPENEM 1 G/30ML
1000 INJECTION INTRAVENOUS EVERY 8 HOURS
Refills: 0 | Status: DISCONTINUED | OUTPATIENT
Start: 2025-06-01 | End: 2025-06-05

## 2025-06-01 RX ORDER — MAGNESIUM SULFATE 500 MG/ML
2 SYRINGE (ML) INJECTION ONCE
Refills: 0 | Status: COMPLETED | OUTPATIENT
Start: 2025-06-01 | End: 2025-06-01

## 2025-06-01 RX ADMIN — Medication 1 APPLICATION(S): at 05:29

## 2025-06-01 RX ADMIN — Medication 63.75 MILLIMOLE(S): at 13:00

## 2025-06-01 RX ADMIN — INSULIN LISPRO 1: 100 INJECTION, SOLUTION INTRAVENOUS; SUBCUTANEOUS at 13:00

## 2025-06-01 RX ADMIN — MEROPENEM 100 MILLIGRAM(S): 1 INJECTION INTRAVENOUS at 22:45

## 2025-06-01 RX ADMIN — Medication 40 MILLIGRAM(S): at 13:01

## 2025-06-01 RX ADMIN — SACUBITRIL AND VALSARTAN 1 TABLET(S): 49; 51 TABLET, FILM COATED ORAL at 05:28

## 2025-06-01 RX ADMIN — INSULIN LISPRO 1: 100 INJECTION, SOLUTION INTRAVENOUS; SUBCUTANEOUS at 08:54

## 2025-06-01 RX ADMIN — Medication 25 GRAM(S): at 09:49

## 2025-06-01 RX ADMIN — ENOXAPARIN SODIUM 80 MILLIGRAM(S): 100 INJECTION SUBCUTANEOUS at 05:29

## 2025-06-01 RX ADMIN — INSULIN LISPRO 1: 100 INJECTION, SOLUTION INTRAVENOUS; SUBCUTANEOUS at 17:36

## 2025-06-01 RX ADMIN — ENOXAPARIN SODIUM 80 MILLIGRAM(S): 100 INJECTION SUBCUTANEOUS at 18:07

## 2025-06-01 RX ADMIN — CARVEDILOL 6.25 MILLIGRAM(S): 3.12 TABLET, FILM COATED ORAL at 05:29

## 2025-06-01 NOTE — PROGRESS NOTE ADULT - SUBJECTIVE AND OBJECTIVE BOX
DOMINIC ALAMO  [id number]      Acute respiratory failure with hypoxia    Intubate    No pertinent family history in first degree relatives    FH: CAD (coronary artery disease)    Handoff    MEWS Score    AMI (Acute Myocardial Infarction)    Hyperlipidemia    Transient ischemic attack (TIA)    Left heart cardiac cath    Acute respiratory failure with hypoxia    CAD (coronary artery disease)    Acute HFrEF (heart failure with reduced ejection fraction)    Bacteremia    ESBL (extended spectrum beta-lactamase) producing bacteria infection    Moderate to severe mitral regurgitation    No Past Surgical History    VOMITING , DIARRHEA    90+    [email or web address]        Chief Complaint:   follow up sepsis/PAF/ CMP    Subjective:  Patient offers no c/o.    24 hour Tele:   No events      bacitracin   Ointment 1 Application(s) Topical two times a day PRN  carvedilol 6.25 milliGRAM(s) Oral every 12 hours  chlorhexidine 2% Cloths 1 Application(s) Topical <User Schedule>  chlorhexidine 2% Cloths 1 Application(s) Topical <User Schedule>  dextrose 5% + sodium chloride 0.45%. 1000 milliLiter(s) IV Continuous <Continuous>  dextrose 50% Injectable 25 Gram(s) IV Push once  dextrose 50% Injectable 12.5 Gram(s) IV Push once  dextrose 50% Injectable 25 Gram(s) IV Push once  enoxaparin Injectable 80 milliGRAM(s) SubCutaneous every 12 hours  insulin lispro (ADMELOG) corrective regimen sliding scale   SubCutaneous three times a day before meals  morphine  - Injectable 2 milliGRAM(s) IV Push every 6 hours PRN  morphine  - Injectable 1 milliGRAM(s) IV Push every 6 hours PRN  pantoprazole  Injectable 40 milliGRAM(s) IV Push daily  sacubitril 24 mG/valsartan 26 mG 1 Tablet(s) Oral two times a day  sodium chloride 0.9%. 1000 milliLiter(s) IV Continuous <Continuous>          Physical Exam:  T(C): 36.7 (25 @ 11:00), Max: 37.7 (25 @ 00:00)  HR: 80 (25 @ 11:00) (77 - 86)  BP: 103/62 (25 @ 11:00) (92/60 - 116/65)  RR: 18 (25 @ 11:00) (18 - 19)  SpO2: 93% (06-01-25 @ 11:00) (92% - 98%)  Wt(kg): --  General: Comfortable in NAD  Neck: No JVD  CVS: nl s1s2, no s3  Pulm: CTA b/l  Abd: soft, non-tender  Ext: No c/c/e  Neuro A&O x3  Psych: Normal affect    I&O's Summary    31 May 2025 07:01  -  2025 07:00  --------------------------------------------------------  IN: 1360 mL / OUT: 810 mL / NET: 550 mL          Labs:   2025 07:29    138    |  104    |  15.2   ----------------------------<  153    4.1     |  21.0   |  0.55     Ca    7.6        2025 07:29  Phos  2.3       2025 07:29  Mg     1.2       2025 07:29    TPro  5.3    /  Alb  2.5    /  TBili  1.3    /  DBili  x      /  AST  27     /  ALT  35     /  AlkPhos  257    2025 07:29                          11.3   9.30  )-----------( 236      ( 2025 07:29 )             33.9     PT/INR - ( 2025 07:29 )   PT: 17.7 sec;   INR: 1.57 ratio         PTT - ( 2025 07:29 )  PTT:37.8 sec        Echo: 2025:    1. Left ventricular systolic function is severely decreased with an ejection fraction visually estimated at 30 to 35 %. Global left ventricular hypokinesis.   2. Enlarged right ventricular cavity size and moderately reduced right ventricular systolic function.   3. Left atrium is normal in size.   4. There is mild calcification of the mitral valve annulus.   5. Moderate to severe mitral regurgitation.   6. Moderate tricuspid regurgitation.   7. Compared to the transthoracic echocardiogram performed on 2025, the LV and RV are now better visualized.    Echo 2025:   1. Technically difficult image quality.   2. Left ventricular systolic function is moderately decreased with an ejection fraction visually estimated at 30 to 35 %. Global left ventricular hypokinesis.   3. There is no evidence of a left ventricular thrombus.   4. Enlarged right ventricular cavity size and moderately reduced right ventricular systolic function.   5. No pericardial effusion seen.   6. Compared to the transthoracic echocardiogram performed on 2025, there have been no significant interval changes.      CXR:  Endotracheal tube tip 5 cm above the gayla. Enteric tube tip below the   edge of the image, and rightIJ catheter tip in the right atrium. No   pneumothorax. No focal consolidation or pleural effusion.    CTA C/A/P:  No pulmonary embolus.  Airspace consolidations at the lung bases may represent atelectasis   and/or pneumonia.  No bowel obstruction.  Cholelithiasis.  Trace ascites.    Cath:  LM: Luminal irregularities    LAD: Prox  with left to left collaterals    LCX: Mild diffuse disease, OM with 40% mid stenosis    RCA: Severe calcium from prox to mid with mild disease, distal RCA with long segment severe 90% stenosis into the PDA.  LV EDP 7       Assessment:  76 yo male PMHx HTN, HLD, CAD based on abnormal nuke, TIA, remote pericarditis in  presented to the ED with complaints of abdominal pain, nausea, vomiting for since the day prior.  Upon arrival to the ED patient was lethargic and tachypneic, with abdominal distention and mottled skin.  He was noted to be hypotensive despite fluid resuscitation and placed on escalating doses of pressors.  He was started on BIPAP initially for tachypnea and metabolic acidosis, eventually intubated in the ED.  Patient remains intubated and history taken from chart and d/w RN.  Patient was on escalating doses of pressors and , but in last day has been weaned down.  Echo with severe LV dysfxn which appears new and likely stress myopathy.  CI on low end of normal on .  Likely sepsis from GNR septicemia from cholangitis as cause now s/p perc tube.  Noted AF with some RVR due to shock, , etc.  -DCCV for rapid AF while in MICU, reviewed tele and no VT.  -Patient extubated and doing well.  Now off pressors/ inotropic support with normal CO.  -Still with runs of Afib, at time NSVT-Remains in SR now  -Repeated echo with persistent drop on LVEF  -LHC with severe RCA dz and LAD , CABG recommended.  Plan to be done as OP when GB issues resolved.  -Per GI plan now for MRCP and then possible ERCP and then re-evaluation.    Plan:  1. Continue carvedilol and entresto.  Will add MRA as OP.  Eventual SGLT2i after GB issues resolved.  2. AC with lovenox, transition to DOAC if no plans for surgical intervention.  F/u surgery and GI.  3. Rest as per primary team and other consultants.   4. Patient high risk for ERCP but no clear CV contraindication.  Same for cholecystectomy if planned.  Ultimately needs to resolved prior to CABG.  5. No objection to d/c from CV perspective.         DOMINIC ALAMO  [id number]        Chief Complaint:   follow up sepsis/PAF/ CMP    Subjective:  tired, comfortable lying flat    24 hour Tele:   No events      bacitracin   Ointment 1 Application(s) Topical two times a day PRN  carvedilol 6.25 milliGRAM(s) Oral every 12 hours  chlorhexidine 2% Cloths 1 Application(s) Topical <User Schedule>  chlorhexidine 2% Cloths 1 Application(s) Topical <User Schedule>  dextrose 5% + sodium chloride 0.45%. 1000 milliLiter(s) IV Continuous <Continuous>  dextrose 50% Injectable 25 Gram(s) IV Push once  dextrose 50% Injectable 12.5 Gram(s) IV Push once  dextrose 50% Injectable 25 Gram(s) IV Push once  enoxaparin Injectable 80 milliGRAM(s) SubCutaneous every 12 hours  insulin lispro (ADMELOG) corrective regimen sliding scale   SubCutaneous three times a day before meals  morphine  - Injectable 2 milliGRAM(s) IV Push every 6 hours PRN  morphine  - Injectable 1 milliGRAM(s) IV Push every 6 hours PRN  pantoprazole  Injectable 40 milliGRAM(s) IV Push daily  sacubitril 24 mG/valsartan 26 mG 1 Tablet(s) Oral two times a day  sodium chloride 0.9%. 1000 milliLiter(s) IV Continuous <Continuous>          Physical Exam:  T(C): 36.7 (25 @ 11:00), Max: 37.7 (25 @ 00:00)  HR: 80 (25 @ 11:00) (77 - 86)  BP: 103/62 (25 @ 11:00) (92/60 - 116/65)  RR: 18 (25 @ 11:00) (18 - 19)  SpO2: 93% (25 @ 11:00) (92% - 98%)  Wt(kg): --  General: Comfortable in NAD  Neck: No JVD  CVS: nl s1s2, no s3  Pulm: CTA b/l  Abd: soft, non-tender  Ext: No c/c/e  Neuro A&O x3  Psych: Normal affect    I&O's Summary    31 May 2025 07:01  -  2025 07:00  --------------------------------------------------------  IN: 1360 mL / OUT: 810 mL / NET: 550 mL          Labs:   2025 07:29    138    |  104    |  15.2   ----------------------------<  153    4.1     |  21.0   |  0.55     Ca    7.6        2025 07:29  Phos  2.3       2025 07:29  Mg     1.2       2025 07:29    TPro  5.3    /  Alb  2.5    /  TBili  1.3    /  DBili  x      /  AST  27     /  ALT  35     /  AlkPhos  257    2025 07:29                          11.3   9.30  )-----------( 236      ( 2025 07:29 )             33.9     PT/INR - ( 2025 07:29 )   PT: 17.7 sec;   INR: 1.57 ratio         PTT - ( 2025 07:29 )  PTT:37.8 sec        Echo: 2025:    1. Left ventricular systolic function is severely decreased with an ejection fraction visually estimated at 30 to 35 %. Global left ventricular hypokinesis.   2. Enlarged right ventricular cavity size and moderately reduced right ventricular systolic function.   3. Left atrium is normal in size.   4. There is mild calcification of the mitral valve annulus.   5. Moderate to severe mitral regurgitation.   6. Moderate tricuspid regurgitation.   7. Compared to the transthoracic echocardiogram performed on 2025, the LV and RV are now better visualized.    Echo 2025:   1. Technically difficult image quality.   2. Left ventricular systolic function is moderately decreased with an ejection fraction visually estimated at 30 to 35 %. Global left ventricular hypokinesis.   3. There is no evidence of a left ventricular thrombus.   4. Enlarged right ventricular cavity size and moderately reduced right ventricular systolic function.   5. No pericardial effusion seen.   6. Compared to the transthoracic echocardiogram performed on 2025, there have been no significant interval changes.      CTA C/A/P:  No pulmonary embolus.  Airspace consolidations at the lung bases may represent atelectasis   and/or pneumonia.  No bowel obstruction.  Cholelithiasis.  Trace ascites.    Cath:  LM: Luminal irregularities    LAD: Prox  with left to left collaterals    LCX: Mild diffuse disease, OM with 40% mid stenosis    RCA: Severe calcium from prox to mid with mild disease, distal RCA with long segment severe 90% stenosis into the PDA.  LV EDP 7       Assessment:  74 yo male PMHx HTN, HLD, CAD based on abnormal nuke, TIA, remote pericarditis in  presented to the ED lethargic and tachypneic, with abdominal distention and mottled skin. with complaints of abdominal pain, nausea, vomiting.  Hypotensive despite fluid resuscitation and placed on escalating doses of pressors and on BIPAP initially for tachypnea and metabolic acidosis, eventually intubated  Patient remains intubated and history taken from chart and d/w RN.  For a while patient was on escalating doses of pressors. Echo with severe LV dysfxn which appears new and likely stress myopathy.  CI on low end of normal on .  Likely sepsis from GNR septicemia from cholangitis as cause now s/p perc tube.  Noted AF with some RVR due to shock, , etc.  -DCCV for rapid AF while in MICU, reviewed tele and no VT.  -Patient extubated and doing well. Has remained stable off pressors/ inotropic support with normal CO.  -Still with runs of Afib, at time NSVT-Remains in SR now  -Repeated echo with persistent drop on LVEF  -LHC with severe RCA dz and LAD , CABG recommended.  Plan to be done as OP when GB issues resolved.  -Per GI plan now for MRCP and then possible ERCP and then re-evaluation.    Plan:  1. Continue carvedilol and entresto.  Will add MRA as OP.  Eventual SGLT2i after GB issues resolved.  2. AC with lovenox, transition to DOAC if no plans for surgical intervention.  F/u surgery and GI.  3. Rest as per primary team and other consultants.   4. Patient high risk for ERCP but no clear CV contraindication.  Same for cholecystectomy if planned.  Ultimately needs to resolved prior to CABG.  5. No objection to d/c from CV perspective.

## 2025-06-01 NOTE — PROGRESS NOTE ADULT - PROBLEM SELECTOR PLAN 1
Continue Entresto & Coreg  s/p Perc primitivo drain by IR 5/19  ERCP planned for tomorrow 6/2/25  Continue Protonix for GI prophylaxis  Follow up with Dr. Alberto as outpatient for CABG

## 2025-06-01 NOTE — PROGRESS NOTE ADULT - SUBJECTIVE AND OBJECTIVE BOX
[facility] Division of Hospital Medicine    INTERVAL HISTORY:  Overnight, hematuria.     Patient seen and examined at bedside this morning. Denies any acute complaints. Patient denies chest pain, SOB, abd pain, N/V, fever, chills, dysuria or any other complaints. No longer having any hematuria.     Possible ERCP tomorrow, NPO after midnight.   Monitor hematuria, on AC which should not be held given recent DCCV, Hgb stable, monitor.     MEDICATIONS  (STANDING):  carvedilol 6.25 milliGRAM(s) Oral every 12 hours  chlorhexidine 2% Cloths 1 Application(s) Topical <User Schedule>  chlorhexidine 2% Cloths 1 Application(s) Topical <User Schedule>  dextrose 5% + sodium chloride 0.45%. 1000 milliLiter(s) (75 mL/Hr) IV Continuous <Continuous>  dextrose 50% Injectable 25 Gram(s) IV Push once  dextrose 50% Injectable 12.5 Gram(s) IV Push once  dextrose 50% Injectable 25 Gram(s) IV Push once  enoxaparin Injectable 80 milliGRAM(s) SubCutaneous every 12 hours  insulin lispro (ADMELOG) corrective regimen sliding scale   SubCutaneous three times a day before meals  meropenem  IVPB 1000 milliGRAM(s) IV Intermittent every 8 hours  pantoprazole  Injectable 40 milliGRAM(s) IV Push daily  sacubitril 24 mG/valsartan 26 mG 1 Tablet(s) Oral two times a day  sodium chloride 0.9%. 1000 milliLiter(s) (75 mL/Hr) IV Continuous <Continuous>    MEDICATIONS  (PRN):  bacitracin   Ointment 1 Application(s) Topical two times a day PRN wound care  morphine  - Injectable 2 milliGRAM(s) IV Push every 6 hours PRN Severe Pain (7 - 10)  morphine  - Injectable 1 milliGRAM(s) IV Push every 6 hours PRN Moderate Pain (4 - 6)        I&O's Summary    31 May 2025 07:01  -  01 Jun 2025 07:00  --------------------------------------------------------  IN: 1360 mL / OUT: 810 mL / NET: 550 mL        PHYSICAL EXAM:  Vital Signs Last 24 Hrs  T(C): 36.7 (01 Jun 2025 11:00), Max: 37.7 (01 Jun 2025 00:00)  T(F): 98 (01 Jun 2025 11:00), Max: 99.8 (01 Jun 2025 00:00)  HR: 80 (01 Jun 2025 11:00) (77 - 86)  BP: 103/62 (01 Jun 2025 11:00) (92/60 - 116/65)  BP(mean): --  RR: 18 (01 Jun 2025 11:00) (18 - 19)  SpO2: 93% (01 Jun 2025 11:00) (92% - 98%)    Parameters below as of 01 Jun 2025 11:00  Patient On (Oxygen Delivery Method): room air      CONSTITUTIONAL: No apparent distress  HEENT: Normocephalic, Atraumatic  RESPIRATORY:  lungs are clear to auscultation bilaterally, No crackles, rhonchi, wheezes  CARDIOVASCULAR: Regular rate and rhythm, no lower extremity edema  ABDOMEN: Soft, non-distended, nontender to palpation, +BS  PSYCH: thoughts linear, affect appropriate  NEUROLOGY: Alert, Oriented x3    LABS:                        11.3   9.30  )-----------( 236      ( 01 Jun 2025 07:29 )             33.9     06-01    138  |  104  |  15.2  ----------------------------<  153[H]  4.1   |  21.0[L]  |  0.55    Ca    7.6[L]      01 Jun 2025 07:29  Phos  2.3     06-01  Mg     1.2     06-01    TPro  5.3[L]  /  Alb  2.5[L]  /  TBili  1.3  /  DBili  x   /  AST  27  /  ALT  35  /  AlkPhos  257[H]  06-01    PT/INR - ( 01 Jun 2025 07:29 )   PT: 17.7 sec;   INR: 1.57 ratio         PTT - ( 01 Jun 2025 07:29 )  PTT:37.8 sec      Urinalysis Basic - ( 01 Jun 2025 07:29 )    Color: x / Appearance: x / SG: x / pH: x  Gluc: 153 mg/dL / Ketone: x  / Bili: x / Urobili: x   Blood: x / Protein: x / Nitrite: x   Leuk Esterase: x / RBC: x / WBC x   Sq Epi: x / Non Sq Epi: x / Bacteria: x        CAPILLARY BLOOD GLUCOSE      POCT Blood Glucose.: 162 mg/dL (01 Jun 2025 12:40)  POCT Blood Glucose.: 162 mg/dL (01 Jun 2025 08:49)  POCT Blood Glucose.: 207 mg/dL (31 May 2025 22:12)  POCT Blood Glucose.: 115 mg/dL (31 May 2025 17:16)        RADIOLOGY & ADDITIONAL TESTS:  Results Reviewed

## 2025-06-01 NOTE — PROGRESS NOTE ADULT - ASSESSMENT
74 y/o M w/ PMH of HTN, HLD, CAD, TIA, remote hx of pericarditis (2015) presented 5/18 to ED c/o abd pain, N/V.  In the ED pt was found to be hypotensive and failed fluid resuscitation and started on pressor support.  Pt also hypoxic and initially placed on BIPAP but eventually required intubation and admitted to MICU for septic shock 2/2 cholangitis, UTI and acute pancreatitis.  Pt also profoundly acidotic w/ ATN from septic shock, transaminitis from shock liver/cholangitis, lipase >3K, , LA>16, procal >100 and abd US w/ distended GB w/ stones and CT a/p w/ cholelithiasis but pancreas reported to be normal.  GI and surgery consulted but pt deemed too unstable to undergo any procedures and ultimately had IR guided primitivo tube placed 5/19 and dark malodorous bile drained and sent for cultures which grew ESBL ecoli, citrobacter freundii, Enterococcus gallinarum and enterococcus faecalis.  Bcx 5/18 gew ESBL ecoli, Citerobacter freundii and strep gallalyticus.  Ucx 5/18 grew ESBL ecoli.  Repeat Bcx 5/19 grew only ESBL Ecoli and BCxs 5/20 NGTD.  Sputum cx 5/24 growing few aspergillus, referred to mycology. ID consulted and pt currently on merrem.  Pt required quadruple pressor support and given methylene blue 5/19 for vasoplegia and was on course of solucortef 5/19-5/25 to help wean off pressors in setting of septic shock.   Hospital course complicated by SVT 5/19 that required emergent cardioversion.  Pt also found to have high ammonia and placed on rifaxamin and lactulose but no known hx of cirrhosis and have since been d/c'd.  Pt successfully weaned off pressors 5/23.  During hospital course pt also found to have new rEF likely stress cardiomyopathy and required dobutamine (5/20-5/23) and milrinone gtts (5/23-5/25).  Hospital course further complicated by AF RVR requiring emergent cardioversion 5/21 and was amio loaded but now in NSR and not on amio.  Full dose AC was held despite AF w/ chadsvasc 6 bc severe thrombocytopenia likely 2/2 sepsis.  Pt extubated 5/24 and OGT removed.  Pt failed SLP eval and family declined NGT for now.  s/p MBS since tolerating thin liquids and easy to chew diet. Pt was medically stable for transfer to M/S floor. Now s/p diagnostic C 5/29. CTS consulted for CABG eval.

## 2025-06-01 NOTE — PROGRESS NOTE ADULT - SUBJECTIVE AND OBJECTIVE BOX
Subjective: Pt. seen & examined,  Lance [id number], NAD noted lying in bed.    VITAL SIGNS  Vital Signs Last 24 Hrs  T(C): 36.6 (25 @ 17:27), Max: 37.7 (25 @ 00:00)  T(F): 97.9 (25 @ 17:27), Max: 99.8 (25 @ 00:00)  HR: 85 (25 @ 17:27) (77 - 86)  BP: 95/60 (25 @ 17:27) (92/60 - 116/65)  RR: 18 (25 @ 17:27) (18 - 18)  SpO2: 93% (25 @ 11:00) (92% - 95%)  on (O2)              Telemetry:    LVEF: 35%    MEDICATIONS  bacitracin   Ointment 1 Application(s) Topical two times a day PRN  carvedilol 6.25 milliGRAM(s) Oral every 12 hours  chlorhexidine 2% Cloths 1 Application(s) Topical <User Schedule>  chlorhexidine 2% Cloths 1 Application(s) Topical <User Schedule>  dextrose 5% + sodium chloride 0.45%. 1000 milliLiter(s) IV Continuous <Continuous>  dextrose 50% Injectable 25 Gram(s) IV Push once  dextrose 50% Injectable 12.5 Gram(s) IV Push once  dextrose 50% Injectable 25 Gram(s) IV Push once  enoxaparin Injectable 80 milliGRAM(s) SubCutaneous every 12 hours  insulin lispro (ADMELOG) corrective regimen sliding scale   SubCutaneous three times a day before meals  meropenem  IVPB 1000 milliGRAM(s) IV Intermittent every 8 hours  morphine  - Injectable 2 milliGRAM(s) IV Push every 6 hours PRN  morphine  - Injectable 1 milliGRAM(s) IV Push every 6 hours PRN  pantoprazole  Injectable 40 milliGRAM(s) IV Push daily  sacubitril 24 mG/valsartan 26 mG 1 Tablet(s) Oral two times a day  sodium chloride 0.9%. 1000 milliLiter(s) IV Continuous <Continuous>      PHYSICAL EXAM  General:  no acute distress  Neurology: alert and oriented x 3, nonfocal, no gross deficits  Respiratory: Diminished at the bases bilaterally  CV: regular rate and rhythm, normal S1, S2  Abdomen: soft, nontender, nondistended, positive bowel sounds, +IR drain in place  Extremities: warm, well perfused. no edema. + DP pulses      I&O's Detail    31 May 2025 07:01  -  2025 07:00  --------------------------------------------------------  IN:    Oral Fluid: 1360 mL  Total IN: 1360 mL    OUT:    Bulb (mL): 10 mL    Voided (mL): 800 mL  Total OUT: 810 mL    Total NET: 550 mL          Weights:  Daily     Daily Weight in k.2 (2025 04:36)  Admit Wt: Drug Dosing Weight  Height (cm): 170.2 (19 May 2025 00:28)  Weight (kg): 78.1 (19 May 2025 00:28)  BMI (kg/m2): 27 (19 May 2025 00:28)  BSA (m2): 1.9 (19 May 2025 00:28)    LABS      138  |  104  |  15.2  ----------------------------<  153[H]  4.1   |  21.0[L]  |  0.55    Ca    7.6[L]      2025 07:29  Phos  2.3       Mg     1.2         TPro  5.3[L]  /  Alb  2.5[L]  /  TBili  1.3  /  DBili  x   /  AST  27  /  ALT  35  /  AlkPhos  257[H]                                   11.3   9.30  )-----------( 236      ( 2025 07:29 )             33.9          PT/INR - ( 2025 07:29 )   PT: 17.7 sec;   INR: 1.57 ratio         PTT - ( 2025 07:29 )  PTT:37.8 sec      Bilirubin Total: 1.3 mg/dL ( @ 07:29)    CAPILLARY BLOOD GLUCOSE      POCT Blood Glucose.: 168 mg/dL (2025 17:31)  POCT Blood Glucose.: 162 mg/dL (2025 12:40)  POCT Blood Glucose.: 162 mg/dL (2025 08:49)  POCT Blood Glucose.: 207 mg/dL (31 May 2025 22:12)             < from: Xray Chest 1 View- PORTABLE-Urgent (Xray Chest 1 View- PORTABLE-Urgent .) (25 @ 22:10) >  ACC: [id number] EXAM:  XR CHEST PORTABLE URGENT 1V   ORDERED BY: KAMILAH CELIS     PROCEDURE DATE:  2025          INTERPRETATION:  Follow-up.    AP chest. Prior 2025    IMPRESSION: Endotracheal tube nasogastric tube right internal jugular   line have been removed. Partial clearing at the lung bases. Patchy   opacities persist may reflect atelectasis and/or pneumonia. Small left   pleural effusion similar to prior. No pneumothorax    --- End of Report ---            JACK MULLIGAN; Attending Radiologist  This document has been electronically signed. May 30 2025 11:00AM    < end of copied text >    < from: TTE Limited W or WO Ultrasound Enhancing Agent (25 @ 11:54) >  CONCLUSIONS:      1. Left ventricular systolic function is moderately to severely decreased with an ejection fraction visually estimated at 30 to 35 %.   2. There is mild (grade 1) left ventricular diastolic dysfunction.   3. No pericardial effusion seen.   4. Small right pleural effusion noted.    < end of copied text >          < from: CT Abdomen and Pelvis w/ IV Cont (25 @ 00:03) >  ACC: [id number] EXAM:  CT ABDOMEN AND PELVIS IC   ORDERED BY: MALLIKA CALIXTO     ACC: [id number] EXAM:  CT ANGIO CHEST PULM ART WAWIC   ORDERED BY: MALLIKA CALIXTO     PROCEDURE DATE:  2025          INTERPRETATION:  CLINICAL INFORMATION: abd tenderness ams sob    TECHNIQUE: CT imaging of the chest, abdomen, and pelvis was obtained with   IV contrast. 90 cc of Omnipaque 350 was administered and 10 cc discarded.   MIP images were also obtained.    COMPARISON: 2021 and 7/10/2021    FINDINGS:    CHEST:  LUNGS, PLEURA AND LARGE AIRWAYS: Endotracheal tube with its distal tip   above the level of the gayla. Airspace consolidations at the lung bases   may represent atelectasis and/or pneumonia.  VESSELS: No pulmonary embolus. Normal aortic caliber.Right-sided central   venous catheter with its distal tip in the SVC.  HEART: Heart size is normal. No pericardial effusion. Coronary artery   calcification.  MEDIASTINUM AND ALMA DELIA: No lymphadenopathy.  CHEST WALL AND LOWER NECK: Within normal limits.    ABDOMEN AND PELVIS:  LIVER: Within normal limits.  BILE DUCTS: Normal caliber.  GALLBLADDER: Cholelithiasis.  SPLEEN: Within normal limits.  PANCREAS: Within normal limits.  ADRENALS: Within normal limits.  KIDNEYS/URETERS: No hydronephrosis.    BLADDER: Petersen.  REPRODUCTIVE ORGANS: Enlarged prostate.    BOWEL: Enteric tube with its distal tip in the stomach. No bowel   obstruction. Normal appendix.  PERITONEUM: Trace ascites.  VESSELS:  Normal aortic caliber.  RETROPERITONEUM: No lymphadenopathy.  ABDOMINAL WALL: Small fat-containing umbilical and inguinal hernias.  BONES: Degenerative changes of the spine. Nonspecific small sclerotic   foci in the pelvic bones.    IMPRESSION:    No pulmonary embolus.    Airspace consolidations at the lung bases may represent atelectasis   and/or pneumonia.    No bowel obstruction.    Cholelithiasis.    Trace ascites.    --- End of Report ---            YUDELKA BUTLER MD; Attending Radiologist  This document has been electronically signed. May 19 2025  1:32AM    < end of copied text >    PAST MEDICAL & SURGICAL HISTORY:  AMI (Acute Myocardial Infarction)  2001      Transient ischemic attack (TIA)      No Past Surgical History

## 2025-06-01 NOTE — PROGRESS NOTE ADULT - ASSESSMENT
76 y/o M w/ PMH of HTN, HLD, CAD, TIA, remote hx of pericarditis (2015) presented 5/18 to ED c/o abd pain, N/V.  In the ED pt was found to be hypotensive and failed fluid resuscitation and started on pressor support.  Pt also hypoxic and initially placed on BIPAP but eventually required intubation and admitted to MICU for septic shock 2/2 cholangitis, UTI and acute pancreatitis.  Pt also profoundly acidotic w/ ATN from septic shock, transaminitis from shock liver/cholangitis, lipase >3K, , LA>16, procal >100 and abd US w/ distended GB w/ stones and CT a/p w/ cholelithiasis but pancreas reported to be normal.  GI and surgery consulted but pt deemed too unstable to undergo any procedures and ultimately had IR guided primitivo tube placed 5/19 and dark malodorous bile drained and sent for cultures which grew ESBL ecoli, citrobacter freundii, Enterococcus gallinarum and enterococcus faecalis.  Bcx 5/18 gew ESBL ecoli, Citerobacter freundii and strep gallalyticus.  Ucx 5/18 grew ESBL ecoli.  Repeat Bcx 5/19 grew only ESBL Ecoli and BCxs 5/20 NGTD.  Sputum cx 5/24 growing mold like fungus on prelim report and refered to mycology.  ID consulted and pt currently on merrem.  Pt required quadruple pressor support and given methylene blue 5/19 for vasoplegia and was on course of solucortef 5/19-5/25 to help wean off pressors in setting of septic shock.   Hospital course complicated by SVT 5/19 that required emergent cardioversion.  Pt also found to have high ammonia and placed on rifaxamin and lactulose but no known hx of cirrhosis and have since been d/c'd.  Pt successfully weaned off pressors 5/23.  During hospital course pt also found to have new rEF likely stress cardiomyopathy and required dobutamine (5/20-5/23) and milrinone gtts (5/23-5/25).  Hospital course further complicated by AF RVR requiring emergent cardioversion 5/21 and was amio loaded but now in NSR and not on amio.  Full dose AC was held despite AF w/ chadsvasc 6 bc severe thrombocytopenia likely 2/2 sepsis.  Pt extubated 5/24 and OGT removed.  Pt failed SLP eval and family declined NGT for now.  Pt will go for MBS tomorrow 5/27 to better assess his ability to swallow.  Pt medically stable for transfer to medicine for continued management.      #Septic shock 2/2 cholangitis, UTI and bacteremia   - s/p quadruple pressor support, methylene blue for vasoplegia and was on course of solucortef to finally wean off pressors 5/23  - Was on solucortef 5/19-5/25 to help wean off pressors   - Pt has up trending WBC which likely from recent steroid course   - US w/ distended GB w/ stones and CT a/p w/ cholelithiasis but pancreas reported to be normal  - IR guided primitivo tube placed 5/19 and drained dark malodorous bilious fluid drained and sent for Cxs which grew ESBL ecoli, citrobacter freundii, Enterococcus gallinarum and enterococcus faecalis  - Bcx 5/18 gew ESBL ecoli, Citerobacter freundii and strep gallalyticus, Bcx 5/19 grew only ESBL Ecoli and BCxs 5/20 NGTD  - Urine cx 5/18 grew ESBL ecoli  - Sputum cx 5/24 prelim reporting mold like fungus and referred to mycology; f/u final results - Per ID - to defer treatment  - c/w merrem as per ID recs; fell off 5/31 am, restarted 6/1  - Trend CBC and monitor temperature   - will need surgery and GI f/u after improvement in symptoms for ercp +- cholecystectomy   - MRCP done this am: Biliary duct dilation. Filling defect in the common bile duct   measuring 11 mm, concerning for choledocholithiasis. Cholelithiasis. Mild gallbladder wall thickening and pericholecystic edema. Percutaneous cholecystostomy tube. Bilateral small pleural effusions. Trace ascites.  - GI planning for ERCP on Monday    #hematuria  - ON episode x1 6/1  - hgb stable  - pt to continue on AC given recent DCCV    #Acute hypoxic respiratory failure possibly 2/2 aspiration , resolved   - Intubated 5/18 and extubated 5/24, now on RA  - now on RA w good sats  - Sputum cx 5/24 growing mold like fungus on prelim report and referred to mycology; f/u final results (pending pending recs)  - Duonebs as needed   - Encourage incentive spirometer and OOB to chair     #Diarrhea (resolved)  - Rectal tube dc  - Started having watery diarrhea shortly after admission that was for Initially suspected to be due to tube feeds   - Has been off tube feeds for days and continues to have diarrhea     #hypernatremia (improved)  - Likely multifactorial from ongoing diarrhea   - Was also on short course of bumex gtt during MICU course   - Monitor I/O's and lytes closely     #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 shock   - Required dobutamine (5/20-5/23) and milronone gtts (5/23-5/25)  - TTE 5/20 w/ LVEF 30-35% and remained unchanged on repeat TTE 5/22  - Clinically hypovolemic at this time likely from ongoing diarrhea and is NPO  - Giving short course of gentle IVFs but monitor vol status closely   - will gradually start gdmt as bp tolerates  - cw coreg  - cw entresto  - S/P LHC and 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  - No signs of cellulitis   - c/w supportive care including RUE elevation     #Small area of Rt toe ischemia   - Likely 2/2 levophed vs vasoplegia   - Monitor for resolution     #ATN from septic shock  - Resolved   - Caution w/ hyperchloremia as can cause OLEKSANDR   - Monitor renal function    #Transaminitis from shock liver and cholangitis   - Trend LFTs and coags      #Normocytic anemia   #Thrombocytopenia, resolving, 2/2 sepsis   - H/H mildly low and stable   - Plts improving and >50K therefore starting full dose AC for AF   - Remains hemodynamically stable   - No active bleeding reported   - BUN elevated but likely 2/2 recent steroids and down trending since steroids stopped   - c/w protonix for GI ppx  - Monitor CBC and transfuse for Hb<8     #Hyperammonemia, resolved    - Suspect 2/2 infection w/ e.coli as it is a ureas producing organisms which hydrolyzes urea to ammonium and ultimately converts to ammonia    - Started on rifaxamin in ICU but now d/c'd as pt has no hx of cirrhosis    VTE ppx: lovenox     Dispo: medically active. Pending ERCP +/- Surgery bayron-katty    Spoke with daughter at bedside

## 2025-06-02 ENCOUNTER — TRANSCRIPTION ENCOUNTER (OUTPATIENT)
Age: 75
End: 2025-06-02

## 2025-06-02 ENCOUNTER — RESULT REVIEW (OUTPATIENT)
Age: 75
End: 2025-06-02

## 2025-06-02 LAB
ALBUMIN SERPL ELPH-MCNC: 2.5 G/DL — LOW (ref 3.3–5.2)
ALP SERPL-CCNC: 526 U/L — HIGH (ref 40–120)
ALT FLD-CCNC: 107 U/L — HIGH
ANION GAP SERPL CALC-SCNC: 15 MMOL/L — SIGNIFICANT CHANGE UP (ref 5–17)
AST SERPL-CCNC: 227 U/L — HIGH
BILIRUB DIRECT SERPL-MCNC: 2.5 MG/DL — HIGH (ref 0–0.3)
BILIRUB INDIRECT FLD-MCNC: 1.4 MG/DL — HIGH (ref 0.2–1)
BILIRUB SERPL-MCNC: 3.9 MG/DL — HIGH (ref 0.4–2)
BUN SERPL-MCNC: 13.7 MG/DL — SIGNIFICANT CHANGE UP (ref 8–20)
CALCIUM SERPL-MCNC: 7.4 MG/DL — LOW (ref 8.4–10.5)
CHLORIDE SERPL-SCNC: 102 MMOL/L — SIGNIFICANT CHANGE UP (ref 96–108)
CO2 SERPL-SCNC: 19 MMOL/L — LOW (ref 22–29)
CREAT SERPL-MCNC: 0.46 MG/DL — LOW (ref 0.5–1.3)
EGFR: 109 ML/MIN/1.73M2 — SIGNIFICANT CHANGE UP
EGFR: 109 ML/MIN/1.73M2 — SIGNIFICANT CHANGE UP
GLUCOSE BLDC GLUCOMTR-MCNC: 130 MG/DL — HIGH (ref 70–99)
GLUCOSE BLDC GLUCOMTR-MCNC: 137 MG/DL — HIGH (ref 70–99)
GLUCOSE BLDC GLUCOMTR-MCNC: 147 MG/DL — HIGH (ref 70–99)
GLUCOSE BLDC GLUCOMTR-MCNC: 149 MG/DL — HIGH (ref 70–99)
GLUCOSE SERPL-MCNC: 156 MG/DL — HIGH (ref 70–99)
HCT VFR BLD CALC: 33 % — LOW (ref 39–50)
HGB BLD-MCNC: 11.1 G/DL — LOW (ref 13–17)
MAGNESIUM SERPL-MCNC: 1.5 MG/DL — LOW (ref 1.6–2.6)
MCHC RBC-ENTMCNC: 29.1 PG — SIGNIFICANT CHANGE UP (ref 27–34)
MCHC RBC-ENTMCNC: 33.6 G/DL — SIGNIFICANT CHANGE UP (ref 32–36)
MCV RBC AUTO: 86.4 FL — SIGNIFICANT CHANGE UP (ref 80–100)
NRBC # BLD AUTO: 0 K/UL — SIGNIFICANT CHANGE UP (ref 0–0)
NRBC # FLD: 0 K/UL — SIGNIFICANT CHANGE UP (ref 0–0)
NRBC BLD AUTO-RTO: 0 /100 WBCS — SIGNIFICANT CHANGE UP (ref 0–0)
PHOSPHATE SERPL-MCNC: 2.9 MG/DL — SIGNIFICANT CHANGE UP (ref 2.4–4.7)
PLATELET # BLD AUTO: 242 K/UL — SIGNIFICANT CHANGE UP (ref 150–400)
PMV BLD: 11.6 FL — SIGNIFICANT CHANGE UP (ref 7–13)
POTASSIUM SERPL-MCNC: 5 MMOL/L — SIGNIFICANT CHANGE UP (ref 3.5–5.3)
POTASSIUM SERPL-SCNC: 5 MMOL/L — SIGNIFICANT CHANGE UP (ref 3.5–5.3)
PROT SERPL-MCNC: 5.2 G/DL — LOW (ref 6.6–8.7)
RBC # BLD: 3.82 M/UL — LOW (ref 4.2–5.8)
RBC # FLD: 14.6 % — HIGH (ref 10.3–14.5)
SODIUM SERPL-SCNC: 136 MMOL/L — SIGNIFICANT CHANGE UP (ref 135–145)
WBC # BLD: 7.35 K/UL — SIGNIFICANT CHANGE UP (ref 3.8–10.5)
WBC # FLD AUTO: 7.35 K/UL — SIGNIFICANT CHANGE UP (ref 3.8–10.5)

## 2025-06-02 PROCEDURE — 88312 SPECIAL STAINS GROUP 1: CPT | Mod: 26

## 2025-06-02 PROCEDURE — 43239 EGD BIOPSY SINGLE/MULTIPLE: CPT | Mod: 59

## 2025-06-02 PROCEDURE — 88305 TISSUE EXAM BY PATHOLOGIST: CPT | Mod: 26

## 2025-06-02 PROCEDURE — 43262 ENDO CHOLANGIOPANCREATOGRAPH: CPT | Mod: 59

## 2025-06-02 PROCEDURE — 74328 X-RAY BILE DUCT ENDOSCOPY: CPT | Mod: 26,59

## 2025-06-02 PROCEDURE — 43264 ERCP REMOVE DUCT CALCULI: CPT

## 2025-06-02 PROCEDURE — 88342 IMHCHEM/IMCYTCHM 1ST ANTB: CPT | Mod: 26

## 2025-06-02 PROCEDURE — 99232 SBSQ HOSP IP/OBS MODERATE 35: CPT

## 2025-06-02 PROCEDURE — 99233 SBSQ HOSP IP/OBS HIGH 50: CPT

## 2025-06-02 DEVICE — SPHINCTERTOME JAG RX 39 PRELOADED CANN
Type: IMPLANTABLE DEVICE | Status: NON-FUNCTIONAL
Removed: 2025-06-02

## 2025-06-02 DEVICE — CATH BLLN EXTRACT DIST GUIDE WIRE 15MM 3LUM
Type: IMPLANTABLE DEVICE | Status: NON-FUNCTIONAL
Removed: 2025-06-02

## 2025-06-02 RX ORDER — INDOMETHACIN 50 MG
100 CAPSULE ORAL ONCE
Refills: 0 | Status: COMPLETED | OUTPATIENT
Start: 2025-06-02 | End: 2025-06-02

## 2025-06-02 RX ORDER — AMPICILLIN SODIUM AND SULBACTAM SODIUM 1; .5 G/1; G/1
1.5 INJECTION, POWDER, FOR SOLUTION INTRAMUSCULAR; INTRAVENOUS ONCE
Refills: 0 | Status: COMPLETED | OUTPATIENT
Start: 2025-06-02 | End: 2025-06-02

## 2025-06-02 RX ORDER — MAGNESIUM SULFATE 500 MG/ML
2 SYRINGE (ML) INJECTION ONCE
Refills: 0 | Status: COMPLETED | OUTPATIENT
Start: 2025-06-02 | End: 2025-06-02

## 2025-06-02 RX ADMIN — AMPICILLIN SODIUM AND SULBACTAM SODIUM 100 GRAM(S): 1; .5 INJECTION, POWDER, FOR SOLUTION INTRAMUSCULAR; INTRAVENOUS at 16:14

## 2025-06-02 RX ADMIN — SACUBITRIL AND VALSARTAN 1 TABLET(S): 49; 51 TABLET, FILM COATED ORAL at 06:11

## 2025-06-02 RX ADMIN — Medication 40 MILLIGRAM(S): at 12:56

## 2025-06-02 RX ADMIN — Medication 1 APPLICATION(S): at 06:12

## 2025-06-02 RX ADMIN — Medication 2 MILLIGRAM(S): at 09:23

## 2025-06-02 RX ADMIN — Medication 25 GRAM(S): at 12:56

## 2025-06-02 RX ADMIN — Medication 2 MILLIGRAM(S): at 08:53

## 2025-06-02 RX ADMIN — Medication 1 APPLICATION(S): at 06:10

## 2025-06-02 RX ADMIN — MEROPENEM 100 MILLIGRAM(S): 1 INJECTION INTRAVENOUS at 06:11

## 2025-06-02 RX ADMIN — CARVEDILOL 6.25 MILLIGRAM(S): 3.12 TABLET, FILM COATED ORAL at 06:11

## 2025-06-02 RX ADMIN — Medication 100 MILLIGRAM(S): at 15:04

## 2025-06-02 RX ADMIN — MEROPENEM 100 MILLIGRAM(S): 1 INJECTION INTRAVENOUS at 22:01

## 2025-06-02 RX ADMIN — MEROPENEM 100 MILLIGRAM(S): 1 INJECTION INTRAVENOUS at 13:01

## 2025-06-02 NOTE — PROGRESS NOTE ADULT - SUBJECTIVE AND OBJECTIVE BOX
[facility] Division of Hospital Medicine    INTERVAL HISTORY:  Overnight, hematuria.     Patient seen and examined at bedside this morning. Denies any acute complaints. Patient denies chest pain, SOB, abd pain, N/V, fever, chills, dysuria or any other complaints. No longer having any hematuria.     MEDICATIONS  (STANDING):  carvedilol 6.25 milliGRAM(s) Oral every 12 hours  chlorhexidine 2% Cloths 1 Application(s) Topical <User Schedule>  chlorhexidine 2% Cloths 1 Application(s) Topical <User Schedule>  dextrose 5% + sodium chloride 0.45%. 1000 milliLiter(s) (75 mL/Hr) IV Continuous <Continuous>  dextrose 50% Injectable 25 Gram(s) IV Push once  dextrose 50% Injectable 12.5 Gram(s) IV Push once  dextrose 50% Injectable 25 Gram(s) IV Push once  insulin lispro (ADMELOG) corrective regimen sliding scale   SubCutaneous three times a day before meals  meropenem  IVPB 1000 milliGRAM(s) IV Intermittent every 8 hours  pantoprazole  Injectable 40 milliGRAM(s) IV Push daily  sacubitril 24 mG/valsartan 26 mG 1 Tablet(s) Oral two times a day  sodium chloride 0.9%. 1000 milliLiter(s) (75 mL/Hr) IV Continuous <Continuous>    MEDICATIONS  (PRN):  bacitracin   Ointment 1 Application(s) Topical two times a day PRN wound care  morphine  - Injectable 2 milliGRAM(s) IV Push every 6 hours PRN Severe Pain (7 - 10)  morphine  - Injectable 1 milliGRAM(s) IV Push every 6 hours PRN Moderate Pain (4 - 6)        I&O's Summary    01 Jun 2025 07:01  -  02 Jun 2025 07:00  --------------------------------------------------------  IN: 1600 mL / OUT: 1057 mL / NET: 543 mL        PHYSICAL EXAM:  Vital Signs Last 24 Hrs  T(C): 36.7 (02 Jun 2025 14:48), Max: 37.2 (01 Jun 2025 20:56)  T(F): 98 (02 Jun 2025 14:48), Max: 98.9 (01 Jun 2025 20:56)  HR: 80 (02 Jun 2025 16:02) (80 - 91)  BP: 87/51 (02 Jun 2025 16:02) (87/51 - 109/66)  BP(mean): --  RR: 18 (02 Jun 2025 16:02) (17 - 18)  SpO2: 95% (02 Jun 2025 16:02) (92% - 100%)    Parameters below as of 02 Jun 2025 16:02  Patient On (Oxygen Delivery Method): room air      CONSTITUTIONAL: No apparent distress  HEENT: Normocephalic, Atraumatic  RESPIRATORY:  lungs are clear to auscultation bilaterally, No crackles, rhonchi, wheezes  CARDIOVASCULAR: Regular rate and rhythm, no lower extremity edema  ABDOMEN: Soft, non-distended, nontender to palpation, +BS  PSYCH: thoughts linear, affect appropriate  NEUROLOGY: Alert, Oriented x3    LABS:                        11.1   7.35  )-----------( 242      ( 02 Jun 2025 05:52 )             33.0     06-02    136  |  102  |  13.7  ----------------------------<  156[H]  5.0   |  19.0[L]  |  0.46[L]    Ca    7.4[L]      02 Jun 2025 05:52  Phos  2.9     06-02  Mg     1.5     06-02    TPro  5.2[L]  /  Alb  2.5[L]  /  TBili  3.9[H]  /  DBili  2.5[H]  /  AST  227[H]  /  ALT  107[H]  /  AlkPhos  526[H]  06-02    PT/INR - ( 01 Jun 2025 07:29 )   PT: 17.7 sec;   INR: 1.57 ratio         PTT - ( 01 Jun 2025 07:29 )  PTT:37.8 sec      Urinalysis Basic - ( 02 Jun 2025 05:52 )    Color: x / Appearance: x / SG: x / pH: x  Gluc: 156 mg/dL / Ketone: x  / Bili: x / Urobili: x   Blood: x / Protein: x / Nitrite: x   Leuk Esterase: x / RBC: x / WBC x   Sq Epi: x / Non Sq Epi: x / Bacteria: x        CAPILLARY BLOOD GLUCOSE      POCT Blood Glucose.: 147 mg/dL (02 Jun 2025 11:57)  POCT Blood Glucose.: 149 mg/dL (02 Jun 2025 07:58)  POCT Blood Glucose.: 137 mg/dL (02 Jun 2025 06:09)  POCT Blood Glucose.: 168 mg/dL (01 Jun 2025 17:31)        RADIOLOGY & ADDITIONAL TESTS:  Results Reviewed

## 2025-06-02 NOTE — PROGRESS NOTE ADULT - SUBJECTIVE AND OBJECTIVE BOX
DOMINIC ALAMO  [id number]      Chief Complaint:   follow up PAF/ CMP    Subjective:  tired, comfortable lying flat    24 hour Tele:   No events        bacitracin   Ointment 1 Application(s) Topical two times a day PRN  carvedilol 6.25 milliGRAM(s) Oral every 12 hours  chlorhexidine 2% Cloths 1 Application(s) Topical <User Schedule>  chlorhexidine 2% Cloths 1 Application(s) Topical <User Schedule>  dextrose 5% + sodium chloride 0.45%. 1000 milliLiter(s) IV Continuous <Continuous>  dextrose 50% Injectable 25 Gram(s) IV Push once  dextrose 50% Injectable 12.5 Gram(s) IV Push once  dextrose 50% Injectable 25 Gram(s) IV Push once  insulin lispro (ADMELOG) corrective regimen sliding scale   SubCutaneous three times a day before meals  meropenem  IVPB 1000 milliGRAM(s) IV Intermittent every 8 hours  morphine  - Injectable 2 milliGRAM(s) IV Push every 6 hours PRN  morphine  - Injectable 1 milliGRAM(s) IV Push every 6 hours PRN  pantoprazole  Injectable 40 milliGRAM(s) IV Push daily  sacubitril 24 mG/valsartan 26 mG 1 Tablet(s) Oral two times a day  sodium chloride 0.9%. 1000 milliLiter(s) IV Continuous <Continuous>          Physical Exam:  T(C): 36.4 (25 @ 21:45), Max: 36.9 (25 @ 04:50)  HR: 68 (25 @ 21:45) (68 - 91)  BP: 93/54 (25 @ 21:45) (87/51 - 125/65)  RR: 18 (25 @ 21:45) (15 - 20)  SpO2: 95% (25 @ 21:45) (92% - 100%)  Wt(kg): --  General: Comfortable in NAD  Neck: No JVD  CVS: nl s1s2, no s3  Pulm: CTA b/l  Abd: soft, non-tender  Ext: No c/c/e  Neuro A&O x3  Psych: Normal affect      Labs:   2025 05:52    136    |  102    |  13.7   ----------------------------<  156    5.0     |  19.0   |  0.46     Ca    7.4        2025 05:52  Phos  2.9       2025 05:52  Mg     1.5       2025 05:52    TPro  5.2    /  Alb  2.5    /  TBili  3.9    /  DBili  2.5    /  AST  227    /  ALT  107    /  AlkPhos  526    2025 05:52                          11.1   7.35  )-----------( 242      ( 2025 05:52 )             33.0     PT/INR - ( 2025 07:29 )   PT: 17.7 sec;   INR: 1.57 ratio         PTT - ( 2025 07:29 )  PTT:37.8 sec            Echo: 2025:    1. Left ventricular systolic function is severely decreased with an ejection fraction visually estimated at 30 to 35 %. Global left ventricular hypokinesis.   2. Enlarged right ventricular cavity size and moderately reduced right ventricular systolic function.   3. Left atrium is normal in size.   4. There is mild calcification of the mitral valve annulus.   5. Moderate to severe mitral regurgitation.   6. Moderate tricuspid regurgitation.   7. Compared to the transthoracic echocardiogram performed on 2025, the LV and RV are now better visualized.    Echo 2025:   1. Technically difficult image quality.   2. Left ventricular systolic function is moderately decreased with an ejection fraction visually estimated at 30 to 35 %. Global left ventricular hypokinesis.   3. There is no evidence of a left ventricular thrombus.   4. Enlarged right ventricular cavity size and moderately reduced right ventricular systolic function.   5. No pericardial effusion seen.   6. Compared to the transthoracic echocardiogram performed on 2025, there have been no significant interval changes.      CTA C/A/P:  No pulmonary embolus.  Airspace consolidations at the lung bases may represent atelectasis   and/or pneumonia.  No bowel obstruction.  Cholelithiasis.  Trace ascites.    Cath:  LM: Luminal irregularities    LAD: Prox  with left to left collaterals    LCX: Mild diffuse disease, OM with 40% mid stenosis    RCA: Severe calcium from prox to mid with mild disease, distal RCA with long segment severe 90% stenosis into the PDA.  LV EDP 7       Assessment:  76 yo male PMHx HTN, HLD, CAD based on abnormal nuke, TIA, remote pericarditis in  presented to the ED lethargic and tachypneic, with abdominal distention and mottled skin. with complaints of abdominal pain, nausea, vomiting.  Hypotensive despite fluid resuscitation and placed on escalating doses of pressors and on BIPAP initially for tachypnea and metabolic acidosis, eventually intubated  Patient remains intubated and history taken from chart and d/w RN.  For a while patient was on escalating doses of pressors. Echo with severe LV dysfxn which appears new and likely stress myopathy.  CI on low end of normal on .  Likely sepsis from GNR septicemia from cholangitis as cause now s/p perc tube.  Noted AF with some RVR due to shock, , etc.  -DCCV for rapid AF while in MICU, reviewed tele and no VT.  -Patient extubated and doing well. Has remained stable off pressors/ inotropic support with normal CO.  -Still with runs of Afib, at time NSVT-Remains in SR now  -Repeated echo with persistent drop on LVEF  -[facility] with severe RCA dz and LAD , CABG recommended.  Plan to be done as OP when GB issues resolved.  -Per GI plan now for MRCP and then possible ERCP and then re-evaluation.    Plan:  1. Continue carvedilol and entresto.  Will add MRA as OP.  Eventual SGLT2i after GB issues resolved.  2. AC with lovenox, transition to DOAC if no plans for surgical intervention.  F/u surgery and GI.  3. Rest as per primary team and other consultants.   4. Patient high risk for ERCP but no clear CV contraindication.  Same for cholecystectomy if planned.  Ultimately needs to resolved prior to CABG.  5. No objection to d/c from CV perspective.    6. Please call us back with questions or concerns.

## 2025-06-02 NOTE — CHART NOTE - NSCHARTNOTEFT_GEN_A_CORE
76 yo male with hx of HTN, HLD, CAD, TIA, remote pericarditis in 2015, presented to the ED with complaints of abdominal pain, nausea, vomiting for since the day prior. Admitted with severe shock. GI consulted after elevated liver enzymes, elevated lipase.     #Elevated lipase  #Elevated liver enzymes  CT abd shows cholelithiasis, no evidence of cholecystitis normal liver, no evidence of pancreatitis.  US abd performed gall bladder stones with gall bladder wall thickening, pericholecystic fluid, trace perihepatic ascites. CBD measuring 8.5 mm.  No stone is seen in the visualized segment.  Distal segment of the common bile duct could not be visualized as it is obscured by bowel gas.  s/p Perc primitivo drain placed by IR 5/19  MR MRCP No Cont (05.31.25) Biliary duct dilation. Filling defect in the common bile duct measuring 11 mm, concerning for choledocholithiasis    -Trend liver enzymes, renal function, electrolytes, CBC  -Regular diet as tolerated  -will plan for tentative ERCP today, 6/2/25, pending endoscopic logistics   -Appreciate  cardiac clearance   -Further care per primary team

## 2025-06-02 NOTE — PROGRESS NOTE ADULT - ASSESSMENT
74 y/o M w/ PMH of HTN, HLD, CAD, TIA, remote hx of pericarditis (2015) presented 5/18 to ED c/o abd pain, N/V.  In the ED pt was found to be hypotensive and failed fluid resuscitation and started on pressor support.  Pt also hypoxic and initially placed on BIPAP but eventually required intubation and admitted to MICU for septic shock 2/2 cholangitis, UTI and acute pancreatitis.  Pt also profoundly acidotic w/ ATN from septic shock, transaminitis from shock liver/cholangitis, lipase >3K, , LA>16, procal >100 and abd US w/ distended GB w/ stones and CT a/p w/ cholelithiasis but pancreas reported to be normal.  GI and surgery consulted but pt deemed too unstable to undergo any procedures and ultimately had IR guided primitivo tube placed 5/19 and dark malodorous bile drained and sent for cultures which grew ESBL ecoli, citrobacter freundii, Enterococcus gallinarum and enterococcus faecalis.  Bcx 5/18 gew ESBL ecoli, Citerobacter freundii and strep gallalyticus.  Ucx 5/18 grew ESBL ecoli.  Repeat Bcx 5/19 grew only ESBL Ecoli and BCxs 5/20 NGTD.  Sputum cx 5/24 growing mold like fungus on prelim report and refered to mycology.  ID consulted and pt currently on merrem.  Pt required quadruple pressor support and given methylene blue 5/19 for vasoplegia and was on course of solucortef 5/19-5/25 to help wean off pressors in setting of septic shock.   Hospital course complicated by SVT 5/19 that required emergent cardioversion.  Pt also found to have high ammonia and placed on rifaxamin and lactulose but no known hx of cirrhosis and have since been d/c'd.  Pt successfully weaned off pressors 5/23.  During hospital course pt also found to have new rEF likely stress cardiomyopathy and required dobutamine (5/20-5/23) and milrinone gtts (5/23-5/25).  Hospital course further complicated by AF RVR requiring emergent cardioversion 5/21 and was amio loaded but now in NSR and not on amio.  Full dose AC was held despite AF w/ chadsvasc 6 bc severe thrombocytopenia likely 2/2 sepsis.  Pt extubated 5/24 and OGT removed.  Pt failed SLP eval and family declined NGT for now.  Pt will go for MBS tomorrow 5/27 to better assess his ability to swallow.  Pt medically stable for transfer to medicine for continued management.      #Septic shock 2/2 cholangitis, UTI and bacteremia   - s/p quadruple pressor support, methylene blue for vasoplegia and was on course of solucortef to finally wean off pressors 5/23  - Was on solucortef 5/19-5/25 to help wean off pressors   - Pt has up trending WBC which likely from recent steroid course   - US w/ distended GB w/ stones and CT a/p w/ cholelithiasis but pancreas reported to be normal  - IR guided primitivo tube placed 5/19 and drained dark malodorous bilious fluid drained and sent for Cxs which grew ESBL ecoli, citrobacter freundii, Enterococcus gallinarum and enterococcus faecalis  - Bcx 5/18 gew ESBL ecoli, Citerobacter freundii and strep gallalyticus, Bcx 5/19 grew only ESBL Ecoli and BCxs 5/20 NGTD  - Urine cx 5/18 grew ESBL ecoli  - Sputum cx 5/24 prelim reporting mold like fungus and referred to mycology; f/u final results - Per ID - to defer treatment  - c/w merrem as per ID recs; fell off 5/31 am, restarted 6/1  - Trend CBC and monitor temperature   - will need surgery and GI f/u after improvement in symptoms for ercp +- cholecystectomy   - MRCP done this am: Biliary duct dilation. Filling defect in the common bile duct   measuring 11 mm, concerning for choledocholithiasis. Cholelithiasis. Mild gallbladder wall thickening and pericholecystic edema. Percutaneous cholecystostomy tube. Bilateral small pleural effusions. Trace ascites.  - pending ERCP today     #hematuria  - ON episode x1 6/1  - hgb stable  - pt to continue on AC given recent DCCV    #Acute hypoxic respiratory failure possibly 2/2 aspiration , resolved   - Intubated 5/18 and extubated 5/24, now on RA  - now on RA w good sats  - Sputum cx 5/24 growing mold like fungus on prelim report and referred to mycology; f/u final results (pending pending recs)  - Duonebs as needed   - Encourage incentive spirometer and OOB to chair     #Diarrhea (resolved)  - Rectal tube dc  - Started having watery diarrhea shortly after admission that was for Initially suspected to be due to tube feeds   - Has been off tube feeds for days and continues to have diarrhea     #hypernatremia (improved)  - Likely multifactorial from ongoing diarrhea   - Was also on short course of bumex gtt during MICU course   - Monitor I/O's and lytes closely     #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 shock   - Required dobutamine (5/20-5/23) and milronone gtts (5/23-5/25)  - TTE 5/20 w/ LVEF 30-35% and remained unchanged on repeat TTE 5/22  - Clinically hypovolemic at this time likely from ongoing diarrhea and is NPO  - Giving short course of gentle IVFs but monitor vol status closely   - will gradually start gdmt as bp tolerates  - cw coreg  - cw entresto  - S/P LHC and 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  - No signs of cellulitis   - c/w supportive care including RUE elevation     #Small area of Rt toe ischemia   - Likely 2/2 levophed vs vasoplegia   - Monitor for resolution     #ATN from septic shock  - Resolved   - Caution w/ hyperchloremia as can cause OLEKSANDR   - Monitor renal function    #Transaminitis from shock liver and cholangitis   - Trend LFTs and coags      #Normocytic anemia   #Thrombocytopenia, resolving, 2/2 sepsis   - H/H mildly low and stable   - Plts improving and >50K therefore starting full dose AC for AF   - Remains hemodynamically stable   - No active bleeding reported   - BUN elevated but likely 2/2 recent steroids and down trending since steroids stopped   - c/w protonix for GI ppx  - Monitor CBC and transfuse for Hb<8     #Hyperammonemia, resolved    - Suspect 2/2 infection w/ e.coli as it is a ureas producing organisms which hydrolyzes urea to ammonium and ultimately converts to ammonia    - Started on rifaxamin in ICU but now d/c'd as pt has no hx of cirrhosis    VTE ppx: lovenox     Dispo: medically active. Pending ERCP +/- Surgery ottoniel    Spoke with daughter at bedside

## 2025-06-03 LAB
ALBUMIN SERPL ELPH-MCNC: 2.4 G/DL — LOW (ref 3.3–5.2)
ALP SERPL-CCNC: 441 U/L — HIGH (ref 40–120)
ALT FLD-CCNC: 104 U/L — HIGH
ANION GAP SERPL CALC-SCNC: 9 MMOL/L — SIGNIFICANT CHANGE UP (ref 5–17)
AST SERPL-CCNC: 116 U/L — HIGH
BILIRUB DIRECT SERPL-MCNC: 0.9 MG/DL — HIGH (ref 0–0.3)
BILIRUB INDIRECT FLD-MCNC: 0.9 MG/DL — SIGNIFICANT CHANGE UP (ref 0.2–1)
BILIRUB SERPL-MCNC: 1.7 MG/DL — SIGNIFICANT CHANGE UP (ref 0.4–2)
BUN SERPL-MCNC: 14.4 MG/DL — SIGNIFICANT CHANGE UP (ref 8–20)
CALCIUM SERPL-MCNC: 7.5 MG/DL — LOW (ref 8.4–10.5)
CHLORIDE SERPL-SCNC: 104 MMOL/L — SIGNIFICANT CHANGE UP (ref 96–108)
CO2 SERPL-SCNC: 25 MMOL/L — SIGNIFICANT CHANGE UP (ref 22–29)
CREAT SERPL-MCNC: 0.63 MG/DL — SIGNIFICANT CHANGE UP (ref 0.5–1.3)
EGFR: 99 ML/MIN/1.73M2 — SIGNIFICANT CHANGE UP
EGFR: 99 ML/MIN/1.73M2 — SIGNIFICANT CHANGE UP
GLUCOSE BLDC GLUCOMTR-MCNC: 123 MG/DL — HIGH (ref 70–99)
GLUCOSE BLDC GLUCOMTR-MCNC: 148 MG/DL — HIGH (ref 70–99)
GLUCOSE BLDC GLUCOMTR-MCNC: 281 MG/DL — HIGH (ref 70–99)
GLUCOSE BLDC GLUCOMTR-MCNC: 95 MG/DL — SIGNIFICANT CHANGE UP (ref 70–99)
GLUCOSE SERPL-MCNC: 118 MG/DL — HIGH (ref 70–99)
HCT VFR BLD CALC: 27.8 % — LOW (ref 39–50)
HGB BLD-MCNC: 9.5 G/DL — LOW (ref 13–17)
MCHC RBC-ENTMCNC: 29.1 PG — SIGNIFICANT CHANGE UP (ref 27–34)
MCHC RBC-ENTMCNC: 34.2 G/DL — SIGNIFICANT CHANGE UP (ref 32–36)
MCV RBC AUTO: 85.3 FL — SIGNIFICANT CHANGE UP (ref 80–100)
NRBC # BLD AUTO: 0 K/UL — SIGNIFICANT CHANGE UP (ref 0–0)
NRBC # FLD: 0 K/UL — SIGNIFICANT CHANGE UP (ref 0–0)
NRBC BLD AUTO-RTO: 0 /100 WBCS — SIGNIFICANT CHANGE UP (ref 0–0)
PLATELET # BLD AUTO: 246 K/UL — SIGNIFICANT CHANGE UP (ref 150–400)
PMV BLD: 11 FL — SIGNIFICANT CHANGE UP (ref 7–13)
POTASSIUM SERPL-MCNC: 3.6 MMOL/L — SIGNIFICANT CHANGE UP (ref 3.5–5.3)
POTASSIUM SERPL-SCNC: 3.6 MMOL/L — SIGNIFICANT CHANGE UP (ref 3.5–5.3)
PROT SERPL-MCNC: 4.9 G/DL — LOW (ref 6.6–8.7)
RBC # BLD: 3.26 M/UL — LOW (ref 4.2–5.8)
RBC # FLD: 14.4 % — SIGNIFICANT CHANGE UP (ref 10.3–14.5)
SODIUM SERPL-SCNC: 138 MMOL/L — SIGNIFICANT CHANGE UP (ref 135–145)
WBC # BLD: 5.57 K/UL — SIGNIFICANT CHANGE UP (ref 3.8–10.5)
WBC # FLD AUTO: 5.57 K/UL — SIGNIFICANT CHANGE UP (ref 3.8–10.5)

## 2025-06-03 PROCEDURE — 99233 SBSQ HOSP IP/OBS HIGH 50: CPT

## 2025-06-03 PROCEDURE — 99231 SBSQ HOSP IP/OBS SF/LOW 25: CPT

## 2025-06-03 PROCEDURE — G0545: CPT

## 2025-06-03 PROCEDURE — 99232 SBSQ HOSP IP/OBS MODERATE 35: CPT

## 2025-06-03 RX ORDER — FLUCONAZOLE 150 MG
400 TABLET ORAL ONCE
Refills: 0 | Status: COMPLETED | OUTPATIENT
Start: 2025-06-03 | End: 2025-06-03

## 2025-06-03 RX ORDER — FLUCONAZOLE 150 MG
TABLET ORAL
Refills: 0 | Status: DISCONTINUED | OUTPATIENT
Start: 2025-06-03 | End: 2025-06-05

## 2025-06-03 RX ORDER — FLUCONAZOLE 150 MG
200 TABLET ORAL DAILY
Refills: 0 | Status: DISCONTINUED | OUTPATIENT
Start: 2025-06-04 | End: 2025-06-05

## 2025-06-03 RX ORDER — CARVEDILOL 3.12 MG/1
6.25 TABLET, FILM COATED ORAL EVERY 12 HOURS
Refills: 0 | Status: DISCONTINUED | OUTPATIENT
Start: 2025-06-03 | End: 2025-06-05

## 2025-06-03 RX ADMIN — Medication 40 MILLIGRAM(S): at 14:42

## 2025-06-03 RX ADMIN — MEROPENEM 100 MILLIGRAM(S): 1 INJECTION INTRAVENOUS at 14:41

## 2025-06-03 RX ADMIN — CARVEDILOL 6.25 MILLIGRAM(S): 3.12 TABLET, FILM COATED ORAL at 06:15

## 2025-06-03 RX ADMIN — INSULIN LISPRO 3: 100 INJECTION, SOLUTION INTRAVENOUS; SUBCUTANEOUS at 11:48

## 2025-06-03 RX ADMIN — MEROPENEM 100 MILLIGRAM(S): 1 INJECTION INTRAVENOUS at 06:15

## 2025-06-03 RX ADMIN — Medication 1 APPLICATION(S): at 06:16

## 2025-06-03 RX ADMIN — Medication 400 MILLIGRAM(S): at 14:42

## 2025-06-03 RX ADMIN — MEROPENEM 100 MILLIGRAM(S): 1 INJECTION INTRAVENOUS at 22:54

## 2025-06-03 RX ADMIN — CARVEDILOL 6.25 MILLIGRAM(S): 3.12 TABLET, FILM COATED ORAL at 17:51

## 2025-06-03 RX ADMIN — SACUBITRIL AND VALSARTAN 1 TABLET(S): 49; 51 TABLET, FILM COATED ORAL at 17:58

## 2025-06-03 NOTE — PROGRESS NOTE ADULT - ASSESSMENT
74 y/o M w/ PMH of HTN, HLD, CAD, TIA, remote hx of pericarditis (2015) presented 5/18 to ED c/o abd pain, N/V.  In the ED pt was found to be hypotensive and failed fluid resuscitation and started on pressor support.  Pt also hypoxic and initially placed on BIPAP but eventually required intubation and admitted to MICU for septic shock 2/2 cholangitis, UTI and acute pancreatitis.  Pt also profoundly acidotic w/ ATN from septic shock, transaminitis from shock liver/cholangitis, lipase >3K, , LA>16, procal >100 and abd US w/ distended GB w/ stones and CT a/p w/ cholelithiasis but pancreas reported to be normal.  GI and surgery consulted but pt deemed too unstable to undergo any procedures and ultimately had IR guided primitivo tube placed 5/19 and dark malodorous bile drained and sent for cultures which grew ESBL ecoli, citrobacter freundii, Enterococcus gallinarum and enterococcus faecalis.  Bcx 5/18 gew ESBL ecoli, Citerobacter freundii and strep gallalyticus.  Ucx 5/18 grew ESBL ecoli.  Repeat Bcx 5/19 grew only ESBL Ecoli and BCxs 5/20 NGTD.  Sputum cx 5/24 growing mold like fungus on prelim report and refered to mycology.  ID consulted and pt currently on merrem.  Pt required quadruple pressor support and given methylene blue 5/19 for vasoplegia and was on course of solucortef 5/19-5/25 to help wean off pressors in setting of septic shock.   Hospital course complicated by SVT 5/19 that required emergent cardioversion.  Pt also found to have high ammonia and placed on rifaxamin and lactulose but no known hx of cirrhosis and have since been d/c'd.  Pt successfully weaned off pressors 5/23.  During hospital course pt also found to have new rEF likely stress cardiomyopathy and required dobutamine (5/20-5/23) and milrinone gtts (5/23-5/25).  Hospital course further complicated by AF RVR requiring emergent cardioversion 5/21 and was amio loaded but now in NSR and not on amio.  Full dose AC was held despite AF w/ chadsvasc 6 bc severe thrombocytopenia likely 2/2 sepsis.  Pt extubated 5/24 and OGT removed.  Pt failed SLP eval and family declined NGT for now.  Pt will go for MBS tomorrow 5/27 to better assess his ability to swallow.  Pt medically stable for transfer to medicine for continued management.      #Septic shock 2/2 cholangitis, UTI and bacteremia   - s/p quadruple pressor support, methylene blue for vasoplegia and was on course of solucortef to finally wean off pressors 5/23  - Was on solucortef 5/19-5/25 to help wean off pressors   - Pt has up trending WBC which likely from recent steroid course   - US w/ distended GB w/ stones and CT a/p w/ cholelithiasis but pancreas reported to be normal  - IR guided primitivo tube placed 5/19 and drained dark malodorous bilious fluid drained and sent for Cxs which grew ESBL ecoli, citrobacter freundii, Enterococcus gallinarum and enterococcus faecalis  - Bcx 5/18 gew ESBL ecoli, Citerobacter freundii and strep gallalyticus, Bcx 5/19 grew only ESBL Ecoli and BCxs 5/20 NGTD  - Urine cx 5/18 grew ESBL ecoli  - Sputum cx 5/24 prelim reporting mold like fungus and referred to mycology; f/u final results - Per ID - to defer treatment  - c/w merrem as per ID recs; fell off 5/31 am, restarted 6/1  - Trend CBC and monitor temperature   - will need surgery and GI f/u after improvement in symptoms for ercp +- cholecystectomy   - MRCP done this am: Biliary duct dilation. Filling defect in the common bile duct   measuring 11 mm, concerning for choledocholithiasis. Cholelithiasis. Mild gallbladder wall thickening and pericholecystic edema. Percutaneous cholecystostomy tube. Bilateral small pleural effusions. Trace ascites.  - s/p ercp, XGS consult for primitivo    #hematuria  - ON episode x1 6/1  - hgb stable  - pt to continue on AC given recent DCCV    #Acute hypoxic respiratory failure possibly 2/2 aspiration , resolved   - Intubated 5/18 and extubated 5/24, now on RA  - now on RA w good sats  - Sputum cx 5/24 growing mold like fungus on prelim report and referred to mycology; f/u final results (pending pending recs)  - Duonebs as needed   - Encourage incentive spirometer and OOB to chair     #Diarrhea (resolved)  - Rectal tube dc  - Started having watery diarrhea shortly after admission that was for Initially suspected to be due to tube feeds   - Has been off tube feeds for days and continues to have diarrhea     #hypernatremia (improved)  - Likely multifactorial from ongoing diarrhea   - Was also on short course of bumex gtt during MICU course   - Monitor I/O's and lytes closely     #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 shock   - Required dobutamine (5/20-5/23) and milronone gtts (5/23-5/25)  - TTE 5/20 w/ LVEF 30-35% and remained unchanged on repeat TTE 5/22  - Clinically hypovolemic at this time likely from ongoing diarrhea and is NPO  - Giving short course of gentle IVFs but monitor vol status closely   - will gradually start gdmt as bp tolerates  - cw coreg  - cw entresto  - S/P LHC and 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  - No signs of cellulitis   - c/w supportive care including RUE elevation     #Small area of Rt toe ischemia   - Likely 2/2 levophed vs vasoplegia   - Monitor for resolution     #ATN from septic shock  - Resolved   - Caution w/ hyperchloremia as can cause OLEKSANDR   - Monitor renal function    #Transaminitis from shock liver and cholangitis   - Trend LFTs and coags      #Normocytic anemia   #Thrombocytopenia, resolving, 2/2 sepsis   - H/H mildly low and stable   - Plts improving and >50K therefore starting full dose AC for AF   - Remains hemodynamically stable   - No active bleeding reported   - BUN elevated but likely 2/2 recent steroids and down trending since steroids stopped   - c/w protonix for GI ppx  - Monitor CBC and transfuse for Hb<8     #Hyperammonemia, resolved    - Suspect 2/2 infection w/ e.coli as it is a ureas producing organisms which hydrolyzes urea to ammonium and ultimately converts to ammonia    - Started on rifaxamin in ICU but now d/c'd as pt has no hx of cirrhosis    VTE ppx: lovenox     Dispo: medically active. Pending ERCP +/- Surgery bayron-katty    Spoke with daughter at bedside

## 2025-06-03 NOTE — PROGRESS NOTE ADULT - ASSESSMENT
Patient with cholecystitis and choledocholithiasis.  Status post cholecystostomy tube placement.  Now status post ERCP with sphincterotomy and bile duct clearance.  Will recommend to reengage surgery for consideration for cholecystectomy while the patient is here.GI will sign off.  Call as needed.

## 2025-06-03 NOTE — PROGRESS NOTE ADULT - SUBJECTIVE AND OBJECTIVE BOX
[facility] Division of Hospital Medicine    INTERVAL HISTORY:  Overnight, no acute events.     Patient seen and examined at bedside this morning. Some pain at site of perc primitivo drain. Patient denies chest pain, SOB, abd pain, N/V, fever, chills, dysuria or any other complaints. No longer having any hematuria.     MEDICATIONS  (STANDING):  carvedilol 6.25 milliGRAM(s) Oral every 12 hours  chlorhexidine 2% Cloths 1 Application(s) Topical <User Schedule>  chlorhexidine 2% Cloths 1 Application(s) Topical <User Schedule>  dextrose 50% Injectable 25 Gram(s) IV Push once  dextrose 50% Injectable 12.5 Gram(s) IV Push once  dextrose 50% Injectable 25 Gram(s) IV Push once  fluconAZOLE   Tablet      insulin lispro (ADMELOG) corrective regimen sliding scale   SubCutaneous three times a day before meals  meropenem  IVPB 1000 milliGRAM(s) IV Intermittent every 8 hours  pantoprazole  Injectable 40 milliGRAM(s) IV Push daily  sacubitril 24 mG/valsartan 26 mG 1 Tablet(s) Oral two times a day    MEDICATIONS  (PRN):  bacitracin   Ointment 1 Application(s) Topical two times a day PRN wound care        I&O's Summary    02 Jun 2025 07:01  -  03 Jun 2025 07:00  --------------------------------------------------------  IN: 150 mL / OUT: 30 mL / NET: 120 mL    03 Jun 2025 07:01  -  03 Jun 2025 16:25  --------------------------------------------------------  IN: 0 mL / OUT: 210 mL / NET: -210 mL        PHYSICAL EXAM:  Vital Signs Last 24 Hrs  T(C): 36.9 (03 Jun 2025 16:21), Max: 36.9 (03 Jun 2025 16:21)  T(F): 98.4 (03 Jun 2025 16:21), Max: 98.4 (03 Jun 2025 16:21)  HR: 71 (03 Jun 2025 16:21) (68 - 80)  BP: 115/70 (03 Jun 2025 16:21) (82/51 - 125/65)  BP(mean): --  RR: 18 (03 Jun 2025 16:21) (15 - 20)  SpO2: 95% (03 Jun 2025 16:21) (94% - 95%)    Parameters below as of 03 Jun 2025 16:21  Patient On (Oxygen Delivery Method): room air        CONSTITUTIONAL: No apparent distress  HEENT: Normocephalic, Atraumatic  RESPIRATORY:  lungs are clear to auscultation bilaterally, No crackles, rhonchi, wheezes  CARDIOVASCULAR: Regular rate and rhythm, no lower extremity edema  ABDOMEN: Soft, non-distended, nontender to palpation, +BS  PSYCH: thoughts linear, affect appropriate  NEUROLOGY: Alert, Oriented x3    LABS:                        9.5    5.57  )-----------( 246      ( 03 Jun 2025 06:00 )             27.8     06-03    138  |  104  |  14.4  ----------------------------<  118[H]  3.6   |  25.0  |  0.63    Ca    7.5[L]      03 Jun 2025 06:00  Phos  2.9     06-02  Mg     1.5     06-02    TPro  4.9[L]  /  Alb  2.4[L]  /  TBili  1.7  /  DBili  0.9[H]  /  AST  116[H]  /  ALT  104[H]  /  AlkPhos  441[H]  06-03          Urinalysis Basic - ( 03 Jun 2025 06:00 )    Color: x / Appearance: x / SG: x / pH: x  Gluc: 118 mg/dL / Ketone: x  / Bili: x / Urobili: x   Blood: x / Protein: x / Nitrite: x   Leuk Esterase: x / RBC: x / WBC x   Sq Epi: x / Non Sq Epi: x / Bacteria: x        CAPILLARY BLOOD GLUCOSE      POCT Blood Glucose.: 281 mg/dL (03 Jun 2025 11:35)  POCT Blood Glucose.: 123 mg/dL (03 Jun 2025 06:13)  POCT Blood Glucose.: 130 mg/dL (02 Jun 2025 17:55)        RADIOLOGY & ADDITIONAL TESTS:  Results Reviewed

## 2025-06-03 NOTE — PROGRESS NOTE ADULT - ASSESSMENT
76 yo male with hx of HTN, HLD, CAD, TIA, remote pericarditis in 2015, presented to the ED with complaints of abdominal pain, nausea, vomiting for since the day prior.  Upon arrival to the ED patient was lethargic and tachypneic, with abdominal distention and mottled skin.    He was noted to be hypotensive despite fluid resuscitation and placed on escalating doses of pressors.    Started on Bipap initially for tachypnea and metabolic acidosis, eventually intubated in the ED.  Labs revealed severe metabolic acidosis. OLEKSANDR, transaminitis     AS ABOVE ADMITTED WITH ABD PAIN HYPOTENSION BLOOD CX POLYMICROBIAL   ULTIMATELY HAD CHOLECYSTOTOMY TUBE PLACED ALSO POLYMICROBIAL  BLOOD CX WITH ESBL ECOLI AND CITROBACTER  BILE CX E-COLI ENTEROCOCCAL FAECALIS AND E GALLINARUM     PT ON MERREM WHICH  WILL COVER ESBL AND STREP  MERREM HAS SOME COVERAGE FOR ENTEROCOCCUS   ALTHOUGH HE DID NOT HAVE ENTEROCOCCAL BACTEREMIA and  THE  SIGNIFICANCE IN BILE FLUID  UNCLEAR WOULD CONTINUE CURRENT IV MeRERRM     SPUTUM WITH POSITIVE MOLD IDENTIFIES AS FWE ASPERGILLUS FUMIGATAS  MAYBE COLONIZATION WILL DEFER TX FOR THIS    s/p ERCP on 6/2 >> sphincterotomy and bile duct clearance. Esophageal candidiasis noted during exam   Will start fluconazole 400 mg x 1, followed by 200 mg daily   For surgery tomorrow?    d/w hospitalist  d/w patient and family at bedside

## 2025-06-03 NOTE — PROGRESS NOTE ADULT - SUBJECTIVE AND OBJECTIVE BOX
INTERVAL HPI/OVERNIGHT EVENTS:Patient status post ERCP.  Patient with no complaints.    MEDICATIONS  (STANDING):  carvedilol 6.25 milliGRAM(s) Oral every 12 hours  chlorhexidine 2% Cloths 1 Application(s) Topical <User Schedule>  chlorhexidine 2% Cloths 1 Application(s) Topical <User Schedule>  dextrose 5% + sodium chloride 0.45%. 1000 milliLiter(s) (75 mL/Hr) IV Continuous <Continuous>  dextrose 50% Injectable 25 Gram(s) IV Push once  dextrose 50% Injectable 12.5 Gram(s) IV Push once  dextrose 50% Injectable 25 Gram(s) IV Push once  insulin lispro (ADMELOG) corrective regimen sliding scale   SubCutaneous three times a day before meals  meropenem  IVPB 1000 milliGRAM(s) IV Intermittent every 8 hours  pantoprazole  Injectable 40 milliGRAM(s) IV Push daily  sacubitril 24 mG/valsartan 26 mG 1 Tablet(s) Oral two times a day  sodium chloride 0.9%. 1000 milliLiter(s) (75 mL/Hr) IV Continuous <Continuous>    MEDICATIONS  (PRN):  bacitracin   Ointment 1 Application(s) Topical two times a day PRN wound care      Allergies    No Known Drug Allergies  SHRIMP (Unknown)  iv dye (Unknown)    Intolerances        Vital Signs Last 24 Hrs  T(C): 36.8 (03 Jun 2025 04:32), Max: 36.8 (02 Jun 2025 18:00)  T(F): 98.3 (03 Jun 2025 04:32), Max: 98.3 (03 Jun 2025 04:32)  HR: 72 (03 Jun 2025 04:32) (68 - 91)  BP: 94/64 (03 Jun 2025 04:32) (87/51 - 125/65)  BP(mean): --  RR: 18 (03 Jun 2025 04:32) (15 - 20)  SpO2: 95% (03 Jun 2025 04:32) (92% - 100%)    Parameters below as of 02 Jun 2025 21:45  Patient On (Oxygen Delivery Method): room air        LABS:                        9.5    5.57  )-----------( 246      ( 03 Jun 2025 06:00 )             27.8     06-03    138  |  104  |  14.4  ----------------------------<  118[H]  3.6   |  25.0  |  0.63    Ca    7.5[L]      03 Jun 2025 06:00  Phos  2.9     06-02  Mg     1.5     06-02    TPro  4.9[L]  /  Alb  2.4[L]  /  TBili  1.7  /  DBili  0.9[H]  /  AST  116[H]  /  ALT  104[H]  /  AlkPhos  441[H]  06-03      Urinalysis Basic - ( 03 Jun 2025 06:00 )    Color: x / Appearance: x / SG: x / pH: x  Gluc: 118 mg/dL / Ketone: x  / Bili: x / Urobili: x   Blood: x / Protein: x / Nitrite: x   Leuk Esterase: x / RBC: x / WBC x   Sq Epi: x / Non Sq Epi: x / Bacteria: x        RADIOLOGY & ADDITIONAL TESTS:

## 2025-06-03 NOTE — PROGRESS NOTE ADULT - SUBJECTIVE AND OBJECTIVE BOX
[facility]  INFECTIOUS DISEASES at [place] and [place]  ===============================================================                  Rubin Lau MD               Diplomates American Board of Internal Medicine & Infectious Diseases                * [place] Office - Appt - Tel  [phone] Fax [phone]                * [place] Office - Appt - Tel [phone] Fax [phone]                                  Hospital Consult line:  [phone]  ==============================================================    DOMINIC ALAMO [id number]    Follow up: polymicrobial bacteremia, cholecystitis, esophageal candidiasis     s/p ERCP yesterday   No acute events overnight   afebrile   hemodynamically stable     Wife and daughter at bedside    I have personally reviewed the labs and data; pertinent labs and data are listed in this note; please see below.     _______________________________________________________________  REVIEW OF SYSTEMS  Feeling much better  Pain only at drain site   No nausea, vomiting or diarrhea.   Admits to some discomfort when swallowing, but no overt dysphagia or odynophagia      services not required as provider is fluent in [place].   ________________________________________________________________  Allergies:  No Known Drug Allergies  SHRIMP (Unknown)  iv dye (Unknown)    ________________________________________________________________  PHYSICAL EXAM  GEN: in NAD, lying in bed.   HEENT: Anicteric sclerae. Moist mucous membranes. No mucosal lesions.   NECK: Supple.   LUNGS: eupneic. CTA B/L.  HEART: RRR, no m/r/g  ABDOMEN: Soft, NT, ND, primitivo tube with bile.    : No Petersen catheter  PSYCHIATRIC: Appropriate affect and mood  SKIN: No rash  LINES: PIV   ________________________________________________________________  Vitals:  T(F): 97.7 (03 Jun 2025 11:10), Max: 98.3 (03 Jun 2025 04:32)  HR: 79 (03 Jun 2025 11:10)  BP: 84/44 (03 Jun 2025 11:14)  RR: 18 (03 Jun 2025 11:10)  SpO2: 94% (03 Jun 2025 11:10) (94% - 95%)  temp max in last 48H T(F): , Max: 98.9 (06-01-25 @ 20:56)    Current Antibiotics:  meropenem  IVPB 1000 milliGRAM(s) IV Intermittent every 8 hours    Other medications:  carvedilol 6.25 milliGRAM(s) Oral every 12 hours  chlorhexidine 2% Cloths 1 Application(s) Topical <User Schedule>  chlorhexidine 2% Cloths 1 Application(s) Topical <User Schedule>  dextrose 5% + sodium chloride 0.45%. 1000 milliLiter(s) IV Continuous <Continuous>  dextrose 50% Injectable 25 Gram(s) IV Push once  dextrose 50% Injectable 12.5 Gram(s) IV Push once  dextrose 50% Injectable 25 Gram(s) IV Push once  insulin lispro (ADMELOG) corrective regimen sliding scale   SubCutaneous three times a day before meals  pantoprazole  Injectable 40 milliGRAM(s) IV Push daily  sacubitril 24 mG/valsartan 26 mG 1 Tablet(s) Oral two times a day  sodium chloride 0.9%. 1000 milliLiter(s) IV Continuous <Continuous>                            9.5    5.57  )-----------( 246      ( 03 Jun 2025 06:00 )             27.8     06-03    138  |  104  |  14.4  ----------------------------<  118[H]  3.6   |  25.0  |  0.63    Ca    7.5[L]      03 Jun 2025 06:00  Phos  2.9     06-02  Mg     1.5     06-02    TPro  4.9[L]  /  Alb  2.4[L]  /  TBili  1.7  /  DBili  0.9[H]  /  AST  116[H]  /  ALT  104[H]  /  AlkPhos  441[H]  06-03    RECENT CULTURES:  05-24 @ 05:15 Sputum Sputum     Few Aspergillus fumigatus complex  Commensal yair consistent with body site    Few polymorphonuclear leukocytes per low power field  No Squamous epithelial cells per low power field  Moderate Yeast like cells per oil power field      05-21 @ 02:50 Blood Blood     No growth at 5 days    WBC Count: 5.57 K/uL (06-03-25 @ 06:00)  WBC Count: 7.35 K/uL (06-02-25 @ 05:52)  WBC Count: 9.30 K/uL (06-01-25 @ 07:29)  WBC Count: 10.35 K/uL (05-31-25 @ 21:10)  WBC Count: 9.08 K/uL (05-31-25 @ 06:28)  WBC Count: 12.18 K/uL (05-30-25 @ 04:35)    Creatinine: 0.63 mg/dL (06-03-25 @ 06:00)  Creatinine: 0.46 mg/dL (06-02-25 @ 05:52)  Creatinine: 0.55 mg/dL (06-01-25 @ 07:29)  Creatinine: 0.52 mg/dL (05-31-25 @ 06:28)  Creatinine: 0.53 mg/dL (05-30-25 @ 04:35)      Procalcitonin: 83.01 ng/mL (05-21-25 @ 14:39)     SARS-CoV-2 Result: NotDetec (05-18-25 @ 22:11)      _______________________________________________________________  CARDIOLOGY   ________________________________________________________________  RADIOLOGY  < from: MR MRCP No Cont (05.31.25 @ 09:54) >  FINDINGS:  Sequences are motion degraded.    LOWER CHEST: Bilateral small pleural effusions and bibasilar atelectasis.    LIVER: Within normal limits.  BILE DUCTS: Extrahepatic and mild intrahepatic biliary duct dilation, the   common bile duct measuring up to 11 mm in caliber. There is a T2   hypointense fillingdefect in the distal common bile duct measuring 10   mm, concerning for choledocholithiasis.  GALLBLADDER: Cholelithiasis. Mild wall thickening and pericholecystic   edema. Percutaneous cholecystostomy tube.  SPLEEN: Within normal limits.  PANCREAS: Within normal limits.  ADRENALS: Within normal limits.  KIDNEYS/URETERS: No hydronephrosis. There is a 1.5 cm T2 hyperintense   lesion in the lower pole of the left kidney, probably representing a cyst.    VISUALIZED PORTIONS:  BOWEL: Within normal limits.  PERITONEUM: Trace ascites.  VESSELS: Within normal limits.  RETROPERITONEUM/LYMPH NODES: No lymphadenopathy.  ABDOMINAL WALL: Small fat-containing umbilical hernia.  BONES: Within normal limits.    IMPRESSION:  *  Biliary duct dilation. Fillingdefect in the common bile duct   measuring 11 mm, concerning for choledocholithiasis.  *  Cholelithiasis. Mild gallbladder wall thickening and pericholecystic   edema. Percutaneous cholecystostomy tube.  *  Bilateral small pleural effusions.  *  Trace ascites.    < end of copied text >

## 2025-06-03 NOTE — PROGRESS NOTE ADULT - SUBJECTIVE AND OBJECTIVE BOX
DOMINIC ALAMO  [id number]    Chief Complaint:   follow up PAF/ CMP    Subjective:  tired, comfortable lying flat    24 hour Tele:   No events        bacitracin   Ointment 1 Application(s) Topical two times a day PRN  carvedilol 6.25 milliGRAM(s) Oral every 12 hours  chlorhexidine 2% Cloths 1 Application(s) Topical <User Schedule>  chlorhexidine 2% Cloths 1 Application(s) Topical <User Schedule>  dextrose 50% Injectable 25 Gram(s) IV Push once  dextrose 50% Injectable 12.5 Gram(s) IV Push once  dextrose 50% Injectable 25 Gram(s) IV Push once  fluconAZOLE   Tablet      insulin lispro (ADMELOG) corrective regimen sliding scale   SubCutaneous three times a day before meals  meropenem  IVPB 1000 milliGRAM(s) IV Intermittent every 8 hours  pantoprazole  Injectable 40 milliGRAM(s) IV Push daily  sacubitril 24 mG/valsartan 26 mG 1 Tablet(s) Oral two times a day          Physical Exam:  T(C): 36.9 (25 @ 16:21), Max: 36.9 (25 @ 16:21)  HR: 75 (25 @ 17:21) (68 - 79)  BP: 112/69 (25 @ 17:21) (82/51 - 115/70)  RR: 18 (25 @ 16:21) (18 - 18)  SpO2: 95% (25 @ 16:21) (94% - 95%)  Wt(kg): --  General: Comfortable in NAD  Neck: No JVD  CVS: nl s1s2, no s3  Pulm: CTA b/l  Abd: soft, non-tender  Ext: No c/c/e  Neuro A&O x3  Psych: Normal affect      Labs:   2025 06:00    138    |  104    |  14.4   ----------------------------<  118    3.6     |  25.0   |  0.63     Ca    7.5        2025 06:00  Phos  2.9       2025 05:52  Mg     1.5       2025 05:52    TPro  4.9    /  Alb  2.4    /  TBili  1.7    /  DBili  0.9    /  AST  116    /  ALT  104    /  AlkPhos  441    2025 06:00                          9.5    5.57  )-----------( 246      ( 2025 06:00 )             27.8               Echo: 2025:    1. Left ventricular systolic function is severely decreased with an ejection fraction visually estimated at 30 to 35 %. Global left ventricular hypokinesis.   2. Enlarged right ventricular cavity size and moderately reduced right ventricular systolic function.   3. Left atrium is normal in size.   4. There is mild calcification of the mitral valve annulus.   5. Moderate to severe mitral regurgitation.   6. Moderate tricuspid regurgitation.   7. Compared to the transthoracic echocardiogram performed on 2025, the LV and RV are now better visualized.    Echo 2025:   1. Technically difficult image quality.   2. Left ventricular systolic function is moderately decreased with an ejection fraction visually estimated at 30 to 35 %. Global left ventricular hypokinesis.   3. There is no evidence of a left ventricular thrombus.   4. Enlarged right ventricular cavity size and moderately reduced right ventricular systolic function.   5. No pericardial effusion seen.   6. Compared to the transthoracic echocardiogram performed on 2025, there have been no significant interval changes.      CTA C/A/P:  No pulmonary embolus.  Airspace consolidations at the lung bases may represent atelectasis   and/or pneumonia.  No bowel obstruction.  Cholelithiasis.  Trace ascites.    Cath:  LM: Luminal irregularities    LAD: Prox  with left to left collaterals    LCX: Mild diffuse disease, OM with 40% mid stenosis    RCA: Severe calcium from prox to mid with mild disease, distal RCA with long segment severe 90% stenosis into the PDA.  LV EDP 7       Assessment:  76 yo male PMHx HTN, HLD, CAD based on abnormal nuke, TIA, remote pericarditis in 2015 presented to the ED lethargic and tachypneic, with abdominal distention and mottled skin. with complaints of abdominal pain, nausea, vomiting.  Hypotensive despite fluid resuscitation and placed on escalating doses of pressors and on BIPAP initially for tachypnea and metabolic acidosis, eventually intubated  Patient remains intubated and history taken from chart and d/w RN.  For a while patient was on escalating doses of pressors. Echo with severe LV dysfxn which appears new and likely stress myopathy.  CI on low end of normal on .  Likely sepsis from GNR septicemia from cholangitis as cause now s/p perc tube.  Noted AF with some RVR due to shock, , etc.  -DCCV for rapid AF while in MICU, reviewed tele and no VT.  -Patient extubated and doing well. Has remained stable off pressors/ inotropic support with normal CO.  -Still with runs of Afib, at time NSVT-Remains in SR now  -Repeated echo with persistent drop on LVEF  -[facility] with severe RCA dz and LAD , CABG recommended.  Plan to be done as OP when GB issues resolved.  -Patient with cholecystitis and choledocholithiasis.  Status post cholecystostomy tube placement.  Now status post ERCP with sphincterotomy and bile duct clearance.       Plan:  1. Continue carvedilol and entresto.  Will add MRA as OP.  Eventual SGLT2i after GB issues resolved.  2. Resume DOAC as no plans for surgical intervention.   3. Rest as per primary team and other consultants.   4. Patient high risk for cholecystectomy if planned.  Ultimately needs CABG prior to GB surgery.  5. No objection to d/c from CV perspective.    6. Please call us back with questions or concerns.

## 2025-06-04 LAB
ANION GAP SERPL CALC-SCNC: 12 MMOL/L — SIGNIFICANT CHANGE UP (ref 5–17)
BUN SERPL-MCNC: 8 MG/DL — SIGNIFICANT CHANGE UP (ref 8–20)
CALCIUM SERPL-MCNC: 7.7 MG/DL — LOW (ref 8.4–10.5)
CHLORIDE SERPL-SCNC: 103 MMOL/L — SIGNIFICANT CHANGE UP (ref 96–108)
CO2 SERPL-SCNC: 23 MMOL/L — SIGNIFICANT CHANGE UP (ref 22–29)
CREAT SERPL-MCNC: 0.61 MG/DL — SIGNIFICANT CHANGE UP (ref 0.5–1.3)
EGFR: 100 ML/MIN/1.73M2 — SIGNIFICANT CHANGE UP
EGFR: 100 ML/MIN/1.73M2 — SIGNIFICANT CHANGE UP
GLUCOSE BLDC GLUCOMTR-MCNC: 112 MG/DL — HIGH (ref 70–99)
GLUCOSE BLDC GLUCOMTR-MCNC: 163 MG/DL — HIGH (ref 70–99)
GLUCOSE BLDC GLUCOMTR-MCNC: 173 MG/DL — HIGH (ref 70–99)
GLUCOSE BLDC GLUCOMTR-MCNC: 179 MG/DL — HIGH (ref 70–99)
GLUCOSE SERPL-MCNC: 150 MG/DL — HIGH (ref 70–99)
HCT VFR BLD CALC: 31.1 % — LOW (ref 39–50)
HGB BLD-MCNC: 10.4 G/DL — LOW (ref 13–17)
MAGNESIUM SERPL-MCNC: 1.4 MG/DL — LOW (ref 1.6–2.6)
MCHC RBC-ENTMCNC: 28.9 PG — SIGNIFICANT CHANGE UP (ref 27–34)
MCHC RBC-ENTMCNC: 33.4 G/DL — SIGNIFICANT CHANGE UP (ref 32–36)
MCV RBC AUTO: 86.4 FL — SIGNIFICANT CHANGE UP (ref 80–100)
NRBC # BLD AUTO: 0 K/UL — SIGNIFICANT CHANGE UP (ref 0–0)
NRBC # FLD: 0 K/UL — SIGNIFICANT CHANGE UP (ref 0–0)
NRBC BLD AUTO-RTO: 0 /100 WBCS — SIGNIFICANT CHANGE UP (ref 0–0)
PHOSPHATE SERPL-MCNC: 2.5 MG/DL — SIGNIFICANT CHANGE UP (ref 2.4–4.7)
PLATELET # BLD AUTO: 277 K/UL — SIGNIFICANT CHANGE UP (ref 150–400)
PMV BLD: 10.6 FL — SIGNIFICANT CHANGE UP (ref 7–13)
POTASSIUM SERPL-MCNC: 3.6 MMOL/L — SIGNIFICANT CHANGE UP (ref 3.5–5.3)
POTASSIUM SERPL-SCNC: 3.6 MMOL/L — SIGNIFICANT CHANGE UP (ref 3.5–5.3)
RBC # BLD: 3.6 M/UL — LOW (ref 4.2–5.8)
RBC # FLD: 14.5 % — SIGNIFICANT CHANGE UP (ref 10.3–14.5)
SODIUM SERPL-SCNC: 138 MMOL/L — SIGNIFICANT CHANGE UP (ref 135–145)
WBC # BLD: 6.2 K/UL — SIGNIFICANT CHANGE UP (ref 3.8–10.5)
WBC # FLD AUTO: 6.2 K/UL — SIGNIFICANT CHANGE UP (ref 3.8–10.5)

## 2025-06-04 PROCEDURE — 99233 SBSQ HOSP IP/OBS HIGH 50: CPT

## 2025-06-04 PROCEDURE — G0545: CPT

## 2025-06-04 PROCEDURE — 99232 SBSQ HOSP IP/OBS MODERATE 35: CPT

## 2025-06-04 RX ADMIN — Medication 200 MILLIGRAM(S): at 12:32

## 2025-06-04 RX ADMIN — SACUBITRIL AND VALSARTAN 1 TABLET(S): 49; 51 TABLET, FILM COATED ORAL at 05:48

## 2025-06-04 RX ADMIN — MEROPENEM 100 MILLIGRAM(S): 1 INJECTION INTRAVENOUS at 12:29

## 2025-06-04 RX ADMIN — INSULIN LISPRO 1: 100 INJECTION, SOLUTION INTRAVENOUS; SUBCUTANEOUS at 08:05

## 2025-06-04 RX ADMIN — MEROPENEM 100 MILLIGRAM(S): 1 INJECTION INTRAVENOUS at 22:02

## 2025-06-04 RX ADMIN — SACUBITRIL AND VALSARTAN 1 TABLET(S): 49; 51 TABLET, FILM COATED ORAL at 17:58

## 2025-06-04 RX ADMIN — CARVEDILOL 6.25 MILLIGRAM(S): 3.12 TABLET, FILM COATED ORAL at 17:57

## 2025-06-04 RX ADMIN — INSULIN LISPRO 1: 100 INJECTION, SOLUTION INTRAVENOUS; SUBCUTANEOUS at 12:30

## 2025-06-04 RX ADMIN — CARVEDILOL 6.25 MILLIGRAM(S): 3.12 TABLET, FILM COATED ORAL at 05:48

## 2025-06-04 RX ADMIN — Medication 40 MILLIGRAM(S): at 12:32

## 2025-06-04 RX ADMIN — MEROPENEM 100 MILLIGRAM(S): 1 INJECTION INTRAVENOUS at 05:52

## 2025-06-04 RX ADMIN — Medication 1 APPLICATION(S): at 05:51

## 2025-06-04 RX ADMIN — Medication 1 APPLICATION(S): at 05:47

## 2025-06-04 NOTE — PROGRESS NOTE ADULT - ASSESSMENT
76 y/o M w/ PMH of HTN, HLD, CAD, TIA, remote hx of pericarditis (2015) presented 5/18 to ED c/o abd pain, N/V.  In the ED pt was found to be hypotensive and failed fluid resuscitation and started on pressor support.  Pt also hypoxic and initially placed on BIPAP but eventually required intubation and admitted to MICU for septic shock 2/2 cholangitis, UTI and acute pancreatitis.  Pt also profoundly acidotic w/ ATN from septic shock, transaminitis from shock liver/cholangitis, lipase >3K, , LA>16, procal >100 and abd US w/ distended GB w/ stones and CT a/p w/ cholelithiasis but pancreas reported to be normal.  GI and surgery consulted but pt deemed too unstable to undergo any procedures and ultimately had IR guided primitivo tube placed 5/19 and dark malodorous bile drained and sent for cultures which grew ESBL ecoli, citrobacter freundii, Enterococcus gallinarum and enterococcus faecalis.  Bcx 5/18 gew ESBL ecoli, Citerobacter freundii and strep gallalyticus.  Ucx 5/18 grew ESBL ecoli.  Repeat Bcx 5/19 grew only ESBL Ecoli and BCxs 5/20 NGTD.  Sputum cx 5/24 growing mold like fungus on prelim report and refered to mycology.  ID consulted and pt currently on merrem.  Pt required quadruple pressor support and given methylene blue 5/19 for vasoplegia and was on course of solucortef 5/19-5/25 to help wean off pressors in setting of septic shock.   Hospital course complicated by SVT 5/19 that required emergent cardioversion.  Pt also found to have high ammonia and placed on rifaxamin and lactulose but no known hx of cirrhosis and have since been d/c'd.  Pt successfully weaned off pressors 5/23.  During hospital course pt also found to have new rEF likely stress cardiomyopathy and required dobutamine (5/20-5/23) and milrinone gtts (5/23-5/25).  Hospital course further complicated by AF RVR requiring emergent cardioversion 5/21 and was amio loaded but now in NSR and not on amio.  Full dose AC was held despite AF w/ chadsvasc 6 bc severe thrombocytopenia likely 2/2 sepsis.  Pt extubated 5/24 and OGT removed.  Pt failed SLP eval and family declined NGT for now.  Pt will go for MBS tomorrow 5/27 to better assess his ability to swallow.  Pt medically stable for transfer to medicine for continued management.      #Septic shock 2/2 cholangitis, UTI and bacteremia   - s/p quadruple pressor support, methylene blue for vasoplegia and was on course of solucortef to finally wean off pressors 5/23  - Was on solucortef 5/19-5/25 to help wean off pressors   - Pt has up trending WBC which likely from recent steroid course   - US w/ distended GB w/ stones and CT a/p w/ cholelithiasis but pancreas reported to be normal  - IR guided primitivo tube placed 5/19 and drained dark malodorous bilious fluid drained and sent for Cxs which grew ESBL ecoli, citrobacter freundii, Enterococcus gallinarum and enterococcus faecalis  - Bcx 5/18 gew ESBL ecoli, Citerobacter freundii and strep gallalyticus, Bcx 5/19 grew only ESBL Ecoli and BCxs 5/20 NGTD  - Urine cx 5/18 grew ESBL ecoli  - Sputum cx 5/24 prelim reporting mold like fungus and referred to mycology; f/u final results - Per ID - to defer treatment  - c/w merrem as per ID recs; fell off 5/31 am, restarted 6/1  - Trend CBC and monitor temperature   - will need surgery and GI f/u after improvement in symptoms for ercp +- cholecystectomy   - MRCP done this am: Biliary duct dilation. Filling defect in the common bile duct   measuring 11 mm, concerning for choledocholithiasis. Cholelithiasis. Mild gallbladder wall thickening and pericholecystic edema. Percutaneous cholecystostomy tube. Bilateral small pleural effusions. Trace ascites.  - s/p ercp, XGS consult for primitivo    #hematuria  - ON episode x1 6/1  - hgb stable  - pt to continue on AC given recent DCCV    #Acute hypoxic respiratory failure possibly 2/2 aspiration , resolved   - Intubated 5/18 and extubated 5/24, now on RA  - now on RA w good sats  - Sputum cx 5/24 growing mold like fungus on prelim report and referred to mycology; f/u final results (pending pending recs)  - Duonebs as needed   - Encourage incentive spirometer and OOB to chair     #Diarrhea (resolved)  - Rectal tube dc  - Started having watery diarrhea shortly after admission that was for Initially suspected to be due to tube feeds   - Has been off tube feeds for days and continues to have diarrhea     #hypernatremia (improved)  - Likely multifactorial from ongoing diarrhea   - Was also on short course of bumex gtt during MICU course   - Monitor I/O's and lytes closely     #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 shock   - Required dobutamine (5/20-5/23) and milronone gtts (5/23-5/25)  - TTE 5/20 w/ LVEF 30-35% and remained unchanged on repeat TTE 5/22  - Clinically hypovolemic at this time likely from ongoing diarrhea and is NPO  - Giving short course of gentle IVFs but monitor vol status closely   - will gradually start gdmt as bp tolerates  - cw coreg  - cw entresto  - S/P LHC and 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  - No signs of cellulitis   - c/w supportive care including RUE elevation     #Small area of Rt toe ischemia   - Likely 2/2 levophed vs vasoplegia   - Monitor for resolution     #ATN from septic shock  - Resolved   - Caution w/ hyperchloremia as can cause OLEKSANDR   - Monitor renal function    #Transaminitis from shock liver and cholangitis   - Trend LFTs and coags      #Normocytic anemia   #Thrombocytopenia, resolving, 2/2 sepsis   - H/H mildly low and stable   - Plts improving and >50K therefore starting full dose AC for AF   - Remains hemodynamically stable   - No active bleeding reported   - BUN elevated but likely 2/2 recent steroids and down trending since steroids stopped   - c/w protonix for GI ppx  - Monitor CBC and transfuse for Hb<8     #Hyperammonemia, resolved    - Suspect 2/2 infection w/ e.coli as it is a ureas producing organisms which hydrolyzes urea to ammonium and ultimately converts to ammonia    - Started on rifaxamin in ICU but now d/c'd as pt has no hx of cirrhosis    VTE ppx: lovenox     Dispo: pending ID abx recs     Spoke with daughter at bedside

## 2025-06-04 NOTE — PROGRESS NOTE ADULT - ASSESSMENT
76 yo male with hx of HTN, HLD, CAD, TIA, remote pericarditis in 2015, presented to the ED with complaints of abdominal pain, nausea, vomiting for since the day prior.  Upon arrival to the ED patient was lethargic and tachypneic, with abdominal distention and mottled skin.    He was noted to be hypotensive despite fluid resuscitation and placed on escalating doses of pressors.    Started on Bipap initially for tachypnea and metabolic acidosis, eventually intubated in the ED.  Labs revealed severe metabolic acidosis. OLEKSANDR, transaminitis     AS ABOVE ADMITTED WITH ABD PAIN HYPOTENSION BLOOD CX POLYMICROBIAL   ULTIMATELY HAD CHOLECYSTOTOMY TUBE PLACED ALSO POLYMICROBIAL  BLOOD CX WITH ESBL ECOLI AND CITROBACTER  BILE CX E-COLI ENTEROCOCCAL FAECALIS AND E GALLINARUM     PT ON MERREM WHICH  WILL COVER ESBL AND STREP  MERREM HAS SOME COVERAGE FOR ENTEROCOCCUS   ALTHOUGH HE DID NOT HAVE ENTEROCOCCAL BACTEREMIA and  THE  SIGNIFICANCE IN BILE FLUID  UNCLEAR WOULD CONTINUE CURRENT IV MeRERRM     SPUTUM WITH POSITIVE MOLD IDENTIFIES AS FWE ASPERGILLUS FUMIGATAS  MAYBE COLONIZATION WILL DEFER TX FOR THIS    s/p ERCP on 6/2 >> sphincterotomy and bile duct clearance. Esophageal candidiasis noted during exam    STARTED ON  fluconazole 400 mg x 1, followed by 200 mg daily    ?CHOLECYSTECTOMY     SPOKE TO FAMILY AT BEDSIDE  CONTINUE MERREM FOR NOW

## 2025-06-04 NOTE — CHART NOTE - NSCHARTNOTEFT_GEN_A_CORE
Source: Patient, Family and Chart review    Current Diet:   Easy to Chew (06-03-25 @ 17:14) [Active]    Patient reports tolerating current prescribed diet. Denies any recent episode of nausea, vomiting, diarrhea or constipation.     PO intake:  %  [X]      Source for PO intake: Patient and family at bedside    Current Weight:   (6/4) 151.2 lbs  (6/3) 167.5 lbs  (5/30) 179.8 lbs  (5/25) 179.4 lbs    Pertinent Medications:   MEDICATIONS  (STANDING):  carvedilol 6.25 milliGRAM(s) Oral every 12 hours  fluconAZOLE   Tablet 200 milliGRAM(s) Oral daily  insulin lispro (ADMELOG) corrective regimen sliding scale   SubCutaneous three times a day before meals  meropenem  IVPB 1000 milliGRAM(s) IV Intermittent every 8 hours  sacubitril 24 mG/valsartan 26 mG 1 Tablet(s) Oral two times a day    MEDICATIONS  (PRN):  bacitracin   Ointment 1 Application(s) Topical two times a day PRN wound care    Pertinent Labs:   WBC Count : 6.20 K/uL  RBC Count : 3.60 M/uL  Hemoglobin : 10.4 g/dL  Hematocrit : 31.1 %  Platelet Count - Automated : 277 K/uL  Magnesium: 1.4 mg/dL [L]  POCT Blood Glucose.: 179 mg/dL (04 Jun 2025 12:25)    Skin: no pressure injuries noted   Nutrition focused physical exam conducted 5/23 with findings of Moderate malnutrition    Estimated Needs:   [X] no change since previous assessment  ~7370-5220 kcals/day (based on 25-30 kcal/kg BW 78.1 kg)  ~78-94 g pro/day (based on 1.0-1.2 g/kg BW 78.1 kg)    76 yo Male admitted for septic shock secondary to cholangitis, UTI and bacteriemia, Acute hypoxic respiratory failure possibly 2/2 aspiration, 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    Hospital Course:   Per chat " In the ED pt was found to be hypotensive and failed fluid resuscitation and started on pressor support.  Pt also hypoxic and initially placed on BIPAP but eventually required intubation and admitted to MICU for septic shock 2/2 cholangitis, UTI and acute pancreatitis.  Pt also profoundly acidotic w/ ATN from septic shock, transaminitis from shock liver/cholangitis, lipase >3K, , LA>16, procal >100 and abd US w/ distended GB w/ stones and CT a/p w/ cholelithiasis but pancreas reported to be normal.  GI and surgery consulted but pt deemed too unstable to undergo any procedures and ultimately had IR guided primitivo tube placed 5/19 and dark malodorous bile drained and sent for cultures which grew ESBL ecoli, citrobacter freundii, Enterococcus gallinarum and enterococcus faecalis.  Bcx 5/18 gew ESBL ecoli, Citerobacter freundii and strep gallalyticus.  Ucx 5/18 grew ESBL ecoli.  Repeat Bcx 5/19 grew only ESBL Ecoli and BCxs 5/20 NGTD.  Sputum cx 5/24 growing mold like fungus on prelim report and refered to mycology.  ID consulted and pt currently on merrem.  Pt required quadruple pressor support and given methylene blue 5/19 for vasoplegia and was on course of solucortef 5/19-5/25 to help wean off pressors in setting of septic shock.   Hospital course complicated by SVT 5/19 that required emergent cardioversion.  Pt also found to have high ammonia and placed on rifaxamin and lactulose but no known hx of cirrhosis and have since been d/c'd.  Pt successfully weaned off pressors 5/23.  During hospital course pt also found to have new rEF likely stress cardiomyopathy and required dobutamine (5/20-5/23) and milrinone gtts (5/23-5/25).  Hospital course further complicated by AF RVR requiring emergent cardioversion 5/21 and was amio loaded but now in NSR and not on amio.  Full dose AC was held despite AF w/ chadsvasc 6 bc severe thrombocytopenia likely 2/2 sepsis.  Pt extubated 5/24 and OGT removed.      Current Nutrition Diagnosis: Acute-moderate protein-calorie malnutrition related to inability to consume adequate kcals/protein in setting of acute respiratory failure and acute illness pta as evidenced by meeting < 75% est needs > 7 days, +1 edema, mild muscle/fat wasting.     ** MBS completed 5/28/25 with recommendations for easy to chew with thin liquids.     Recommendations:   1. Recommend Regular diet with easy to chew and thin liquids consistencies.  2. Pt at risk of refeeding syndrome, recommend adding Thiamine daily.  3. Recommend MVI daily, vitamin C (500mg daily), and zinc sulfate (220mg daily x 10 days) to aid in wound healing.   4. Monitor diet tolerance & PO intake, weight/hydration status, nutrition-related labs, and skin.     Monitoring and Evaluation:   [X] PO intake [X] Tolerance to diet prescription [X] Weights  [X] Follow up per protocol [X] Labs: Chem 8. Source: Patient, Family and Chart review    Current Diet:   Easy to Chew (06-03-25 @ 17:14) [Active]    Patient reports tolerating current prescribed diet. Denies any recent episode of nausea, vomiting, diarrhea or constipation.     PO intake:  %  [X]      Source for PO intake: Patient and family at bedside    Current Weight:   (6/4) 151.2 lbs  (6/3) 167.5 lbs  (5/30) 179.8 lbs  (5/25) 179.4 lbs    Pertinent Medications:   MEDICATIONS  (STANDING):  carvedilol 6.25 milliGRAM(s) Oral every 12 hours  fluconAZOLE   Tablet 200 milliGRAM(s) Oral daily  insulin lispro (ADMELOG) corrective regimen sliding scale   SubCutaneous three times a day before meals  meropenem  IVPB 1000 milliGRAM(s) IV Intermittent every 8 hours  sacubitril 24 mG/valsartan 26 mG 1 Tablet(s) Oral two times a day    MEDICATIONS  (PRN):  bacitracin   Ointment 1 Application(s) Topical two times a day PRN wound care    Pertinent Labs:   WBC Count : 6.20 K/uL  RBC Count : 3.60 M/uL  Hemoglobin : 10.4 g/dL  Hematocrit : 31.1 %  Platelet Count - Automated : 277 K/uL  Magnesium: 1.4 mg/dL [L]  POCT Blood Glucose.: 179 mg/dL (04 Jun 2025 12:25)    Skin: no pressure injuries noted   Nutrition focused physical exam conducted 5/23 with findings of Moderate malnutrition    Estimated Needs:   [X] no change since previous assessment  ~2397-1685 kcals/day (based on 25-30 kcal/kg BW 78.1 kg)  ~78-94 g pro/day (based on 1.0-1.2 g/kg BW 78.1 kg)    74 yo Male admitted for septic shock secondary to cholangitis, UTI and bacteriemia, Acute hypoxic respiratory failure possibly 2/2 aspiration, 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    Hospital Course:   Per chat " In the ED pt was found to be hypotensive and failed fluid resuscitation and started on pressor support.  Pt also hypoxic and initially placed on BIPAP but eventually required intubation and admitted to MICU for septic shock 2/2 cholangitis, UTI and acute pancreatitis.  Pt also profoundly acidotic w/ ATN from septic shock, transaminitis from shock liver/cholangitis, lipase >3K, , LA>16, procal >100 and abd US w/ distended GB w/ stones and CT a/p w/ cholelithiasis but pancreas reported to be normal.  GI and surgery consulted but pt deemed too unstable to undergo any procedures and ultimately had IR guided primitivo tube placed 5/19 and dark malodorous bile drained and sent for cultures which grew ESBL ecoli, citrobacter freundii, Enterococcus gallinarum and enterococcus faecalis.  Bcx 5/18 gew ESBL ecoli, Citerobacter freundii and strep gallalyticus.  Ucx 5/18 grew ESBL ecoli.  Repeat Bcx 5/19 grew only ESBL Ecoli and BCxs 5/20 NGTD.  Sputum cx 5/24 growing mold like fungus on prelim report and refered to mycology.  ID consulted and pt currently on merrem.  Pt required quadruple pressor support and given methylene blue 5/19 for vasoplegia and was on course of solucortef 5/19-5/25 to help wean off pressors in setting of septic shock.   Hospital course complicated by SVT 5/19 that required emergent cardioversion.  Pt also found to have high ammonia and placed on rifaxamin and lactulose but no known hx of cirrhosis and have since been d/c'd.  Pt successfully weaned off pressors 5/23.  During hospital course pt also found to have new rEF likely stress cardiomyopathy and required dobutamine (5/20-5/23) and milrinone gtts (5/23-5/25).  Hospital course further complicated by AF RVR requiring emergent cardioversion 5/21 and was amio loaded but now in NSR and not on amio.  Full dose AC was held despite AF w/ chadsvasc 6 bc severe thrombocytopenia likely 2/2 sepsis.  Pt extubated 5/24 and OGT removed.      Current Nutrition Diagnosis: Acute-moderate protein-calorie malnutrition related to inability to consume adequate kcals/protein in setting of acute respiratory failure and acute illness pta as evidenced by meeting < 75% est needs > 7 days, +1 edema, mild muscle/fat wasting.     ** MBS completed 5/28/25 with recommendations for easy to chew with thin liquids.     Recommendations:   1. Recommend Regular diet with easy to chew and thin liquids consistencies.  2. Recommend MVI daily, vitamin C (500mg daily), and zinc sulfate (220mg daily x 10 days) to aid in wound healing.   3. Monitor diet tolerance & PO intake, weight/hydration status, nutrition-related labs, and skin.     Monitoring and Evaluation:   [X] PO intake [X] Tolerance to diet prescription [X] Weights  [X] Follow up per protocol [X] Labs: Chem 8.

## 2025-06-04 NOTE — PROGRESS NOTE ADULT - NUTRITIONAL ASSESSMENT
This patient has been assessed with a concern for Malnutrition and has been determined to have a diagnosis/diagnoses of Moderate protein-calorie malnutrition.    This patient is being managed with:   Diet Easy to Chew-  Entered: May 28 2025  2:10PM  
This patient has been assessed with a concern for Malnutrition and has been determined to have a diagnosis/diagnoses of Moderate protein-calorie malnutrition.    This patient is being managed with:   Diet NPO after Midnight-     NPO Start Date: 01-Jun-2025   NPO Start Time: 23:59  Except Medications  Entered: Jun 1 2025  8:56AM    Diet Easy to Chew-  Entered: May 28 2025  2:10PM  
This patient has been assessed with a concern for Malnutrition and has been determined to have a diagnosis/diagnoses of Moderate protein-calorie malnutrition.    This patient is being managed with:   Diet Clear Liquid-  Entered: Jun 2 2025  6:08PM  
This patient has been assessed with a concern for Malnutrition and has been determined to have a diagnosis/diagnoses of Moderate protein-calorie malnutrition.    This patient is being managed with:   Diet Easy to Chew-  Entered: May 28 2025  2:10PM  
This patient has been assessed with a concern for Malnutrition and has been determined to have a diagnosis/diagnoses of Moderate protein-calorie malnutrition.    This patient is being managed with:   Diet Easy to Chew-  Entered: May 28 2025  2:10PM  
This patient has been assessed with a concern for Malnutrition and has been determined to have a diagnosis/diagnoses of Moderate protein-calorie malnutrition.    This patient is being managed with:   Diet NPO with Tube Feed-  Tube Feeding Modality: Orogastric  [place] Peptide 1.5 (KFPEPT1.5RTH)  Total Volume for 24 Hours (mL): 960  Continuous  Starting Tube Feed Rate {mL per Hour}: 10  Increase Tube Feed Rate by (mL): 10     Every 4 hours  Until Goal Tube Feed Rate (mL per Hour): 40  Tube Feed Duration (in Hours): 24  Tube Feed Start Time: 11:00  Entered: May 22 2025 10:28AM  
This patient has been assessed with a concern for Malnutrition and has been determined to have a diagnosis/diagnoses of Moderate protein-calorie malnutrition.    This patient is being managed with:   Diet NPO-  Entered: May 24 2025  9:54AM  
This patient has been assessed with a concern for Malnutrition and has been determined to have a diagnosis/diagnoses of Moderate protein-calorie malnutrition.    This patient is being managed with:   Diet NPO-  Entered: May 24 2025  9:54AM  
This patient has been assessed with a concern for Malnutrition and has been determined to have a diagnosis/diagnoses of Moderate protein-calorie malnutrition.    This patient is being managed with:   Diet Easy to Chew-  Entered: May 28 2025  2:10PM  
This patient has been assessed with a concern for Malnutrition and has been determined to have a diagnosis/diagnoses of Moderate protein-calorie malnutrition.    This patient is being managed with:   Diet NPO after Midnight-     NPO Start Date: 01-Jun-2025   NPO Start Time: 23:59  Except Medications  Entered: Jun 1 2025  8:56AM    Diet Easy to Chew-  Entered: May 28 2025  2:10PM  
This patient has been assessed with a concern for Malnutrition and has been determined to have a diagnosis/diagnoses of Moderate protein-calorie malnutrition.    This patient is being managed with:   Diet Easy to Chew-  Entered: May 28 2025  2:10PM  
This patient has been assessed with a concern for Malnutrition and has been determined to have a diagnosis/diagnoses of Moderate protein-calorie malnutrition.    This patient is being managed with:   Diet Easy to Chew-  Entered: Victor Manuel  3 2025  5:14PM  
This patient has been assessed with a concern for Malnutrition and has been determined to have a diagnosis/diagnoses of Moderate protein-calorie malnutrition.    This patient is being managed with:   Diet NPO-  Entered: May 24 2025  9:54AM  
This patient has been assessed with a concern for Malnutrition and has been determined to have a diagnosis/diagnoses of Moderate protein-calorie malnutrition.    This patient is being managed with:   Diet Easy to Chew-  Entered: May 28 2025  2:10PM  
This patient has been assessed with a concern for Malnutrition and has been determined to have a diagnosis/diagnoses of Moderate protein-calorie malnutrition.    This patient is being managed with:   Diet NPO-  Entered: May 24 2025  9:54AM  
This patient has been assessed with a concern for Malnutrition and has been determined to have a diagnosis/diagnoses of Moderate protein-calorie malnutrition.    This patient is being managed with:   Diet NPO-  Entered: May 24 2025  9:54AM

## 2025-06-04 NOTE — PROGRESS NOTE ADULT - SUBJECTIVE AND OBJECTIVE BOX
[facility]  INFECTIOUS DISEASES at [place] and [place]  ===============================================================                  Rubin Lau MD               Diplomates American Board of Internal Medicine & Infectious Diseases                * [place] Office - Appt - Tel  [phone] Fax [phone]                * [place] Office - Appt - Tel [phone] Fax [phone]                                  Hospital Consult line:  [phone]  ==============================================================    DOMINIC ALAMO [id number]    Follow up: polymicrobial bacteremia, cholecystitis, esophageal candidiasis     s/p ERCP yesterday   No acute events overnight   afebrile   hemodynamically stable     Wife and daughter at bedside    I have personally reviewed the labs and data; pertinent labs and data are listed in this note; please see below.     _______________________________________________________________  REVIEW OF SYSTEMS  Feeling much better  Pain only at drain site   No nausea, vomiting or diarrhea.   Admits to some discomfort when swallowing, but no overt dysphagia or odynophagia      services not required as provider is fluent in [place].   ________________________________________________________________  Allergies:  No Known Drug Allergies  SHRIMP (Unknown)  iv dye (Unknown)    ________________________________________________________________  PHYSICAL EXAM  GEN: in NAD, lying in bed.   HEENT: Anicteric sclerae. Moist mucous membranes. No mucosal lesions.   NECK: Supple.   LUNGS: eupneic. CTA B/L.  HEART: RRR, no m/r/g  ABDOMEN: Soft, NT, ND, primitivo tube with bile.    : No Petersen catheter  PSYCHIATRIC: Appropriate affect and mood  SKIN: No rash  LINES: PIV   ________________________________________________________________  Vitals:   Vital Signs Last 24 Hrs  T(C): 36.7 (04 Jun 2025 04:30), Max: 37.1 (03 Jun 2025 20:56)  T(F): 98.1 (04 Jun 2025 04:30), Max: 98.8 (03 Jun 2025 20:56)  HR: 74 (04 Jun 2025 04:30) (71 - 79)  BP: 105/66 (04 Jun 2025 04:30) (82/51 - 115/70)  BP(mean): --  RR: 18 (04 Jun 2025 04:30) (18 - 18)  SpO2: 97% (04 Jun 2025 04:30) (94% - 97%)    Parameters below as of 03 Jun 2025 20:56  Patient On (Oxygen Delivery Method): room air        Current Antibiotics:  meropenem  IVPB 1000 milliGRAM(s) IV Intermittent every 8 hours    Other medications:  carvedilol 6.25 milliGRAM(s) Oral every 12 hours  chlorhexidine 2% Cloths 1 Application(s) Topical <User Schedule>  chlorhexidine 2% Cloths 1 Application(s) Topical <User Schedule>  dextrose 5% + sodium chloride 0.45%. 1000 milliLiter(s) IV Continuous <Continuous>  dextrose 50% Injectable 25 Gram(s) IV Push once  dextrose 50% Injectable 12.5 Gram(s) IV Push once  dextrose 50% Injectable 25 Gram(s) IV Push once  insulin lispro (ADMELOG) corrective regimen sliding scale   SubCutaneous three times a day before meals  pantoprazole  Injectable 40 milliGRAM(s) IV Push daily  sacubitril 24 mG/valsartan 26 mG 1 Tablet(s) Oral two times a day  sodium chloride 0.9%. 1000 milliLiter(s) IV Continuous <Continuous>                                              10.4   6.20  )-----------( 277      ( 04 Jun 2025 06:52 )             31.1   06-04    138  |  103  |  8.0  ----------------------------<  150[H]  3.6   |  23.0  |  0.61    Ca    7.7[L]      04 Jun 2025 06:52  Phos  2.5     06-04  Mg     1.4     06-04    TPro  4.9[L]  /  Alb  2.4[L]  /  TBili  1.7  /  DBili  0.9[H]  /  AST  116[H]  /  ALT  104[H]  /  AlkPhos  441[H]  06-03      5   RECENT CULTURES:  05-24 @ 05:15 Sputum Sputum     Few Aspergillus fumigatus complex  Commensal yair consistent with body site    Few polymorphonuclear leukocytes per low power field  No Squamous epithelial cells per low power field  Moderate Yeast like cells per oil power field      05-21 @ 02:50 Blood Blood     No growth at 5 days    WBC Count: 5.57 K/uL (06-03-25 @ 06:00)  WBC Count: 7.35 K/uL (06-02-25 @ 05:52)  WBC Count: 9.30 K/uL (06-01-25 @ 07:29)  WBC Count: 10.35 K/uL (05-31-25 @ 21:10)  WBC Count: 9.08 K/uL (05-31-25 @ 06:28)  WBC Count: 12.18 K/uL (05-30-25 @ 04:35)    Creatinine: 0.63 mg/dL (06-03-25 @ 06:00)  Creatinine: 0.46 mg/dL (06-02-25 @ 05:52)  Creatinine: 0.55 mg/dL (06-01-25 @ 07:29)  Creatinine: 0.52 mg/dL (05-31-25 @ 06:28)  Creatinine: 0.53 mg/dL (05-30-25 @ 04:35)      Procalcitonin: 83.01 ng/mL (05-21-25 @ 14:39)     SARS-CoV-2 Result: NotDetec (05-18-25 @ 22:11)      _______________________________________________________________  CARDIOLOGY   ________________________________________________________________  RADIOLOGY  < from: MR MRCP No Cont (05.31.25 @ 09:54) >  FINDINGS:  Sequences are motion degraded.    LOWER CHEST: Bilateral small pleural effusions and bibasilar atelectasis.    LIVER: Within normal limits.  BILE DUCTS: Extrahepatic and mild intrahepatic biliary duct dilation, the   common bile duct measuring up to 11 mm in caliber. There is a T2   hypointense fillingdefect in the distal common bile duct measuring 10   mm, concerning for choledocholithiasis.  GALLBLADDER: Cholelithiasis. Mild wall thickening and pericholecystic   edema. Percutaneous cholecystostomy tube.  SPLEEN: Within normal limits.  PANCREAS: Within normal limits.  ADRENALS: Within normal limits.  KIDNEYS/URETERS: No hydronephrosis. There is a 1.5 cm T2 hyperintense   lesion in the lower pole of the left kidney, probably representing a cyst.    VISUALIZED PORTIONS:  BOWEL: Within normal limits.  PERITONEUM: Trace ascites.  VESSELS: Within normal limits.  RETROPERITONEUM/LYMPH NODES: No lymphadenopathy.  ABDOMINAL WALL: Small fat-containing umbilical hernia.  BONES: Within normal limits.    IMPRESSION:  *  Biliary duct dilation. Fillingdefect in the common bile duct   measuring 11 mm, concerning for choledocholithiasis.  *  Cholelithiasis. Mild gallbladder wall thickening and pericholecystic   edema. Percutaneous cholecystostomy tube.  *  Bilateral small pleural effusions.  *  Trace ascites.    < end of copied text >

## 2025-06-04 NOTE — PROGRESS NOTE ADULT - SUBJECTIVE AND OBJECTIVE BOX
[facility] Division of Hospital Medicine    INTERVAL HISTORY:  Overnight, no acute events.     Patient seen and examined at bedside this morning. Patient denies chest pain, SOB, abd pain, N/V, fever, chills, dysuria or any other complaints.    MEDICATIONS  (STANDING):  carvedilol 6.25 milliGRAM(s) Oral every 12 hours  chlorhexidine 2% Cloths 1 Application(s) Topical <User Schedule>  chlorhexidine 2% Cloths 1 Application(s) Topical <User Schedule>  dextrose 50% Injectable 25 Gram(s) IV Push once  dextrose 50% Injectable 12.5 Gram(s) IV Push once  dextrose 50% Injectable 25 Gram(s) IV Push once  fluconAZOLE   Tablet      fluconAZOLE   Tablet 200 milliGRAM(s) Oral daily  insulin lispro (ADMELOG) corrective regimen sliding scale   SubCutaneous three times a day before meals  meropenem  IVPB 1000 milliGRAM(s) IV Intermittent every 8 hours  sacubitril 24 mG/valsartan 26 mG 1 Tablet(s) Oral two times a day    MEDICATIONS  (PRN):  bacitracin   Ointment 1 Application(s) Topical two times a day PRN wound care        I&O's Summary    03 Jun 2025 07:01  -  04 Jun 2025 07:00  --------------------------------------------------------  IN: 0 mL / OUT: 760 mL / NET: -760 mL    04 Jun 2025 07:01  -  04 Jun 2025 16:40  --------------------------------------------------------  IN: 640 mL / OUT: 75 mL / NET: 565 mL        PHYSICAL EXAM:  Vital Signs Last 24 Hrs  T(C): 36.8 (04 Jun 2025 16:33), Max: 37.1 (03 Jun 2025 20:56)  T(F): 98.3 (04 Jun 2025 16:33), Max: 98.8 (03 Jun 2025 20:56)  HR: 73 (04 Jun 2025 16:33) (73 - 78)  BP: 103/62 (04 Jun 2025 16:33) (103/62 - 112/69)  BP(mean): --  RR: 92 (04 Jun 2025 16:33) (18 - 92)  SpO2: 97% (04 Jun 2025 04:30) (95% - 97%)    Parameters below as of 03 Jun 2025 20:56  Patient On (Oxygen Delivery Method): room air      CONSTITUTIONAL: No apparent distress  HEENT: Normocephalic, Atraumatic  RESPIRATORY:  lungs are clear to auscultation bilaterally, No crackles, rhonchi, wheezes  CARDIOVASCULAR: Regular rate and rhythm, no lower extremity edema  ABDOMEN: Soft, non-distended, nontender to palpation, +BS, BRITTNEY drain   PSYCH: thoughts linear, affect appropriate  NEUROLOGY: Alert, Oriented x3    LABS:                        10.4   6.20  )-----------( 277      ( 04 Jun 2025 06:52 )             31.1     06-04    138  |  103  |  8.0  ----------------------------<  150[H]  3.6   |  23.0  |  0.61    Ca    7.7[L]      04 Jun 2025 06:52  Phos  2.5     06-04  Mg     1.4     06-04    TPro  4.9[L]  /  Alb  2.4[L]  /  TBili  1.7  /  DBili  0.9[H]  /  AST  116[H]  /  ALT  104[H]  /  AlkPhos  441[H]  06-03          Urinalysis Basic - ( 04 Jun 2025 06:52 )    Color: x / Appearance: x / SG: x / pH: x  Gluc: 150 mg/dL / Ketone: x  / Bili: x / Urobili: x   Blood: x / Protein: x / Nitrite: x   Leuk Esterase: x / RBC: x / WBC x   Sq Epi: x / Non Sq Epi: x / Bacteria: x        CAPILLARY BLOOD GLUCOSE      POCT Blood Glucose.: 179 mg/dL (04 Jun 2025 12:25)  POCT Blood Glucose.: 173 mg/dL (04 Jun 2025 08:03)  POCT Blood Glucose.: 148 mg/dL (03 Jun 2025 22:53)  POCT Blood Glucose.: 95 mg/dL (03 Jun 2025 17:20)        RADIOLOGY & ADDITIONAL TESTS:  Results Reviewed

## 2025-06-04 NOTE — PROGRESS NOTE ADULT - TIME BILLING
Time spent with review of chart documents, labs, imaging. Direct patient assessment,  formulation of care plan, documentation. Discussion with  Interdisciplinary  team
Time spent with review of chart documents, labs, imaging. Direct patient assessment,  formulation of care plan, documentation. Discussion with  Interdisciplinary  team  MICU Dr. Moser
reviewing the chart documentation, reviewing labs and imaging studies, evaluating the patient, discussing the plan of care with the consultants & medical team, and documenting.
Time spent reviewing the chart documentation, reviewing labs and imaging studies, evaluating the patient, discussing the plan of care with the consultants & medical team, and documenting.
reviewing the chart documentation, reviewing labs and imaging studies, evaluating the patient, discussing the plan of care with the consultants & medical team, and documenting.
Time spent reviewing the chart documentation, reviewing labs and imaging studies, evaluating the patient, discussing the plan of care with the consultants & medical team, and documenting.
Time spent with review of chart documents, labs, imaging. Direct patient assessment,  formulation of care plan, documentation. Discussion with  Interdisciplinary  team
reviewing the chart documentation, reviewing labs and imaging studies, evaluating the patient, discussing the plan of care with the consultants & medical team, and documenting.
Critical care time spent titrating IV sedatives, vasoactive medications, adjusting mechanical ventilator settings, interpreting blood gases and chest imaging.
coordinating care with consultants, ICU ACP/resident, bedside RN, reviewing labs and prior records, personally reviewing and interpreting imaging, documenting findings and assessment in the medical record.
reviewing the chart documentation, reviewing labs and imaging studies, evaluating the patient, discussing the plan of care with the consultants & medical team, and documenting.
reviewing the chart documentation, reviewing labs and imaging studies, evaluating the patient, discussing the plan of care with the consultants & medical team, and documenting.
Time spent reviewing the chart documentation, reviewing labs and imaging studies, evaluating the patient, discussing the plan of care with the consultants & medical team, and documenting.

## 2025-06-05 ENCOUNTER — TRANSCRIPTION ENCOUNTER (OUTPATIENT)
Age: 75
End: 2025-06-05

## 2025-06-05 VITALS
OXYGEN SATURATION: 93 % | DIASTOLIC BLOOD PRESSURE: 50 MMHG | TEMPERATURE: 97 F | HEART RATE: 91 BPM | RESPIRATION RATE: 18 BRPM | SYSTOLIC BLOOD PRESSURE: 90 MMHG

## 2025-06-05 LAB
GLUCOSE BLDC GLUCOMTR-MCNC: 161 MG/DL — HIGH (ref 70–99)
GLUCOSE BLDC GLUCOMTR-MCNC: 269 MG/DL — HIGH (ref 70–99)
SURGICAL PATHOLOGY STUDY: SIGNIFICANT CHANGE UP

## 2025-06-05 PROCEDURE — 96374 THER/PROPH/DIAG INJ IV PUSH: CPT

## 2025-06-05 PROCEDURE — 84550 ASSAY OF BLOOD/URIC ACID: CPT

## 2025-06-05 PROCEDURE — 87637 SARSCOV2&INF A&B&RSV AMP PRB: CPT

## 2025-06-05 PROCEDURE — 85014 HEMATOCRIT: CPT

## 2025-06-05 PROCEDURE — 74019 RADEX ABDOMEN 2 VIEWS: CPT

## 2025-06-05 PROCEDURE — 71045 X-RAY EXAM CHEST 1 VIEW: CPT

## 2025-06-05 PROCEDURE — 85018 HEMOGLOBIN: CPT

## 2025-06-05 PROCEDURE — 87086 URINE CULTURE/COLONY COUNT: CPT

## 2025-06-05 PROCEDURE — 84100 ASSAY OF PHOSPHORUS: CPT

## 2025-06-05 PROCEDURE — 87205 SMEAR GRAM STAIN: CPT

## 2025-06-05 PROCEDURE — 86850 RBC ANTIBODY SCREEN: CPT

## 2025-06-05 PROCEDURE — 84436 ASSAY OF TOTAL THYROXINE: CPT

## 2025-06-05 PROCEDURE — C8929: CPT

## 2025-06-05 PROCEDURE — 96376 TX/PRO/DX INJ SAME DRUG ADON: CPT | Mod: XU

## 2025-06-05 PROCEDURE — 76705 ECHO EXAM OF ABDOMEN: CPT

## 2025-06-05 PROCEDURE — 88312 SPECIAL STAINS GROUP 1: CPT

## 2025-06-05 PROCEDURE — 84132 ASSAY OF SERUM POTASSIUM: CPT

## 2025-06-05 PROCEDURE — 87040 BLOOD CULTURE FOR BACTERIA: CPT

## 2025-06-05 PROCEDURE — 94003 VENT MGMT INPAT SUBQ DAY: CPT

## 2025-06-05 PROCEDURE — 87150 DNA/RNA AMPLIFIED PROBE: CPT

## 2025-06-05 PROCEDURE — 85027 COMPLETE CBC AUTOMATED: CPT

## 2025-06-05 PROCEDURE — 75989 ABSCESS DRAINAGE UNDER X-RAY: CPT

## 2025-06-05 PROCEDURE — 43753 TX GASTRO INTUB W/ASP: CPT

## 2025-06-05 PROCEDURE — 93321 DOPPLER ECHO F-UP/LMTD STD: CPT

## 2025-06-05 PROCEDURE — 36430 TRANSFUSION BLD/BLD COMPNT: CPT

## 2025-06-05 PROCEDURE — 93458 L HRT ARTERY/VENTRICLE ANGIO: CPT

## 2025-06-05 PROCEDURE — 84484 ASSAY OF TROPONIN QUANT: CPT

## 2025-06-05 PROCEDURE — 82962 GLUCOSE BLOOD TEST: CPT

## 2025-06-05 PROCEDURE — 82947 ASSAY GLUCOSE BLOOD QUANT: CPT

## 2025-06-05 PROCEDURE — 87015 SPECIMEN INFECT AGNT CONCNTJ: CPT

## 2025-06-05 PROCEDURE — 85025 COMPLETE CBC W/AUTO DIFF WBC: CPT

## 2025-06-05 PROCEDURE — 74181 MRI ABDOMEN W/O CONTRAST: CPT

## 2025-06-05 PROCEDURE — 31500 INSERT EMERGENCY AIRWAY: CPT

## 2025-06-05 PROCEDURE — 81001 URINALYSIS AUTO W/SCOPE: CPT

## 2025-06-05 PROCEDURE — P9045: CPT

## 2025-06-05 PROCEDURE — 87641 MR-STAPH DNA AMP PROBE: CPT

## 2025-06-05 PROCEDURE — 93971 EXTREMITY STUDY: CPT

## 2025-06-05 PROCEDURE — 70450 CT HEAD/BRAIN W/O DYE: CPT

## 2025-06-05 PROCEDURE — 99232 SBSQ HOSP IP/OBS MODERATE 35: CPT

## 2025-06-05 PROCEDURE — 82140 ASSAY OF AMMONIA: CPT

## 2025-06-05 PROCEDURE — 82977 ASSAY OF GGT: CPT

## 2025-06-05 PROCEDURE — C1769: CPT

## 2025-06-05 PROCEDURE — 85730 THROMBOPLASTIN TIME PARTIAL: CPT

## 2025-06-05 PROCEDURE — 74230 X-RAY XM SWLNG FUNCJ C+: CPT

## 2025-06-05 PROCEDURE — 93325 DOPPLER ECHO COLOR FLOW MAPG: CPT

## 2025-06-05 PROCEDURE — 84443 ASSAY THYROID STIM HORMONE: CPT

## 2025-06-05 PROCEDURE — C1894: CPT

## 2025-06-05 PROCEDURE — 82803 BLOOD GASES ANY COMBINATION: CPT

## 2025-06-05 PROCEDURE — 80053 COMPREHEN METABOLIC PANEL: CPT

## 2025-06-05 PROCEDURE — 87077 CULTURE AEROBIC IDENTIFY: CPT

## 2025-06-05 PROCEDURE — 82553 CREATINE MB FRACTION: CPT

## 2025-06-05 PROCEDURE — 82435 ASSAY OF BLOOD CHLORIDE: CPT

## 2025-06-05 PROCEDURE — 92610 EVALUATE SWALLOWING FUNCTION: CPT

## 2025-06-05 PROCEDURE — 93005 ELECTROCARDIOGRAM TRACING: CPT

## 2025-06-05 PROCEDURE — C1887: CPT

## 2025-06-05 PROCEDURE — 95700 EEG CONT REC W/VID EEG TECH: CPT

## 2025-06-05 PROCEDURE — 95705 EEG W/O VID 2-12 HR UNMNTR: CPT

## 2025-06-05 PROCEDURE — 82550 ASSAY OF CK (CPK): CPT

## 2025-06-05 PROCEDURE — 83690 ASSAY OF LIPASE: CPT

## 2025-06-05 PROCEDURE — 96375 TX/PRO/DX INJ NEW DRUG ADDON: CPT | Mod: XU

## 2025-06-05 PROCEDURE — 36600 WITHDRAWAL OF ARTERIAL BLOOD: CPT

## 2025-06-05 PROCEDURE — 84478 ASSAY OF TRIGLYCERIDES: CPT

## 2025-06-05 PROCEDURE — 94760 N-INVAS EAR/PLS OXIMETRY 1: CPT

## 2025-06-05 PROCEDURE — G0545: CPT

## 2025-06-05 PROCEDURE — P9047: CPT

## 2025-06-05 PROCEDURE — 87070 CULTURE OTHR SPECIMN AEROBIC: CPT

## 2025-06-05 PROCEDURE — 88305 TISSUE EXAM BY PATHOLOGIST: CPT

## 2025-06-05 PROCEDURE — 86901 BLOOD TYPING SEROLOGIC RH(D): CPT

## 2025-06-05 PROCEDURE — 84295 ASSAY OF SERUM SODIUM: CPT

## 2025-06-05 PROCEDURE — 71275 CT ANGIOGRAPHY CHEST: CPT

## 2025-06-05 PROCEDURE — 82009 KETONE BODYS QUAL: CPT

## 2025-06-05 PROCEDURE — 74018 RADEX ABDOMEN 1 VIEW: CPT

## 2025-06-05 PROCEDURE — 80076 HEPATIC FUNCTION PANEL: CPT

## 2025-06-05 PROCEDURE — 74177 CT ABD & PELVIS W/CONTRAST: CPT

## 2025-06-05 PROCEDURE — 86022 PLATELET ANTIBODIES: CPT

## 2025-06-05 PROCEDURE — T1013: CPT

## 2025-06-05 PROCEDURE — 88342 IMHCHEM/IMCYTCHM 1ST ANTB: CPT

## 2025-06-05 PROCEDURE — 84145 PROCALCITONIN (PCT): CPT

## 2025-06-05 PROCEDURE — 82330 ASSAY OF CALCIUM: CPT

## 2025-06-05 PROCEDURE — 80048 BASIC METABOLIC PNL TOTAL CA: CPT

## 2025-06-05 PROCEDURE — 94002 VENT MGMT INPAT INIT DAY: CPT

## 2025-06-05 PROCEDURE — 83036 HEMOGLOBIN GLYCOSYLATED A1C: CPT

## 2025-06-05 PROCEDURE — 95714 VEEG EA 12-26 HR UNMNTR: CPT

## 2025-06-05 PROCEDURE — 36415 COLL VENOUS BLD VENIPUNCTURE: CPT

## 2025-06-05 PROCEDURE — 87640 STAPH A DNA AMP PROBE: CPT

## 2025-06-05 PROCEDURE — 85384 FIBRINOGEN ACTIVITY: CPT

## 2025-06-05 PROCEDURE — 82010 KETONE BODYS QUAN: CPT

## 2025-06-05 PROCEDURE — P9059: CPT

## 2025-06-05 PROCEDURE — 74329 X-RAY FOR PANCREAS ENDOSCOPY: CPT

## 2025-06-05 PROCEDURE — 85610 PROTHROMBIN TIME: CPT

## 2025-06-05 PROCEDURE — 82248 BILIRUBIN DIRECT: CPT

## 2025-06-05 PROCEDURE — C1729: CPT

## 2025-06-05 PROCEDURE — 83605 ASSAY OF LACTIC ACID: CPT

## 2025-06-05 PROCEDURE — 92611 MOTION FLUOROSCOPY/SWALLOW: CPT

## 2025-06-05 PROCEDURE — 86900 BLOOD TYPING SEROLOGIC ABO: CPT

## 2025-06-05 PROCEDURE — 87075 CULTR BACTERIA EXCEPT BLOOD: CPT

## 2025-06-05 PROCEDURE — C8924: CPT

## 2025-06-05 PROCEDURE — 83880 ASSAY OF NATRIURETIC PEPTIDE: CPT

## 2025-06-05 PROCEDURE — 99291 CRITICAL CARE FIRST HOUR: CPT | Mod: 25

## 2025-06-05 PROCEDURE — 83615 LACTATE (LD) (LDH) ENZYME: CPT

## 2025-06-05 PROCEDURE — 83735 ASSAY OF MAGNESIUM: CPT

## 2025-06-05 PROCEDURE — 99239 HOSP IP/OBS DSCHRG MGMT >30: CPT

## 2025-06-05 PROCEDURE — 87186 SC STD MICRODIL/AGAR DIL: CPT

## 2025-06-05 RX ORDER — CARVEDILOL 3.12 MG/1
1 TABLET, FILM COATED ORAL
Qty: 60 | Refills: 0
Start: 2025-06-05 | End: 2025-07-04

## 2025-06-05 RX ORDER — FLUCONAZOLE 150 MG
1 TABLET ORAL
Qty: 14 | Refills: 0
Start: 2025-06-05 | End: 2025-06-18

## 2025-06-05 RX ORDER — TAMSULOSIN HYDROCHLORIDE 0.4 MG/1
1 CAPSULE ORAL
Refills: 0 | DISCHARGE

## 2025-06-05 RX ORDER — NITROFURANTOIN MACROCRYSTAL 100 MG
1 CAPSULE ORAL
Refills: 0 | DISCHARGE

## 2025-06-05 RX ORDER — SACUBITRIL AND VALSARTAN 49; 51 MG/1; MG/1
1 TABLET, FILM COATED ORAL
Qty: 60 | Refills: 0
Start: 2025-06-05 | End: 2025-07-04

## 2025-06-05 RX ADMIN — Medication 1 APPLICATION(S): at 05:35

## 2025-06-05 RX ADMIN — Medication 200 MILLIGRAM(S): at 14:04

## 2025-06-05 RX ADMIN — INSULIN LISPRO 3: 100 INJECTION, SOLUTION INTRAVENOUS; SUBCUTANEOUS at 11:12

## 2025-06-05 RX ADMIN — MEROPENEM 100 MILLIGRAM(S): 1 INJECTION INTRAVENOUS at 05:35

## 2025-06-05 RX ADMIN — INSULIN LISPRO 1: 100 INJECTION, SOLUTION INTRAVENOUS; SUBCUTANEOUS at 07:50

## 2025-06-05 RX ADMIN — Medication 40 MILLIGRAM(S): at 05:35

## 2025-06-05 NOTE — PROGRESS NOTE ADULT - PROVIDER SPECIALTY LIST ADULT
Cardiology
Hospitalist
Infectious Disease
Infectious Disease
Intervent Radiology
MICU
Nephrology
Nephrology
CT Surgery
Cardiology
Hospitalist
Hospitalist
Infectious Disease
Infectious Disease
MICU
MICU
Nephrology
Nephrology
Palliative Care
Cardiology
Hospitalist
Infectious Disease
Infectious Disease
MICU
Surgery
Gastroenterology
Gastroenterology
Hospitalist
Hospitalist
Intervent Cardiology
MICU
MICU
Hospitalist
Hospitalist
Palliative Care
Palliative Care
Critical Care
Gastroenterology

## 2025-06-05 NOTE — DISCHARGE NOTE NURSING/CASE MANAGEMENT/SOCIAL WORK - FINANCIAL ASSISTANCE
[facility] provides services at a reduced cost to those who are determined to be eligible through [facility]’s financial assistance program. Information regarding [facility]’s financial assistance program can be found by going to [email or web address] or by calling [phone].

## 2025-06-05 NOTE — PROGRESS NOTE ADULT - ASSESSMENT
76 yo male with hx of HTN, HLD, CAD, TIA, remote pericarditis in 2015, presented to the ED with complaints of abdominal pain, nausea, vomiting for since the day prior.  Upon arrival to the ED patient was lethargic and tachypneic, with abdominal distention and mottled skin.    He was noted to be hypotensive despite fluid resuscitation and placed on escalating doses of pressors.    Started on Bipap initially for tachypnea and metabolic acidosis, eventually intubated in the ED.  Labs revealed severe metabolic acidosis. OLEKSANDR, transaminitis     AS ABOVE ADMITTED WITH ABD PAIN HYPOTENSION BLOOD CX POLYMICROBIAL   ULTIMATELY HAD CHOLECYSTOTOMY TUBE PLACED ALSO POLYMICROBIAL  BLOOD CX WITH ESBL ECOLI AND CITROBACTER  BILE CX E-COLI ENTEROCOCCAL FAECALIS AND E GALLINARUM     PT ON MERREM WHICH  WILL COVER ESBL AND STREP  MERREM HAS SOME COVERAGE FOR ENTEROCOCCUS   ALTHOUGH HE DID NOT HAVE ENTEROCOCCAL BACTEREMIA and  THE  SIGNIFICANCE IN BILE FLUID  UNCLEAR WOULD CONTINUE CURRENT IV MeRERRM     SPUTUM WITH POSITIVE MOLD IDENTIFIES AS FWE ASPERGILLUS FUMIGATAS  MAYBE COLONIZATION WILL DEFER TX FOR THIS    s/p ERCP on 6/2 >> sphincterotomy and bile duct clearance. Esophageal candidiasis noted during exam    STARTED ON  fluconazole 400 mg x 1, followed by 200 mg daily    ?CHOLECYSTECTOMY       PT WITH   NO PLAN FOR SURGERY AT THIS TIME AS PER CARDIOLOGY MAY NEED CABG BEFORE  GB SURGERY   PT HAS COMPLETED MORE THAN 2 WEEKS OF MERREM FOR ESBL BACTEREMIA  OK TO D/C MERREM  OK FOR D/C ON NO ABX   WILL FOLLOW UP AS NEEDED    PLEASE CALL IF QUESTIONS

## 2025-06-05 NOTE — PROGRESS NOTE ADULT - SUBJECTIVE AND OBJECTIVE BOX
[facility]  INFECTIOUS DISEASES at [place] and [place]  ===============================================================                  Rubin Lau MD               Diplomates American Board of Internal Medicine & Infectious Diseases                * [place] Office - Appt - Tel  [phone] Fax [phone]                * [place] Office - Appt - Tel [phone] Fax [phone]                                  Hospital Consult line:  [phone]  ==============================================================    DOMINIC ALAMO [id number]    Follow up: polymicrobial bacteremia, cholecystitis, esophageal candidiasis     s/p ERCP    No acute events overnight   afebrile   hemodynamically stable     Wife and daughter at bedside    I have personally reviewed the labs and data; pertinent labs and data are listed in this note; please see below.     _______________________________________________________________  REVIEW OF SYSTEMS  Feeling much better  Pain only at drain site   No nausea, vomiting or diarrhea.   Admits to some discomfort when swallowing, but no overt dysphagia or odynophagia      services not required as provider is fluent in [place].   ________________________________________________________________  Allergies:  No Known Drug Allergies  SHRIMP (Unknown)  iv dye (Unknown)    ________________________________________________________________  PHYSICAL EXAM  GEN: in NAD, lying in bed.   HEENT: Anicteric sclerae. Moist mucous membranes. No mucosal lesions.   NECK: Supple.   LUNGS: eupneic. CTA B/L.  HEART: RRR, no m/r/g  ABDOMEN: Soft, NT, ND, primitivo tube with bile.    : No Petersen catheter  PSYCHIATRIC: Appropriate affect and mood  SKIN: No rash  LINES: PIV   ________________________________________________________________  Vitals:    Vital Signs Last 24 Hrs  T(C): 36.8 (05 Jun 2025 04:28), Max: 36.8 (04 Jun 2025 16:33)  T(F): 98.2 (05 Jun 2025 04:28), Max: 98.3 (04 Jun 2025 16:33)  HR: 86 (05 Jun 2025 04:28) (73 - 86)  BP: 94/55 (05 Jun 2025 04:28) (94/55 - 108/67)  BP(mean): --  RR: 18 (05 Jun 2025 04:28) (18 - 92)  SpO2: 96% (05 Jun 2025 04:28) (95% - 96%)    Parameters below as of 04 Jun 2025 20:20  Patient On (Oxygen Delivery Method): room air          Current Antibiotics:  meropenem  IVPB 1000 milliGRAM(s) IV Intermittent every 8 hours    Other medications:  carvedilol 6.25 milliGRAM(s) Oral every 12 hours  chlorhexidine 2% Cloths 1 Application(s) Topical <User Schedule>  chlorhexidine 2% Cloths 1 Application(s) Topical <User Schedule>  dextrose 5% + sodium chloride 0.45%. 1000 milliLiter(s) IV Continuous <Continuous>  dextrose 50% Injectable 25 Gram(s) IV Push once  dextrose 50% Injectable 12.5 Gram(s) IV Push once  dextrose 50% Injectable 25 Gram(s) IV Push once  insulin lispro (ADMELOG) corrective regimen sliding scale   SubCutaneous three times a day before meals  pantoprazole  Injectable 40 milliGRAM(s) IV Push daily  sacubitril 24 mG/valsartan 26 mG 1 Tablet(s) Oral two times a day  sodium chloride 0.9%. 1000 milliLiter(s) IV Continuous <Continuous>                                                    10.4   6.20  )-----------( 277      ( 04 Jun 2025 06:52 )             31.1   06-04    138  |  103  |  8.0  ----------------------------<  150[H]  3.6   |  23.0  |  0.61    Ca    7.7[L]      04 Jun 2025 06:52  Phos  2.5     06-04  Mg     1.4     06-04      RECENT CULTURES:  05-24 @ 05:15 Sputum Sputum     Few Aspergillus fumigatus complex  Commensal yair consistent with body site    Few polymorphonuclear leukocytes per low power field  No Squamous epithelial cells per low power field  Moderate Yeast like cells per oil power field      05-21 @ 02:50 Blood Blood     No growth at 5 days    WBC Count: 5.57 K/uL (06-03-25 @ 06:00)  WBC Count: 7.35 K/uL (06-02-25 @ 05:52)  WBC Count: 9.30 K/uL (06-01-25 @ 07:29)  WBC Count: 10.35 K/uL (05-31-25 @ 21:10)  WBC Count: 9.08 K/uL (05-31-25 @ 06:28)  WBC Count: 12.18 K/uL (05-30-25 @ 04:35)    Creatinine: 0.63 mg/dL (06-03-25 @ 06:00)  Creatinine: 0.46 mg/dL (06-02-25 @ 05:52)  Creatinine: 0.55 mg/dL (06-01-25 @ 07:29)  Creatinine: 0.52 mg/dL (05-31-25 @ 06:28)  Creatinine: 0.53 mg/dL (05-30-25 @ 04:35)      Procalcitonin: 83.01 ng/mL (05-21-25 @ 14:39)     SARS-CoV-2 Result: NotDetec (05-18-25 @ 22:11)      _______________________________________________________________  CARDIOLOGY   ________________________________________________________________  RADIOLOGY  < from: MR MRCP No Cont (05.31.25 @ 09:54) >  FINDINGS:  Sequences are motion degraded.    LOWER CHEST: Bilateral small pleural effusions and bibasilar atelectasis.    LIVER: Within normal limits.  BILE DUCTS: Extrahepatic and mild intrahepatic biliary duct dilation, the   common bile duct measuring up to 11 mm in caliber. There is a T2   hypointense fillingdefect in the distal common bile duct measuring 10   mm, concerning for choledocholithiasis.  GALLBLADDER: Cholelithiasis. Mild wall thickening and pericholecystic   edema. Percutaneous cholecystostomy tube.  SPLEEN: Within normal limits.  PANCREAS: Within normal limits.  ADRENALS: Within normal limits.  KIDNEYS/URETERS: No hydronephrosis. There is a 1.5 cm T2 hyperintense   lesion in the lower pole of the left kidney, probably representing a cyst.    VISUALIZED PORTIONS:  BOWEL: Within normal limits.  PERITONEUM: Trace ascites.  VESSELS: Within normal limits.  RETROPERITONEUM/LYMPH NODES: No lymphadenopathy.  ABDOMINAL WALL: Small fat-containing umbilical hernia.  BONES: Within normal limits.    IMPRESSION:  *  Biliary duct dilation. Fillingdefect in the common bile duct   measuring 11 mm, concerning for choledocholithiasis.  *  Cholelithiasis. Mild gallbladder wall thickening and pericholecystic   edema. Percutaneous cholecystostomy tube.  *  Bilateral small pleural effusions.  *  Trace ascites.    < end of copied text >

## 2025-06-05 NOTE — CHART NOTE - NSCHARTNOTEFT_GEN_A_CORE
Pt with PCT in place  pt to follow up outpatient     Discussed with Dr Aguirre Pt with PCT in place  no acute surgical intervention warranted   pt to follow up outpatient     Discussed with Dr Aguirre

## 2025-06-05 NOTE — PROGRESS NOTE ADULT - REASON FOR ADMISSION
Acute respiratory failure, shock
Price (Do Not Change): 0.00
Detail Level: Simple
Instructions: This plan will send the code FBSD to the PM system.  DO NOT or CHANGE the price.
Acute respiratory failure, shock

## 2025-06-05 NOTE — DISCHARGE NOTE NURSING/CASE MANAGEMENT/SOCIAL WORK - PATIENT PORTAL LINK FT
You can access the FollowMyHealth Patient Portal offered by [facility] [place] by registering at the following website: [email or web address]. By joining [employer]’s FollowMyHealth portal, you will also be able to view your health information using other applications (apps) compatible with our system.

## 2025-06-05 NOTE — CHART NOTE - NSCHARTNOTEFT_GEN_A_CORE
Case discussed in CT surgery AM rounds with Dr. Alberto. Patient can follow up outpatient with Dr. Alberto in the CTS office ([phone]) after his gallbladder is addressed and resolved. No inpatient CT surgery intervention indicated at this time. Please call with any questions.

## 2025-06-06 ENCOUNTER — NON-APPOINTMENT (OUTPATIENT)
Age: 75
End: 2025-06-06

## 2025-06-06 NOTE — CHART NOTE - NSCHARTNOTESELECT_GEN_ALL_CORE
CTS/Off Service Note
EEG prelim fellow read
Event Note
Gastroenterology/Event Note
Note for Family
Nutrition Services
site check/Event Note
Event Note
Event Note
Nutrition Services
Nutrition Services
Surgery/Event Note

## 2025-06-06 NOTE — CHART NOTE - NSCHARTNOTEFT_GEN_A_CORE
To Whoever it may concern    This patient was admitted in the hospital from 05/18/2025 - 06/05/2025.     Please feel free to reach the hospital at Ph: [phone] for any questions & concerns.

## 2025-06-11 ENCOUNTER — APPOINTMENT (OUTPATIENT)
Dept: CARDIOLOGY | Facility: CLINIC | Age: 75
End: 2025-06-11
Payer: MEDICARE

## 2025-06-11 VITALS
RESPIRATION RATE: 16 BRPM | BODY MASS INDEX: 23.39 KG/M2 | SYSTOLIC BLOOD PRESSURE: 90 MMHG | HEIGHT: 67 IN | HEART RATE: 91 BPM | WEIGHT: 149 LBS | DIASTOLIC BLOOD PRESSURE: 54 MMHG

## 2025-06-11 PROBLEM — I31.9 PERICARDITIS, UNSPECIFIED CHRONICITY, UNSPECIFIED TYPE: Status: ACTIVE | Noted: 2025-06-11

## 2025-06-11 PROCEDURE — 93000 ELECTROCARDIOGRAM COMPLETE: CPT | Mod: NC

## 2025-06-11 PROCEDURE — 99215 OFFICE O/P EST HI 40 MIN: CPT

## 2025-06-11 RX ORDER — CARVEDILOL 3.12 MG/1
3.12 TABLET, FILM COATED ORAL
Qty: 90 | Refills: 1 | Status: ACTIVE | COMMUNITY
Start: 1900-01-01 | End: 1900-01-01

## 2025-06-11 RX ORDER — EMPAGLIFLOZIN AND METFORMIN HYDROCHLORIDE 5; 1000 MG/1; MG/1
5-1000 TABLET ORAL
Refills: 0 | Status: ACTIVE | COMMUNITY

## 2025-06-11 RX ORDER — ASPIRIN 81 MG
81 TABLET, DELAYED RELEASE (ENTERIC COATED) ORAL
Refills: 0 | Status: ACTIVE | COMMUNITY

## 2025-06-11 RX ORDER — FLUCONAZOLE 200 MG/1
200 TABLET ORAL
Refills: 0 | Status: ACTIVE | COMMUNITY

## 2025-06-11 RX ORDER — SACUBITRIL AND VALSARTAN 24; 26 MG/1; MG/1
24-26 TABLET, FILM COATED ORAL TWICE DAILY
Refills: 0 | Status: ACTIVE | COMMUNITY

## 2025-06-11 RX ORDER — ROSUVASTATIN CALCIUM 20 MG/1
20 TABLET, FILM COATED ORAL
Refills: 0 | Status: ACTIVE | COMMUNITY

## 2025-06-18 ENCOUNTER — APPOINTMENT (OUTPATIENT)
Dept: VASCULAR SURGERY | Facility: CLINIC | Age: 75
End: 2025-06-18

## 2025-06-18 VITALS — WEIGHT: 144 LBS | HEIGHT: 65 IN | BODY MASS INDEX: 23.99 KG/M2

## 2025-06-23 ENCOUNTER — NON-APPOINTMENT (OUTPATIENT)
Age: 75
End: 2025-06-23

## 2025-06-24 ENCOUNTER — APPOINTMENT (OUTPATIENT)
Dept: TRAUMA SURGERY | Facility: CLINIC | Age: 75
End: 2025-06-24
Payer: MEDICARE

## 2025-06-24 VITALS — SYSTOLIC BLOOD PRESSURE: 90 MMHG | DIASTOLIC BLOOD PRESSURE: 55 MMHG

## 2025-06-24 VITALS
TEMPERATURE: 98.2 F | WEIGHT: 151 LBS | SYSTOLIC BLOOD PRESSURE: 78 MMHG | DIASTOLIC BLOOD PRESSURE: 43 MMHG | OXYGEN SATURATION: 95 % | RESPIRATION RATE: 18 BRPM | BODY MASS INDEX: 25.13 KG/M2 | HEART RATE: 92 BPM

## 2025-06-24 PROCEDURE — 99214 OFFICE O/P EST MOD 30 MIN: CPT

## 2025-06-30 ENCOUNTER — APPOINTMENT (OUTPATIENT)
Dept: GASTROENTEROLOGY | Facility: CLINIC | Age: 75
End: 2025-06-30
Payer: MEDICARE

## 2025-06-30 VITALS
BODY MASS INDEX: 24.99 KG/M2 | SYSTOLIC BLOOD PRESSURE: 105 MMHG | DIASTOLIC BLOOD PRESSURE: 69 MMHG | OXYGEN SATURATION: 96 % | HEIGHT: 65 IN | HEART RATE: 93 BPM | WEIGHT: 150 LBS

## 2025-06-30 PROBLEM — K80.50 BILE DUCT STONE: Status: ACTIVE | Noted: 2025-06-30

## 2025-06-30 PROBLEM — Z09 HOSPITAL DISCHARGE FOLLOW-UP: Status: ACTIVE | Noted: 2025-06-30

## 2025-06-30 PROCEDURE — 99203 OFFICE O/P NEW LOW 30 MIN: CPT

## 2025-07-01 ENCOUNTER — APPOINTMENT (OUTPATIENT)
Dept: INTERVENTIONAL RADIOLOGY/VASCULAR | Facility: CLINIC | Age: 75
End: 2025-07-01
Payer: MEDICARE

## 2025-07-01 ENCOUNTER — RESULT REVIEW (OUTPATIENT)
Age: 75
End: 2025-07-01

## 2025-07-01 ENCOUNTER — OUTPATIENT (OUTPATIENT)
Dept: OUTPATIENT SERVICES | Facility: HOSPITAL | Age: 75
LOS: 1 days | End: 2025-07-01

## 2025-07-01 DIAGNOSIS — K80.20 CALCULUS OF GALLBLADDER WITHOUT CHOLECYSTITIS WITHOUT OBSTRUCTION: ICD-10-CM

## 2025-07-01 PROCEDURE — 47531 INJECTION FOR CHOLANGIOGRAM: CPT

## 2025-07-01 NOTE — DISCHARGE NOTE NURSING/CASE MANAGEMENT/SOCIAL WORK - NSDCPEPTSTROKESIGNS_GEN_ALL_CORE
Sudden numbness or weakness of the face, arm, or leg, especially on one side of the body. Confusion, trouble speaking or understanding. Trouble seeing in one or both eyes. Trouble walking, dizziness, loss of balance or coordination. Severe headache. Leopoldo Pitts

## 2025-07-03 ENCOUNTER — APPOINTMENT (OUTPATIENT)
Dept: CT IMAGING | Facility: CLINIC | Age: 75
End: 2025-07-03

## 2025-07-03 ENCOUNTER — OUTPATIENT (OUTPATIENT)
Dept: OUTPATIENT SERVICES | Facility: HOSPITAL | Age: 75
LOS: 1 days | End: 2025-07-03
Payer: MEDICARE

## 2025-07-03 DIAGNOSIS — K80.20 CALCULUS OF GALLBLADDER WITHOUT CHOLECYSTITIS WITHOUT OBSTRUCTION: ICD-10-CM

## 2025-07-03 PROCEDURE — 74177 CT ABD & PELVIS W/CONTRAST: CPT

## 2025-07-03 PROCEDURE — 74177 CT ABD & PELVIS W/CONTRAST: CPT | Mod: 26

## 2025-07-08 ENCOUNTER — APPOINTMENT (OUTPATIENT)
Dept: TRAUMA SURGERY | Facility: CLINIC | Age: 75
End: 2025-07-08
Payer: MEDICARE

## 2025-07-08 VITALS
OXYGEN SATURATION: 96 % | SYSTOLIC BLOOD PRESSURE: 81 MMHG | RESPIRATION RATE: 18 BRPM | BODY MASS INDEX: 25.33 KG/M2 | TEMPERATURE: 97.9 F | WEIGHT: 152 LBS | HEIGHT: 65 IN | DIASTOLIC BLOOD PRESSURE: 56 MMHG | HEART RATE: 87 BPM

## 2025-07-08 VITALS — SYSTOLIC BLOOD PRESSURE: 96 MMHG | DIASTOLIC BLOOD PRESSURE: 58 MMHG

## 2025-07-08 PROBLEM — Z98.890: Status: ACTIVE | Noted: 2025-06-24

## 2025-07-08 PROCEDURE — 99214 OFFICE O/P EST MOD 30 MIN: CPT

## 2025-07-09 ENCOUNTER — NON-APPOINTMENT (OUTPATIENT)
Age: 75
End: 2025-07-09

## 2025-07-09 ENCOUNTER — APPOINTMENT (OUTPATIENT)
Dept: CARDIOLOGY | Facility: CLINIC | Age: 75
End: 2025-07-09
Payer: MEDICARE

## 2025-07-09 VITALS
HEIGHT: 65 IN | WEIGHT: 153 LBS | DIASTOLIC BLOOD PRESSURE: 63 MMHG | BODY MASS INDEX: 25.49 KG/M2 | RESPIRATION RATE: 16 BRPM | HEART RATE: 79 BPM | SYSTOLIC BLOOD PRESSURE: 91 MMHG

## 2025-07-09 PROBLEM — I42.9 CARDIOMYOPATHY, UNSPECIFIED TYPE: Status: ACTIVE | Noted: 2025-06-11

## 2025-07-09 PROCEDURE — 93000 ELECTROCARDIOGRAM COMPLETE: CPT

## 2025-07-09 PROCEDURE — 99214 OFFICE O/P EST MOD 30 MIN: CPT

## 2025-07-11 ENCOUNTER — APPOINTMENT (OUTPATIENT)
Dept: CARDIOTHORACIC SURGERY | Facility: CLINIC | Age: 75
End: 2025-07-11
Payer: MEDICARE

## 2025-07-11 VITALS
WEIGHT: 148.4 LBS | HEIGHT: 65 IN | BODY MASS INDEX: 24.72 KG/M2 | RESPIRATION RATE: 16 BRPM | DIASTOLIC BLOOD PRESSURE: 64 MMHG | HEART RATE: 69 BPM | SYSTOLIC BLOOD PRESSURE: 98 MMHG | TEMPERATURE: 98.2 F | OXYGEN SATURATION: 97 %

## 2025-07-11 PROBLEM — E78.5 HYPERLIPEMIA: Status: ACTIVE | Noted: 2025-06-11

## 2025-07-11 PROBLEM — I10 HYPERTENSION, UNSPECIFIED TYPE: Status: ACTIVE | Noted: 2025-06-11

## 2025-07-11 PROBLEM — Z78.9 NON-SMOKER: Status: ACTIVE | Noted: 2025-07-11

## 2025-07-11 PROBLEM — Z86.73 HISTORY OF TIA (TRANSIENT ISCHEMIC ATTACK): Status: ACTIVE | Noted: 2025-06-11

## 2025-07-11 PROBLEM — Z86.79 HISTORY OF ATRIAL FIBRILLATION: Status: RESOLVED | Noted: 2025-07-11 | Resolved: 2025-07-11

## 2025-07-11 PROBLEM — K83.09 CHOLANGITIS: Status: ACTIVE | Noted: 2025-06-30

## 2025-07-11 PROCEDURE — 99204 OFFICE O/P NEW MOD 45 MIN: CPT

## 2025-07-18 ENCOUNTER — INPATIENT (INPATIENT)
Facility: HOSPITAL | Age: 75
LOS: 3 days | Discharge: HOME | DRG: 418 | End: 2025-07-22
Attending: STUDENT IN AN ORGANIZED HEALTH CARE EDUCATION/TRAINING PROGRAM | Admitting: STUDENT IN AN ORGANIZED HEALTH CARE EDUCATION/TRAINING PROGRAM
Payer: MEDICARE

## 2025-07-18 ENCOUNTER — OFFICE (OUTPATIENT)
Dept: URBAN - METROPOLITAN AREA CLINIC 94 | Facility: CLINIC | Age: 75
Setting detail: OPHTHALMOLOGY
End: 2025-07-18
Payer: COMMERCIAL

## 2025-07-18 VITALS
SYSTOLIC BLOOD PRESSURE: 100 MMHG | OXYGEN SATURATION: 98 % | HEART RATE: 86 BPM | TEMPERATURE: 98 F | WEIGHT: 152.34 LBS | HEIGHT: 67 IN | DIASTOLIC BLOOD PRESSURE: 63 MMHG | RESPIRATION RATE: 18 BRPM

## 2025-07-18 DIAGNOSIS — H25.13: ICD-10-CM

## 2025-07-18 DIAGNOSIS — E11.3213: ICD-10-CM

## 2025-07-18 DIAGNOSIS — H01.001: ICD-10-CM

## 2025-07-18 DIAGNOSIS — H35.3111: ICD-10-CM

## 2025-07-18 LAB
ALBUMIN SERPL ELPH-MCNC: 3.8 G/DL — SIGNIFICANT CHANGE UP (ref 3.3–5.2)
ALP SERPL-CCNC: 107 U/L — SIGNIFICANT CHANGE UP (ref 40–120)
ALT FLD-CCNC: 13 U/L — SIGNIFICANT CHANGE UP
ANION GAP SERPL CALC-SCNC: 15 MMOL/L — SIGNIFICANT CHANGE UP (ref 5–17)
APPEARANCE UR: CLEAR — SIGNIFICANT CHANGE UP
AST SERPL-CCNC: 24 U/L — SIGNIFICANT CHANGE UP
BASOPHILS # BLD AUTO: 0.08 K/UL — SIGNIFICANT CHANGE UP (ref 0–0.2)
BASOPHILS NFR BLD AUTO: 0.7 % — SIGNIFICANT CHANGE UP (ref 0–2)
BILIRUB SERPL-MCNC: 0.4 MG/DL — SIGNIFICANT CHANGE UP (ref 0.4–2)
BILIRUB UR-MCNC: NEGATIVE — SIGNIFICANT CHANGE UP
BUN SERPL-MCNC: 11.3 MG/DL — SIGNIFICANT CHANGE UP (ref 8–20)
CALCIUM SERPL-MCNC: 9.3 MG/DL — SIGNIFICANT CHANGE UP (ref 8.4–10.5)
CHLORIDE SERPL-SCNC: 98 MMOL/L — SIGNIFICANT CHANGE UP (ref 96–108)
CO2 SERPL-SCNC: 23 MMOL/L — SIGNIFICANT CHANGE UP (ref 22–29)
COLOR SPEC: YELLOW — SIGNIFICANT CHANGE UP
CREAT SERPL-MCNC: 0.54 MG/DL — SIGNIFICANT CHANGE UP (ref 0.5–1.3)
DIFF PNL FLD: NEGATIVE — SIGNIFICANT CHANGE UP
EGFR: 104 ML/MIN/1.73M2 — SIGNIFICANT CHANGE UP
EGFR: 104 ML/MIN/1.73M2 — SIGNIFICANT CHANGE UP
EOSINOPHIL # BLD AUTO: 0.21 K/UL — SIGNIFICANT CHANGE UP (ref 0–0.5)
EOSINOPHIL NFR BLD AUTO: 1.9 % — SIGNIFICANT CHANGE UP (ref 0–6)
GLUCOSE SERPL-MCNC: 104 MG/DL — HIGH (ref 70–99)
GLUCOSE UR QL: >=1000 MG/DL
HCT VFR BLD CALC: 39.2 % — SIGNIFICANT CHANGE UP (ref 39–50)
HGB BLD-MCNC: 12.5 G/DL — LOW (ref 13–17)
IMM GRANULOCYTES # BLD AUTO: 0.04 K/UL — SIGNIFICANT CHANGE UP (ref 0–0.07)
IMM GRANULOCYTES NFR BLD AUTO: 0.4 % — SIGNIFICANT CHANGE UP (ref 0–0.9)
KETONES UR QL: NEGATIVE MG/DL — SIGNIFICANT CHANGE UP
LEUKOCYTE ESTERASE UR-ACNC: NEGATIVE — SIGNIFICANT CHANGE UP
LIDOCAIN IGE QN: 86 U/L — HIGH (ref 22–51)
LYMPHOCYTES # BLD AUTO: 3.46 K/UL — HIGH (ref 1–3.3)
LYMPHOCYTES NFR BLD AUTO: 31.4 % — SIGNIFICANT CHANGE UP (ref 13–44)
MCHC RBC-ENTMCNC: 27.6 PG — SIGNIFICANT CHANGE UP (ref 27–34)
MCHC RBC-ENTMCNC: 31.9 G/DL — LOW (ref 32–36)
MCV RBC AUTO: 86.5 FL — SIGNIFICANT CHANGE UP (ref 80–100)
MONOCYTES # BLD AUTO: 1.04 K/UL — HIGH (ref 0–0.9)
MONOCYTES NFR BLD AUTO: 9.4 % — SIGNIFICANT CHANGE UP (ref 2–14)
NEUTROPHILS # BLD AUTO: 6.19 K/UL — SIGNIFICANT CHANGE UP (ref 1.8–7.4)
NEUTROPHILS NFR BLD AUTO: 56.2 % — SIGNIFICANT CHANGE UP (ref 43–77)
NITRITE UR-MCNC: NEGATIVE — SIGNIFICANT CHANGE UP
NRBC # BLD AUTO: 0 K/UL — SIGNIFICANT CHANGE UP (ref 0–0)
NRBC # FLD: 0 K/UL — SIGNIFICANT CHANGE UP (ref 0–0)
NRBC BLD AUTO-RTO: 0 /100 WBCS — SIGNIFICANT CHANGE UP (ref 0–0)
PH UR: 6 — SIGNIFICANT CHANGE UP (ref 5–8)
PLATELET # BLD AUTO: 264 K/UL — SIGNIFICANT CHANGE UP (ref 150–400)
PMV BLD: 10.1 FL — SIGNIFICANT CHANGE UP (ref 7–13)
POTASSIUM SERPL-MCNC: 4.3 MMOL/L — SIGNIFICANT CHANGE UP (ref 3.5–5.3)
POTASSIUM SERPL-SCNC: 4.3 MMOL/L — SIGNIFICANT CHANGE UP (ref 3.5–5.3)
PROT SERPL-MCNC: 7.9 G/DL — SIGNIFICANT CHANGE UP (ref 6.6–8.7)
PROT UR-MCNC: NEGATIVE MG/DL — SIGNIFICANT CHANGE UP
RBC # BLD: 4.53 M/UL — SIGNIFICANT CHANGE UP (ref 4.2–5.8)
RBC # FLD: 14.5 % — SIGNIFICANT CHANGE UP (ref 10.3–14.5)
SODIUM SERPL-SCNC: 136 MMOL/L — SIGNIFICANT CHANGE UP (ref 135–145)
SP GR SPEC: 1.02 — SIGNIFICANT CHANGE UP (ref 1–1.03)
TROPONIN T, HIGH SENSITIVITY RESULT: 19 NG/L — SIGNIFICANT CHANGE UP (ref 0–51)
UROBILINOGEN FLD QL: 1 MG/DL — SIGNIFICANT CHANGE UP (ref 0.2–1)
WBC # BLD: 11.02 K/UL — HIGH (ref 3.8–10.5)
WBC # FLD AUTO: 11.02 K/UL — HIGH (ref 3.8–10.5)

## 2025-07-18 PROCEDURE — 74176 CT ABD & PELVIS W/O CONTRAST: CPT | Mod: 26

## 2025-07-18 PROCEDURE — 71045 X-RAY EXAM CHEST 1 VIEW: CPT | Mod: 26

## 2025-07-18 PROCEDURE — 92004 COMPRE OPH EXAM NEW PT 1/>: CPT | Performed by: OPHTHALMOLOGY

## 2025-07-18 PROCEDURE — 92250 FUNDUS PHOTOGRAPHY W/I&R: CPT | Performed by: OPHTHALMOLOGY

## 2025-07-18 PROCEDURE — 99285 EMERGENCY DEPT VISIT HI MDM: CPT

## 2025-07-18 RX ADMIN — Medication 4 MILLIGRAM(S): at 18:06

## 2025-07-18 ASSESSMENT — REFRACTION_MANIFEST
OS_SPHERE: +2.00
OD_VA1: 20/30+2
OD_ADD: +2.75
OU_VA: 20/30
OS_VA1: 20/40-1
OD_ADD: +2.50
OD_AXIS: 000
OD_SPHERE: +1.75
OS_AXIS: 055
OS_CYLINDER: +0.75
OS_ADD: +2.50
OD_SPHERE: +1.50
OS_VA1: 20/20-2
OS_CYLINDER: -0.50
OS_SPHERE: +1.00
OD_CYLINDER: 0.00
OS_AXIS: 108
OD_AXIS: 105
OD_CYLINDER: +0.75
OD_VA1: 20/30
OS_ADD: +2.75

## 2025-07-18 ASSESSMENT — TONOMETRY
OS_IOP_MMHG: 12
OD_IOP_MMHG: 13

## 2025-07-18 ASSESSMENT — REFRACTION_CURRENTRX
OS_VPRISM_DIRECTION: SV
OD_CYLINDER: +1.00
OS_OVR_VA: 20/
OD_AXIS: 124
OS_CYLINDER: +0.50
OS_SPHERE: +3.25
OD_VPRISM_DIRECTION: SV
OS_AXIS: 055
OD_OVR_VA: 20/
OD_SPHERE: +3.50

## 2025-07-18 ASSESSMENT — VISUAL ACUITY
OD_BCVA: 20/80
OS_BCVA: 20/50

## 2025-07-18 ASSESSMENT — KERATOMETRY
OD_K1POWER_DIOPTERS: 40.50
OD_AXISANGLE_DEGREES: 094
OD_K2POWER_DIOPTERS: 41.00
OS_AXISANGLE_DEGREES: 045
OS_K2POWER_DIOPTERS: 40.75
OS_K1POWER_DIOPTERS: 40.50

## 2025-07-18 ASSESSMENT — REFRACTION_AUTOREFRACTION
OD_CYLINDER: 0.00
OS_CYLINDER: -1.50
OD_AXIS: 000
OD_SPHERE: +1.75
OS_AXIS: 108
OS_SPHERE: +2.75

## 2025-07-18 ASSESSMENT — LID EXAM ASSESSMENTS
OS_BLEPHARITIS: LUL 1+
OD_BLEPHARITIS: RUL 1+

## 2025-07-18 ASSESSMENT — CONFRONTATIONAL VISUAL FIELD TEST (CVF)
OS_FINDINGS: FULL
OD_FINDINGS: FULL

## 2025-07-19 DIAGNOSIS — I25.10 ATHEROSCLEROTIC HEART DISEASE OF NATIVE CORONARY ARTERY WITHOUT ANGINA PECTORIS: ICD-10-CM

## 2025-07-19 DIAGNOSIS — K81.9 CHOLECYSTITIS, UNSPECIFIED: ICD-10-CM

## 2025-07-19 LAB
ALBUMIN SERPL ELPH-MCNC: 3.2 G/DL — LOW (ref 3.3–5.2)
ALP SERPL-CCNC: 85 U/L — SIGNIFICANT CHANGE UP (ref 40–120)
ALT FLD-CCNC: 10 U/L — SIGNIFICANT CHANGE UP
ANION GAP SERPL CALC-SCNC: 11 MMOL/L — SIGNIFICANT CHANGE UP (ref 5–17)
AST SERPL-CCNC: 18 U/L — SIGNIFICANT CHANGE UP
BASOPHILS # BLD AUTO: 0.05 K/UL — SIGNIFICANT CHANGE UP (ref 0–0.2)
BASOPHILS NFR BLD AUTO: 0.6 % — SIGNIFICANT CHANGE UP (ref 0–2)
BILIRUB SERPL-MCNC: 0.6 MG/DL — SIGNIFICANT CHANGE UP (ref 0.4–2)
BUN SERPL-MCNC: 11.6 MG/DL — SIGNIFICANT CHANGE UP (ref 8–20)
CALCIUM SERPL-MCNC: 8.6 MG/DL — SIGNIFICANT CHANGE UP (ref 8.4–10.5)
CHLORIDE SERPL-SCNC: 99 MMOL/L — SIGNIFICANT CHANGE UP (ref 96–108)
CO2 SERPL-SCNC: 23 MMOL/L — SIGNIFICANT CHANGE UP (ref 22–29)
CREAT SERPL-MCNC: 0.56 MG/DL — SIGNIFICANT CHANGE UP (ref 0.5–1.3)
EGFR: 103 ML/MIN/1.73M2 — SIGNIFICANT CHANGE UP
EGFR: 103 ML/MIN/1.73M2 — SIGNIFICANT CHANGE UP
EOSINOPHIL # BLD AUTO: 0.2 K/UL — SIGNIFICANT CHANGE UP (ref 0–0.5)
EOSINOPHIL NFR BLD AUTO: 2.6 % — SIGNIFICANT CHANGE UP (ref 0–6)
GLUCOSE SERPL-MCNC: 252 MG/DL — HIGH (ref 70–99)
HCT VFR BLD CALC: 33.3 % — LOW (ref 39–50)
HGB BLD-MCNC: 10.8 G/DL — LOW (ref 13–17)
IMM GRANULOCYTES # BLD AUTO: 0.01 K/UL — SIGNIFICANT CHANGE UP (ref 0–0.07)
IMM GRANULOCYTES NFR BLD AUTO: 0.1 % — SIGNIFICANT CHANGE UP (ref 0–0.9)
LYMPHOCYTES # BLD AUTO: 2.09 K/UL — SIGNIFICANT CHANGE UP (ref 1–3.3)
LYMPHOCYTES NFR BLD AUTO: 26.8 % — SIGNIFICANT CHANGE UP (ref 13–44)
MAGNESIUM SERPL-MCNC: 1.7 MG/DL — SIGNIFICANT CHANGE UP (ref 1.6–2.6)
MCHC RBC-ENTMCNC: 27.9 PG — SIGNIFICANT CHANGE UP (ref 27–34)
MCHC RBC-ENTMCNC: 32.4 G/DL — SIGNIFICANT CHANGE UP (ref 32–36)
MCV RBC AUTO: 86 FL — SIGNIFICANT CHANGE UP (ref 80–100)
MONOCYTES # BLD AUTO: 0.98 K/UL — HIGH (ref 0–0.9)
MONOCYTES NFR BLD AUTO: 12.6 % — SIGNIFICANT CHANGE UP (ref 2–14)
NEUTROPHILS # BLD AUTO: 4.47 K/UL — SIGNIFICANT CHANGE UP (ref 1.8–7.4)
NEUTROPHILS NFR BLD AUTO: 57.3 % — SIGNIFICANT CHANGE UP (ref 43–77)
NRBC # BLD AUTO: 0 K/UL — SIGNIFICANT CHANGE UP (ref 0–0)
NRBC # FLD: 0 K/UL — SIGNIFICANT CHANGE UP (ref 0–0)
NRBC BLD AUTO-RTO: 0 /100 WBCS — SIGNIFICANT CHANGE UP (ref 0–0)
PHOSPHATE SERPL-MCNC: 4.4 MG/DL — SIGNIFICANT CHANGE UP (ref 2.4–4.7)
PLATELET # BLD AUTO: 234 K/UL — SIGNIFICANT CHANGE UP (ref 150–400)
PMV BLD: 10.4 FL — SIGNIFICANT CHANGE UP (ref 7–13)
POTASSIUM SERPL-MCNC: 4.1 MMOL/L — SIGNIFICANT CHANGE UP (ref 3.5–5.3)
POTASSIUM SERPL-SCNC: 4.1 MMOL/L — SIGNIFICANT CHANGE UP (ref 3.5–5.3)
PROCALCITONIN SERPL-MCNC: 0.11 NG/ML — HIGH (ref 0.02–0.1)
PROT SERPL-MCNC: 6.7 G/DL — SIGNIFICANT CHANGE UP (ref 6.6–8.7)
RBC # BLD: 3.87 M/UL — LOW (ref 4.2–5.8)
RBC # FLD: 14.5 % — SIGNIFICANT CHANGE UP (ref 10.3–14.5)
SODIUM SERPL-SCNC: 133 MMOL/L — LOW (ref 135–145)
TROPONIN T, HIGH SENSITIVITY RESULT: 22 NG/L — SIGNIFICANT CHANGE UP (ref 0–51)
WBC # BLD: 7.8 K/UL — SIGNIFICANT CHANGE UP (ref 3.8–10.5)
WBC # FLD AUTO: 7.8 K/UL — SIGNIFICANT CHANGE UP (ref 3.8–10.5)

## 2025-07-19 PROCEDURE — 83735 ASSAY OF MAGNESIUM: CPT

## 2025-07-19 PROCEDURE — 93005 ELECTROCARDIOGRAM TRACING: CPT

## 2025-07-19 PROCEDURE — 71045 X-RAY EXAM CHEST 1 VIEW: CPT

## 2025-07-19 PROCEDURE — 76705 ECHO EXAM OF ABDOMEN: CPT

## 2025-07-19 PROCEDURE — 74176 CT ABD & PELVIS W/O CONTRAST: CPT

## 2025-07-19 PROCEDURE — 99223 1ST HOSP IP/OBS HIGH 75: CPT

## 2025-07-19 PROCEDURE — 93010 ELECTROCARDIOGRAM REPORT: CPT

## 2025-07-19 PROCEDURE — 76705 ECHO EXAM OF ABDOMEN: CPT | Mod: 26

## 2025-07-19 PROCEDURE — 85025 COMPLETE CBC W/AUTO DIFF WBC: CPT

## 2025-07-19 PROCEDURE — 80053 COMPREHEN METABOLIC PANEL: CPT

## 2025-07-19 PROCEDURE — 87040 BLOOD CULTURE FOR BACTERIA: CPT

## 2025-07-19 PROCEDURE — 36415 COLL VENOUS BLD VENIPUNCTURE: CPT

## 2025-07-19 PROCEDURE — 84484 ASSAY OF TROPONIN QUANT: CPT

## 2025-07-19 PROCEDURE — 99222 1ST HOSP IP/OBS MODERATE 55: CPT

## 2025-07-19 PROCEDURE — 84100 ASSAY OF PHOSPHORUS: CPT

## 2025-07-19 PROCEDURE — 83690 ASSAY OF LIPASE: CPT

## 2025-07-19 PROCEDURE — 84145 PROCALCITONIN (PCT): CPT

## 2025-07-19 PROCEDURE — 81003 URINALYSIS AUTO W/O SCOPE: CPT

## 2025-07-19 RX ORDER — ONDANSETRON HCL/PF 4 MG/2 ML
4 VIAL (ML) INJECTION EVERY 6 HOURS
Refills: 0 | Status: DISCONTINUED | OUTPATIENT
Start: 2025-07-19 | End: 2025-07-21

## 2025-07-19 RX ORDER — PIPERACILLIN-TAZO-DEXTROSE,ISO 3.375G/5
3.38 IV SOLUTION, PIGGYBACK PREMIX FROZEN(ML) INTRAVENOUS EVERY 8 HOURS
Refills: 0 | Status: DISCONTINUED | OUTPATIENT
Start: 2025-07-19 | End: 2025-07-21

## 2025-07-19 RX ORDER — SODIUM CHLORIDE 9 G/1000ML
1000 INJECTION, SOLUTION INTRAVENOUS
Refills: 0 | Status: DISCONTINUED | OUTPATIENT
Start: 2025-07-19 | End: 2025-07-19

## 2025-07-19 RX ORDER — MAGNESIUM, ALUMINUM HYDROXIDE 200-200 MG
30 TABLET,CHEWABLE ORAL EVERY 4 HOURS
Refills: 0 | Status: DISCONTINUED | OUTPATIENT
Start: 2025-07-19 | End: 2025-07-21

## 2025-07-19 RX ORDER — SENNA 187 MG
2 TABLET ORAL AT BEDTIME
Refills: 0 | Status: DISCONTINUED | OUTPATIENT
Start: 2025-07-19 | End: 2025-07-21

## 2025-07-19 RX ORDER — MELATONIN 5 MG
3 TABLET ORAL AT BEDTIME
Refills: 0 | Status: DISCONTINUED | OUTPATIENT
Start: 2025-07-19 | End: 2025-07-21

## 2025-07-19 RX ORDER — ENOXAPARIN SODIUM 100 MG/ML
40 INJECTION SUBCUTANEOUS EVERY 24 HOURS
Refills: 0 | Status: DISCONTINUED | OUTPATIENT
Start: 2025-07-19 | End: 2025-07-20

## 2025-07-19 RX ORDER — PIPERACILLIN-TAZO-DEXTROSE,ISO 3.375G/5
3.38 IV SOLUTION, PIGGYBACK PREMIX FROZEN(ML) INTRAVENOUS ONCE
Refills: 0 | Status: COMPLETED | OUTPATIENT
Start: 2025-07-19 | End: 2025-07-19

## 2025-07-19 RX ORDER — OXYCODONE HYDROCHLORIDE 30 MG/1
10 TABLET ORAL EVERY 4 HOURS
Refills: 0 | Status: DISCONTINUED | OUTPATIENT
Start: 2025-07-19 | End: 2025-07-21

## 2025-07-19 RX ORDER — OXYCODONE HYDROCHLORIDE 30 MG/1
5 TABLET ORAL EVERY 4 HOURS
Refills: 0 | Status: DISCONTINUED | OUTPATIENT
Start: 2025-07-19 | End: 2025-07-21

## 2025-07-19 RX ORDER — IBUPROFEN 200 MG
600 TABLET ORAL EVERY 8 HOURS
Refills: 0 | Status: DISCONTINUED | OUTPATIENT
Start: 2025-07-19 | End: 2025-07-21

## 2025-07-19 RX ORDER — SACUBITRIL AND VALSARTAN 6; 6 MG/1; MG/1
1 PELLET ORAL
Refills: 0 | Status: DISCONTINUED | OUTPATIENT
Start: 2025-07-19 | End: 2025-07-19

## 2025-07-19 RX ORDER — ASPIRIN 325 MG
81 TABLET ORAL DAILY
Refills: 0 | Status: DISCONTINUED | OUTPATIENT
Start: 2025-07-19 | End: 2025-07-21

## 2025-07-19 RX ORDER — ACETAMINOPHEN 500 MG/5ML
975 LIQUID (ML) ORAL EVERY 6 HOURS
Refills: 0 | Status: DISCONTINUED | OUTPATIENT
Start: 2025-07-19 | End: 2025-07-21

## 2025-07-19 RX ORDER — POLYETHYLENE GLYCOL 3350 17 G/17G
17 POWDER, FOR SOLUTION ORAL DAILY
Refills: 0 | Status: DISCONTINUED | OUTPATIENT
Start: 2025-07-19 | End: 2025-07-21

## 2025-07-19 RX ORDER — CARVEDILOL 3.12 MG/1
3.12 TABLET, FILM COATED ORAL EVERY 12 HOURS
Refills: 0 | Status: DISCONTINUED | OUTPATIENT
Start: 2025-07-19 | End: 2025-07-21

## 2025-07-19 RX ORDER — ROSUVASTATIN CALCIUM 20 MG/1
20 TABLET, FILM COATED ORAL AT BEDTIME
Refills: 0 | Status: DISCONTINUED | OUTPATIENT
Start: 2025-07-19 | End: 2025-07-21

## 2025-07-19 RX ORDER — CARVEDILOL 3.12 MG/1
6.25 TABLET, FILM COATED ORAL EVERY 12 HOURS
Refills: 0 | Status: DISCONTINUED | OUTPATIENT
Start: 2025-07-19 | End: 2025-07-19

## 2025-07-19 RX ORDER — SACUBITRIL AND VALSARTAN 6; 6 MG/1; MG/1
1 PELLET ORAL
Refills: 0 | Status: DISCONTINUED | OUTPATIENT
Start: 2025-07-19 | End: 2025-07-21

## 2025-07-19 RX ADMIN — Medication 25 GRAM(S): at 10:14

## 2025-07-19 RX ADMIN — OXYCODONE HYDROCHLORIDE 5 MILLIGRAM(S): 30 TABLET ORAL at 11:14

## 2025-07-19 RX ADMIN — Medication 25 GRAM(S): at 16:19

## 2025-07-19 RX ADMIN — OXYCODONE HYDROCHLORIDE 5 MILLIGRAM(S): 30 TABLET ORAL at 10:14

## 2025-07-19 RX ADMIN — ENOXAPARIN SODIUM 40 MILLIGRAM(S): 100 INJECTION SUBCUTANEOUS at 10:14

## 2025-07-19 RX ADMIN — SODIUM CHLORIDE 100 MILLILITER(S): 9 INJECTION, SOLUTION INTRAVENOUS at 06:14

## 2025-07-19 RX ADMIN — Medication 2 TABLET(S): at 21:34

## 2025-07-19 RX ADMIN — CARVEDILOL 3.12 MILLIGRAM(S): 3.12 TABLET, FILM COATED ORAL at 17:34

## 2025-07-19 RX ADMIN — Medication 81 MILLIGRAM(S): at 10:39

## 2025-07-19 RX ADMIN — ROSUVASTATIN CALCIUM 20 MILLIGRAM(S): 20 TABLET, FILM COATED ORAL at 21:35

## 2025-07-19 RX ADMIN — SACUBITRIL AND VALSARTAN 1 TABLET(S): 6; 6 PELLET ORAL at 17:34

## 2025-07-19 RX ADMIN — Medication 200 GRAM(S): at 06:15

## 2025-07-19 RX ADMIN — Medication 25 GRAM(S): at 21:33

## 2025-07-20 PROCEDURE — 87040 BLOOD CULTURE FOR BACTERIA: CPT

## 2025-07-20 PROCEDURE — 71045 X-RAY EXAM CHEST 1 VIEW: CPT

## 2025-07-20 PROCEDURE — 83690 ASSAY OF LIPASE: CPT

## 2025-07-20 PROCEDURE — 84100 ASSAY OF PHOSPHORUS: CPT

## 2025-07-20 PROCEDURE — 99233 SBSQ HOSP IP/OBS HIGH 50: CPT

## 2025-07-20 PROCEDURE — 93306 TTE W/DOPPLER COMPLETE: CPT | Mod: 26

## 2025-07-20 PROCEDURE — 93005 ELECTROCARDIOGRAM TRACING: CPT

## 2025-07-20 PROCEDURE — 83735 ASSAY OF MAGNESIUM: CPT

## 2025-07-20 PROCEDURE — 99223 1ST HOSP IP/OBS HIGH 75: CPT

## 2025-07-20 PROCEDURE — G0545: CPT

## 2025-07-20 PROCEDURE — 85025 COMPLETE CBC W/AUTO DIFF WBC: CPT

## 2025-07-20 PROCEDURE — 84484 ASSAY OF TROPONIN QUANT: CPT

## 2025-07-20 PROCEDURE — 74176 CT ABD & PELVIS W/O CONTRAST: CPT

## 2025-07-20 PROCEDURE — 99231 SBSQ HOSP IP/OBS SF/LOW 25: CPT

## 2025-07-20 PROCEDURE — 76705 ECHO EXAM OF ABDOMEN: CPT

## 2025-07-20 PROCEDURE — 81003 URINALYSIS AUTO W/O SCOPE: CPT

## 2025-07-20 PROCEDURE — 84145 PROCALCITONIN (PCT): CPT

## 2025-07-20 PROCEDURE — 80053 COMPREHEN METABOLIC PANEL: CPT

## 2025-07-20 PROCEDURE — 36415 COLL VENOUS BLD VENIPUNCTURE: CPT

## 2025-07-20 RX ORDER — INDOCYANINE GREEN AND WATER 25 MG
2.5 KIT INJECTION ONCE
Refills: 0 | Status: COMPLETED | OUTPATIENT
Start: 2025-07-21 | End: 2025-07-21

## 2025-07-20 RX ORDER — MAGNESIUM SULFATE 500 MG/ML
2 SYRINGE (ML) INJECTION ONCE
Refills: 0 | Status: COMPLETED | OUTPATIENT
Start: 2025-07-20 | End: 2025-07-20

## 2025-07-20 RX ADMIN — Medication 40 MILLIGRAM(S): at 06:02

## 2025-07-20 RX ADMIN — Medication 81 MILLIGRAM(S): at 11:47

## 2025-07-20 RX ADMIN — ROSUVASTATIN CALCIUM 20 MILLIGRAM(S): 20 TABLET, FILM COATED ORAL at 22:16

## 2025-07-20 RX ADMIN — Medication 25 GRAM(S): at 13:58

## 2025-07-20 RX ADMIN — SACUBITRIL AND VALSARTAN 1 TABLET(S): 6; 6 PELLET ORAL at 17:16

## 2025-07-20 RX ADMIN — Medication 25 GRAM(S): at 06:01

## 2025-07-20 RX ADMIN — Medication 25 GRAM(S): at 22:16

## 2025-07-20 RX ADMIN — SACUBITRIL AND VALSARTAN 1 TABLET(S): 6; 6 PELLET ORAL at 06:02

## 2025-07-20 RX ADMIN — ENOXAPARIN SODIUM 40 MILLIGRAM(S): 100 INJECTION SUBCUTANEOUS at 10:42

## 2025-07-20 RX ADMIN — Medication 25 GRAM(S): at 10:42

## 2025-07-20 RX ADMIN — CARVEDILOL 3.12 MILLIGRAM(S): 3.12 TABLET, FILM COATED ORAL at 17:16

## 2025-07-20 RX ADMIN — CARVEDILOL 3.12 MILLIGRAM(S): 3.12 TABLET, FILM COATED ORAL at 06:01

## 2025-07-20 RX ADMIN — Medication 2 TABLET(S): at 22:16

## 2025-07-21 ENCOUNTER — TRANSCRIPTION ENCOUNTER (OUTPATIENT)
Age: 75
End: 2025-07-21

## 2025-07-21 LAB
ANION GAP SERPL CALC-SCNC: 13 MMOL/L — SIGNIFICANT CHANGE UP (ref 5–17)
APTT BLD: 30.5 SEC — SIGNIFICANT CHANGE UP (ref 26.1–36.8)
BASOPHILS # BLD AUTO: 0.05 K/UL — SIGNIFICANT CHANGE UP (ref 0–0.2)
BASOPHILS NFR BLD AUTO: 0.7 % — SIGNIFICANT CHANGE UP (ref 0–2)
BLD GP AB SCN SERPL QL: SIGNIFICANT CHANGE UP
BUN SERPL-MCNC: 13.6 MG/DL — SIGNIFICANT CHANGE UP (ref 8–20)
CALCIUM SERPL-MCNC: 8.5 MG/DL — SIGNIFICANT CHANGE UP (ref 8.4–10.5)
CHLORIDE SERPL-SCNC: 100 MMOL/L — SIGNIFICANT CHANGE UP (ref 96–108)
CO2 SERPL-SCNC: 21 MMOL/L — LOW (ref 22–29)
CREAT SERPL-MCNC: 0.67 MG/DL — SIGNIFICANT CHANGE UP (ref 0.5–1.3)
EGFR: 97 ML/MIN/1.73M2 — SIGNIFICANT CHANGE UP
EGFR: 97 ML/MIN/1.73M2 — SIGNIFICANT CHANGE UP
EOSINOPHIL # BLD AUTO: 0.32 K/UL — SIGNIFICANT CHANGE UP (ref 0–0.5)
EOSINOPHIL NFR BLD AUTO: 4.4 % — SIGNIFICANT CHANGE UP (ref 0–6)
GLUCOSE BLDC GLUCOMTR-MCNC: 174 MG/DL — HIGH (ref 70–99)
GLUCOSE BLDC GLUCOMTR-MCNC: 180 MG/DL — HIGH (ref 70–99)
GLUCOSE BLDC GLUCOMTR-MCNC: 191 MG/DL — HIGH (ref 70–99)
GLUCOSE BLDC GLUCOMTR-MCNC: 276 MG/DL — HIGH (ref 70–99)
GLUCOSE BLDC GLUCOMTR-MCNC: 291 MG/DL — HIGH (ref 70–99)
GLUCOSE SERPL-MCNC: 326 MG/DL — HIGH (ref 70–99)
HCT VFR BLD CALC: 37.1 % — LOW (ref 39–50)
HGB BLD-MCNC: 11.8 G/DL — LOW (ref 13–17)
IMM GRANULOCYTES # BLD AUTO: 0.02 K/UL — SIGNIFICANT CHANGE UP (ref 0–0.07)
IMM GRANULOCYTES NFR BLD AUTO: 0.3 % — SIGNIFICANT CHANGE UP (ref 0–0.9)
INR BLD: 1.29 RATIO — HIGH (ref 0.85–1.16)
LIDOCAIN IGE QN: 133 U/L — HIGH (ref 22–51)
LYMPHOCYTES # BLD AUTO: 2.25 K/UL — SIGNIFICANT CHANGE UP (ref 1–3.3)
LYMPHOCYTES NFR BLD AUTO: 30.8 % — SIGNIFICANT CHANGE UP (ref 13–44)
MAGNESIUM SERPL-MCNC: 1.8 MG/DL — SIGNIFICANT CHANGE UP (ref 1.6–2.6)
MCHC RBC-ENTMCNC: 27.1 PG — SIGNIFICANT CHANGE UP (ref 27–34)
MCHC RBC-ENTMCNC: 31.8 G/DL — LOW (ref 32–36)
MCV RBC AUTO: 85.3 FL — SIGNIFICANT CHANGE UP (ref 80–100)
MONOCYTES # BLD AUTO: 0.89 K/UL — SIGNIFICANT CHANGE UP (ref 0–0.9)
MONOCYTES NFR BLD AUTO: 12.2 % — SIGNIFICANT CHANGE UP (ref 2–14)
NEUTROPHILS # BLD AUTO: 3.77 K/UL — SIGNIFICANT CHANGE UP (ref 1.8–7.4)
NEUTROPHILS NFR BLD AUTO: 51.6 % — SIGNIFICANT CHANGE UP (ref 43–77)
NRBC # BLD AUTO: 0 K/UL — SIGNIFICANT CHANGE UP (ref 0–0)
NRBC # FLD: 0 K/UL — SIGNIFICANT CHANGE UP (ref 0–0)
NRBC BLD AUTO-RTO: 0 /100 WBCS — SIGNIFICANT CHANGE UP (ref 0–0)
PLATELET # BLD AUTO: 257 K/UL — SIGNIFICANT CHANGE UP (ref 150–400)
PMV BLD: 10.2 FL — SIGNIFICANT CHANGE UP (ref 7–13)
POTASSIUM SERPL-MCNC: 4.3 MMOL/L — SIGNIFICANT CHANGE UP (ref 3.5–5.3)
POTASSIUM SERPL-SCNC: 4.3 MMOL/L — SIGNIFICANT CHANGE UP (ref 3.5–5.3)
PROTHROM AB SERPL-ACNC: 14.9 SEC — HIGH (ref 9.9–13.4)
RBC # BLD: 4.35 M/UL — SIGNIFICANT CHANGE UP (ref 4.2–5.8)
RBC # FLD: 14.2 % — SIGNIFICANT CHANGE UP (ref 10.3–14.5)
SODIUM SERPL-SCNC: 133 MMOL/L — LOW (ref 135–145)
WBC # BLD: 7.3 K/UL — SIGNIFICANT CHANGE UP (ref 3.8–10.5)
WBC # FLD AUTO: 7.3 K/UL — SIGNIFICANT CHANGE UP (ref 3.8–10.5)

## 2025-07-21 PROCEDURE — 87040 BLOOD CULTURE FOR BACTERIA: CPT

## 2025-07-21 PROCEDURE — 71045 X-RAY EXAM CHEST 1 VIEW: CPT

## 2025-07-21 PROCEDURE — 76705 ECHO EXAM OF ABDOMEN: CPT

## 2025-07-21 PROCEDURE — 80048 BASIC METABOLIC PNL TOTAL CA: CPT

## 2025-07-21 PROCEDURE — 86850 RBC ANTIBODY SCREEN: CPT

## 2025-07-21 PROCEDURE — 86901 BLOOD TYPING SEROLOGIC RH(D): CPT

## 2025-07-21 PROCEDURE — 86900 BLOOD TYPING SEROLOGIC ABO: CPT

## 2025-07-21 PROCEDURE — 85730 THROMBOPLASTIN TIME PARTIAL: CPT

## 2025-07-21 PROCEDURE — 88304 TISSUE EXAM BY PATHOLOGIST: CPT | Mod: 26

## 2025-07-21 PROCEDURE — 93005 ELECTROCARDIOGRAM TRACING: CPT

## 2025-07-21 PROCEDURE — 85610 PROTHROMBIN TIME: CPT

## 2025-07-21 PROCEDURE — 36415 COLL VENOUS BLD VENIPUNCTURE: CPT

## 2025-07-21 PROCEDURE — 82962 GLUCOSE BLOOD TEST: CPT

## 2025-07-21 PROCEDURE — 81003 URINALYSIS AUTO W/O SCOPE: CPT

## 2025-07-21 PROCEDURE — C8929: CPT

## 2025-07-21 PROCEDURE — 83735 ASSAY OF MAGNESIUM: CPT

## 2025-07-21 PROCEDURE — 84484 ASSAY OF TROPONIN QUANT: CPT

## 2025-07-21 PROCEDURE — 99233 SBSQ HOSP IP/OBS HIGH 50: CPT

## 2025-07-21 PROCEDURE — 80053 COMPREHEN METABOLIC PANEL: CPT

## 2025-07-21 PROCEDURE — 85025 COMPLETE CBC W/AUTO DIFF WBC: CPT

## 2025-07-21 PROCEDURE — 84145 PROCALCITONIN (PCT): CPT

## 2025-07-21 PROCEDURE — 84100 ASSAY OF PHOSPHORUS: CPT

## 2025-07-21 PROCEDURE — 99232 SBSQ HOSP IP/OBS MODERATE 35: CPT

## 2025-07-21 PROCEDURE — 74176 CT ABD & PELVIS W/O CONTRAST: CPT

## 2025-07-21 PROCEDURE — 83690 ASSAY OF LIPASE: CPT

## 2025-07-21 DEVICE — KIT A-LINE 1LUM 20G X 12CM SAFE KIT: Type: IMPLANTABLE DEVICE | Status: FUNCTIONAL

## 2025-07-21 DEVICE — LIGATING CLIPS WECK HEMOLOK POLYMER LARGE (PURPLE) 6: Type: IMPLANTABLE DEVICE | Status: FUNCTIONAL

## 2025-07-21 RX ORDER — DEXTROSE 50 % IN WATER 50 %
25 SYRINGE (ML) INTRAVENOUS ONCE
Refills: 0 | Status: DISCONTINUED | OUTPATIENT
Start: 2025-07-21 | End: 2025-07-21

## 2025-07-21 RX ORDER — DEXTROSE 50 % IN WATER 50 %
15 SYRINGE (ML) INTRAVENOUS ONCE
Refills: 0 | Status: DISCONTINUED | OUTPATIENT
Start: 2025-07-21 | End: 2025-07-21

## 2025-07-21 RX ORDER — BISACODYL 5 MG
5 TABLET, DELAYED RELEASE (ENTERIC COATED) ORAL EVERY 12 HOURS
Refills: 0 | Status: DISCONTINUED | OUTPATIENT
Start: 2025-07-21 | End: 2025-07-21

## 2025-07-21 RX ORDER — OXYCODONE HYDROCHLORIDE 30 MG/1
5 TABLET ORAL EVERY 4 HOURS
Refills: 0 | Status: DISCONTINUED | OUTPATIENT
Start: 2025-07-21 | End: 2025-07-21

## 2025-07-21 RX ORDER — GLUCAGON 3 MG/1
1 POWDER NASAL ONCE
Refills: 0 | Status: DISCONTINUED | OUTPATIENT
Start: 2025-07-21 | End: 2025-07-21

## 2025-07-21 RX ORDER — PIPERACILLIN-TAZO-DEXTROSE,ISO 3.375G/5
3.38 IV SOLUTION, PIGGYBACK PREMIX FROZEN(ML) INTRAVENOUS EVERY 8 HOURS
Refills: 0 | Status: DISCONTINUED | OUTPATIENT
Start: 2025-07-22 | End: 2025-07-22

## 2025-07-21 RX ORDER — ACETAMINOPHEN 500 MG/5ML
975 LIQUID (ML) ORAL EVERY 6 HOURS
Refills: 0 | Status: DISCONTINUED | OUTPATIENT
Start: 2025-07-21 | End: 2025-07-22

## 2025-07-21 RX ORDER — CARVEDILOL 3.12 MG/1
3.12 TABLET, FILM COATED ORAL EVERY 12 HOURS
Refills: 0 | Status: DISCONTINUED | OUTPATIENT
Start: 2025-07-21 | End: 2025-07-22

## 2025-07-21 RX ORDER — OXYCODONE HYDROCHLORIDE 30 MG/1
5 TABLET ORAL EVERY 4 HOURS
Refills: 0 | Status: DISCONTINUED | OUTPATIENT
Start: 2025-07-21 | End: 2025-07-22

## 2025-07-21 RX ORDER — ASPIRIN 325 MG
81 TABLET ORAL DAILY
Refills: 0 | Status: DISCONTINUED | OUTPATIENT
Start: 2025-07-21 | End: 2025-07-22

## 2025-07-21 RX ORDER — HYDROMORPHONE/SOD CHLOR,ISO/PF 2 MG/10 ML
0.5 SYRINGE (ML) INJECTION
Refills: 0 | Status: DISCONTINUED | OUTPATIENT
Start: 2025-07-21 | End: 2025-07-21

## 2025-07-21 RX ORDER — DEXTROSE 50 % IN WATER 50 %
12.5 SYRINGE (ML) INTRAVENOUS ONCE
Refills: 0 | Status: DISCONTINUED | OUTPATIENT
Start: 2025-07-21 | End: 2025-07-21

## 2025-07-21 RX ORDER — MAGNESIUM, ALUMINUM HYDROXIDE 200-200 MG
30 TABLET,CHEWABLE ORAL EVERY 4 HOURS
Refills: 0 | Status: DISCONTINUED | OUTPATIENT
Start: 2025-07-21 | End: 2025-07-22

## 2025-07-21 RX ORDER — FENTANYL CITRATE-0.9 % NACL/PF 100MCG/2ML
50 SYRINGE (ML) INTRAVENOUS
Refills: 0 | Status: DISCONTINUED | OUTPATIENT
Start: 2025-07-21 | End: 2025-07-21

## 2025-07-21 RX ORDER — ACETAMINOPHEN 500 MG/5ML
975 LIQUID (ML) ORAL EVERY 6 HOURS
Refills: 0 | Status: DISCONTINUED | OUTPATIENT
Start: 2025-07-21 | End: 2025-07-21

## 2025-07-21 RX ORDER — GLUCAGON 3 MG/1
1 POWDER NASAL ONCE
Refills: 0 | Status: DISCONTINUED | OUTPATIENT
Start: 2025-07-21 | End: 2025-07-22

## 2025-07-21 RX ORDER — SODIUM CHLORIDE 9 G/1000ML
1000 INJECTION, SOLUTION INTRAVENOUS
Refills: 0 | Status: DISCONTINUED | OUTPATIENT
Start: 2025-07-21 | End: 2025-07-22

## 2025-07-21 RX ORDER — ONDANSETRON HCL/PF 4 MG/2 ML
4 VIAL (ML) INJECTION ONCE
Refills: 0 | Status: DISCONTINUED | OUTPATIENT
Start: 2025-07-21 | End: 2025-07-21

## 2025-07-21 RX ORDER — ROSUVASTATIN CALCIUM 20 MG/1
20 TABLET, FILM COATED ORAL AT BEDTIME
Refills: 0 | Status: DISCONTINUED | OUTPATIENT
Start: 2025-07-21 | End: 2025-07-22

## 2025-07-21 RX ORDER — SODIUM CHLORIDE 9 G/1000ML
1000 INJECTION, SOLUTION INTRAVENOUS
Refills: 0 | Status: DISCONTINUED | OUTPATIENT
Start: 2025-07-21 | End: 2025-07-21

## 2025-07-21 RX ORDER — SACUBITRIL AND VALSARTAN 6; 6 MG/1; MG/1
1 PELLET ORAL
Refills: 0 | Status: DISCONTINUED | OUTPATIENT
Start: 2025-07-21 | End: 2025-07-22

## 2025-07-21 RX ORDER — INSULIN LISPRO 100 U/ML
INJECTION, SOLUTION INTRAVENOUS; SUBCUTANEOUS
Refills: 0 | Status: DISCONTINUED | OUTPATIENT
Start: 2025-07-21 | End: 2025-07-22

## 2025-07-21 RX ORDER — IBUPROFEN 200 MG
600 TABLET ORAL EVERY 8 HOURS
Refills: 0 | Status: DISCONTINUED | OUTPATIENT
Start: 2025-07-21 | End: 2025-07-22

## 2025-07-21 RX ORDER — SENNA 187 MG
2 TABLET ORAL AT BEDTIME
Refills: 0 | Status: DISCONTINUED | OUTPATIENT
Start: 2025-07-21 | End: 2025-07-22

## 2025-07-21 RX ORDER — DEXTROSE 50 % IN WATER 50 %
15 SYRINGE (ML) INTRAVENOUS ONCE
Refills: 0 | Status: DISCONTINUED | OUTPATIENT
Start: 2025-07-21 | End: 2025-07-22

## 2025-07-21 RX ORDER — GABAPENTIN 400 MG/1
100 CAPSULE ORAL EVERY 8 HOURS
Refills: 0 | Status: DISCONTINUED | OUTPATIENT
Start: 2025-07-21 | End: 2025-07-22

## 2025-07-21 RX ORDER — GABAPENTIN 400 MG/1
100 CAPSULE ORAL EVERY 8 HOURS
Refills: 0 | Status: DISCONTINUED | OUTPATIENT
Start: 2025-07-21 | End: 2025-07-21

## 2025-07-21 RX ORDER — MELATONIN 5 MG
3 TABLET ORAL AT BEDTIME
Refills: 0 | Status: DISCONTINUED | OUTPATIENT
Start: 2025-07-21 | End: 2025-07-22

## 2025-07-21 RX ORDER — INSULIN LISPRO 100 U/ML
INJECTION, SOLUTION INTRAVENOUS; SUBCUTANEOUS
Refills: 0 | Status: DISCONTINUED | OUTPATIENT
Start: 2025-07-21 | End: 2025-07-21

## 2025-07-21 RX ORDER — POLYETHYLENE GLYCOL 3350 17 G/17G
17 POWDER, FOR SOLUTION ORAL DAILY
Refills: 0 | Status: DISCONTINUED | OUTPATIENT
Start: 2025-07-21 | End: 2025-07-22

## 2025-07-21 RX ORDER — ONDANSETRON HCL/PF 4 MG/2 ML
4 VIAL (ML) INJECTION EVERY 6 HOURS
Refills: 0 | Status: DISCONTINUED | OUTPATIENT
Start: 2025-07-21 | End: 2025-07-22

## 2025-07-21 RX ORDER — OXYCODONE HYDROCHLORIDE 30 MG/1
10 TABLET ORAL EVERY 4 HOURS
Refills: 0 | Status: DISCONTINUED | OUTPATIENT
Start: 2025-07-21 | End: 2025-07-22

## 2025-07-21 RX ADMIN — GABAPENTIN 100 MILLIGRAM(S): 400 CAPSULE ORAL at 22:20

## 2025-07-21 RX ADMIN — Medication 2 TABLET(S): at 22:20

## 2025-07-21 RX ADMIN — CARVEDILOL 3.12 MILLIGRAM(S): 3.12 TABLET, FILM COATED ORAL at 17:14

## 2025-07-21 RX ADMIN — Medication 40 MILLIGRAM(S): at 17:13

## 2025-07-21 RX ADMIN — Medication 25 GRAM(S): at 05:56

## 2025-07-21 RX ADMIN — OXYCODONE HYDROCHLORIDE 10 MILLIGRAM(S): 30 TABLET ORAL at 18:20

## 2025-07-21 RX ADMIN — INSULIN LISPRO 2: 100 INJECTION, SOLUTION INTRAVENOUS; SUBCUTANEOUS at 17:13

## 2025-07-21 RX ADMIN — Medication 0.5 MILLIGRAM(S): at 14:30

## 2025-07-21 RX ADMIN — Medication 81 MILLIGRAM(S): at 17:14

## 2025-07-21 RX ADMIN — Medication 0.5 MILLIGRAM(S): at 14:03

## 2025-07-21 RX ADMIN — OXYCODONE HYDROCHLORIDE 10 MILLIGRAM(S): 30 TABLET ORAL at 17:20

## 2025-07-21 RX ADMIN — SACUBITRIL AND VALSARTAN 1 TABLET(S): 6; 6 PELLET ORAL at 17:14

## 2025-07-21 RX ADMIN — INDOCYANINE GREEN AND WATER 2.5 MILLIGRAM(S): KIT at 09:56

## 2025-07-21 RX ADMIN — ROSUVASTATIN CALCIUM 20 MILLIGRAM(S): 20 TABLET, FILM COATED ORAL at 22:19

## 2025-07-21 RX ADMIN — SODIUM CHLORIDE 75 MILLILITER(S): 9 INJECTION, SOLUTION INTRAVENOUS at 17:33

## 2025-07-22 ENCOUNTER — APPOINTMENT (OUTPATIENT)
Dept: TRAUMA SURGERY | Facility: CLINIC | Age: 75
End: 2025-07-22

## 2025-07-22 ENCOUNTER — TRANSCRIPTION ENCOUNTER (OUTPATIENT)
Age: 75
End: 2025-07-22

## 2025-07-22 VITALS
HEART RATE: 85 BPM | SYSTOLIC BLOOD PRESSURE: 114 MMHG | TEMPERATURE: 99 F | RESPIRATION RATE: 18 BRPM | DIASTOLIC BLOOD PRESSURE: 65 MMHG | OXYGEN SATURATION: 94 %

## 2025-07-22 LAB
GLUCOSE BLDC GLUCOMTR-MCNC: 214 MG/DL — HIGH (ref 70–99)
GLUCOSE BLDC GLUCOMTR-MCNC: 300 MG/DL — HIGH (ref 70–99)
GLUCOSE BLDC GLUCOMTR-MCNC: 402 MG/DL — HIGH (ref 70–99)
GLUCOSE BLDC GLUCOMTR-MCNC: 404 MG/DL — HIGH (ref 70–99)

## 2025-07-22 PROCEDURE — C1769: CPT

## 2025-07-22 PROCEDURE — 84484 ASSAY OF TROPONIN QUANT: CPT

## 2025-07-22 PROCEDURE — 83735 ASSAY OF MAGNESIUM: CPT

## 2025-07-22 PROCEDURE — 99285 EMERGENCY DEPT VISIT HI MDM: CPT

## 2025-07-22 PROCEDURE — G0545: CPT

## 2025-07-22 PROCEDURE — 80048 BASIC METABOLIC PNL TOTAL CA: CPT

## 2025-07-22 PROCEDURE — 71045 X-RAY EXAM CHEST 1 VIEW: CPT

## 2025-07-22 PROCEDURE — 86900 BLOOD TYPING SEROLOGIC ABO: CPT

## 2025-07-22 PROCEDURE — 84100 ASSAY OF PHOSPHORUS: CPT

## 2025-07-22 PROCEDURE — 96374 THER/PROPH/DIAG INJ IV PUSH: CPT

## 2025-07-22 PROCEDURE — 76705 ECHO EXAM OF ABDOMEN: CPT

## 2025-07-22 PROCEDURE — 82962 GLUCOSE BLOOD TEST: CPT

## 2025-07-22 PROCEDURE — C1889: CPT

## 2025-07-22 PROCEDURE — 83690 ASSAY OF LIPASE: CPT

## 2025-07-22 PROCEDURE — C8929: CPT

## 2025-07-22 PROCEDURE — 87040 BLOOD CULTURE FOR BACTERIA: CPT

## 2025-07-22 PROCEDURE — 86850 RBC ANTIBODY SCREEN: CPT

## 2025-07-22 PROCEDURE — 88304 TISSUE EXAM BY PATHOLOGIST: CPT

## 2025-07-22 PROCEDURE — 85730 THROMBOPLASTIN TIME PARTIAL: CPT

## 2025-07-22 PROCEDURE — S2900: CPT

## 2025-07-22 PROCEDURE — 81003 URINALYSIS AUTO W/O SCOPE: CPT

## 2025-07-22 PROCEDURE — 84145 PROCALCITONIN (PCT): CPT

## 2025-07-22 PROCEDURE — 80053 COMPREHEN METABOLIC PANEL: CPT

## 2025-07-22 PROCEDURE — 85610 PROTHROMBIN TIME: CPT

## 2025-07-22 PROCEDURE — 85025 COMPLETE CBC W/AUTO DIFF WBC: CPT

## 2025-07-22 PROCEDURE — 99232 SBSQ HOSP IP/OBS MODERATE 35: CPT

## 2025-07-22 PROCEDURE — 86901 BLOOD TYPING SEROLOGIC RH(D): CPT

## 2025-07-22 PROCEDURE — 93005 ELECTROCARDIOGRAM TRACING: CPT

## 2025-07-22 PROCEDURE — C9399: CPT

## 2025-07-22 PROCEDURE — 74176 CT ABD & PELVIS W/O CONTRAST: CPT

## 2025-07-22 PROCEDURE — 36415 COLL VENOUS BLD VENIPUNCTURE: CPT

## 2025-07-22 PROCEDURE — 99233 SBSQ HOSP IP/OBS HIGH 50: CPT

## 2025-07-22 RX ORDER — SENNA 187 MG
2 TABLET ORAL
Qty: 60 | Refills: 0
Start: 2025-07-22 | End: 2025-08-20

## 2025-07-22 RX ORDER — GABAPENTIN 400 MG/1
1 CAPSULE ORAL
Qty: 90 | Refills: 0
Start: 2025-07-22 | End: 2025-08-20

## 2025-07-22 RX ORDER — OXYCODONE HYDROCHLORIDE 30 MG/1
1 TABLET ORAL
Qty: 42 | Refills: 0
Start: 2025-07-22 | End: 2025-07-28

## 2025-07-22 RX ORDER — POLYETHYLENE GLYCOL 3350 17 G/17G
17 POWDER, FOR SOLUTION ORAL
Qty: 510 | Refills: 0
Start: 2025-07-22 | End: 2025-08-20

## 2025-07-22 RX ORDER — SACUBITRIL AND VALSARTAN 6; 6 MG/1; MG/1
1 PELLET ORAL
Qty: 0 | Refills: 0 | DISCHARGE
Start: 2025-07-22

## 2025-07-22 RX ORDER — ACETAMINOPHEN 500 MG/5ML
1000 LIQUID (ML) ORAL ONCE
Refills: 0 | Status: COMPLETED | OUTPATIENT
Start: 2025-07-22 | End: 2025-07-22

## 2025-07-22 RX ORDER — INSULIN LISPRO 100 U/ML
10 INJECTION, SOLUTION INTRAVENOUS; SUBCUTANEOUS ONCE
Refills: 0 | Status: COMPLETED | OUTPATIENT
Start: 2025-07-22 | End: 2025-07-22

## 2025-07-22 RX ORDER — SODIUM CHLORIDE 9 G/1000ML
1000 INJECTION, SOLUTION INTRAVENOUS ONCE
Refills: 0 | Status: COMPLETED | OUTPATIENT
Start: 2025-07-22 | End: 2025-07-22

## 2025-07-22 RX ADMIN — INSULIN LISPRO 12: 100 INJECTION, SOLUTION INTRAVENOUS; SUBCUTANEOUS at 11:09

## 2025-07-22 RX ADMIN — Medication 400 MILLIGRAM(S): at 08:44

## 2025-07-22 RX ADMIN — SODIUM CHLORIDE 2000 MILLILITER(S): 9 INJECTION, SOLUTION INTRAVENOUS at 12:00

## 2025-07-22 RX ADMIN — INSULIN LISPRO 6: 100 INJECTION, SOLUTION INTRAVENOUS; SUBCUTANEOUS at 07:55

## 2025-07-22 RX ADMIN — GABAPENTIN 100 MILLIGRAM(S): 400 CAPSULE ORAL at 05:58

## 2025-07-22 RX ADMIN — SODIUM CHLORIDE 75 MILLILITER(S): 9 INJECTION, SOLUTION INTRAVENOUS at 06:04

## 2025-07-22 RX ADMIN — SODIUM CHLORIDE 75 MILLILITER(S): 9 INJECTION, SOLUTION INTRAVENOUS at 07:54

## 2025-07-22 RX ADMIN — Medication 40 MILLIGRAM(S): at 05:58

## 2025-07-22 RX ADMIN — Medication 81 MILLIGRAM(S): at 13:19

## 2025-07-22 RX ADMIN — GABAPENTIN 100 MILLIGRAM(S): 400 CAPSULE ORAL at 13:19

## 2025-07-22 RX ADMIN — INSULIN LISPRO 10 UNIT(S): 100 INJECTION, SOLUTION INTRAVENOUS; SUBCUTANEOUS at 12:11

## 2025-07-22 RX ADMIN — Medication 25 GRAM(S): at 05:58

## 2025-07-23 ENCOUNTER — APPOINTMENT (OUTPATIENT)
Dept: PULMONOLOGY | Facility: CLINIC | Age: 75
End: 2025-07-23

## 2025-07-23 ENCOUNTER — APPOINTMENT (OUTPATIENT)
Dept: INTERNAL MEDICINE | Facility: CLINIC | Age: 75
End: 2025-07-23

## 2025-07-24 LAB
CULTURE RESULTS: SIGNIFICANT CHANGE UP
SPECIMEN SOURCE: SIGNIFICANT CHANGE UP

## 2025-07-26 LAB — SURGICAL PATHOLOGY STUDY: SIGNIFICANT CHANGE UP

## 2025-08-01 ENCOUNTER — APPOINTMENT (OUTPATIENT)
Dept: PULMONOLOGY | Facility: CLINIC | Age: 75
End: 2025-08-01

## 2025-08-01 VITALS — HEIGHT: 65 IN | BODY MASS INDEX: 24.49 KG/M2 | WEIGHT: 147 LBS

## 2025-08-01 DIAGNOSIS — Z01.810 ENCOUNTER FOR PREPROCEDURAL CARDIOVASCULAR EXAMINATION: ICD-10-CM

## 2025-08-04 LAB — GLUCOSE BLDC GLUCOMTR-MCNC: 235 MG/DL — HIGH (ref 70–99)

## 2025-08-05 ENCOUNTER — APPOINTMENT (OUTPATIENT)
Dept: RADIOLOGY | Facility: CLINIC | Age: 75
End: 2025-08-05

## 2025-08-05 ENCOUNTER — OUTPATIENT (OUTPATIENT)
Dept: OUTPATIENT SERVICES | Facility: HOSPITAL | Age: 75
LOS: 1 days | End: 2025-08-05

## 2025-08-08 ENCOUNTER — APPOINTMENT (OUTPATIENT)
Dept: CARDIOTHORACIC SURGERY | Facility: CLINIC | Age: 75
End: 2025-08-08

## 2025-08-08 VITALS
OXYGEN SATURATION: 95 % | SYSTOLIC BLOOD PRESSURE: 93 MMHG | DIASTOLIC BLOOD PRESSURE: 58 MMHG | BODY MASS INDEX: 24.16 KG/M2 | RESPIRATION RATE: 16 BRPM | HEART RATE: 79 BPM | HEIGHT: 65 IN | WEIGHT: 145 LBS

## 2025-08-08 PROCEDURE — 93970 EXTREMITY STUDY: CPT

## 2025-08-08 PROCEDURE — 99213 OFFICE O/P EST LOW 20 MIN: CPT | Mod: 25

## 2025-08-11 ENCOUNTER — NON-APPOINTMENT (OUTPATIENT)
Age: 75
End: 2025-08-11

## 2025-08-11 ENCOUNTER — OUTPATIENT (OUTPATIENT)
Dept: OUTPATIENT SERVICES | Facility: HOSPITAL | Age: 75
LOS: 1 days | End: 2025-08-11
Payer: MEDICARE

## 2025-08-11 VITALS
SYSTOLIC BLOOD PRESSURE: 100 MMHG | TEMPERATURE: 97 F | RESPIRATION RATE: 16 BRPM | HEART RATE: 84 BPM | WEIGHT: 149.91 LBS | HEIGHT: 65 IN | DIASTOLIC BLOOD PRESSURE: 60 MMHG | OXYGEN SATURATION: 97 %

## 2025-08-11 DIAGNOSIS — I25.10 ATHEROSCLEROTIC HEART DISEASE OF NATIVE CORONARY ARTERY WITHOUT ANGINA PECTORIS: ICD-10-CM

## 2025-08-11 DIAGNOSIS — Z01.818 ENCOUNTER FOR OTHER PREPROCEDURAL EXAMINATION: ICD-10-CM

## 2025-08-11 DIAGNOSIS — E11.9 TYPE 2 DIABETES MELLITUS WITHOUT COMPLICATIONS: ICD-10-CM

## 2025-08-11 DIAGNOSIS — Z98.890 OTHER SPECIFIED POSTPROCEDURAL STATES: Chronic | ICD-10-CM

## 2025-08-11 DIAGNOSIS — Z92.89 PERSONAL HISTORY OF OTHER MEDICAL TREATMENT: Chronic | ICD-10-CM

## 2025-08-11 DIAGNOSIS — Z29.9 ENCOUNTER FOR PROPHYLACTIC MEASURES, UNSPECIFIED: ICD-10-CM

## 2025-08-11 LAB
A1C WITH ESTIMATED AVERAGE GLUCOSE RESULT: 9.2 % — HIGH (ref 4–5.6)
ALBUMIN SERPL ELPH-MCNC: 3.9 G/DL — SIGNIFICANT CHANGE UP (ref 3.3–5.2)
ALP SERPL-CCNC: 103 U/L — SIGNIFICANT CHANGE UP (ref 40–120)
ALT FLD-CCNC: 19 U/L — SIGNIFICANT CHANGE UP
ANION GAP SERPL CALC-SCNC: 16 MMOL/L — SIGNIFICANT CHANGE UP (ref 5–17)
APPEARANCE UR: CLEAR — SIGNIFICANT CHANGE UP
APTT BLD: 30.8 SEC — SIGNIFICANT CHANGE UP (ref 26.1–36.8)
AST SERPL-CCNC: 25 U/L — SIGNIFICANT CHANGE UP
BASOPHILS # BLD AUTO: 0.06 K/UL — SIGNIFICANT CHANGE UP (ref 0–0.2)
BASOPHILS NFR BLD AUTO: 0.7 % — SIGNIFICANT CHANGE UP (ref 0–2)
BILIRUB SERPL-MCNC: 0.4 MG/DL — SIGNIFICANT CHANGE UP (ref 0.4–2)
BILIRUB UR-MCNC: NEGATIVE — SIGNIFICANT CHANGE UP
BLD GP AB SCN SERPL QL: SIGNIFICANT CHANGE UP
BUN SERPL-MCNC: 16.1 MG/DL — SIGNIFICANT CHANGE UP (ref 8–20)
CALCIUM SERPL-MCNC: 9.2 MG/DL — SIGNIFICANT CHANGE UP (ref 8.4–10.5)
CHLORIDE SERPL-SCNC: 97 MMOL/L — SIGNIFICANT CHANGE UP (ref 96–108)
CO2 SERPL-SCNC: 23 MMOL/L — SIGNIFICANT CHANGE UP (ref 22–29)
COLOR SPEC: YELLOW — SIGNIFICANT CHANGE UP
CREAT SERPL-MCNC: 0.66 MG/DL — SIGNIFICANT CHANGE UP (ref 0.5–1.3)
DIFF PNL FLD: NEGATIVE — SIGNIFICANT CHANGE UP
EGFR: 98 ML/MIN/1.73M2 — SIGNIFICANT CHANGE UP
EGFR: 98 ML/MIN/1.73M2 — SIGNIFICANT CHANGE UP
EOSINOPHIL # BLD AUTO: 0.18 K/UL — SIGNIFICANT CHANGE UP (ref 0–0.5)
EOSINOPHIL NFR BLD AUTO: 2.2 % — SIGNIFICANT CHANGE UP (ref 0–6)
ESTIMATED AVERAGE GLUCOSE: 217 MG/DL — HIGH (ref 68–114)
GLUCOSE SERPL-MCNC: 287 MG/DL — HIGH (ref 70–99)
GLUCOSE UR QL: >=1000 MG/DL
HCT VFR BLD CALC: 40 % — SIGNIFICANT CHANGE UP (ref 39–50)
HGB BLD-MCNC: 12.7 G/DL — LOW (ref 13–17)
IMM GRANULOCYTES # BLD AUTO: 0.02 K/UL — SIGNIFICANT CHANGE UP (ref 0–0.07)
IMM GRANULOCYTES NFR BLD AUTO: 0.2 % — SIGNIFICANT CHANGE UP (ref 0–0.9)
INR BLD: 1.32 RATIO — HIGH (ref 0.85–1.16)
KETONES UR QL: NEGATIVE MG/DL — SIGNIFICANT CHANGE UP
LEUKOCYTE ESTERASE UR-ACNC: NEGATIVE — SIGNIFICANT CHANGE UP
LYMPHOCYTES # BLD AUTO: 2.35 K/UL — SIGNIFICANT CHANGE UP (ref 1–3.3)
LYMPHOCYTES NFR BLD AUTO: 28.6 % — SIGNIFICANT CHANGE UP (ref 13–44)
MCHC RBC-ENTMCNC: 27.1 PG — SIGNIFICANT CHANGE UP (ref 27–34)
MCHC RBC-ENTMCNC: 31.8 G/DL — LOW (ref 32–36)
MCV RBC AUTO: 85.5 FL — SIGNIFICANT CHANGE UP (ref 80–100)
MONOCYTES # BLD AUTO: 0.69 K/UL — SIGNIFICANT CHANGE UP (ref 0–0.9)
MONOCYTES NFR BLD AUTO: 8.4 % — SIGNIFICANT CHANGE UP (ref 2–14)
MRSA PCR RESULT.: SIGNIFICANT CHANGE UP
NEUTROPHILS # BLD AUTO: 4.92 K/UL — SIGNIFICANT CHANGE UP (ref 1.8–7.4)
NEUTROPHILS NFR BLD AUTO: 59.9 % — SIGNIFICANT CHANGE UP (ref 43–77)
NITRITE UR-MCNC: NEGATIVE — SIGNIFICANT CHANGE UP
NRBC # BLD AUTO: 0 K/UL — SIGNIFICANT CHANGE UP (ref 0–0)
NRBC # FLD: 0 K/UL — SIGNIFICANT CHANGE UP (ref 0–0)
NRBC BLD AUTO-RTO: 0 /100 WBCS — SIGNIFICANT CHANGE UP (ref 0–0)
NT-PROBNP SERPL-SCNC: 119 PG/ML — SIGNIFICANT CHANGE UP (ref 0–300)
PH UR: 6.5 — SIGNIFICANT CHANGE UP (ref 5–8)
PLATELET # BLD AUTO: 287 K/UL — SIGNIFICANT CHANGE UP (ref 150–400)
PMV BLD: 10.6 FL — SIGNIFICANT CHANGE UP (ref 7–13)
POTASSIUM SERPL-MCNC: 4.2 MMOL/L — SIGNIFICANT CHANGE UP (ref 3.5–5.3)
POTASSIUM SERPL-SCNC: 4.2 MMOL/L — SIGNIFICANT CHANGE UP (ref 3.5–5.3)
PREALB SERPL-MCNC: 17 MG/DL — LOW (ref 18–38)
PROT SERPL-MCNC: 7.7 G/DL — SIGNIFICANT CHANGE UP (ref 6.6–8.7)
PROT UR-MCNC: NEGATIVE MG/DL — SIGNIFICANT CHANGE UP
PROTHROM AB SERPL-ACNC: 15.3 SEC — HIGH (ref 9.9–13.4)
RBC # BLD: 4.68 M/UL — SIGNIFICANT CHANGE UP (ref 4.2–5.8)
RBC # FLD: 14.1 % — SIGNIFICANT CHANGE UP (ref 10.3–14.5)
S AUREUS DNA NOSE QL NAA+PROBE: SIGNIFICANT CHANGE UP
SODIUM SERPL-SCNC: 136 MMOL/L — SIGNIFICANT CHANGE UP (ref 135–145)
SP GR SPEC: >1.03 — HIGH (ref 1–1.03)
T3 SERPL-MCNC: 114 NG/DL — SIGNIFICANT CHANGE UP (ref 80–200)
T4 AB SER-ACNC: 8.8 UG/DL — SIGNIFICANT CHANGE UP (ref 4.5–12)
TSH SERPL-MCNC: 2.02 UIU/ML — SIGNIFICANT CHANGE UP (ref 0.27–4.2)
UROBILINOGEN FLD QL: 0.2 MG/DL — SIGNIFICANT CHANGE UP (ref 0.2–1)
WBC # BLD: 8.22 K/UL — SIGNIFICANT CHANGE UP (ref 3.8–10.5)
WBC # FLD AUTO: 8.22 K/UL — SIGNIFICANT CHANGE UP (ref 3.8–10.5)

## 2025-08-11 PROCEDURE — 87086 URINE CULTURE/COLONY COUNT: CPT

## 2025-08-11 PROCEDURE — 93005 ELECTROCARDIOGRAM TRACING: CPT

## 2025-08-11 PROCEDURE — 85025 COMPLETE CBC W/AUTO DIFF WBC: CPT

## 2025-08-11 PROCEDURE — 84134 ASSAY OF PREALBUMIN: CPT

## 2025-08-11 PROCEDURE — 93010 ELECTROCARDIOGRAM REPORT: CPT

## 2025-08-11 PROCEDURE — 84436 ASSAY OF TOTAL THYROXINE: CPT

## 2025-08-11 PROCEDURE — 87640 STAPH A DNA AMP PROBE: CPT

## 2025-08-11 PROCEDURE — 87641 MR-STAPH DNA AMP PROBE: CPT

## 2025-08-11 PROCEDURE — G0463: CPT

## 2025-08-11 PROCEDURE — 83880 ASSAY OF NATRIURETIC PEPTIDE: CPT

## 2025-08-11 PROCEDURE — 85610 PROTHROMBIN TIME: CPT

## 2025-08-11 PROCEDURE — 84480 ASSAY TRIIODOTHYRONINE (T3): CPT

## 2025-08-11 PROCEDURE — 80053 COMPREHEN METABOLIC PANEL: CPT

## 2025-08-11 PROCEDURE — 86901 BLOOD TYPING SEROLOGIC RH(D): CPT

## 2025-08-11 PROCEDURE — 83036 HEMOGLOBIN GLYCOSYLATED A1C: CPT

## 2025-08-11 PROCEDURE — 71046 X-RAY EXAM CHEST 2 VIEWS: CPT

## 2025-08-11 PROCEDURE — 84443 ASSAY THYROID STIM HORMONE: CPT

## 2025-08-11 PROCEDURE — 36415 COLL VENOUS BLD VENIPUNCTURE: CPT

## 2025-08-11 PROCEDURE — 71046 X-RAY EXAM CHEST 2 VIEWS: CPT | Mod: 26

## 2025-08-11 PROCEDURE — 86850 RBC ANTIBODY SCREEN: CPT

## 2025-08-11 PROCEDURE — 81003 URINALYSIS AUTO W/O SCOPE: CPT

## 2025-08-11 PROCEDURE — 86900 BLOOD TYPING SEROLOGIC ABO: CPT

## 2025-08-11 PROCEDURE — 85730 THROMBOPLASTIN TIME PARTIAL: CPT

## 2025-08-11 RX ORDER — CEFUROXIME SODIUM 1.5 G
1500 VIAL (EA) INJECTION ONCE
Refills: 0 | Status: DISCONTINUED | OUTPATIENT
Start: 2025-08-21 | End: 2025-08-21

## 2025-08-11 RX ORDER — CARVEDILOL 3.12 MG/1
1 TABLET, FILM COATED ORAL
Refills: 0 | DISCHARGE

## 2025-08-12 ENCOUNTER — APPOINTMENT (OUTPATIENT)
Dept: ULTRASOUND IMAGING | Facility: CLINIC | Age: 75
End: 2025-08-12
Payer: MEDICARE

## 2025-08-12 ENCOUNTER — OUTPATIENT (OUTPATIENT)
Dept: OUTPATIENT SERVICES | Facility: HOSPITAL | Age: 75
LOS: 1 days | End: 2025-08-12
Payer: MEDICARE

## 2025-08-12 ENCOUNTER — APPOINTMENT (OUTPATIENT)
Dept: PULMONOLOGY | Facility: CLINIC | Age: 75
End: 2025-08-12
Payer: MEDICARE

## 2025-08-12 ENCOUNTER — NON-APPOINTMENT (OUTPATIENT)
Age: 75
End: 2025-08-12

## 2025-08-12 VITALS — HEIGHT: 65.5 IN | BODY MASS INDEX: 24.53 KG/M2 | WEIGHT: 149 LBS

## 2025-08-12 DIAGNOSIS — I25.10 ATHEROSCLEROTIC HEART DISEASE OF NATIVE CORONARY ARTERY WITHOUT ANGINA PECTORIS: ICD-10-CM

## 2025-08-12 DIAGNOSIS — I25.10 ATHEROSCLEROTIC HEART DISEASE OF NATIVE CORONARY ARTERY W/OUT ANGINA PECTORIS: ICD-10-CM

## 2025-08-12 DIAGNOSIS — Z92.89 PERSONAL HISTORY OF OTHER MEDICAL TREATMENT: Chronic | ICD-10-CM

## 2025-08-12 DIAGNOSIS — Z98.890 OTHER SPECIFIED POSTPROCEDURAL STATES: Chronic | ICD-10-CM

## 2025-08-12 PROBLEM — I48.91 UNSPECIFIED ATRIAL FIBRILLATION: Chronic | Status: ACTIVE | Noted: 2025-08-11

## 2025-08-12 PROBLEM — E78.5 HYPERLIPIDEMIA, UNSPECIFIED: Chronic | Status: ACTIVE | Noted: 2025-08-11

## 2025-08-12 PROBLEM — E11.9 TYPE 2 DIABETES MELLITUS WITHOUT COMPLICATIONS: Chronic | Status: ACTIVE | Noted: 2025-08-11

## 2025-08-12 LAB
CULTURE RESULTS: SIGNIFICANT CHANGE UP
SPECIMEN SOURCE: SIGNIFICANT CHANGE UP

## 2025-08-12 PROCEDURE — 93880 EXTRACRANIAL BILAT STUDY: CPT | Mod: 26

## 2025-08-12 PROCEDURE — 93880 EXTRACRANIAL BILAT STUDY: CPT

## 2025-08-12 PROCEDURE — 94727 GAS DIL/WSHOT DETER LNG VOL: CPT

## 2025-08-12 PROCEDURE — 85018 HEMOGLOBIN: CPT | Mod: QW

## 2025-08-12 PROCEDURE — 94729 DIFFUSING CAPACITY: CPT

## 2025-08-12 PROCEDURE — 94010 BREATHING CAPACITY TEST: CPT

## 2025-08-20 ENCOUNTER — APPOINTMENT (OUTPATIENT)
Dept: CARDIOLOGY | Facility: CLINIC | Age: 75
End: 2025-08-20

## 2025-08-20 PROCEDURE — 84443 ASSAY THYROID STIM HORMONE: CPT

## 2025-08-20 PROCEDURE — 81003 URINALYSIS AUTO W/O SCOPE: CPT

## 2025-08-20 PROCEDURE — 87641 MR-STAPH DNA AMP PROBE: CPT

## 2025-08-20 PROCEDURE — 87640 STAPH A DNA AMP PROBE: CPT

## 2025-08-20 PROCEDURE — 84480 ASSAY TRIIODOTHYRONINE (T3): CPT

## 2025-08-20 PROCEDURE — 85610 PROTHROMBIN TIME: CPT

## 2025-08-20 PROCEDURE — 83036 HEMOGLOBIN GLYCOSYLATED A1C: CPT

## 2025-08-20 PROCEDURE — 87086 URINE CULTURE/COLONY COUNT: CPT

## 2025-08-20 PROCEDURE — 80053 COMPREHEN METABOLIC PANEL: CPT

## 2025-08-20 PROCEDURE — 85025 COMPLETE CBC W/AUTO DIFF WBC: CPT

## 2025-08-20 PROCEDURE — 85730 THROMBOPLASTIN TIME PARTIAL: CPT

## 2025-08-20 PROCEDURE — 71046 X-RAY EXAM CHEST 2 VIEWS: CPT

## 2025-08-20 PROCEDURE — 86923 COMPATIBILITY TEST ELECTRIC: CPT

## 2025-08-20 PROCEDURE — 84134 ASSAY OF PREALBUMIN: CPT

## 2025-08-20 PROCEDURE — 86850 RBC ANTIBODY SCREEN: CPT

## 2025-08-20 PROCEDURE — 84436 ASSAY OF TOTAL THYROXINE: CPT

## 2025-08-20 PROCEDURE — 86901 BLOOD TYPING SEROLOGIC RH(D): CPT

## 2025-08-20 PROCEDURE — 83880 ASSAY OF NATRIURETIC PEPTIDE: CPT

## 2025-08-20 PROCEDURE — 93005 ELECTROCARDIOGRAM TRACING: CPT

## 2025-08-20 PROCEDURE — 36415 COLL VENOUS BLD VENIPUNCTURE: CPT

## 2025-08-20 PROCEDURE — G0463: CPT

## 2025-08-20 PROCEDURE — 86900 BLOOD TYPING SEROLOGIC ABO: CPT

## 2025-08-21 ENCOUNTER — INPATIENT (INPATIENT)
Facility: HOSPITAL | Age: 75
LOS: 4 days | Discharge: HOME HEALTH | DRG: 303 | End: 2025-08-26
Attending: THORACIC SURGERY (CARDIOTHORACIC VASCULAR SURGERY) | Admitting: THORACIC SURGERY (CARDIOTHORACIC VASCULAR SURGERY)
Payer: MEDICARE

## 2025-08-21 ENCOUNTER — APPOINTMENT (OUTPATIENT)
Dept: CARDIOTHORACIC SURGERY | Facility: HOSPITAL | Age: 75
End: 2025-08-21

## 2025-08-21 ENCOUNTER — TRANSCRIPTION ENCOUNTER (OUTPATIENT)
Age: 75
End: 2025-08-21

## 2025-08-21 VITALS
HEIGHT: 65 IN | SYSTOLIC BLOOD PRESSURE: 105 MMHG | TEMPERATURE: 98 F | OXYGEN SATURATION: 99 % | WEIGHT: 149.91 LBS | HEART RATE: 85 BPM | RESPIRATION RATE: 16 BRPM | DIASTOLIC BLOOD PRESSURE: 73 MMHG

## 2025-08-21 DIAGNOSIS — Z98.890 OTHER SPECIFIED POSTPROCEDURAL STATES: Chronic | ICD-10-CM

## 2025-08-21 DIAGNOSIS — Z92.89 PERSONAL HISTORY OF OTHER MEDICAL TREATMENT: Chronic | ICD-10-CM

## 2025-08-21 DIAGNOSIS — I25.10 ATHEROSCLEROTIC HEART DISEASE OF NATIVE CORONARY ARTERY WITHOUT ANGINA PECTORIS: ICD-10-CM

## 2025-08-21 LAB
ALBUMIN SERPL ELPH-MCNC: 3.5 G/DL — SIGNIFICANT CHANGE UP (ref 3.3–5.2)
ALP SERPL-CCNC: 78 U/L — SIGNIFICANT CHANGE UP (ref 40–120)
ALT FLD-CCNC: 16 U/L — SIGNIFICANT CHANGE UP
ANION GAP SERPL CALC-SCNC: 17 MMOL/L — SIGNIFICANT CHANGE UP (ref 5–17)
APTT BLD: 27.3 SEC — SIGNIFICANT CHANGE UP (ref 26.1–36.8)
APTT BLD: 32.1 SEC — SIGNIFICANT CHANGE UP (ref 26.1–36.8)
AST SERPL-CCNC: 36 U/L — SIGNIFICANT CHANGE UP
BASOPHILS # BLD AUTO: 0.06 K/UL — SIGNIFICANT CHANGE UP (ref 0–0.2)
BASOPHILS NFR BLD AUTO: 0.3 % — SIGNIFICANT CHANGE UP (ref 0–2)
BILIRUB SERPL-MCNC: 0.7 MG/DL — SIGNIFICANT CHANGE UP (ref 0.4–2)
BUN SERPL-MCNC: 10.3 MG/DL — SIGNIFICANT CHANGE UP (ref 8–20)
CALCIUM SERPL-MCNC: 8.2 MG/DL — LOW (ref 8.4–10.5)
CHLORIDE SERPL-SCNC: 101 MMOL/L — SIGNIFICANT CHANGE UP (ref 96–108)
CK MB CFR SERPL CALC: 19.6 NG/ML — HIGH (ref 0–6.7)
CK SERPL-CCNC: 203 U/L — HIGH (ref 30–200)
CO2 SERPL-SCNC: 23 MMOL/L — SIGNIFICANT CHANGE UP (ref 22–29)
CREAT SERPL-MCNC: 0.49 MG/DL — LOW (ref 0.5–1.3)
EGFR: 107 ML/MIN/1.73M2 — SIGNIFICANT CHANGE UP
EGFR: 107 ML/MIN/1.73M2 — SIGNIFICANT CHANGE UP
EOSINOPHIL # BLD AUTO: 0.03 K/UL — SIGNIFICANT CHANGE UP (ref 0–0.5)
EOSINOPHIL NFR BLD AUTO: 0.2 % — SIGNIFICANT CHANGE UP (ref 0–6)
FIBRINOGEN PPP-MCNC: 239 MG/DL — SIGNIFICANT CHANGE UP (ref 200–450)
GAS PNL BLDA: SIGNIFICANT CHANGE UP
GAS PNL BLDA: SIGNIFICANT CHANGE UP
GAS PNL BLDV: SIGNIFICANT CHANGE UP
GLUCOSE BLDC GLUCOMTR-MCNC: 100 MG/DL — HIGH (ref 70–99)
GLUCOSE BLDC GLUCOMTR-MCNC: 102 MG/DL — HIGH (ref 70–99)
GLUCOSE BLDC GLUCOMTR-MCNC: 104 MG/DL — HIGH (ref 70–99)
GLUCOSE BLDC GLUCOMTR-MCNC: 108 MG/DL — HIGH (ref 70–99)
GLUCOSE BLDC GLUCOMTR-MCNC: 111 MG/DL — HIGH (ref 70–99)
GLUCOSE BLDC GLUCOMTR-MCNC: 112 MG/DL — HIGH (ref 70–99)
GLUCOSE BLDC GLUCOMTR-MCNC: 127 MG/DL — HIGH (ref 70–99)
GLUCOSE BLDC GLUCOMTR-MCNC: 130 MG/DL — HIGH (ref 70–99)
GLUCOSE BLDC GLUCOMTR-MCNC: 146 MG/DL — HIGH (ref 70–99)
GLUCOSE BLDC GLUCOMTR-MCNC: 164 MG/DL — HIGH (ref 70–99)
GLUCOSE BLDC GLUCOMTR-MCNC: 169 MG/DL — HIGH (ref 70–99)
GLUCOSE SERPL-MCNC: 170 MG/DL — HIGH (ref 70–99)
HCT VFR BLD CALC: 34.4 % — LOW (ref 39–50)
HGB BLD-MCNC: 11 G/DL — LOW (ref 13–17)
IMM GRANULOCYTES # BLD AUTO: 0.33 K/UL — HIGH (ref 0–0.07)
IMM GRANULOCYTES NFR BLD AUTO: 1.8 % — HIGH (ref 0–0.9)
INR BLD: 1.18 RATIO — HIGH (ref 0.85–1.16)
INR BLD: 1.45 RATIO — HIGH (ref 0.85–1.16)
LYMPHOCYTES # BLD AUTO: 1.92 K/UL — SIGNIFICANT CHANGE UP (ref 1–3.3)
LYMPHOCYTES NFR BLD AUTO: 10.7 % — LOW (ref 13–44)
MAGNESIUM SERPL-MCNC: 1.9 MG/DL — SIGNIFICANT CHANGE UP (ref 1.6–2.6)
MCHC RBC-ENTMCNC: 26.8 PG — LOW (ref 27–34)
MCHC RBC-ENTMCNC: 32 G/DL — SIGNIFICANT CHANGE UP (ref 32–36)
MCV RBC AUTO: 83.9 FL — SIGNIFICANT CHANGE UP (ref 80–100)
MONOCYTES # BLD AUTO: 0.56 K/UL — SIGNIFICANT CHANGE UP (ref 0–0.9)
MONOCYTES NFR BLD AUTO: 3.1 % — SIGNIFICANT CHANGE UP (ref 2–14)
NEUTROPHILS # BLD AUTO: 15.07 K/UL — HIGH (ref 1.8–7.4)
NEUTROPHILS NFR BLD AUTO: 83.9 % — HIGH (ref 43–77)
NRBC # BLD AUTO: 0 K/UL — SIGNIFICANT CHANGE UP (ref 0–0)
NRBC # FLD: 0 K/UL — SIGNIFICANT CHANGE UP (ref 0–0)
NRBC BLD AUTO-RTO: 0 /100 WBCS — SIGNIFICANT CHANGE UP (ref 0–0)
PHOSPHATE SERPL-MCNC: 2.2 MG/DL — LOW (ref 2.4–4.7)
PLATELET # BLD AUTO: 169 K/UL — SIGNIFICANT CHANGE UP (ref 150–400)
PMV BLD: 10.6 FL — SIGNIFICANT CHANGE UP (ref 7–13)
POTASSIUM SERPL-MCNC: 3.8 MMOL/L — SIGNIFICANT CHANGE UP (ref 3.5–5.3)
POTASSIUM SERPL-SCNC: 3.8 MMOL/L — SIGNIFICANT CHANGE UP (ref 3.5–5.3)
PROT SERPL-MCNC: 5.9 G/DL — LOW (ref 6.6–8.7)
PROTHROM AB SERPL-ACNC: 13.7 SEC — HIGH (ref 9.9–13.4)
PROTHROM AB SERPL-ACNC: 16.8 SEC — HIGH (ref 9.9–13.4)
RBC # BLD: 4.1 M/UL — LOW (ref 4.2–5.8)
RBC # FLD: 14.1 % — SIGNIFICANT CHANGE UP (ref 10.3–14.5)
SODIUM SERPL-SCNC: 141 MMOL/L — SIGNIFICANT CHANGE UP (ref 135–145)
TROPONIN T, HIGH SENSITIVITY RESULT: 168 NG/L — CRITICAL HIGH
WBC # BLD: 17.97 K/UL — HIGH (ref 3.8–10.5)
WBC # FLD AUTO: 17.97 K/UL — HIGH (ref 3.8–10.5)

## 2025-08-21 PROCEDURE — 83735 ASSAY OF MAGNESIUM: CPT

## 2025-08-21 PROCEDURE — 94760 N-INVAS EAR/PLS OXIMETRY 1: CPT

## 2025-08-21 PROCEDURE — 82330 ASSAY OF CALCIUM: CPT

## 2025-08-21 PROCEDURE — 85730 THROMBOPLASTIN TIME PARTIAL: CPT

## 2025-08-21 PROCEDURE — 85014 HEMATOCRIT: CPT

## 2025-08-21 PROCEDURE — 33517 CABG ARTERY-VEIN SINGLE: CPT | Mod: AS

## 2025-08-21 PROCEDURE — 36415 COLL VENOUS BLD VENIPUNCTURE: CPT

## 2025-08-21 PROCEDURE — 82550 ASSAY OF CK (CPK): CPT

## 2025-08-21 PROCEDURE — 33533 CABG ARTERIAL SINGLE: CPT

## 2025-08-21 PROCEDURE — 83605 ASSAY OF LACTIC ACID: CPT

## 2025-08-21 PROCEDURE — 85025 COMPLETE CBC W/AUTO DIFF WBC: CPT

## 2025-08-21 PROCEDURE — 33508 ENDOSCOPIC VEIN HARVEST: CPT | Mod: 59

## 2025-08-21 PROCEDURE — 85610 PROTHROMBIN TIME: CPT

## 2025-08-21 PROCEDURE — 84100 ASSAY OF PHOSPHORUS: CPT

## 2025-08-21 PROCEDURE — 84295 ASSAY OF SERUM SODIUM: CPT

## 2025-08-21 PROCEDURE — 71045 X-RAY EXAM CHEST 1 VIEW: CPT

## 2025-08-21 PROCEDURE — P9045: CPT

## 2025-08-21 PROCEDURE — 85018 HEMOGLOBIN: CPT

## 2025-08-21 PROCEDURE — 80053 COMPREHEN METABOLIC PANEL: CPT

## 2025-08-21 PROCEDURE — 84132 ASSAY OF SERUM POTASSIUM: CPT

## 2025-08-21 PROCEDURE — 33533 CABG ARTERIAL SINGLE: CPT | Mod: AS

## 2025-08-21 PROCEDURE — 82962 GLUCOSE BLOOD TEST: CPT

## 2025-08-21 PROCEDURE — 93005 ELECTROCARDIOGRAM TRACING: CPT

## 2025-08-21 PROCEDURE — 82947 ASSAY GLUCOSE BLOOD QUANT: CPT

## 2025-08-21 PROCEDURE — 99291 CRITICAL CARE FIRST HOUR: CPT

## 2025-08-21 PROCEDURE — 93010 ELECTROCARDIOGRAM REPORT: CPT

## 2025-08-21 PROCEDURE — 99292 CRITICAL CARE ADDL 30 MIN: CPT

## 2025-08-21 PROCEDURE — 82803 BLOOD GASES ANY COMBINATION: CPT

## 2025-08-21 PROCEDURE — 71045 X-RAY EXAM CHEST 1 VIEW: CPT | Mod: 26

## 2025-08-21 PROCEDURE — 84484 ASSAY OF TROPONIN QUANT: CPT

## 2025-08-21 PROCEDURE — 33517 CABG ARTERY-VEIN SINGLE: CPT

## 2025-08-21 PROCEDURE — 82435 ASSAY OF BLOOD CHLORIDE: CPT

## 2025-08-21 PROCEDURE — 94002 VENT MGMT INPAT INIT DAY: CPT

## 2025-08-21 PROCEDURE — 85384 FIBRINOGEN ACTIVITY: CPT

## 2025-08-21 PROCEDURE — 82553 CREATINE MB FRACTION: CPT

## 2025-08-21 DEVICE — CHEST DRAIN PLEUR-EVAC 32FR STRAIGHT: Type: IMPLANTABLE DEVICE | Status: FUNCTIONAL

## 2025-08-21 DEVICE — CANNULA ARTERIAL STRAIGHT 20FR X 3/8" VENTED: Type: IMPLANTABLE DEVICE | Status: FUNCTIONAL

## 2025-08-21 DEVICE — PACING WIRE WHITE M-25 WINGED WIRE 37MM X 89MM: Type: IMPLANTABLE DEVICE | Status: FUNCTIONAL

## 2025-08-21 DEVICE — PACING WIRE ORANGE M-25 WINGED WIRE 37MM X 89MM: Type: IMPLANTABLE DEVICE | Status: FUNCTIONAL

## 2025-08-21 DEVICE — FLOSEAL WITH RECOTHROM THROMBIN 10ML: Type: IMPLANTABLE DEVICE | Status: FUNCTIONAL

## 2025-08-21 DEVICE — KIT A-LINE 1LUM 20G X 12CM SAFE KIT: Type: IMPLANTABLE DEVICE | Status: FUNCTIONAL

## 2025-08-21 DEVICE — SURGICEL NU-KNIT 6 X 9": Type: IMPLANTABLE DEVICE | Status: FUNCTIONAL

## 2025-08-21 DEVICE — KIT CVC 2LUM MAC 9FR CHG: Type: IMPLANTABLE DEVICE | Status: FUNCTIONAL

## 2025-08-21 DEVICE — CANNULA ARTERIAL SOFT-FLOW 21FR EXTENDED VENTED: Type: IMPLANTABLE DEVICE | Status: FUNCTIONAL

## 2025-08-21 DEVICE — CANNULA RETROGRADE CARDIOPLEGIA SELF-INFLATING 14FR PRE-SHAPED STYLET/HANDLE: Type: IMPLANTABLE DEVICE | Status: FUNCTIONAL

## 2025-08-21 DEVICE — KIT A-LINE 1LUM 18G X 16CM: Type: IMPLANTABLE DEVICE | Status: FUNCTIONAL

## 2025-08-21 DEVICE — PACING WIRE BLUE LIGHT/DARK 2-0 24" BB-1 SKS-3: Type: IMPLANTABLE DEVICE | Status: FUNCTIONAL

## 2025-08-21 DEVICE — OCCLUDER INTERNAL VESSEL FLO-RESTER 1 X 12MM: Type: IMPLANTABLE DEVICE | Status: FUNCTIONAL

## 2025-08-21 DEVICE — CANNULA AORTIC ROOT WITH VENT LINE 12G X 14CM FLANGED: Type: IMPLANTABLE DEVICE | Status: FUNCTIONAL

## 2025-08-21 DEVICE — CANNULA VESSEL 3MM BLUNT TIP CLEAR 1-WAY VALVE: Type: IMPLANTABLE DEVICE | Status: FUNCTIONAL

## 2025-08-21 DEVICE — BONE WAX 2.5GM: Type: IMPLANTABLE DEVICE | Status: FUNCTIONAL

## 2025-08-21 DEVICE — CATH INFUS SL 7FR 20CM: Type: IMPLANTABLE DEVICE | Status: FUNCTIONAL

## 2025-08-21 DEVICE — CATH THERMAL OXI SWAN GANZ DILUTION 7.5FR: Type: IMPLANTABLE DEVICE | Status: FUNCTIONAL

## 2025-08-21 DEVICE — AGENT HEMOSTATIC HEMOBLAST 1.65G 10CM: Type: IMPLANTABLE DEVICE | Status: FUNCTIONAL

## 2025-08-21 DEVICE — CANNULA ATRASUMP 1/4" X 38CM: Type: IMPLANTABLE DEVICE | Status: FUNCTIONAL

## 2025-08-21 DEVICE — SURGICEL FIBRILLAR 4 X 4": Type: IMPLANTABLE DEVICE | Status: FUNCTIONAL

## 2025-08-21 DEVICE — SURGICEL 2 X 14": Type: IMPLANTABLE DEVICE | Status: FUNCTIONAL

## 2025-08-21 DEVICE — CANNULA IMA 1MM BLUNT TIP: Type: IMPLANTABLE DEVICE | Status: FUNCTIONAL

## 2025-08-21 DEVICE — PACING WIRE BLUE DARK 2-0 24" BB-1 SKS-3: Type: IMPLANTABLE DEVICE | Status: FUNCTIONAL

## 2025-08-21 DEVICE — CANNULA VENOUS 2 STAGE THIN FLEX 29/29FR X 3/8" NON-VENTED: Type: IMPLANTABLE DEVICE | Status: FUNCTIONAL

## 2025-08-21 RX ORDER — AMIODARONE HYDROCHLORIDE 50 MG/ML
400 INJECTION, SOLUTION INTRAVENOUS
Refills: 0 | Status: DISCONTINUED | OUTPATIENT
Start: 2025-08-21 | End: 2025-08-23

## 2025-08-21 RX ORDER — ACETAMINOPHEN 500 MG/5ML
975 LIQUID (ML) ORAL EVERY 6 HOURS
Refills: 0 | Status: COMPLETED | OUTPATIENT
Start: 2025-08-22 | End: 2025-08-24

## 2025-08-21 RX ORDER — ASPIRIN 325 MG
81 TABLET ORAL ONCE
Refills: 0 | Status: COMPLETED | OUTPATIENT
Start: 2025-08-21 | End: 2025-08-21

## 2025-08-21 RX ORDER — PROPOFOL 10 MG/ML
10 INJECTION, EMULSION INTRAVENOUS
Qty: 1000 | Refills: 0 | Status: DISCONTINUED | OUTPATIENT
Start: 2025-08-21 | End: 2025-08-21

## 2025-08-21 RX ORDER — ASPIRIN 325 MG
81 TABLET ORAL DAILY
Refills: 0 | Status: DISCONTINUED | OUTPATIENT
Start: 2025-08-22 | End: 2025-08-26

## 2025-08-21 RX ORDER — ALBUMIN (HUMAN) 12.5 G/50ML
250 INJECTION, SOLUTION INTRAVENOUS ONCE
Refills: 0 | Status: COMPLETED | OUTPATIENT
Start: 2025-08-21 | End: 2025-08-21

## 2025-08-21 RX ORDER — VANCOMYCIN HCL IN 5 % DEXTROSE 1.5G/250ML
1000 PLASTIC BAG, INJECTION (ML) INTRAVENOUS EVERY 12 HOURS
Refills: 0 | Status: COMPLETED | OUTPATIENT
Start: 2025-08-21 | End: 2025-08-23

## 2025-08-21 RX ORDER — CEFUROXIME SODIUM 1.5 G
1500 VIAL (EA) INJECTION EVERY 8 HOURS
Refills: 0 | Status: COMPLETED | OUTPATIENT
Start: 2025-08-21 | End: 2025-08-23

## 2025-08-21 RX ORDER — TAMSULOSIN HYDROCHLORIDE 0.4 MG/1
1 CAPSULE ORAL
Refills: 0 | DISCHARGE

## 2025-08-21 RX ORDER — IRON SUCROSE 20 MG/ML
100 INJECTION, SOLUTION INTRAVENOUS EVERY 24 HOURS
Refills: 0 | Status: COMPLETED | OUTPATIENT
Start: 2025-08-21 | End: 2025-08-23

## 2025-08-21 RX ORDER — DEXTROSE 50 % IN WATER 50 %
50 SYRINGE (ML) INTRAVENOUS
Refills: 0 | Status: DISCONTINUED | OUTPATIENT
Start: 2025-08-21 | End: 2025-08-21

## 2025-08-21 RX ORDER — FENTANYL CITRATE-0.9 % NACL/PF 100MCG/2ML
25 SYRINGE (ML) INTRAVENOUS ONCE
Refills: 0 | Status: DISCONTINUED | OUTPATIENT
Start: 2025-08-21 | End: 2025-08-21

## 2025-08-21 RX ORDER — GABAPENTIN 400 MG/1
100 CAPSULE ORAL EVERY 8 HOURS
Refills: 0 | Status: DISCONTINUED | OUTPATIENT
Start: 2025-08-22 | End: 2025-08-26

## 2025-08-21 RX ORDER — HYDROMORPHONE/SOD CHLOR,ISO/PF 2 MG/10 ML
0.5 SYRINGE (ML) INJECTION EVERY 6 HOURS
Refills: 0 | Status: DISCONTINUED | OUTPATIENT
Start: 2025-08-21 | End: 2025-08-22

## 2025-08-21 RX ORDER — BISACODYL 5 MG
10 TABLET, DELAYED RELEASE (ENTERIC COATED) ORAL ONCE
Refills: 0 | Status: DISCONTINUED | OUTPATIENT
Start: 2025-08-23 | End: 2025-08-26

## 2025-08-21 RX ORDER — ACETAMINOPHEN 500 MG/5ML
650 LIQUID (ML) ORAL EVERY 6 HOURS
Refills: 0 | Status: COMPLETED | OUTPATIENT
Start: 2025-08-24 | End: 2026-07-23

## 2025-08-21 RX ORDER — ALBUMIN (HUMAN) 12.5 G/50ML
250 INJECTION, SOLUTION INTRAVENOUS
Refills: 0 | Status: COMPLETED | OUTPATIENT
Start: 2025-08-21 | End: 2025-08-21

## 2025-08-21 RX ORDER — MAGNESIUM SULFATE 500 MG/ML
2 SYRINGE (ML) INJECTION ONCE
Refills: 0 | Status: COMPLETED | OUTPATIENT
Start: 2025-08-21 | End: 2025-08-21

## 2025-08-21 RX ORDER — NOREPINEPHRINE BITARTRATE 8 MG
0.06 SOLUTION INTRAVENOUS
Qty: 8 | Refills: 0 | Status: DISCONTINUED | OUTPATIENT
Start: 2025-08-21 | End: 2025-08-23

## 2025-08-21 RX ORDER — ROSUVASTATIN CALCIUM 20 MG/1
20 TABLET, FILM COATED ORAL AT BEDTIME
Refills: 0 | Status: DISCONTINUED | OUTPATIENT
Start: 2025-08-21 | End: 2025-08-26

## 2025-08-21 RX ORDER — VASOPRESSIN 20 [USP'U]/ML
0.1 INJECTION INTRAVENOUS
Qty: 40 | Refills: 0 | Status: DISCONTINUED | OUTPATIENT
Start: 2025-08-21 | End: 2025-08-22

## 2025-08-21 RX ORDER — DEXTROSE 50 % IN WATER 50 %
25 SYRINGE (ML) INTRAVENOUS
Refills: 0 | Status: DISCONTINUED | OUTPATIENT
Start: 2025-08-21 | End: 2025-08-21

## 2025-08-21 RX ORDER — OXYCODONE HYDROCHLORIDE 30 MG/1
10 TABLET ORAL EVERY 4 HOURS
Refills: 0 | Status: DISCONTINUED | OUTPATIENT
Start: 2025-08-22 | End: 2025-08-26

## 2025-08-21 RX ORDER — OXYCODONE HYDROCHLORIDE 30 MG/1
5 TABLET ORAL EVERY 4 HOURS
Refills: 0 | Status: DISCONTINUED | OUTPATIENT
Start: 2025-08-22 | End: 2025-08-26

## 2025-08-21 RX ORDER — TAMSULOSIN HYDROCHLORIDE 0.4 MG/1
0.4 CAPSULE ORAL AT BEDTIME
Refills: 0 | Status: DISCONTINUED | OUTPATIENT
Start: 2025-08-22 | End: 2025-08-26

## 2025-08-21 RX ORDER — SENNA 187 MG
2 TABLET ORAL AT BEDTIME
Refills: 0 | Status: DISCONTINUED | OUTPATIENT
Start: 2025-08-22 | End: 2025-08-26

## 2025-08-21 RX ORDER — POLYETHYLENE GLYCOL 3350 17 G/17G
17 POWDER, FOR SOLUTION ORAL DAILY
Refills: 0 | Status: DISCONTINUED | OUTPATIENT
Start: 2025-08-22 | End: 2025-08-26

## 2025-08-21 RX ORDER — ACETAMINOPHEN 500 MG/5ML
1000 LIQUID (ML) ORAL EVERY 6 HOURS
Refills: 0 | Status: COMPLETED | OUTPATIENT
Start: 2025-08-21 | End: 2025-08-22

## 2025-08-21 RX ADMIN — Medication 81 MILLIGRAM(S): at 17:35

## 2025-08-21 RX ADMIN — ALBUMIN (HUMAN) 500 MILLILITER(S): 12.5 INJECTION, SOLUTION INTRAVENOUS at 21:06

## 2025-08-21 RX ADMIN — VASOPRESSIN 15 UNIT(S)/MIN: 20 INJECTION INTRAVENOUS at 13:14

## 2025-08-21 RX ADMIN — IRON SUCROSE 210 MILLIGRAM(S): 20 INJECTION, SOLUTION INTRAVENOUS at 13:15

## 2025-08-21 RX ADMIN — Medication 1000 MILLIGRAM(S): at 23:47

## 2025-08-21 RX ADMIN — Medication 15 MILLILITER(S): at 17:12

## 2025-08-21 RX ADMIN — Medication 5 MILLILITER(S): at 20:24

## 2025-08-21 RX ADMIN — AMIODARONE HYDROCHLORIDE 400 MILLIGRAM(S): 50 INJECTION, SOLUTION INTRAVENOUS at 17:12

## 2025-08-21 RX ADMIN — Medication 25 GRAM(S): at 18:59

## 2025-08-21 RX ADMIN — Medication 100 MILLIEQUIVALENT(S): at 23:14

## 2025-08-21 RX ADMIN — Medication 100 MILLIGRAM(S): at 16:06

## 2025-08-21 RX ADMIN — PROPOFOL 4.08 MICROGRAM(S)/KG/MIN: 10 INJECTION, EMULSION INTRAVENOUS at 13:12

## 2025-08-21 RX ADMIN — Medication 0.5 MILLIGRAM(S): at 22:00

## 2025-08-21 RX ADMIN — NOREPINEPHRINE BITARTRATE 7.65 MICROGRAM(S)/KG/MIN: 8 SOLUTION at 20:00

## 2025-08-21 RX ADMIN — Medication 2 UNIT(S)/HR: at 20:25

## 2025-08-21 RX ADMIN — Medication 100 MILLIEQUIVALENT(S): at 21:36

## 2025-08-21 RX ADMIN — ALBUMIN (HUMAN) 125 MILLILITER(S): 12.5 INJECTION, SOLUTION INTRAVENOUS at 23:44

## 2025-08-21 RX ADMIN — ALBUMIN (HUMAN) 1500 MILLILITER(S): 12.5 INJECTION, SOLUTION INTRAVENOUS at 14:06

## 2025-08-21 RX ADMIN — Medication 250 MILLIGRAM(S): at 20:23

## 2025-08-21 RX ADMIN — Medication 10 MILLILITER(S): at 20:24

## 2025-08-21 RX ADMIN — Medication 100 MILLIGRAM(S): at 23:14

## 2025-08-21 RX ADMIN — Medication 100 MILLIEQUIVALENT(S): at 14:46

## 2025-08-21 RX ADMIN — Medication 40 MILLIGRAM(S): at 13:38

## 2025-08-21 RX ADMIN — Medication 25 MICROGRAM(S): at 16:45

## 2025-08-21 RX ADMIN — Medication 400 MILLIGRAM(S): at 17:11

## 2025-08-21 RX ADMIN — ALBUMIN (HUMAN) 500 MILLILITER(S): 12.5 INJECTION, SOLUTION INTRAVENOUS at 20:44

## 2025-08-21 RX ADMIN — Medication 500 MILLIGRAM(S): at 17:12

## 2025-08-21 RX ADMIN — Medication 0.5 MILLIGRAM(S): at 20:24

## 2025-08-21 RX ADMIN — NOREPINEPHRINE BITARTRATE 7.65 MICROGRAM(S)/KG/MIN: 8 SOLUTION at 13:11

## 2025-08-21 RX ADMIN — Medication 100 MILLIEQUIVALENT(S): at 16:38

## 2025-08-21 RX ADMIN — Medication 100 MILLIEQUIVALENT(S): at 22:23

## 2025-08-21 RX ADMIN — Medication 400 MILLIGRAM(S): at 23:14

## 2025-08-21 RX ADMIN — ROSUVASTATIN CALCIUM 20 MILLIGRAM(S): 20 TABLET, FILM COATED ORAL at 22:23

## 2025-08-21 RX ADMIN — Medication 1000 MILLIGRAM(S): at 17:45

## 2025-08-21 RX ADMIN — Medication 25 MICROGRAM(S): at 16:15

## 2025-08-21 RX ADMIN — Medication 100 MILLIEQUIVALENT(S): at 15:26

## 2025-08-21 RX ADMIN — Medication 2 UNIT(S)/HR: at 13:11

## 2025-08-22 LAB
ALBUMIN SERPL ELPH-MCNC: 3.8 G/DL — SIGNIFICANT CHANGE UP (ref 3.3–5.2)
ALP SERPL-CCNC: 63 U/L — SIGNIFICANT CHANGE UP (ref 40–120)
ALT FLD-CCNC: 20 U/L — SIGNIFICANT CHANGE UP
ANION GAP SERPL CALC-SCNC: 11 MMOL/L — SIGNIFICANT CHANGE UP (ref 5–17)
APTT BLD: 29.1 SEC — SIGNIFICANT CHANGE UP (ref 26.1–36.8)
AST SERPL-CCNC: 44 U/L — HIGH
BASOPHILS # BLD AUTO: 0.03 K/UL — SIGNIFICANT CHANGE UP (ref 0–0.2)
BASOPHILS NFR BLD AUTO: 0.1 % — SIGNIFICANT CHANGE UP (ref 0–2)
BILIRUB SERPL-MCNC: 0.6 MG/DL — SIGNIFICANT CHANGE UP (ref 0.4–2)
BUN SERPL-MCNC: 11.4 MG/DL — SIGNIFICANT CHANGE UP (ref 8–20)
CALCIUM SERPL-MCNC: 7.9 MG/DL — LOW (ref 8.4–10.5)
CHLORIDE SERPL-SCNC: 101 MMOL/L — SIGNIFICANT CHANGE UP (ref 96–108)
CK MB CFR SERPL CALC: 12.9 NG/ML — HIGH (ref 0–6.7)
CK SERPL-CCNC: 309 U/L — HIGH (ref 30–200)
CO2 SERPL-SCNC: 23 MMOL/L — SIGNIFICANT CHANGE UP (ref 22–29)
CREAT SERPL-MCNC: 0.44 MG/DL — LOW (ref 0.5–1.3)
EGFR: 111 ML/MIN/1.73M2 — SIGNIFICANT CHANGE UP
EGFR: 111 ML/MIN/1.73M2 — SIGNIFICANT CHANGE UP
EOSINOPHIL # BLD AUTO: 0 K/UL — SIGNIFICANT CHANGE UP (ref 0–0.5)
EOSINOPHIL NFR BLD AUTO: 0 % — SIGNIFICANT CHANGE UP (ref 0–6)
GLUCOSE BLDC GLUCOMTR-MCNC: 108 MG/DL — HIGH (ref 70–99)
GLUCOSE BLDC GLUCOMTR-MCNC: 116 MG/DL — HIGH (ref 70–99)
GLUCOSE BLDC GLUCOMTR-MCNC: 118 MG/DL — HIGH (ref 70–99)
GLUCOSE BLDC GLUCOMTR-MCNC: 119 MG/DL — HIGH (ref 70–99)
GLUCOSE BLDC GLUCOMTR-MCNC: 120 MG/DL — HIGH (ref 70–99)
GLUCOSE BLDC GLUCOMTR-MCNC: 121 MG/DL — HIGH (ref 70–99)
GLUCOSE BLDC GLUCOMTR-MCNC: 122 MG/DL — HIGH (ref 70–99)
GLUCOSE BLDC GLUCOMTR-MCNC: 126 MG/DL — HIGH (ref 70–99)
GLUCOSE BLDC GLUCOMTR-MCNC: 127 MG/DL — HIGH (ref 70–99)
GLUCOSE BLDC GLUCOMTR-MCNC: 128 MG/DL — HIGH (ref 70–99)
GLUCOSE BLDC GLUCOMTR-MCNC: 129 MG/DL — HIGH (ref 70–99)
GLUCOSE BLDC GLUCOMTR-MCNC: 142 MG/DL — HIGH (ref 70–99)
GLUCOSE BLDC GLUCOMTR-MCNC: 143 MG/DL — HIGH (ref 70–99)
GLUCOSE SERPL-MCNC: 118 MG/DL — HIGH (ref 70–99)
HCT VFR BLD CALC: 30.5 % — LOW (ref 39–50)
HGB BLD-MCNC: 9.9 G/DL — LOW (ref 13–17)
IMM GRANULOCYTES # BLD AUTO: 0.15 K/UL — HIGH (ref 0–0.07)
IMM GRANULOCYTES NFR BLD AUTO: 0.7 % — SIGNIFICANT CHANGE UP (ref 0–0.9)
INR BLD: 1.43 RATIO — HIGH (ref 0.85–1.16)
LYMPHOCYTES # BLD AUTO: 1.36 K/UL — SIGNIFICANT CHANGE UP (ref 1–3.3)
LYMPHOCYTES NFR BLD AUTO: 6.6 % — LOW (ref 13–44)
MAGNESIUM SERPL-MCNC: 1.9 MG/DL — SIGNIFICANT CHANGE UP (ref 1.6–2.6)
MCHC RBC-ENTMCNC: 27 PG — SIGNIFICANT CHANGE UP (ref 27–34)
MCHC RBC-ENTMCNC: 32.5 G/DL — SIGNIFICANT CHANGE UP (ref 32–36)
MCV RBC AUTO: 83.3 FL — SIGNIFICANT CHANGE UP (ref 80–100)
MONOCYTES # BLD AUTO: 1.24 K/UL — HIGH (ref 0–0.9)
MONOCYTES NFR BLD AUTO: 6 % — SIGNIFICANT CHANGE UP (ref 2–14)
NEUTROPHILS # BLD AUTO: 17.9 K/UL — HIGH (ref 1.8–7.4)
NEUTROPHILS NFR BLD AUTO: 86.6 % — HIGH (ref 43–77)
NRBC # BLD AUTO: 0 K/UL — SIGNIFICANT CHANGE UP (ref 0–0)
NRBC # FLD: 0 K/UL — SIGNIFICANT CHANGE UP (ref 0–0)
NRBC BLD AUTO-RTO: 0 /100 WBCS — SIGNIFICANT CHANGE UP (ref 0–0)
PHOSPHATE SERPL-MCNC: 2.9 MG/DL — SIGNIFICANT CHANGE UP (ref 2.4–4.7)
PLATELET # BLD AUTO: 157 K/UL — SIGNIFICANT CHANGE UP (ref 150–400)
PMV BLD: 10.5 FL — SIGNIFICANT CHANGE UP (ref 7–13)
POTASSIUM SERPL-MCNC: 4.6 MMOL/L — SIGNIFICANT CHANGE UP (ref 3.5–5.3)
POTASSIUM SERPL-SCNC: 4.6 MMOL/L — SIGNIFICANT CHANGE UP (ref 3.5–5.3)
PROT SERPL-MCNC: 5.9 G/DL — LOW (ref 6.6–8.7)
PROTHROM AB SERPL-ACNC: 16.5 SEC — HIGH (ref 9.9–13.4)
RBC # BLD: 3.66 M/UL — LOW (ref 4.2–5.8)
RBC # FLD: 14.3 % — SIGNIFICANT CHANGE UP (ref 10.3–14.5)
SODIUM SERPL-SCNC: 135 MMOL/L — SIGNIFICANT CHANGE UP (ref 135–145)
TROPONIN T, HIGH SENSITIVITY RESULT: 180 NG/L — CRITICAL HIGH
WBC # BLD: 20.68 K/UL — HIGH (ref 3.8–10.5)
WBC # FLD AUTO: 20.68 K/UL — HIGH (ref 3.8–10.5)

## 2025-08-22 PROCEDURE — 71045 X-RAY EXAM CHEST 1 VIEW: CPT | Mod: 26

## 2025-08-22 PROCEDURE — 71045 X-RAY EXAM CHEST 1 VIEW: CPT | Mod: 26,77

## 2025-08-22 PROCEDURE — 99292 CRITICAL CARE ADDL 30 MIN: CPT

## 2025-08-22 PROCEDURE — 99291 CRITICAL CARE FIRST HOUR: CPT

## 2025-08-22 PROCEDURE — 93010 ELECTROCARDIOGRAM REPORT: CPT

## 2025-08-22 RX ORDER — ALBUMIN (HUMAN) 12.5 G/50ML
100 INJECTION, SOLUTION INTRAVENOUS ONCE
Refills: 0 | Status: COMPLETED | OUTPATIENT
Start: 2025-08-22 | End: 2025-08-22

## 2025-08-22 RX ORDER — DEXTROSE 50 % IN WATER 50 %
15 SYRINGE (ML) INTRAVENOUS ONCE
Refills: 0 | Status: DISCONTINUED | OUTPATIENT
Start: 2025-08-22 | End: 2025-08-23

## 2025-08-22 RX ORDER — DEXTROSE 50 % IN WATER 50 %
12.5 SYRINGE (ML) INTRAVENOUS ONCE
Refills: 0 | Status: DISCONTINUED | OUTPATIENT
Start: 2025-08-22 | End: 2025-08-23

## 2025-08-22 RX ORDER — DEXTROSE 50 % IN WATER 50 %
25 SYRINGE (ML) INTRAVENOUS ONCE
Refills: 0 | Status: DISCONTINUED | OUTPATIENT
Start: 2025-08-22 | End: 2025-08-23

## 2025-08-22 RX ORDER — METHOCARBAMOL 500 MG/1
500 TABLET, FILM COATED ORAL THREE TIMES A DAY
Refills: 0 | Status: COMPLETED | OUTPATIENT
Start: 2025-08-22 | End: 2025-08-24

## 2025-08-22 RX ORDER — SODIUM CHLORIDE 9 G/1000ML
1000 INJECTION, SOLUTION INTRAVENOUS
Refills: 0 | Status: DISCONTINUED | OUTPATIENT
Start: 2025-08-22 | End: 2025-08-23

## 2025-08-22 RX ORDER — INSULIN LISPRO 100 U/ML
INJECTION, SOLUTION INTRAVENOUS; SUBCUTANEOUS
Refills: 0 | Status: DISCONTINUED | OUTPATIENT
Start: 2025-08-22 | End: 2025-08-25

## 2025-08-22 RX ORDER — KETOROLAC TROMETHAMINE 30 MG/ML
15 INJECTION, SOLUTION INTRAMUSCULAR; INTRAVENOUS EVERY 8 HOURS
Refills: 0 | Status: DISCONTINUED | OUTPATIENT
Start: 2025-08-22 | End: 2025-08-22

## 2025-08-22 RX ORDER — ENOXAPARIN SODIUM 100 MG/ML
40 INJECTION SUBCUTANEOUS EVERY 24 HOURS
Refills: 0 | Status: DISCONTINUED | OUTPATIENT
Start: 2025-08-22 | End: 2025-08-26

## 2025-08-22 RX ORDER — GLUCAGON 3 MG/1
1 POWDER NASAL ONCE
Refills: 0 | Status: DISCONTINUED | OUTPATIENT
Start: 2025-08-22 | End: 2025-08-23

## 2025-08-22 RX ORDER — INSULIN GLARGINE-YFGN 100 [IU]/ML
10 INJECTION, SOLUTION SUBCUTANEOUS AT BEDTIME
Refills: 0 | Status: DISCONTINUED | OUTPATIENT
Start: 2025-08-22 | End: 2025-08-26

## 2025-08-22 RX ORDER — MAGNESIUM SULFATE 500 MG/ML
2 SYRINGE (ML) INJECTION ONCE
Refills: 0 | Status: COMPLETED | OUTPATIENT
Start: 2025-08-22 | End: 2025-08-22

## 2025-08-22 RX ORDER — DEXTROSE 50 % IN WATER 50 %
25 SYRINGE (ML) INTRAVENOUS ONCE
Refills: 0 | Status: DISCONTINUED | OUTPATIENT
Start: 2025-08-22 | End: 2025-08-22

## 2025-08-22 RX ADMIN — Medication 250 MILLIGRAM(S): at 08:10

## 2025-08-22 RX ADMIN — POLYETHYLENE GLYCOL 3350 17 GRAM(S): 17 POWDER, FOR SOLUTION ORAL at 11:21

## 2025-08-22 RX ADMIN — Medication 1000 MILLIGRAM(S): at 05:52

## 2025-08-22 RX ADMIN — AMIODARONE HYDROCHLORIDE 400 MILLIGRAM(S): 50 INJECTION, SOLUTION INTRAVENOUS at 05:20

## 2025-08-22 RX ADMIN — METHOCARBAMOL 500 MILLIGRAM(S): 500 TABLET, FILM COATED ORAL at 14:53

## 2025-08-22 RX ADMIN — Medication 975 MILLIGRAM(S): at 16:56

## 2025-08-22 RX ADMIN — Medication 400 MILLIGRAM(S): at 11:21

## 2025-08-22 RX ADMIN — KETOROLAC TROMETHAMINE 15 MILLIGRAM(S): 30 INJECTION, SOLUTION INTRAMUSCULAR; INTRAVENOUS at 14:52

## 2025-08-22 RX ADMIN — Medication 1000 MILLIGRAM(S): at 11:51

## 2025-08-22 RX ADMIN — Medication 0.5 MILLIGRAM(S): at 03:30

## 2025-08-22 RX ADMIN — KETOROLAC TROMETHAMINE 15 MILLIGRAM(S): 30 INJECTION, SOLUTION INTRAMUSCULAR; INTRAVENOUS at 05:20

## 2025-08-22 RX ADMIN — ROSUVASTATIN CALCIUM 20 MILLIGRAM(S): 20 TABLET, FILM COATED ORAL at 21:20

## 2025-08-22 RX ADMIN — KETOROLAC TROMETHAMINE 15 MILLIGRAM(S): 30 INJECTION, SOLUTION INTRAMUSCULAR; INTRAVENOUS at 01:18

## 2025-08-22 RX ADMIN — Medication 100 MILLIGRAM(S): at 08:08

## 2025-08-22 RX ADMIN — GABAPENTIN 100 MILLIGRAM(S): 400 CAPSULE ORAL at 14:54

## 2025-08-22 RX ADMIN — Medication 40 MILLIGRAM(S): at 11:21

## 2025-08-22 RX ADMIN — AMIODARONE HYDROCHLORIDE 400 MILLIGRAM(S): 50 INJECTION, SOLUTION INTRAVENOUS at 17:01

## 2025-08-22 RX ADMIN — Medication 25 GRAM(S): at 04:41

## 2025-08-22 RX ADMIN — Medication 400 MILLIGRAM(S): at 05:20

## 2025-08-22 RX ADMIN — TAMSULOSIN HYDROCHLORIDE 0.4 MILLIGRAM(S): 0.4 CAPSULE ORAL at 21:20

## 2025-08-22 RX ADMIN — Medication 81 MILLIGRAM(S): at 11:55

## 2025-08-22 RX ADMIN — GABAPENTIN 100 MILLIGRAM(S): 400 CAPSULE ORAL at 05:19

## 2025-08-22 RX ADMIN — Medication 2 UNIT(S)/HR: at 05:48

## 2025-08-22 RX ADMIN — ENOXAPARIN SODIUM 40 MILLIGRAM(S): 100 INJECTION SUBCUTANEOUS at 14:53

## 2025-08-22 RX ADMIN — Medication 500 MILLIGRAM(S): at 05:20

## 2025-08-22 RX ADMIN — ALBUMIN (HUMAN) 400 MILLILITER(S): 12.5 INJECTION, SOLUTION INTRAVENOUS at 16:59

## 2025-08-22 RX ADMIN — OXYCODONE HYDROCHLORIDE 10 MILLIGRAM(S): 30 TABLET ORAL at 11:11

## 2025-08-22 RX ADMIN — KETOROLAC TROMETHAMINE 15 MILLIGRAM(S): 30 INJECTION, SOLUTION INTRAMUSCULAR; INTRAVENOUS at 15:30

## 2025-08-22 RX ADMIN — Medication 250 MILLIGRAM(S): at 21:19

## 2025-08-22 RX ADMIN — Medication 100 MILLIGRAM(S): at 16:32

## 2025-08-22 RX ADMIN — GABAPENTIN 100 MILLIGRAM(S): 400 CAPSULE ORAL at 21:20

## 2025-08-22 RX ADMIN — KETOROLAC TROMETHAMINE 15 MILLIGRAM(S): 30 INJECTION, SOLUTION INTRAMUSCULAR; INTRAVENOUS at 05:52

## 2025-08-22 RX ADMIN — KETOROLAC TROMETHAMINE 15 MILLIGRAM(S): 30 INJECTION, SOLUTION INTRAMUSCULAR; INTRAVENOUS at 02:13

## 2025-08-22 RX ADMIN — Medication 1 APPLICATION(S): at 11:26

## 2025-08-22 RX ADMIN — Medication 2 TABLET(S): at 21:19

## 2025-08-22 RX ADMIN — Medication 0.5 MILLIGRAM(S): at 02:30

## 2025-08-22 RX ADMIN — IRON SUCROSE 210 MILLIGRAM(S): 20 INJECTION, SOLUTION INTRAVENOUS at 14:52

## 2025-08-22 RX ADMIN — Medication 975 MILLIGRAM(S): at 16:32

## 2025-08-22 RX ADMIN — OXYCODONE HYDROCHLORIDE 10 MILLIGRAM(S): 30 TABLET ORAL at 10:41

## 2025-08-22 RX ADMIN — Medication 500 MILLIGRAM(S): at 17:01

## 2025-08-22 RX ADMIN — METHOCARBAMOL 500 MILLIGRAM(S): 500 TABLET, FILM COATED ORAL at 21:19

## 2025-08-22 RX ADMIN — METHOCARBAMOL 500 MILLIGRAM(S): 500 TABLET, FILM COATED ORAL at 05:23

## 2025-08-23 DIAGNOSIS — I10 ESSENTIAL (PRIMARY) HYPERTENSION: ICD-10-CM

## 2025-08-23 DIAGNOSIS — I48.91 UNSPECIFIED ATRIAL FIBRILLATION: ICD-10-CM

## 2025-08-23 DIAGNOSIS — E78.5 HYPERLIPIDEMIA, UNSPECIFIED: ICD-10-CM

## 2025-08-23 LAB
ALBUMIN SERPL ELPH-MCNC: 3.7 G/DL — SIGNIFICANT CHANGE UP (ref 3.3–5.2)
ALP SERPL-CCNC: 76 U/L — SIGNIFICANT CHANGE UP (ref 40–120)
ALT FLD-CCNC: 22 U/L — SIGNIFICANT CHANGE UP
ANION GAP SERPL CALC-SCNC: 10 MMOL/L — SIGNIFICANT CHANGE UP (ref 5–17)
AST SERPL-CCNC: 34 U/L — SIGNIFICANT CHANGE UP
BILIRUB DIRECT SERPL-MCNC: 0.2 MG/DL — SIGNIFICANT CHANGE UP (ref 0–0.3)
BILIRUB INDIRECT FLD-MCNC: 0.2 MG/DL — SIGNIFICANT CHANGE UP (ref 0.2–1)
BILIRUB SERPL-MCNC: 0.4 MG/DL — SIGNIFICANT CHANGE UP (ref 0.4–2)
BUN SERPL-MCNC: 21 MG/DL — HIGH (ref 8–20)
CALCIUM SERPL-MCNC: 8.4 MG/DL — SIGNIFICANT CHANGE UP (ref 8.4–10.5)
CHLORIDE SERPL-SCNC: 106 MMOL/L — SIGNIFICANT CHANGE UP (ref 96–108)
CO2 SERPL-SCNC: 22 MMOL/L — SIGNIFICANT CHANGE UP (ref 22–29)
CREAT SERPL-MCNC: 0.67 MG/DL — SIGNIFICANT CHANGE UP (ref 0.5–1.3)
EGFR: 97 ML/MIN/1.73M2 — SIGNIFICANT CHANGE UP
EGFR: 97 ML/MIN/1.73M2 — SIGNIFICANT CHANGE UP
GLUCOSE BLDC GLUCOMTR-MCNC: 108 MG/DL — HIGH (ref 70–99)
GLUCOSE BLDC GLUCOMTR-MCNC: 111 MG/DL — HIGH (ref 70–99)
GLUCOSE BLDC GLUCOMTR-MCNC: 135 MG/DL — HIGH (ref 70–99)
GLUCOSE BLDC GLUCOMTR-MCNC: 167 MG/DL — HIGH (ref 70–99)
GLUCOSE BLDC GLUCOMTR-MCNC: 180 MG/DL — HIGH (ref 70–99)
GLUCOSE SERPL-MCNC: 98 MG/DL — SIGNIFICANT CHANGE UP (ref 70–99)
HCT VFR BLD CALC: 29.3 % — LOW (ref 39–50)
HGB BLD-MCNC: 9.3 G/DL — LOW (ref 13–17)
MAGNESIUM SERPL-MCNC: 2.1 MG/DL — SIGNIFICANT CHANGE UP (ref 1.6–2.6)
MCHC RBC-ENTMCNC: 27 PG — SIGNIFICANT CHANGE UP (ref 27–34)
MCHC RBC-ENTMCNC: 31.7 G/DL — LOW (ref 32–36)
MCV RBC AUTO: 84.9 FL — SIGNIFICANT CHANGE UP (ref 80–100)
NRBC # BLD AUTO: 0 K/UL — SIGNIFICANT CHANGE UP (ref 0–0)
NRBC # FLD: 0 K/UL — SIGNIFICANT CHANGE UP (ref 0–0)
NRBC BLD AUTO-RTO: 0 /100 WBCS — SIGNIFICANT CHANGE UP (ref 0–0)
PLATELET # BLD AUTO: 154 K/UL — SIGNIFICANT CHANGE UP (ref 150–400)
PMV BLD: 10.8 FL — SIGNIFICANT CHANGE UP (ref 7–13)
POTASSIUM SERPL-MCNC: 4.2 MMOL/L — SIGNIFICANT CHANGE UP (ref 3.5–5.3)
POTASSIUM SERPL-SCNC: 4.2 MMOL/L — SIGNIFICANT CHANGE UP (ref 3.5–5.3)
PROT SERPL-MCNC: 5.8 G/DL — LOW (ref 6.6–8.7)
RBC # BLD: 3.45 M/UL — LOW (ref 4.2–5.8)
RBC # FLD: 14.6 % — HIGH (ref 10.3–14.5)
SODIUM SERPL-SCNC: 138 MMOL/L — SIGNIFICANT CHANGE UP (ref 135–145)
VANCOMYCIN TROUGH SERPL-MCNC: 9.3 UG/ML — LOW (ref 10–20)
WBC # BLD: 14.48 K/UL — HIGH (ref 3.8–10.5)
WBC # FLD AUTO: 14.48 K/UL — HIGH (ref 3.8–10.5)

## 2025-08-23 PROCEDURE — 71045 X-RAY EXAM CHEST 1 VIEW: CPT | Mod: 26

## 2025-08-23 PROCEDURE — 99292 CRITICAL CARE ADDL 30 MIN: CPT

## 2025-08-23 PROCEDURE — 99291 CRITICAL CARE FIRST HOUR: CPT

## 2025-08-23 PROCEDURE — 93010 ELECTROCARDIOGRAM REPORT: CPT

## 2025-08-23 RX ORDER — AMIODARONE HYDROCHLORIDE 50 MG/ML
400 INJECTION, SOLUTION INTRAVENOUS EVERY 8 HOURS
Refills: 0 | Status: DISCONTINUED | OUTPATIENT
Start: 2025-08-23 | End: 2025-08-26

## 2025-08-23 RX ORDER — AMIODARONE HYDROCHLORIDE 50 MG/ML
200 INJECTION, SOLUTION INTRAVENOUS DAILY
Refills: 0 | Status: DISCONTINUED | OUTPATIENT
Start: 2025-08-27 | End: 2025-08-26

## 2025-08-23 RX ORDER — ALBUMIN (HUMAN) 12.5 G/50ML
250 INJECTION, SOLUTION INTRAVENOUS ONCE
Refills: 0 | Status: COMPLETED | OUTPATIENT
Start: 2025-08-23 | End: 2025-08-23

## 2025-08-23 RX ORDER — AMIODARONE HYDROCHLORIDE 50 MG/ML
INJECTION, SOLUTION INTRAVENOUS
Refills: 0 | Status: DISCONTINUED | OUTPATIENT
Start: 2025-08-23 | End: 2025-08-26

## 2025-08-23 RX ADMIN — AMIODARONE HYDROCHLORIDE 400 MILLIGRAM(S): 50 INJECTION, SOLUTION INTRAVENOUS at 05:40

## 2025-08-23 RX ADMIN — TAMSULOSIN HYDROCHLORIDE 0.4 MILLIGRAM(S): 0.4 CAPSULE ORAL at 20:58

## 2025-08-23 RX ADMIN — ALBUMIN (HUMAN) 125 MILLILITER(S): 12.5 INJECTION, SOLUTION INTRAVENOUS at 22:15

## 2025-08-23 RX ADMIN — OXYCODONE HYDROCHLORIDE 5 MILLIGRAM(S): 30 TABLET ORAL at 21:58

## 2025-08-23 RX ADMIN — Medication 250 MILLIGRAM(S): at 09:42

## 2025-08-23 RX ADMIN — AMIODARONE HYDROCHLORIDE 400 MILLIGRAM(S): 50 INJECTION, SOLUTION INTRAVENOUS at 20:58

## 2025-08-23 RX ADMIN — Medication 2 TABLET(S): at 20:59

## 2025-08-23 RX ADMIN — INSULIN GLARGINE-YFGN 10 UNIT(S): 100 INJECTION, SOLUTION SUBCUTANEOUS at 00:08

## 2025-08-23 RX ADMIN — OXYCODONE HYDROCHLORIDE 5 MILLIGRAM(S): 30 TABLET ORAL at 20:58

## 2025-08-23 RX ADMIN — METHOCARBAMOL 500 MILLIGRAM(S): 500 TABLET, FILM COATED ORAL at 13:50

## 2025-08-23 RX ADMIN — IRON SUCROSE 210 MILLIGRAM(S): 20 INJECTION, SOLUTION INTRAVENOUS at 13:50

## 2025-08-23 RX ADMIN — Medication 81 MILLIGRAM(S): at 11:03

## 2025-08-23 RX ADMIN — POLYETHYLENE GLYCOL 3350 17 GRAM(S): 17 POWDER, FOR SOLUTION ORAL at 11:03

## 2025-08-23 RX ADMIN — METHOCARBAMOL 500 MILLIGRAM(S): 500 TABLET, FILM COATED ORAL at 05:39

## 2025-08-23 RX ADMIN — ALBUMIN (HUMAN) 125 MILLILITER(S): 12.5 INJECTION, SOLUTION INTRAVENOUS at 23:40

## 2025-08-23 RX ADMIN — GABAPENTIN 100 MILLIGRAM(S): 400 CAPSULE ORAL at 05:40

## 2025-08-23 RX ADMIN — Medication 500 MILLIGRAM(S): at 05:39

## 2025-08-23 RX ADMIN — INSULIN LISPRO 2: 100 INJECTION, SOLUTION INTRAVENOUS; SUBCUTANEOUS at 11:06

## 2025-08-23 RX ADMIN — Medication 975 MILLIGRAM(S): at 11:02

## 2025-08-23 RX ADMIN — AMIODARONE HYDROCHLORIDE 400 MILLIGRAM(S): 50 INJECTION, SOLUTION INTRAVENOUS at 18:08

## 2025-08-23 RX ADMIN — ENOXAPARIN SODIUM 40 MILLIGRAM(S): 100 INJECTION SUBCUTANEOUS at 18:08

## 2025-08-23 RX ADMIN — Medication 975 MILLIGRAM(S): at 00:45

## 2025-08-23 RX ADMIN — INSULIN GLARGINE-YFGN 10 UNIT(S): 100 INJECTION, SOLUTION SUBCUTANEOUS at 21:07

## 2025-08-23 RX ADMIN — GABAPENTIN 100 MILLIGRAM(S): 400 CAPSULE ORAL at 20:59

## 2025-08-23 RX ADMIN — ROSUVASTATIN CALCIUM 20 MILLIGRAM(S): 20 TABLET, FILM COATED ORAL at 20:58

## 2025-08-23 RX ADMIN — Medication 975 MILLIGRAM(S): at 07:43

## 2025-08-23 RX ADMIN — Medication 100 MILLIGRAM(S): at 00:07

## 2025-08-23 RX ADMIN — Medication 975 MILLIGRAM(S): at 18:07

## 2025-08-23 RX ADMIN — Medication 975 MILLIGRAM(S): at 05:39

## 2025-08-23 RX ADMIN — Medication 975 MILLIGRAM(S): at 00:08

## 2025-08-23 RX ADMIN — Medication 100 MILLIGRAM(S): at 08:02

## 2025-08-23 RX ADMIN — Medication 500 MILLIGRAM(S): at 18:07

## 2025-08-23 RX ADMIN — METHOCARBAMOL 500 MILLIGRAM(S): 500 TABLET, FILM COATED ORAL at 20:58

## 2025-08-23 RX ADMIN — Medication 40 MILLIGRAM(S): at 11:03

## 2025-08-23 RX ADMIN — INSULIN LISPRO 2: 100 INJECTION, SOLUTION INTRAVENOUS; SUBCUTANEOUS at 21:08

## 2025-08-23 RX ADMIN — Medication 975 MILLIGRAM(S): at 23:10

## 2025-08-23 RX ADMIN — ALBUMIN (HUMAN) 125 MILLILITER(S): 12.5 INJECTION, SOLUTION INTRAVENOUS at 10:44

## 2025-08-23 RX ADMIN — AMIODARONE HYDROCHLORIDE 400 MILLIGRAM(S): 50 INJECTION, SOLUTION INTRAVENOUS at 13:50

## 2025-08-24 LAB
ALBUMIN SERPL ELPH-MCNC: 3.7 G/DL — SIGNIFICANT CHANGE UP (ref 3.3–5.2)
ALP SERPL-CCNC: 59 U/L — SIGNIFICANT CHANGE UP (ref 40–120)
ALT FLD-CCNC: 14 U/L — SIGNIFICANT CHANGE UP
ANION GAP SERPL CALC-SCNC: 10 MMOL/L — SIGNIFICANT CHANGE UP (ref 5–17)
AST SERPL-CCNC: 18 U/L — SIGNIFICANT CHANGE UP
BILIRUB DIRECT SERPL-MCNC: 0.2 MG/DL — SIGNIFICANT CHANGE UP (ref 0–0.3)
BILIRUB INDIRECT FLD-MCNC: 0.3 MG/DL — SIGNIFICANT CHANGE UP (ref 0.2–1)
BILIRUB SERPL-MCNC: 0.5 MG/DL — SIGNIFICANT CHANGE UP (ref 0.4–2)
BLD GP AB SCN SERPL QL: SIGNIFICANT CHANGE UP
BUN SERPL-MCNC: 14.4 MG/DL — SIGNIFICANT CHANGE UP (ref 8–20)
CALCIUM SERPL-MCNC: 7.9 MG/DL — LOW (ref 8.4–10.5)
CHLORIDE SERPL-SCNC: 107 MMOL/L — SIGNIFICANT CHANGE UP (ref 96–108)
CO2 SERPL-SCNC: 24 MMOL/L — SIGNIFICANT CHANGE UP (ref 22–29)
CREAT SERPL-MCNC: 0.49 MG/DL — LOW (ref 0.5–1.3)
EGFR: 107 ML/MIN/1.73M2 — SIGNIFICANT CHANGE UP
EGFR: 107 ML/MIN/1.73M2 — SIGNIFICANT CHANGE UP
GLUCOSE BLDC GLUCOMTR-MCNC: 115 MG/DL — HIGH (ref 70–99)
GLUCOSE BLDC GLUCOMTR-MCNC: 122 MG/DL — HIGH (ref 70–99)
GLUCOSE BLDC GLUCOMTR-MCNC: 142 MG/DL — HIGH (ref 70–99)
GLUCOSE BLDC GLUCOMTR-MCNC: 221 MG/DL — HIGH (ref 70–99)
GLUCOSE SERPL-MCNC: 99 MG/DL — SIGNIFICANT CHANGE UP (ref 70–99)
HCT VFR BLD CALC: 26.9 % — LOW (ref 39–50)
HGB BLD-MCNC: 8.6 G/DL — LOW (ref 13–17)
MAGNESIUM SERPL-MCNC: 1.7 MG/DL — SIGNIFICANT CHANGE UP (ref 1.6–2.6)
MCHC RBC-ENTMCNC: 27 PG — SIGNIFICANT CHANGE UP (ref 27–34)
MCHC RBC-ENTMCNC: 32 G/DL — SIGNIFICANT CHANGE UP (ref 32–36)
MCV RBC AUTO: 84.6 FL — SIGNIFICANT CHANGE UP (ref 80–100)
NRBC # BLD AUTO: 0 K/UL — SIGNIFICANT CHANGE UP (ref 0–0)
NRBC # FLD: 0 K/UL — SIGNIFICANT CHANGE UP (ref 0–0)
NRBC BLD AUTO-RTO: 0 /100 WBCS — SIGNIFICANT CHANGE UP (ref 0–0)
PLATELET # BLD AUTO: 154 K/UL — SIGNIFICANT CHANGE UP (ref 150–400)
PMV BLD: 9.7 FL — SIGNIFICANT CHANGE UP (ref 7–13)
POTASSIUM SERPL-MCNC: 4 MMOL/L — SIGNIFICANT CHANGE UP (ref 3.5–5.3)
POTASSIUM SERPL-SCNC: 4 MMOL/L — SIGNIFICANT CHANGE UP (ref 3.5–5.3)
PROT SERPL-MCNC: 5.8 G/DL — LOW (ref 6.6–8.7)
RBC # BLD: 3.18 M/UL — LOW (ref 4.2–5.8)
RBC # FLD: 14.6 % — HIGH (ref 10.3–14.5)
SODIUM SERPL-SCNC: 141 MMOL/L — SIGNIFICANT CHANGE UP (ref 135–145)
WBC # BLD: 9.16 K/UL — SIGNIFICANT CHANGE UP (ref 3.8–10.5)
WBC # FLD AUTO: 9.16 K/UL — SIGNIFICANT CHANGE UP (ref 3.8–10.5)

## 2025-08-24 PROCEDURE — 94002 VENT MGMT INPAT INIT DAY: CPT

## 2025-08-24 PROCEDURE — 84484 ASSAY OF TROPONIN QUANT: CPT

## 2025-08-24 PROCEDURE — 82803 BLOOD GASES ANY COMBINATION: CPT

## 2025-08-24 PROCEDURE — 93005 ELECTROCARDIOGRAM TRACING: CPT

## 2025-08-24 PROCEDURE — 82553 CREATINE MB FRACTION: CPT

## 2025-08-24 PROCEDURE — 83735 ASSAY OF MAGNESIUM: CPT

## 2025-08-24 PROCEDURE — 84100 ASSAY OF PHOSPHORUS: CPT

## 2025-08-24 PROCEDURE — 36415 COLL VENOUS BLD VENIPUNCTURE: CPT

## 2025-08-24 PROCEDURE — 82550 ASSAY OF CK (CPK): CPT

## 2025-08-24 PROCEDURE — P9045: CPT

## 2025-08-24 PROCEDURE — 85730 THROMBOPLASTIN TIME PARTIAL: CPT

## 2025-08-24 PROCEDURE — 86901 BLOOD TYPING SEROLOGIC RH(D): CPT

## 2025-08-24 PROCEDURE — 86891 AUTOLOGOUS BLOOD OP SALVAGE: CPT

## 2025-08-24 PROCEDURE — 99291 CRITICAL CARE FIRST HOUR: CPT

## 2025-08-24 PROCEDURE — 80076 HEPATIC FUNCTION PANEL: CPT

## 2025-08-24 PROCEDURE — 82435 ASSAY OF BLOOD CHLORIDE: CPT

## 2025-08-24 PROCEDURE — C1769: CPT

## 2025-08-24 PROCEDURE — 80053 COMPREHEN METABOLIC PANEL: CPT

## 2025-08-24 PROCEDURE — 71045 X-RAY EXAM CHEST 1 VIEW: CPT | Mod: 26

## 2025-08-24 PROCEDURE — C1889: CPT

## 2025-08-24 PROCEDURE — C1751: CPT

## 2025-08-24 PROCEDURE — 82947 ASSAY GLUCOSE BLOOD QUANT: CPT

## 2025-08-24 PROCEDURE — 36430 TRANSFUSION BLD/BLD COMPNT: CPT

## 2025-08-24 PROCEDURE — 94760 N-INVAS EAR/PLS OXIMETRY 1: CPT

## 2025-08-24 PROCEDURE — 82330 ASSAY OF CALCIUM: CPT

## 2025-08-24 PROCEDURE — 80048 BASIC METABOLIC PNL TOTAL CA: CPT

## 2025-08-24 PROCEDURE — 83605 ASSAY OF LACTIC ACID: CPT

## 2025-08-24 PROCEDURE — 99292 CRITICAL CARE ADDL 30 MIN: CPT

## 2025-08-24 PROCEDURE — 86900 BLOOD TYPING SEROLOGIC ABO: CPT

## 2025-08-24 PROCEDURE — 82962 GLUCOSE BLOOD TEST: CPT

## 2025-08-24 PROCEDURE — P9047: CPT

## 2025-08-24 PROCEDURE — 84132 ASSAY OF SERUM POTASSIUM: CPT

## 2025-08-24 PROCEDURE — 71045 X-RAY EXAM CHEST 1 VIEW: CPT

## 2025-08-24 PROCEDURE — 86850 RBC ANTIBODY SCREEN: CPT

## 2025-08-24 PROCEDURE — 71045 X-RAY EXAM CHEST 1 VIEW: CPT | Mod: 26,77

## 2025-08-24 PROCEDURE — 85384 FIBRINOGEN ACTIVITY: CPT

## 2025-08-24 PROCEDURE — 85027 COMPLETE CBC AUTOMATED: CPT

## 2025-08-24 PROCEDURE — P9016: CPT

## 2025-08-24 PROCEDURE — 85018 HEMOGLOBIN: CPT

## 2025-08-24 PROCEDURE — 85610 PROTHROMBIN TIME: CPT

## 2025-08-24 PROCEDURE — 85014 HEMATOCRIT: CPT

## 2025-08-24 PROCEDURE — 85025 COMPLETE CBC W/AUTO DIFF WBC: CPT

## 2025-08-24 PROCEDURE — 99024 POSTOP FOLLOW-UP VISIT: CPT

## 2025-08-24 PROCEDURE — 84295 ASSAY OF SERUM SODIUM: CPT

## 2025-08-24 PROCEDURE — 80202 ASSAY OF VANCOMYCIN: CPT

## 2025-08-24 RX ORDER — MAGNESIUM SULFATE 500 MG/ML
2 SYRINGE (ML) INJECTION ONCE
Refills: 0 | Status: COMPLETED | OUTPATIENT
Start: 2025-08-24 | End: 2025-08-24

## 2025-08-24 RX ORDER — METOPROLOL SUCCINATE 50 MG/1
12.5 TABLET, EXTENDED RELEASE ORAL
Refills: 0 | Status: DISCONTINUED | OUTPATIENT
Start: 2025-08-24 | End: 2025-08-26

## 2025-08-24 RX ADMIN — METHOCARBAMOL 500 MILLIGRAM(S): 500 TABLET, FILM COATED ORAL at 05:22

## 2025-08-24 RX ADMIN — Medication 25 GRAM(S): at 04:33

## 2025-08-24 RX ADMIN — Medication 1 APPLICATION(S): at 13:29

## 2025-08-24 RX ADMIN — GABAPENTIN 100 MILLIGRAM(S): 400 CAPSULE ORAL at 13:28

## 2025-08-24 RX ADMIN — Medication 975 MILLIGRAM(S): at 17:15

## 2025-08-24 RX ADMIN — Medication 975 MILLIGRAM(S): at 23:33

## 2025-08-24 RX ADMIN — Medication 975 MILLIGRAM(S): at 05:22

## 2025-08-24 RX ADMIN — ROSUVASTATIN CALCIUM 20 MILLIGRAM(S): 20 TABLET, FILM COATED ORAL at 21:21

## 2025-08-24 RX ADMIN — Medication 500 MILLIGRAM(S): at 17:15

## 2025-08-24 RX ADMIN — INSULIN LISPRO 4: 100 INJECTION, SOLUTION INTRAVENOUS; SUBCUTANEOUS at 21:26

## 2025-08-24 RX ADMIN — AMIODARONE HYDROCHLORIDE 400 MILLIGRAM(S): 50 INJECTION, SOLUTION INTRAVENOUS at 21:25

## 2025-08-24 RX ADMIN — METHOCARBAMOL 500 MILLIGRAM(S): 500 TABLET, FILM COATED ORAL at 21:26

## 2025-08-24 RX ADMIN — AMIODARONE HYDROCHLORIDE 400 MILLIGRAM(S): 50 INJECTION, SOLUTION INTRAVENOUS at 13:29

## 2025-08-24 RX ADMIN — POLYETHYLENE GLYCOL 3350 17 GRAM(S): 17 POWDER, FOR SOLUTION ORAL at 13:29

## 2025-08-24 RX ADMIN — GABAPENTIN 100 MILLIGRAM(S): 400 CAPSULE ORAL at 21:25

## 2025-08-24 RX ADMIN — Medication 40 MILLIGRAM(S): at 13:28

## 2025-08-24 RX ADMIN — INSULIN GLARGINE-YFGN 10 UNIT(S): 100 INJECTION, SOLUTION SUBCUTANEOUS at 21:26

## 2025-08-24 RX ADMIN — TAMSULOSIN HYDROCHLORIDE 0.4 MILLIGRAM(S): 0.4 CAPSULE ORAL at 21:25

## 2025-08-24 RX ADMIN — Medication 1 APPLICATION(S): at 05:22

## 2025-08-24 RX ADMIN — ENOXAPARIN SODIUM 40 MILLIGRAM(S): 100 INJECTION SUBCUTANEOUS at 21:38

## 2025-08-24 RX ADMIN — Medication 975 MILLIGRAM(S): at 12:40

## 2025-08-24 RX ADMIN — Medication 81 MILLIGRAM(S): at 13:28

## 2025-08-24 RX ADMIN — METHOCARBAMOL 500 MILLIGRAM(S): 500 TABLET, FILM COATED ORAL at 13:28

## 2025-08-24 RX ADMIN — Medication 500 MILLIGRAM(S): at 05:22

## 2025-08-24 RX ADMIN — METOPROLOL SUCCINATE 12.5 MILLIGRAM(S): 50 TABLET, EXTENDED RELEASE ORAL at 17:14

## 2025-08-24 RX ADMIN — Medication 975 MILLIGRAM(S): at 13:40

## 2025-08-24 RX ADMIN — GABAPENTIN 100 MILLIGRAM(S): 400 CAPSULE ORAL at 05:23

## 2025-08-24 RX ADMIN — AMIODARONE HYDROCHLORIDE 400 MILLIGRAM(S): 50 INJECTION, SOLUTION INTRAVENOUS at 05:22

## 2025-08-25 LAB
ALBUMIN SERPL ELPH-MCNC: 3.6 G/DL — SIGNIFICANT CHANGE UP (ref 3.3–5.2)
ALP SERPL-CCNC: 76 U/L — SIGNIFICANT CHANGE UP (ref 40–120)
ALT FLD-CCNC: 14 U/L — SIGNIFICANT CHANGE UP
ANION GAP SERPL CALC-SCNC: 12 MMOL/L — SIGNIFICANT CHANGE UP (ref 5–17)
AST SERPL-CCNC: 21 U/L — SIGNIFICANT CHANGE UP
BILIRUB DIRECT SERPL-MCNC: 0.2 MG/DL — SIGNIFICANT CHANGE UP (ref 0–0.3)
BILIRUB INDIRECT FLD-MCNC: 0.3 MG/DL — SIGNIFICANT CHANGE UP (ref 0.2–1)
BILIRUB SERPL-MCNC: 0.5 MG/DL — SIGNIFICANT CHANGE UP (ref 0.4–2)
BUN SERPL-MCNC: 10.5 MG/DL — SIGNIFICANT CHANGE UP (ref 8–20)
CALCIUM SERPL-MCNC: 8.7 MG/DL — SIGNIFICANT CHANGE UP (ref 8.4–10.5)
CHLORIDE SERPL-SCNC: 104 MMOL/L — SIGNIFICANT CHANGE UP (ref 96–108)
CO2 SERPL-SCNC: 23 MMOL/L — SIGNIFICANT CHANGE UP (ref 22–29)
CREAT SERPL-MCNC: 0.46 MG/DL — LOW (ref 0.5–1.3)
EGFR: 109 ML/MIN/1.73M2 — SIGNIFICANT CHANGE UP
EGFR: 109 ML/MIN/1.73M2 — SIGNIFICANT CHANGE UP
GLUCOSE BLDC GLUCOMTR-MCNC: 144 MG/DL — HIGH (ref 70–99)
GLUCOSE BLDC GLUCOMTR-MCNC: 186 MG/DL — HIGH (ref 70–99)
GLUCOSE BLDC GLUCOMTR-MCNC: 230 MG/DL — HIGH (ref 70–99)
GLUCOSE BLDC GLUCOMTR-MCNC: 236 MG/DL — HIGH (ref 70–99)
GLUCOSE SERPL-MCNC: 96 MG/DL — SIGNIFICANT CHANGE UP (ref 70–99)
HCT VFR BLD CALC: 29.5 % — LOW (ref 39–50)
HGB BLD-MCNC: 9.7 G/DL — LOW (ref 13–17)
MAGNESIUM SERPL-MCNC: 1.5 MG/DL — LOW (ref 1.6–2.6)
MCHC RBC-ENTMCNC: 27.6 PG — SIGNIFICANT CHANGE UP (ref 27–34)
MCHC RBC-ENTMCNC: 32.9 G/DL — SIGNIFICANT CHANGE UP (ref 32–36)
MCV RBC AUTO: 83.8 FL — SIGNIFICANT CHANGE UP (ref 80–100)
NRBC # BLD AUTO: 0 K/UL — SIGNIFICANT CHANGE UP (ref 0–0)
NRBC # FLD: 0 K/UL — SIGNIFICANT CHANGE UP (ref 0–0)
NRBC BLD AUTO-RTO: 0 /100 WBCS — SIGNIFICANT CHANGE UP (ref 0–0)
PLATELET # BLD AUTO: 194 K/UL — SIGNIFICANT CHANGE UP (ref 150–400)
PMV BLD: 10.1 FL — SIGNIFICANT CHANGE UP (ref 7–13)
POTASSIUM SERPL-MCNC: 3.9 MMOL/L — SIGNIFICANT CHANGE UP (ref 3.5–5.3)
POTASSIUM SERPL-SCNC: 3.9 MMOL/L — SIGNIFICANT CHANGE UP (ref 3.5–5.3)
PROT SERPL-MCNC: 6.1 G/DL — LOW (ref 6.6–8.7)
RBC # BLD: 3.52 M/UL — LOW (ref 4.2–5.8)
RBC # FLD: 14.6 % — HIGH (ref 10.3–14.5)
SODIUM SERPL-SCNC: 139 MMOL/L — SIGNIFICANT CHANGE UP (ref 135–145)
WBC # BLD: 9.75 K/UL — SIGNIFICANT CHANGE UP (ref 3.8–10.5)
WBC # FLD AUTO: 9.75 K/UL — SIGNIFICANT CHANGE UP (ref 3.8–10.5)

## 2025-08-25 PROCEDURE — 99024 POSTOP FOLLOW-UP VISIT: CPT

## 2025-08-25 PROCEDURE — 71045 X-RAY EXAM CHEST 1 VIEW: CPT | Mod: 26

## 2025-08-25 PROCEDURE — 99222 1ST HOSP IP/OBS MODERATE 55: CPT

## 2025-08-25 RX ORDER — INSULIN LISPRO 100 U/ML
INJECTION, SOLUTION INTRAVENOUS; SUBCUTANEOUS
Refills: 0 | Status: DISCONTINUED | OUTPATIENT
Start: 2025-08-25 | End: 2025-08-25

## 2025-08-25 RX ORDER — INSULIN LISPRO 100 U/ML
3 INJECTION, SOLUTION INTRAVENOUS; SUBCUTANEOUS
Refills: 0 | Status: DISCONTINUED | OUTPATIENT
Start: 2025-08-25 | End: 2025-08-25

## 2025-08-25 RX ORDER — INSULIN LISPRO 100 U/ML
3 INJECTION, SOLUTION INTRAVENOUS; SUBCUTANEOUS
Refills: 0 | Status: DISCONTINUED | OUTPATIENT
Start: 2025-08-25 | End: 2025-08-26

## 2025-08-25 RX ORDER — MAGNESIUM SULFATE 500 MG/ML
2 SYRINGE (ML) INJECTION EVERY 4 HOURS
Refills: 0 | Status: COMPLETED | OUTPATIENT
Start: 2025-08-25 | End: 2025-08-25

## 2025-08-25 RX ORDER — INSULIN LISPRO 100 U/ML
INJECTION, SOLUTION INTRAVENOUS; SUBCUTANEOUS
Refills: 0 | Status: DISCONTINUED | OUTPATIENT
Start: 2025-08-25 | End: 2025-08-26

## 2025-08-25 RX ADMIN — Medication 20 MILLIEQUIVALENT(S): at 08:36

## 2025-08-25 RX ADMIN — INSULIN LISPRO 2: 100 INJECTION, SOLUTION INTRAVENOUS; SUBCUTANEOUS at 12:19

## 2025-08-25 RX ADMIN — AMIODARONE HYDROCHLORIDE 400 MILLIGRAM(S): 50 INJECTION, SOLUTION INTRAVENOUS at 13:15

## 2025-08-25 RX ADMIN — Medication 500 MILLIGRAM(S): at 05:47

## 2025-08-25 RX ADMIN — METOPROLOL SUCCINATE 12.5 MILLIGRAM(S): 50 TABLET, EXTENDED RELEASE ORAL at 08:37

## 2025-08-25 RX ADMIN — Medication 500 MILLIGRAM(S): at 17:03

## 2025-08-25 RX ADMIN — Medication 2 TABLET(S): at 21:16

## 2025-08-25 RX ADMIN — Medication 1 APPLICATION(S): at 08:36

## 2025-08-25 RX ADMIN — METOPROLOL SUCCINATE 12.5 MILLIGRAM(S): 50 TABLET, EXTENDED RELEASE ORAL at 17:03

## 2025-08-25 RX ADMIN — ENOXAPARIN SODIUM 40 MILLIGRAM(S): 100 INJECTION SUBCUTANEOUS at 21:16

## 2025-08-25 RX ADMIN — Medication 25 GRAM(S): at 08:37

## 2025-08-25 RX ADMIN — Medication 25 GRAM(S): at 13:15

## 2025-08-25 RX ADMIN — INSULIN GLARGINE-YFGN 10 UNIT(S): 100 INJECTION, SOLUTION SUBCUTANEOUS at 21:50

## 2025-08-25 RX ADMIN — GABAPENTIN 100 MILLIGRAM(S): 400 CAPSULE ORAL at 05:46

## 2025-08-25 RX ADMIN — GABAPENTIN 100 MILLIGRAM(S): 400 CAPSULE ORAL at 13:15

## 2025-08-25 RX ADMIN — ROSUVASTATIN CALCIUM 20 MILLIGRAM(S): 20 TABLET, FILM COATED ORAL at 21:16

## 2025-08-25 RX ADMIN — TAMSULOSIN HYDROCHLORIDE 0.4 MILLIGRAM(S): 0.4 CAPSULE ORAL at 21:16

## 2025-08-25 RX ADMIN — AMIODARONE HYDROCHLORIDE 400 MILLIGRAM(S): 50 INJECTION, SOLUTION INTRAVENOUS at 05:47

## 2025-08-25 RX ADMIN — Medication 40 MILLIGRAM(S): at 08:36

## 2025-08-25 RX ADMIN — INSULIN LISPRO 5: 100 INJECTION, SOLUTION INTRAVENOUS; SUBCUTANEOUS at 17:03

## 2025-08-25 RX ADMIN — INSULIN LISPRO 3 UNIT(S): 100 INJECTION, SOLUTION INTRAVENOUS; SUBCUTANEOUS at 17:03

## 2025-08-25 RX ADMIN — AMIODARONE HYDROCHLORIDE 400 MILLIGRAM(S): 50 INJECTION, SOLUTION INTRAVENOUS at 21:16

## 2025-08-25 RX ADMIN — GABAPENTIN 100 MILLIGRAM(S): 400 CAPSULE ORAL at 21:16

## 2025-08-25 RX ADMIN — Medication 975 MILLIGRAM(S): at 00:15

## 2025-08-25 RX ADMIN — Medication 81 MILLIGRAM(S): at 08:36

## 2025-08-26 ENCOUNTER — TRANSCRIPTION ENCOUNTER (OUTPATIENT)
Age: 75
End: 2025-08-26

## 2025-08-26 VITALS
TEMPERATURE: 98 F | HEART RATE: 71 BPM | DIASTOLIC BLOOD PRESSURE: 68 MMHG | SYSTOLIC BLOOD PRESSURE: 104 MMHG | RESPIRATION RATE: 16 BRPM | OXYGEN SATURATION: 96 %

## 2025-08-26 LAB
ANION GAP SERPL CALC-SCNC: 11 MMOL/L — SIGNIFICANT CHANGE UP (ref 5–17)
BUN SERPL-MCNC: 10.3 MG/DL — SIGNIFICANT CHANGE UP (ref 8–20)
CALCIUM SERPL-MCNC: 8.5 MG/DL — SIGNIFICANT CHANGE UP (ref 8.4–10.5)
CHLORIDE SERPL-SCNC: 104 MMOL/L — SIGNIFICANT CHANGE UP (ref 96–108)
CO2 SERPL-SCNC: 23 MMOL/L — SIGNIFICANT CHANGE UP (ref 22–29)
CREAT SERPL-MCNC: 0.51 MG/DL — SIGNIFICANT CHANGE UP (ref 0.5–1.3)
EGFR: 106 ML/MIN/1.73M2 — SIGNIFICANT CHANGE UP
EGFR: 106 ML/MIN/1.73M2 — SIGNIFICANT CHANGE UP
GLUCOSE BLDC GLUCOMTR-MCNC: 126 MG/DL — HIGH (ref 70–99)
GLUCOSE BLDC GLUCOMTR-MCNC: 147 MG/DL — HIGH (ref 70–99)
GLUCOSE SERPL-MCNC: 111 MG/DL — HIGH (ref 70–99)
HCT VFR BLD CALC: 29.8 % — LOW (ref 39–50)
HGB BLD-MCNC: 9.8 G/DL — LOW (ref 13–17)
MAGNESIUM SERPL-MCNC: 1.6 MG/DL — SIGNIFICANT CHANGE UP (ref 1.6–2.6)
MCHC RBC-ENTMCNC: 27.5 PG — SIGNIFICANT CHANGE UP (ref 27–34)
MCHC RBC-ENTMCNC: 32.9 G/DL — SIGNIFICANT CHANGE UP (ref 32–36)
MCV RBC AUTO: 83.5 FL — SIGNIFICANT CHANGE UP (ref 80–100)
NRBC # BLD AUTO: 0 K/UL — SIGNIFICANT CHANGE UP (ref 0–0)
NRBC # FLD: 0 K/UL — SIGNIFICANT CHANGE UP (ref 0–0)
NRBC BLD AUTO-RTO: 0 /100 WBCS — SIGNIFICANT CHANGE UP (ref 0–0)
PLATELET # BLD AUTO: 216 K/UL — SIGNIFICANT CHANGE UP (ref 150–400)
PMV BLD: 9.4 FL — SIGNIFICANT CHANGE UP (ref 7–13)
POTASSIUM SERPL-MCNC: 3.9 MMOL/L — SIGNIFICANT CHANGE UP (ref 3.5–5.3)
POTASSIUM SERPL-SCNC: 3.9 MMOL/L — SIGNIFICANT CHANGE UP (ref 3.5–5.3)
RBC # BLD: 3.57 M/UL — LOW (ref 4.2–5.8)
RBC # FLD: 14.8 % — HIGH (ref 10.3–14.5)
SODIUM SERPL-SCNC: 137 MMOL/L — SIGNIFICANT CHANGE UP (ref 135–145)
WBC # BLD: 9.85 K/UL — SIGNIFICANT CHANGE UP (ref 3.8–10.5)
WBC # FLD AUTO: 9.85 K/UL — SIGNIFICANT CHANGE UP (ref 3.8–10.5)

## 2025-08-26 PROCEDURE — 36430 TRANSFUSION BLD/BLD COMPNT: CPT

## 2025-08-26 PROCEDURE — 82550 ASSAY OF CK (CPK): CPT

## 2025-08-26 PROCEDURE — 85610 PROTHROMBIN TIME: CPT

## 2025-08-26 PROCEDURE — 83605 ASSAY OF LACTIC ACID: CPT

## 2025-08-26 PROCEDURE — P9016: CPT

## 2025-08-26 PROCEDURE — P9047: CPT

## 2025-08-26 PROCEDURE — 84484 ASSAY OF TROPONIN QUANT: CPT

## 2025-08-26 PROCEDURE — 85027 COMPLETE CBC AUTOMATED: CPT

## 2025-08-26 PROCEDURE — 86891 AUTOLOGOUS BLOOD OP SALVAGE: CPT

## 2025-08-26 PROCEDURE — 36415 COLL VENOUS BLD VENIPUNCTURE: CPT

## 2025-08-26 PROCEDURE — 94002 VENT MGMT INPAT INIT DAY: CPT

## 2025-08-26 PROCEDURE — 83735 ASSAY OF MAGNESIUM: CPT

## 2025-08-26 PROCEDURE — 86900 BLOOD TYPING SEROLOGIC ABO: CPT

## 2025-08-26 PROCEDURE — 93005 ELECTROCARDIOGRAM TRACING: CPT

## 2025-08-26 PROCEDURE — 80202 ASSAY OF VANCOMYCIN: CPT

## 2025-08-26 PROCEDURE — 86850 RBC ANTIBODY SCREEN: CPT

## 2025-08-26 PROCEDURE — 80076 HEPATIC FUNCTION PANEL: CPT

## 2025-08-26 PROCEDURE — 85384 FIBRINOGEN ACTIVITY: CPT

## 2025-08-26 PROCEDURE — 82330 ASSAY OF CALCIUM: CPT

## 2025-08-26 PROCEDURE — 85014 HEMATOCRIT: CPT

## 2025-08-26 PROCEDURE — 84100 ASSAY OF PHOSPHORUS: CPT

## 2025-08-26 PROCEDURE — 82435 ASSAY OF BLOOD CHLORIDE: CPT

## 2025-08-26 PROCEDURE — 84295 ASSAY OF SERUM SODIUM: CPT

## 2025-08-26 PROCEDURE — P9045: CPT

## 2025-08-26 PROCEDURE — 80048 BASIC METABOLIC PNL TOTAL CA: CPT

## 2025-08-26 PROCEDURE — 85025 COMPLETE CBC W/AUTO DIFF WBC: CPT

## 2025-08-26 PROCEDURE — 82553 CREATINE MB FRACTION: CPT

## 2025-08-26 PROCEDURE — 71045 X-RAY EXAM CHEST 1 VIEW: CPT

## 2025-08-26 PROCEDURE — 71045 X-RAY EXAM CHEST 1 VIEW: CPT | Mod: 26

## 2025-08-26 PROCEDURE — C1889: CPT

## 2025-08-26 PROCEDURE — 80053 COMPREHEN METABOLIC PANEL: CPT

## 2025-08-26 PROCEDURE — 99233 SBSQ HOSP IP/OBS HIGH 50: CPT

## 2025-08-26 PROCEDURE — 85018 HEMOGLOBIN: CPT

## 2025-08-26 PROCEDURE — 86901 BLOOD TYPING SEROLOGIC RH(D): CPT

## 2025-08-26 PROCEDURE — 82947 ASSAY GLUCOSE BLOOD QUANT: CPT

## 2025-08-26 PROCEDURE — 82962 GLUCOSE BLOOD TEST: CPT

## 2025-08-26 PROCEDURE — C1769: CPT

## 2025-08-26 PROCEDURE — 94760 N-INVAS EAR/PLS OXIMETRY 1: CPT

## 2025-08-26 PROCEDURE — 82803 BLOOD GASES ANY COMBINATION: CPT

## 2025-08-26 PROCEDURE — 85730 THROMBOPLASTIN TIME PARTIAL: CPT

## 2025-08-26 PROCEDURE — 84132 ASSAY OF SERUM POTASSIUM: CPT

## 2025-08-26 PROCEDURE — 99024 POSTOP FOLLOW-UP VISIT: CPT

## 2025-08-26 PROCEDURE — C1751: CPT

## 2025-08-26 RX ORDER — METOPROLOL SUCCINATE 50 MG/1
1 TABLET, EXTENDED RELEASE ORAL
Qty: 30 | Refills: 1
Start: 2025-08-26 | End: 2025-10-24

## 2025-08-26 RX ORDER — INSULIN GLARGINE-YFGN 100 [IU]/ML
10 INJECTION, SOLUTION SUBCUTANEOUS
Qty: 1 | Refills: 0
Start: 2025-08-26 | End: 2025-09-24

## 2025-08-26 RX ORDER — MAGNESIUM SULFATE 500 MG/ML
2 SYRINGE (ML) INJECTION
Refills: 0 | Status: COMPLETED | OUTPATIENT
Start: 2025-08-26 | End: 2025-08-26

## 2025-08-26 RX ORDER — SENNA 187 MG
2 TABLET ORAL
Qty: 14 | Refills: 0
Start: 2025-08-26 | End: 2025-09-01

## 2025-08-26 RX ORDER — OXYCODONE HYDROCHLORIDE 30 MG/1
1 TABLET ORAL
Qty: 20 | Refills: 0
Start: 2025-08-26 | End: 2025-08-30

## 2025-08-26 RX ORDER — METOPROLOL SUCCINATE 50 MG/1
25 TABLET, EXTENDED RELEASE ORAL DAILY
Refills: 0 | Status: DISCONTINUED | OUTPATIENT
Start: 2025-08-26 | End: 2025-08-26

## 2025-08-26 RX ORDER — ASPIRIN 325 MG
1 TABLET ORAL
Qty: 30 | Refills: 1
Start: 2025-08-26 | End: 2025-10-24

## 2025-08-26 RX ORDER — ROSUVASTATIN CALCIUM 20 MG/1
1 TABLET, FILM COATED ORAL
Qty: 30 | Refills: 1
Start: 2025-08-26 | End: 2025-10-24

## 2025-08-26 RX ORDER — ISOPROPYL ALCOHOL, BENZOCAINE .7; .06 ML/ML; ML/ML
0 SWAB TOPICAL
Qty: 100 | Refills: 1
Start: 2025-08-26

## 2025-08-26 RX ORDER — ACETAMINOPHEN 500 MG/5ML
650 LIQUID (ML) ORAL EVERY 6 HOURS
Refills: 0 | Status: DISCONTINUED | OUTPATIENT
Start: 2025-08-26 | End: 2025-08-26

## 2025-08-26 RX ORDER — AMIODARONE HYDROCHLORIDE 50 MG/ML
1 INJECTION, SOLUTION INTRAVENOUS
Qty: 1 | Refills: 1
Start: 2025-08-26 | End: 2025-10-24

## 2025-08-26 RX ORDER — METFORMIN HYDROCHLORIDE 850 MG/1
1 TABLET ORAL
Qty: 60 | Refills: 1
Start: 2025-08-26 | End: 2025-10-24

## 2025-08-26 RX ORDER — METFORMIN HYDROCHLORIDE 850 MG/1
1 TABLET ORAL
Refills: 0 | DISCHARGE

## 2025-08-26 RX ORDER — ACETAMINOPHEN 500 MG/5ML
2 LIQUID (ML) ORAL
Qty: 0 | Refills: 0 | DISCHARGE
Start: 2025-08-26

## 2025-08-26 RX ADMIN — Medication 81 MILLIGRAM(S): at 12:20

## 2025-08-26 RX ADMIN — Medication 25 GRAM(S): at 09:04

## 2025-08-26 RX ADMIN — AMIODARONE HYDROCHLORIDE 400 MILLIGRAM(S): 50 INJECTION, SOLUTION INTRAVENOUS at 05:16

## 2025-08-26 RX ADMIN — Medication 1 APPLICATION(S): at 13:08

## 2025-08-26 RX ADMIN — Medication 500 MILLIGRAM(S): at 05:17

## 2025-08-26 RX ADMIN — METOPROLOL SUCCINATE 25 MILLIGRAM(S): 50 TABLET, EXTENDED RELEASE ORAL at 12:20

## 2025-08-26 RX ADMIN — INSULIN LISPRO 3 UNIT(S): 100 INJECTION, SOLUTION INTRAVENOUS; SUBCUTANEOUS at 13:05

## 2025-08-26 RX ADMIN — GABAPENTIN 100 MILLIGRAM(S): 400 CAPSULE ORAL at 13:04

## 2025-08-26 RX ADMIN — Medication 40 MILLIGRAM(S): at 12:20

## 2025-08-26 RX ADMIN — GABAPENTIN 100 MILLIGRAM(S): 400 CAPSULE ORAL at 05:17

## 2025-08-26 RX ADMIN — INSULIN LISPRO 3 UNIT(S): 100 INJECTION, SOLUTION INTRAVENOUS; SUBCUTANEOUS at 08:48

## 2025-08-26 RX ADMIN — AMIODARONE HYDROCHLORIDE 400 MILLIGRAM(S): 50 INJECTION, SOLUTION INTRAVENOUS at 13:04

## 2025-08-27 ENCOUNTER — TRANSCRIPTION ENCOUNTER (OUTPATIENT)
Age: 75
End: 2025-08-27

## 2025-08-29 ENCOUNTER — APPOINTMENT (OUTPATIENT)
Dept: CARE COORDINATION | Facility: HOME HEALTH | Age: 75
End: 2025-08-29
Payer: MEDICARE

## 2025-08-29 VITALS
BODY MASS INDEX: 24.36 KG/M2 | DIASTOLIC BLOOD PRESSURE: 60 MMHG | HEART RATE: 73 BPM | OXYGEN SATURATION: 95 % | HEIGHT: 65.5 IN | SYSTOLIC BLOOD PRESSURE: 98 MMHG | RESPIRATION RATE: 16 BRPM | WEIGHT: 148 LBS

## 2025-08-29 VITALS — HEIGHT: 65.5 IN

## 2025-08-29 PROCEDURE — 99024 POSTOP FOLLOW-UP VISIT: CPT

## 2025-08-29 RX ORDER — METFORMIN HYDROCHLORIDE 1000 MG/1
1000 TABLET, COATED ORAL
Refills: 0 | Status: ACTIVE | COMMUNITY

## 2025-08-29 RX ORDER — METOPROLOL SUCCINATE 25 MG/1
25 TABLET, EXTENDED RELEASE ORAL
Refills: 0 | Status: ACTIVE | COMMUNITY

## 2025-08-29 RX ORDER — INSULIN GLARGINE 100 [IU]/ML
100 INJECTION, SOLUTION SUBCUTANEOUS
Refills: 0 | Status: ACTIVE | COMMUNITY

## 2025-08-29 RX ORDER — AMIODARONE HYDROCHLORIDE 200 MG/1
200 TABLET ORAL
Refills: 0 | Status: ACTIVE | COMMUNITY

## 2025-08-29 RX ORDER — TAMSULOSIN HYDROCHLORIDE 0.4 MG/1
0.4 CAPSULE ORAL
Refills: 0 | Status: ACTIVE | COMMUNITY

## 2025-08-29 RX ORDER — GABAPENTIN 100 MG/1
100 CAPSULE ORAL
Refills: 0 | Status: ACTIVE | COMMUNITY

## 2025-08-29 RX ORDER — ROSUVASTATIN CALCIUM 20 MG/1
20 TABLET, FILM COATED ORAL
Refills: 0 | Status: ACTIVE | COMMUNITY

## 2025-09-02 ENCOUNTER — APPOINTMENT (OUTPATIENT)
Dept: RADIOLOGY | Facility: CLINIC | Age: 75
End: 2025-09-02
Payer: MEDICARE

## 2025-09-02 ENCOUNTER — OUTPATIENT (OUTPATIENT)
Dept: OUTPATIENT SERVICES | Facility: HOSPITAL | Age: 75
LOS: 1 days | End: 2025-09-02
Payer: MEDICARE

## 2025-09-02 ENCOUNTER — RESULT REVIEW (OUTPATIENT)
Age: 75
End: 2025-09-02

## 2025-09-02 DIAGNOSIS — Z00.8 ENCOUNTER FOR OTHER GENERAL EXAMINATION: ICD-10-CM

## 2025-09-02 DIAGNOSIS — Z92.89 PERSONAL HISTORY OF OTHER MEDICAL TREATMENT: Chronic | ICD-10-CM

## 2025-09-02 DIAGNOSIS — Z98.890 OTHER SPECIFIED POSTPROCEDURAL STATES: Chronic | ICD-10-CM

## 2025-09-02 PROCEDURE — 71046 X-RAY EXAM CHEST 2 VIEWS: CPT

## 2025-09-02 PROCEDURE — 71046 X-RAY EXAM CHEST 2 VIEWS: CPT | Mod: 26

## 2025-09-03 ENCOUNTER — NON-APPOINTMENT (OUTPATIENT)
Age: 75
End: 2025-09-03

## 2025-09-05 ENCOUNTER — NON-APPOINTMENT (OUTPATIENT)
Age: 75
End: 2025-09-05

## 2025-09-05 ENCOUNTER — APPOINTMENT (OUTPATIENT)
Dept: CARDIOTHORACIC SURGERY | Facility: CLINIC | Age: 75
End: 2025-09-05
Payer: MEDICARE

## 2025-09-05 ENCOUNTER — APPOINTMENT (OUTPATIENT)
Dept: ENDOCRINOLOGY | Facility: CLINIC | Age: 75
End: 2025-09-05

## 2025-09-05 VITALS
HEIGHT: 65.5 IN | BODY MASS INDEX: 25.02 KG/M2 | SYSTOLIC BLOOD PRESSURE: 88 MMHG | OXYGEN SATURATION: 96 % | HEART RATE: 83 BPM | WEIGHT: 152 LBS | DIASTOLIC BLOOD PRESSURE: 59 MMHG

## 2025-09-05 VITALS
SYSTOLIC BLOOD PRESSURE: 93 MMHG | WEIGHT: 149.4 LBS | RESPIRATION RATE: 16 BRPM | HEIGHT: 65.5 IN | DIASTOLIC BLOOD PRESSURE: 62 MMHG | HEART RATE: 78 BPM | TEMPERATURE: 98.1 F | OXYGEN SATURATION: 95 % | BODY MASS INDEX: 24.59 KG/M2

## 2025-09-05 DIAGNOSIS — E78.5 HYPERLIPIDEMIA, UNSPECIFIED: ICD-10-CM

## 2025-09-05 DIAGNOSIS — Z86.73 PERSONAL HISTORY OF TRANSIENT ISCHEMIC ATTACK (TIA), AND CEREBRAL INFARCTION W/OUT RESIDUAL DEFICITS: ICD-10-CM

## 2025-09-05 DIAGNOSIS — I25.10 ATHEROSCLEROTIC HEART DISEASE OF NATIVE CORONARY ARTERY W/OUT ANGINA PECTORIS: ICD-10-CM

## 2025-09-05 DIAGNOSIS — E11.9 TYPE 2 DIABETES MELLITUS W/OUT COMPLICATIONS: ICD-10-CM

## 2025-09-05 DIAGNOSIS — Z95.1 PRESENCE OF AORTOCORONARY BYPASS GRAFT: ICD-10-CM

## 2025-09-05 DIAGNOSIS — I42.9 CARDIOMYOPATHY, UNSPECIFIED: ICD-10-CM

## 2025-09-05 LAB — GLUCOSE BLDC GLUCOMTR-MCNC: 216

## 2025-09-05 PROCEDURE — 99024 POSTOP FOLLOW-UP VISIT: CPT

## 2025-09-05 PROCEDURE — 99215 OFFICE O/P EST HI 40 MIN: CPT

## 2025-09-05 PROCEDURE — 82962 GLUCOSE BLOOD TEST: CPT

## 2025-09-05 PROCEDURE — G2211 COMPLEX E/M VISIT ADD ON: CPT

## 2025-09-05 RX ORDER — INSULIN GLARGINE 100 [IU]/ML
INJECTION, SOLUTION SUBCUTANEOUS
Refills: 0 | Status: ACTIVE | COMMUNITY

## 2025-09-05 RX ORDER — OXYCODONE 5 MG/1
5 TABLET ORAL
Refills: 0 | Status: ACTIVE | COMMUNITY

## 2025-09-05 RX ORDER — PANTOPRAZOLE SODIUM 40 MG/1
40 GRANULE, DELAYED RELEASE ORAL
Refills: 0 | Status: ACTIVE | COMMUNITY

## 2025-09-12 ENCOUNTER — APPOINTMENT (OUTPATIENT)
Dept: CARDIOLOGY | Facility: CLINIC | Age: 75
End: 2025-09-12
Payer: MEDICARE

## 2025-09-12 VITALS
HEIGHT: 65.5 IN | DIASTOLIC BLOOD PRESSURE: 59 MMHG | SYSTOLIC BLOOD PRESSURE: 95 MMHG | BODY MASS INDEX: 25.02 KG/M2 | WEIGHT: 152 LBS | HEART RATE: 73 BPM | RESPIRATION RATE: 16 BRPM

## 2025-09-12 DIAGNOSIS — E11.9 TYPE 2 DIABETES MELLITUS W/OUT COMPLICATIONS: ICD-10-CM

## 2025-09-12 DIAGNOSIS — I25.10 ATHEROSCLEROTIC HEART DISEASE OF NATIVE CORONARY ARTERY W/OUT ANGINA PECTORIS: ICD-10-CM

## 2025-09-12 DIAGNOSIS — E78.5 HYPERLIPIDEMIA, UNSPECIFIED: ICD-10-CM

## 2025-09-12 DIAGNOSIS — I10 ESSENTIAL (PRIMARY) HYPERTENSION: ICD-10-CM

## 2025-09-12 DIAGNOSIS — I48.91 UNSPECIFIED ATRIAL FIBRILLATION: ICD-10-CM

## 2025-09-12 DIAGNOSIS — I42.9 CARDIOMYOPATHY, UNSPECIFIED: ICD-10-CM

## 2025-09-12 PROCEDURE — 99214 OFFICE O/P EST MOD 30 MIN: CPT

## 2025-09-12 PROCEDURE — 93000 ELECTROCARDIOGRAM COMPLETE: CPT

## (undated) DEVICE — Device

## (undated) DEVICE — PRESSURE INFUSOR BAG 1000ML

## (undated) DEVICE — BEAVER BLADE MINI SHARP ALL ROUND (BLUE)

## (undated) DEVICE — SUT PROLENE 4-0 36" SH

## (undated) DEVICE — DRSG 2X2

## (undated) DEVICE — SOL IRR BAG NS 0.9% 1000ML

## (undated) DEVICE — SOL IRR POUR H2O 1000ML

## (undated) DEVICE — SOL IRR BAG H2O 1000ML

## (undated) DEVICE — PREP SCRUB BRUSH W CHG 4%

## (undated) DEVICE — XI OBTURATOR OPTICAL BLADELESS 8MM

## (undated) DEVICE — SUT PROLENE 7-0 24" BV-1

## (undated) DEVICE — SUT MONOCRYL 4-0 27" PS-2 UNDYED

## (undated) DEVICE — SUT ETHIBOND 4-0 36" RB-1

## (undated) DEVICE — SUT PROLENE 6-0 30" C-1

## (undated) DEVICE — AORTIC PUNCH 4.0MM STANDARD HANDLE

## (undated) DEVICE — SUT PROLENE 4-0 30" SH-1

## (undated) DEVICE — SYR LUER LOK 20CC

## (undated) DEVICE — SUT VICRYL 0 36" CTX UNDYED

## (undated) DEVICE — VISITEC 4X4

## (undated) DEVICE — SUT ETHIBOND 3-0 36" RB-1

## (undated) DEVICE — BITE BLOCK ADULT 20 X 27MM (GREEN)

## (undated) DEVICE — DRSG DERMABOND 0.7ML

## (undated) DEVICE — AORTIC PUNCH 4.4MM LONG HANDLE

## (undated) DEVICE — MARKING PEN W RULER

## (undated) DEVICE — NDL COUNTER FOAM AND MAGNET 40-70

## (undated) DEVICE — SOL IRR POUR NS 0.9% 1000ML

## (undated) DEVICE — SOL INJ NS 0.9% 1000ML

## (undated) DEVICE — MAXI-FLO SUCTION CATHETER KIT 14FR STRAIGHT

## (undated) DEVICE — WOUND IRR IRRISEPT W 0.5 CHG

## (undated) DEVICE — PACK IV START WITH CHG

## (undated) DEVICE — VENODYNE/SCD SLEEVE CALF MEDIUM

## (undated) DEVICE — DRAPE IOBAN 33" X 23"

## (undated) DEVICE — DVC CLEVERLOCK

## (undated) DEVICE — SOL BAG NS 0.9% 1000ML

## (undated) DEVICE — SUT VICRYL PLUS 0 27" CT-2 UNDYED

## (undated) DEVICE — DRAPE GENERAL ENDOSCOPY

## (undated) DEVICE — GOWN TRIMAX LG

## (undated) DEVICE — DRSG MEPILEX 10 X 10CM (4 X 4") AG

## (undated) DEVICE — DRSG CURITY GAUZE SPONGE 4 X 4" 12-PLY NON-STERILE

## (undated) DEVICE — ELCTR BOVIE BLADE 3/4" EXTENDED LENGTH 6"

## (undated) DEVICE — ELCTR BOVIE PENCIL SMOKE EVACUATION 15FT

## (undated) DEVICE — GOWN ROYAL SILK XL

## (undated) DEVICE — CHEST DRAIN PLEUR-EVAC DRY/WET ADULT-PEDS SINGLE (QUICK)

## (undated) DEVICE — DRSG TEGADERM 4 X 4.75"

## (undated) DEVICE — XI ENDOWRIST SUCTION IRRIGATOR 8MM

## (undated) DEVICE — SUT VICRYL 3-0 27" CT-1

## (undated) DEVICE — TUBING IV EXTENSION MACRO W CLAVE 7"

## (undated) DEVICE — PACK VALVE

## (undated) DEVICE — SUT ETHIBOND 2-0 36" SH

## (undated) DEVICE — DRSG OPSITE 2.5 X 2"

## (undated) DEVICE — SUT ETHIBOND 1 30" OS8

## (undated) DEVICE — WARMING BLANKET FULL ADULT

## (undated) DEVICE — XI CORD MONOPOLAR CAUTERY (GREEN)

## (undated) DEVICE — D HELP - CLEARVIEW CLEARIFY SYSTEM

## (undated) DEVICE — SUT SILK 0 30" TIES

## (undated) DEVICE — FORCEP RADIAL JAW 4 W NDL 2.4MM 2.8MM 240CM ORANGE DISP

## (undated) DEVICE — SUT PROLENE 7-0 24" BV175-6

## (undated) DEVICE — SUT SILK 0 30" SH

## (undated) DEVICE — SYR LUER SLIP TIP 50CC

## (undated) DEVICE — SUT PLEDGET 9MM X 4MM X 1.5MM

## (undated) DEVICE — SYR SLIP 10CC

## (undated) DEVICE — STOPCOCK 3 WAY W SWIVEL MALE LUER LOCK

## (undated) DEVICE — SUT PROLENE 4-0 36" RB-1

## (undated) DEVICE — MASK PROCEDURE EARLOOP LVL 2 50/BX

## (undated) DEVICE — FORCEP BIOPSY ENDOSCOPIC 2.8MM DISP

## (undated) DEVICE — ELCTR GROUNDING PAD ADULT COVIDIEN

## (undated) DEVICE — CATH IV SAFE BC 22G X 1" (BLUE)

## (undated) DEVICE — SYR IV FLUSH SALINE 10ML 30/TY

## (undated) DEVICE — XI ARM CLIP APPLIER LARGE

## (undated) DEVICE — BULLDOG SPRING CLIP 6MM SOFT/SOFT

## (undated) DEVICE — SOL ANTI FOG (FRED)

## (undated) DEVICE — DRAPE TOWEL BLUE STICKY

## (undated) DEVICE — DRSG MEPILEX 10 X 25CM (4 X 10") POST OP AG SILVER

## (undated) DEVICE — TUBING ALARIS PUMP MODULE NON-DEHP

## (undated) DEVICE — ELCTR BOVIE PENCIL HANDPIECE ROCKER SWITCH 15FT

## (undated) DEVICE — SUT SILK 5-0 60" TIES

## (undated) DEVICE — STAPLER SKIN PROXIMATE

## (undated) DEVICE — STEALTH CLAMP INSERT FIBRA/FIBRA 60MM

## (undated) DEVICE — ELCTR MULTIFUNCTION DEFIBRILLATION ELECTRODE EDGE SYSTEM ADULT

## (undated) DEVICE — PACK OPEN HEART VAMP PLUS

## (undated) DEVICE — XI CORD BIPOLAR CAUTERY (BLUE)

## (undated) DEVICE — XI ARM FORCE BIPOLAR 8MM

## (undated) DEVICE — SUT VICRYL 1 36" CTX UNDYED

## (undated) DEVICE — TROCAR COVIDIEN VERSAONE BLUNT TIP HASSAN 12MM

## (undated) DEVICE — DRSG MEPILEX 10 X 30CM (4 X 12") WHITE

## (undated) DEVICE — PREP CHLORAPREP HI-LITE ORANGE 26ML

## (undated) DEVICE — DRAPE SLUSH / WARMER 44 X 66"

## (undated) DEVICE — DRAPE XL SHEET 77X98"

## (undated) DEVICE — SUT ETHIBOND 2-0 4-30" RB-1 WHITE

## (undated) DEVICE — XI ARM PERMANENT CAUTERY HOOK

## (undated) DEVICE — CONNECTOR STRAIGHT 3/8 X 1/2"

## (undated) DEVICE — SENSOR O2 FINGER ADULT

## (undated) DEVICE — POSITIONER FOAM EGG CRATE ULNAR 2PCS (PINK)

## (undated) DEVICE — DRSG OPSITE 13.75 X 4"

## (undated) DEVICE — BLOWER MISTER VIPER II

## (undated) DEVICE — VASOVIEW HEMOPRO 2

## (undated) DEVICE — SUT VICRYL 2-0 27" CT-1 UNDYED

## (undated) DEVICE — TUBING INSUFFLATION LAP FILTER 10FT

## (undated) DEVICE — GOWN IMPERV XL

## (undated) DEVICE — XI ARM FORCEP PROGRASP 8MM

## (undated) DEVICE — SUT SILK 4-0 30" RB-1

## (undated) DEVICE — TONGUE DEPRESSOR

## (undated) DEVICE — UNDERPAD LINEN SAVER 23 X 36"

## (undated) DEVICE — WARMING BLANKET DUO-THERM HYPER/HYPOTHERM ADULT

## (undated) DEVICE — DENTURE CUP PINK

## (undated) DEVICE — LAP PAD 18 X 18"

## (undated) DEVICE — VESSEL LOOP MAXI-RED  0.120" X 16"

## (undated) DEVICE — ENDOCATCH 10MM

## (undated) DEVICE — DRAPE TOWEL BLUE 17" X 24"

## (undated) DEVICE — XI SEAL UNIVERSIAL 5-12MM

## (undated) DEVICE — SUT DOUBLE 6 WIRE STERNAL

## (undated) DEVICE — PACK ROBOTIC

## (undated) DEVICE — SSH-ERBE RM1 11351341: Type: DURABLE MEDICAL EQUIPMENT